# Patient Record
Sex: MALE | Race: BLACK OR AFRICAN AMERICAN | NOT HISPANIC OR LATINO | Employment: OTHER | URBAN - METROPOLITAN AREA
[De-identification: names, ages, dates, MRNs, and addresses within clinical notes are randomized per-mention and may not be internally consistent; named-entity substitution may affect disease eponyms.]

---

## 2017-03-13 ENCOUNTER — ALLSCRIPTS OFFICE VISIT (OUTPATIENT)
Dept: OTHER | Facility: OTHER | Age: 77
End: 2017-03-13

## 2017-03-13 DIAGNOSIS — N18.30 CHRONIC KIDNEY DISEASE, STAGE III (MODERATE) (HCC): ICD-10-CM

## 2017-06-21 ENCOUNTER — APPOINTMENT (OUTPATIENT)
Dept: LAB | Facility: CLINIC | Age: 77
End: 2017-06-21
Payer: MEDICARE

## 2017-06-21 ENCOUNTER — ALLSCRIPTS OFFICE VISIT (OUTPATIENT)
Dept: OTHER | Facility: OTHER | Age: 77
End: 2017-06-21

## 2017-06-21 DIAGNOSIS — G40.909 EPILEPSY WITHOUT STATUS EPILEPTICUS, NOT INTRACTABLE (HCC): ICD-10-CM

## 2017-06-21 LAB
ALBUMIN SERPL BCP-MCNC: 3.1 G/DL (ref 3.5–5)
ALP SERPL-CCNC: 75 U/L (ref 46–116)
ALT SERPL W P-5'-P-CCNC: 15 U/L (ref 12–78)
ANION GAP SERPL CALCULATED.3IONS-SCNC: 5 MMOL/L (ref 4–13)
AST SERPL W P-5'-P-CCNC: 14 U/L (ref 5–45)
BILIRUB SERPL-MCNC: 0.4 MG/DL (ref 0.2–1)
BUN SERPL-MCNC: 22 MG/DL (ref 5–25)
CALCIUM SERPL-MCNC: 8.8 MG/DL (ref 8.3–10.1)
CHLORIDE SERPL-SCNC: 102 MMOL/L (ref 100–108)
CO2 SERPL-SCNC: 31 MMOL/L (ref 21–32)
CREAT SERPL-MCNC: 1.79 MG/DL (ref 0.6–1.3)
GFR SERPL CREATININE-BSD FRML MDRD: 45.2 ML/MIN/1.73SQ M
GLUCOSE SERPL-MCNC: 161 MG/DL (ref 65–140)
POTASSIUM SERPL-SCNC: 4.4 MMOL/L (ref 3.5–5.3)
PROT SERPL-MCNC: 7.5 G/DL (ref 6.4–8.2)
SODIUM SERPL-SCNC: 138 MMOL/L (ref 136–145)
VALPROATE SERPL-MCNC: 89 UG/ML (ref 50–100)

## 2017-06-21 PROCEDURE — 80053 COMPREHEN METABOLIC PANEL: CPT

## 2017-06-21 PROCEDURE — 80164 ASSAY DIPROPYLACETIC ACD TOT: CPT

## 2017-06-21 PROCEDURE — 36415 COLL VENOUS BLD VENIPUNCTURE: CPT

## 2017-10-10 ENCOUNTER — GENERIC CONVERSION - ENCOUNTER (OUTPATIENT)
Dept: OTHER | Facility: OTHER | Age: 77
End: 2017-10-10

## 2017-10-10 ENCOUNTER — GENERIC CONVERSION - ENCOUNTER (OUTPATIENT)
Dept: NEPHROLOGY | Facility: CLINIC | Age: 77
End: 2017-10-10

## 2017-10-10 DIAGNOSIS — N18.30 CHRONIC KIDNEY DISEASE, STAGE III (MODERATE) (HCC): ICD-10-CM

## 2017-10-17 ENCOUNTER — HOSPITAL ENCOUNTER (OUTPATIENT)
Dept: NON INVASIVE DIAGNOSTICS | Facility: CLINIC | Age: 77
Discharge: HOME | End: 2017-10-17
Payer: MEDICARE

## 2017-10-17 DIAGNOSIS — I65.23 OCCLUSION AND STENOSIS OF BILATERAL CAROTID ARTERIES: ICD-10-CM

## 2017-10-17 PROCEDURE — 93880 EXTRACRANIAL BILAT STUDY: CPT

## 2017-12-18 ENCOUNTER — TRANSCRIBE ORDERS (OUTPATIENT)
Dept: ADMINISTRATIVE | Facility: HOSPITAL | Age: 77
End: 2017-12-18

## 2017-12-18 ENCOUNTER — ALLSCRIPTS OFFICE VISIT (OUTPATIENT)
Dept: OTHER | Facility: OTHER | Age: 77
End: 2017-12-18

## 2017-12-18 DIAGNOSIS — G91.2 IDIOPATHIC NORMAL PRESSURE HYDROCEPHALUS (HCC): ICD-10-CM

## 2017-12-18 DIAGNOSIS — G91.2 LOW PRESSURE HYDROCEPHALUS (HCC): Primary | ICD-10-CM

## 2017-12-19 NOTE — PROGRESS NOTES
Assessment  1  CVA (cerebral vascular accident) (434 91) (I63 9)   2  Carotid stenosis, bilateral (433 10,433 30) (I65 23)   3  Seizure disorder (345 90) (G40 909)   4  NPH (normal pressure hydrocephalus) (331 5) (G91 2)    Plan  NPH (normal pressure hydrocephalus)    · Continue with our present treatment plan ; Status:Complete;   Done: [id number]   Ordered;For:NPH (normal pressure hydrocephalus); Ordered By:Wiley Abdul;   · * MRI BRAIN WO CONTRAST; Status:Need Information - Financial Authorization; Requested for:[id number];    Perform:[facility] Radiology; Due:47Hpx7908; Last Updated By:Jamal Calero; 12/18/2017 10:55:51 AM;Ordered;For:NPH (normal pressure hydrocephalus); Ordered By:Wiley Abdul;   · Follow-up visit in 6 months Evaluation and Treatment  Follow-up  Status: Complete Done: 44IWA7068   Ordered; For: NPH (normal pressure hydrocephalus); Ordered By: Violetta Howard Performed:  Due: 90HQZ3129; Last Updated By: Lyndsey Lainez; 12/18/2017 10:56:04 AM  Seizure disorder    · Divalproex Sodium 500 MG Oral Tablet Delayed Release; take 2 tablets twice aday   Rx By: Violetta Howard; Dispense: 90 Days ; #:360 Tablet Delayed Release; Refill: 3;For: Seizure disorder; YASMEEN = N; Verified Transmission to [employer]; Last Updated By: [employer]; 12/18/2017 10:52:47 AM    Discussion/Summary  Discussion Summary:   Patient with a history of seizure disorder, lacunar CVAs has been stable on his current medications  He is advised to continue the same  Patient will also have a repeat MRI of the brain done this year due to suspected normal pressure hydrocephalus  Continue Depakote 1000 mg twice a day, patient will return back to see me in 3 months  Chief Complaint  Chief Complaint Free Text Note Form: Patient is here for follow up visit for his history of CVA and seizure disorder        History of Present Illness  HPI: Patient is here for a follow-up visit with a history of seizure disorder, CVA and suspected normal pressure hydrocephalus  Since his last visit he has not had any subsequent seizures and remains on Depakote 1000 mg twice a day  His most recent Depakote level was 89 and the patient denies any side effects from the medication  Patient had an MRI of the brain last year which showed evidence of suspected normal pressure hydrocephalus with bilateral lacunar basal ganglia CVAs  He continues to have bladder dysfunction, gait dysfunction and occasionally his wife has noticed him to have some visual hallucinations  Patient denies any central neurological symptoms such as headaches blurred vision slurred speech and ambulates with the help of a cane  Review of Systems  Neurological ROS:  Constitutional: no fever, no chills, no recent weight gain, no recent weight loss, no complaints of feeling tired, no changes in appetite  HEENT:  no sinus problems, not feeling congested, no blurred vision, no dryness of the eyes, no eye pain, no hearing loss, no tinnitus, no mouth sores, no sore throat, no hoarseness, no dysphagia, no masses, no bleeding  Cardiovascular:  no chest pain or pressure, no palpitations present, the heart rate was not rapid or irregular, no swelling in the arms or legs, no poor circulation  Respiratory:  no unusual or persistant cough, no shortness of breath with or without exertion  Gastrointestinal:  no nausea, no vomiting, no diarrhea, no abdominal pain, no changes in bowel habits, no melena, no loss of bowel control  Genitourinary:  no incontinence, no feelings of urinary urgency, no increase in frequency, no urinary hesitancy, no dysuria, no hematuria  Musculoskeletal: head/neck/back pain  Integumentary  no masses, no rash, no skin lesions, no livedo reticularis  Psychiatric:  no anxiety, no depression, no mood swings, no psychiatric hospitalizations, no sleep problems  Endocrine   no unusual weight loss or gain, no excessive urination, no excessive thirst, no hair loss or gain, no hot or cold intolerance, no menstrual period change or irregularity, no loss of sexual ability or drive, no erection difficulty, no nipple discharge  Hematologic/Lymphatic:  no unusual bleeding, no tendency for easy bruising, no clotting skin or lumps  Neurological General:  no headache, no nausea or vomiting, no lightheadedness, no convulsions, no blackouts, no syncope, no trauma, no photopsia, no increased sleepiness, no trouble falling asleep, no snoring, no awakening at night  Neurological Mental Status:  no confusion, no mood swings, no alteration or loss of consciousness, no difficulty expressing/understanding speech, no memory problems  Neurological Cranial Nerves:  no blurry or double vision, no loss of vision, no face drooping, no facial numbness or weakness, no taste or smell loss/changes, no hearing loss or ringing, no vertigo or dizziness, no dysphagia, no slurred speech  Neurological Motor findings include:  no tremor, no twitching, no cramping(pre/post exercise), no atrophy  Neurological Coordination:  no unsteadiness, no vertigo or dizziness, no clumsiness, no problems reaching for objects  Neurological Sensory:  no numbness, no pain, no tingling, does not fall when eyes closed or taking a shower  Neurological Gait:  no difficulty walking, not falling to one side, no sensation of being pushed, has not had falls  ROS Reviewed:   ROS reviewed  Active Problems  1  Abnormal MRA, brain (793 0) (R90 89)   2  Acute renal failure syndrome (584 9) (N17 9)   3  Cardiac arrhythmia (427 9) (I49 9)   4  Carotid stenosis, bilateral (433 10,433 30) (I65 23)   5  Chronic kidney disease, stage 3 (585 3) (N18 3)   6  CVA (cerebral vascular accident) (434 91) (I63 9)   7  Diabetes (250 00) (E11 9)   8  Hyperosmolality and/or hypernatremia (276 0) (E87 0)   9  Hypertension (401 9) (I10)   10  Lymphedema of both lower extremities (457 1) (I89 0)   11  Memory loss (780 93) (R41 3)   12  Seizure disorder (345 90) (G40 909)    Past Medical History  1  History of Epileptic seizures (345 90) (G40 909)   2  History of alcohol abuse (305 03) (Z87 898)   3  History of diabetes mellitus (V12 29) (Z86 39)   4  History of kidney disease (V13 09) (Z87 448)   5  History of stroke (V12 54) (Z86 73)    Surgical History  1  History of Cataract Surgery   2  History of Umbilical Hernia Repair    Family History  Mother    1  Family history of   Father    2  Family history of   Brother    3  Family history of cerebrovascular accident (CVA) (V17 1) (Z82 3)   4  Family history of diabetes mellitus (V18 0) (Z83 3)    Social History   · Active advance directive (V4 89) (Z78 9)   · Caffeine use (V4 89) (F15 90)   · Employed   · Exercises occasionally (V49 89) (Z78 9)   · Former smoker (H34 62) (B47 139)   ·    · Never used chewing tobacco (V4 89) (Z78 9)   · Retired   · Two children  Social History Reviewed: The social history was reviewed and updated today  Current Meds   1  Aspirin 325 MG Oral Tablet; Take 1 tablet daily; Therapy: (Recorded:2016) to Recorded   2  Atorvastatin Calcium 20 MG Oral Tablet; TAKE 1 TABLET DAILY; Therapy: (Recorded:50Nfh0075) to Recorded   3  Brimonidine Tartrate 0 1 % SOLN; INSTILL 1 DROP IN BOTH EYES EVERY 12 HOURS DAILY; Therapy: (Recorded:2016) to Recorded   4  Divalproex Sodium 500 MG Oral Tablet Delayed Release; take 2 tablets twice a day; Therapy: 72OHM3718 to (Last Rx:2017)  Requested for: 42ZJZ5882 Ordered   5  Dronedarone HCl 400 MG TABS; Take 1 tablet twice daily; Therapy: (Recorded:2016) to Recorded   6  Ferrous Sulfate 325 (65 Fe) MG Oral Tablet; Take 1 tablet twice daily; Therapy: (Recorded:07Dmp5224) to Recorded   7  Folic Acid 1 MG Oral Tablet; TAKE 1 TABLET DAILY; Therapy: (Narayan Rene) to Recorded   8  Furosemide 40 MG Oral Tablet; Take 1 tablet daily; Therapy: (Recorded:2016) to Recorded   9   Klor-Con M20 20 MEQ Oral Tablet Extended Release; TAKE 1 TABLET DAILY; Therapy: (Recorded:02Nov2016) to Recorded   10  Lantus SoloStar 100 UNIT/ML Subcutaneous Solution Pen-injector; INJECT 36 UNIT  Daily; Therapy: (Recorded:24Dmg3391) to Recorded   11  Metoprolol Tartrate 50 MG Oral Tablet; TAKE 1 TABLET EVERY 12 HOURS; Therapy: (Recorded:02Nov2016) to Recorded   12  Multivitamins TABS; TAKE 1 TABLET DAILY; Therapy: (Recorded:02Nov2016) to Recorded   13  Onglyza 5 MG Oral Tablet; Take 1 tablet daily; Therapy: (Recorded:02Nov2016) to Recorded   14  Travatan Z 0 004 % Ophthalmic Solution; INSTILL 1 DROP Twice daily BOTH EYES;  Therapy: (Recorded:21Jun2017) to Recorded  Medication List Reviewed: The medication list was reviewed and updated today  Allergies  1  Vasotec TABS  2  No Known Environmental Allergies   3  No Known Food Allergies    Vitals  Signs   Recorded: 15DPI1292 10:30AM   Heart Rate: 60  Systolic: 110  Diastolic: 80  Height: 5 ft 8 in  Weight: 268 lb   BMI Calculated: 40 75  BSA Calculated: 2 31    Physical Exam   Constitutional  General appearance: No acute distress, well appearing and well nourished  Musculoskeletal  Gait and station: Abnormal  -- Patient ambulates with the help of a cane, overall gait slowness noted, but his gait is normal based  Muscle strength: Normal strength throughout  Muscle tone: No atrophy, abnormal movements, flaccidity, cogwheeling or spasticity  Neurologic  Orientation to person, place, and time: Normal    Language: Names objects, able to repeat phrases and speaks spontaneously  -- Mild hypophonia noted  2nd cranial nerve: Normal    3rd, 4th, and 6th cranial nerves: Normal    7th cranial nerve: Normal    Sensation: Normal    Reflexes: Normal    Coordination: Normal        Future Appointments    Date/Time Provider Specialty Site   04/12/2018 10:30 AM HAMILTON Barreto  Nephrology [address] [facility]     Signatures   Electronically signed by :  Lula Mascorro MD; Dec 18 2017 11:06AM EST                       (Author)

## 2018-01-12 ENCOUNTER — HOSPITAL ENCOUNTER (OUTPATIENT)
Dept: MRI IMAGING | Facility: CLINIC | Age: 78
Discharge: HOME | End: 2018-01-12
Payer: MEDICARE

## 2018-01-12 DIAGNOSIS — G91.2 IDIOPATHIC NORMAL PRESSURE HYDROCEPHALUS (HCC): ICD-10-CM

## 2018-01-12 PROCEDURE — 70551 MRI BRAIN STEM W/O DYE: CPT

## 2018-01-14 VITALS
TEMPERATURE: 97.7 F | SYSTOLIC BLOOD PRESSURE: 110 MMHG | RESPIRATION RATE: 16 BRPM | HEIGHT: 68 IN | WEIGHT: 278.25 LBS | BODY MASS INDEX: 42.17 KG/M2 | DIASTOLIC BLOOD PRESSURE: 70 MMHG | HEART RATE: 78 BPM

## 2018-01-14 VITALS
HEART RATE: 62 BPM | BODY MASS INDEX: 41.22 KG/M2 | DIASTOLIC BLOOD PRESSURE: 76 MMHG | HEIGHT: 68 IN | SYSTOLIC BLOOD PRESSURE: 138 MMHG | WEIGHT: 272 LBS

## 2018-01-14 VITALS — BODY MASS INDEX: 41.07 KG/M2 | TEMPERATURE: 98.2 F | WEIGHT: 271 LBS | HEIGHT: 68 IN

## 2018-01-17 NOTE — MISCELLANEOUS
Dear Mr Salomon: We missed you for your originally scheduled neurological followup appointment with Dr Joce Bond  Please call at your earliest convenience to reschedule this appointment      Sincerely,     Jovita Lafleur [address]           Electronically signed by:Sarah Chicas   Feb 1 2016 11:47PM EST Author

## 2018-01-22 VITALS — DIASTOLIC BLOOD PRESSURE: 70 MMHG | HEART RATE: 64 BPM | RESPIRATION RATE: 16 BRPM | SYSTOLIC BLOOD PRESSURE: 120 MMHG

## 2018-01-23 VITALS
BODY MASS INDEX: 40.62 KG/M2 | HEART RATE: 60 BPM | WEIGHT: 268 LBS | HEIGHT: 68 IN | DIASTOLIC BLOOD PRESSURE: 80 MMHG | SYSTOLIC BLOOD PRESSURE: 130 MMHG

## 2018-04-30 ENCOUNTER — APPOINTMENT (OUTPATIENT)
Dept: LAB | Facility: CLINIC | Age: 78
End: 2018-04-30
Payer: MEDICARE

## 2018-04-30 DIAGNOSIS — N18.30 CHRONIC KIDNEY DISEASE, STAGE III (MODERATE) (HCC): ICD-10-CM

## 2018-04-30 LAB
ANION GAP SERPL CALCULATED.3IONS-SCNC: 4 MMOL/L (ref 4–13)
BILIRUB UR QL STRIP: NEGATIVE
BUN SERPL-MCNC: 22 MG/DL (ref 5–25)
CALCIUM SERPL-MCNC: 8.5 MG/DL (ref 8.3–10.1)
CHLORIDE SERPL-SCNC: 106 MMOL/L (ref 100–108)
CLARITY UR: CLEAR
CO2 SERPL-SCNC: 31 MMOL/L (ref 21–32)
COLOR UR: YELLOW
CREAT SERPL-MCNC: 1.72 MG/DL (ref 0.6–1.3)
CREAT UR-MCNC: 28.9 MG/DL
ERYTHROCYTE [DISTWIDTH] IN BLOOD BY AUTOMATED COUNT: 15.1 % (ref 11.6–15.1)
GFR SERPL CREATININE-BSD FRML MDRD: 44 ML/MIN/1.73SQ M
GLUCOSE P FAST SERPL-MCNC: 99 MG/DL (ref 65–99)
GLUCOSE UR STRIP-MCNC: NEGATIVE MG/DL
HCT VFR BLD AUTO: 41 % (ref 36.5–49.3)
HGB BLD-MCNC: 13.2 G/DL (ref 12–17)
HGB UR QL STRIP.AUTO: NEGATIVE
KETONES UR STRIP-MCNC: NEGATIVE MG/DL
LEUKOCYTE ESTERASE UR QL STRIP: NEGATIVE
MCH RBC QN AUTO: 30.9 PG (ref 26.8–34.3)
MCHC RBC AUTO-ENTMCNC: 32.2 G/DL (ref 31.4–37.4)
MCV RBC AUTO: 96 FL (ref 82–98)
NITRITE UR QL STRIP: NEGATIVE
PH UR STRIP.AUTO: 6 [PH] (ref 4.5–8)
PHOSPHATE SERPL-MCNC: 3.9 MG/DL (ref 2.3–4.1)
PLATELET # BLD AUTO: 137 THOUSANDS/UL (ref 149–390)
PMV BLD AUTO: 11.8 FL (ref 8.9–12.7)
POTASSIUM SERPL-SCNC: 4.2 MMOL/L (ref 3.5–5.3)
PROT UR STRIP-MCNC: NEGATIVE MG/DL
PROT UR-MCNC: <6 MG/DL
PROT/CREAT UR: <0.21 MG/G{CREAT} (ref 0–0.1)
PTH-INTACT SERPL-MCNC: 92.3 PG/ML (ref 18.4–80.1)
RBC # BLD AUTO: 4.27 MILLION/UL (ref 3.88–5.62)
SODIUM SERPL-SCNC: 141 MMOL/L (ref 136–145)
SP GR UR STRIP.AUTO: 1.01 (ref 1–1.03)
UROBILINOGEN UR QL STRIP.AUTO: 0.2 E.U./DL
WBC # BLD AUTO: 5.27 THOUSAND/UL (ref 4.31–10.16)

## 2018-04-30 PROCEDURE — 80048 BASIC METABOLIC PNL TOTAL CA: CPT

## 2018-04-30 PROCEDURE — 84156 ASSAY OF PROTEIN URINE: CPT

## 2018-04-30 PROCEDURE — 84100 ASSAY OF PHOSPHORUS: CPT

## 2018-04-30 PROCEDURE — 82570 ASSAY OF URINE CREATININE: CPT

## 2018-04-30 PROCEDURE — 83970 ASSAY OF PARATHORMONE: CPT

## 2018-04-30 PROCEDURE — 81003 URINALYSIS AUTO W/O SCOPE: CPT

## 2018-04-30 PROCEDURE — 36415 COLL VENOUS BLD VENIPUNCTURE: CPT

## 2018-04-30 PROCEDURE — 85027 COMPLETE CBC AUTOMATED: CPT

## 2018-04-30 RX ORDER — TRAVOPROST OPHTHALMIC SOLUTION 0.04 MG/ML
1 SOLUTION OPHTHALMIC
COMMUNITY

## 2018-04-30 RX ORDER — SIMVASTATIN 10 MG
1 TABLET ORAL
COMMUNITY
End: 2018-08-07 | Stop reason: ALTCHOICE

## 2018-04-30 RX ORDER — POTASSIUM CHLORIDE 20 MEQ/1
1 TABLET, EXTENDED RELEASE ORAL DAILY
COMMUNITY
End: 2020-03-20 | Stop reason: HOSPADM

## 2018-04-30 RX ORDER — FERROUS SULFATE 325(65) MG
1 TABLET ORAL 2 TIMES DAILY
COMMUNITY
End: 2019-05-13 | Stop reason: ALTCHOICE

## 2018-04-30 RX ORDER — ASPIRIN 325 MG
1 TABLET ORAL DAILY
COMMUNITY
End: 2018-08-07 | Stop reason: ALTCHOICE

## 2018-04-30 RX ORDER — DIVALPROEX SODIUM 500 MG/1
2 TABLET, DELAYED RELEASE ORAL 2 TIMES DAILY
COMMUNITY
Start: 2016-05-04 | End: 2018-08-07 | Stop reason: SDUPTHER

## 2018-04-30 RX ORDER — INSULIN GLARGINE 100 [IU]/ML
30 INJECTION, SOLUTION SUBCUTANEOUS DAILY
COMMUNITY
End: 2019-12-29 | Stop reason: HOSPADM

## 2018-04-30 RX ORDER — FOLIC ACID 1 MG/1
1 TABLET ORAL DAILY
Status: ON HOLD | COMMUNITY
End: 2020-03-20 | Stop reason: SDUPTHER

## 2018-04-30 RX ORDER — ATORVASTATIN CALCIUM 20 MG/1
1 TABLET, FILM COATED ORAL DAILY
Status: ON HOLD | COMMUNITY
End: 2020-03-20 | Stop reason: SDUPTHER

## 2018-04-30 RX ORDER — FUROSEMIDE 40 MG/1
1 TABLET ORAL DAILY
COMMUNITY
End: 2018-05-02 | Stop reason: SDUPTHER

## 2018-04-30 RX ORDER — METOPROLOL TARTRATE 50 MG/1
1 TABLET, FILM COATED ORAL EVERY 12 HOURS
COMMUNITY
End: 2019-12-29 | Stop reason: HOSPADM

## 2018-05-02 ENCOUNTER — OFFICE VISIT (OUTPATIENT)
Dept: NEPHROLOGY | Facility: CLINIC | Age: 78
End: 2018-05-02
Payer: MEDICARE

## 2018-05-02 VITALS
SYSTOLIC BLOOD PRESSURE: 120 MMHG | DIASTOLIC BLOOD PRESSURE: 80 MMHG | BODY MASS INDEX: 39.06 KG/M2 | HEIGHT: 71 IN | HEART RATE: 78 BPM | WEIGHT: 279 LBS | TEMPERATURE: 97.7 F | RESPIRATION RATE: 16 BRPM

## 2018-05-02 DIAGNOSIS — R56.9 CONVULSIONS, UNSPECIFIED CONVULSION TYPE (HCC): ICD-10-CM

## 2018-05-02 DIAGNOSIS — N18.30 CHRONIC RENAL INSUFFICIENCY, STAGE III (MODERATE) (HCC): Primary | ICD-10-CM

## 2018-05-02 DIAGNOSIS — I48.92 PAROXYSMAL ATRIAL FLUTTER (HCC): ICD-10-CM

## 2018-05-02 DIAGNOSIS — D69.3 CHRONIC ITP (IDIOPATHIC THROMBOCYTOPENIA) (HCC): ICD-10-CM

## 2018-05-02 DIAGNOSIS — N18.30 TYPE 2 DIABETES MELLITUS WITH STAGE 3 CHRONIC KIDNEY DISEASE, WITH LONG-TERM CURRENT USE OF INSULIN (HCC): ICD-10-CM

## 2018-05-02 DIAGNOSIS — I50.32 CHRONIC DIASTOLIC HEART FAILURE (HCC): ICD-10-CM

## 2018-05-02 DIAGNOSIS — Z79.4 TYPE 2 DIABETES MELLITUS WITH STAGE 3 CHRONIC KIDNEY DISEASE, WITH LONG-TERM CURRENT USE OF INSULIN (HCC): ICD-10-CM

## 2018-05-02 DIAGNOSIS — I10 BENIGN ESSENTIAL HYPERTENSION: ICD-10-CM

## 2018-05-02 DIAGNOSIS — E11.22 TYPE 2 DIABETES MELLITUS WITH STAGE 3 CHRONIC KIDNEY DISEASE, WITH LONG-TERM CURRENT USE OF INSULIN (HCC): ICD-10-CM

## 2018-05-02 PROCEDURE — 99213 OFFICE O/P EST LOW 20 MIN: CPT | Performed by: INTERNAL MEDICINE

## 2018-05-02 RX ORDER — FUROSEMIDE 40 MG/1
40 TABLET ORAL DAILY
Qty: 90 TABLET | Refills: 2 | Status: SHIPPED | OUTPATIENT
Start: 2018-05-02 | End: 2018-05-02 | Stop reason: SDUPTHER

## 2018-05-02 RX ORDER — FUROSEMIDE 40 MG/1
40 TABLET ORAL DAILY
Qty: 90 TABLET | Refills: 0 | Status: SHIPPED | OUTPATIENT
Start: 2018-05-02 | End: 2018-07-30 | Stop reason: SDUPTHER

## 2018-05-02 NOTE — LETTER
May 2, 2018     Camila Da Silva MD  [address] [place] [id number]    Patient: Yulissa Denson   YOB: 1940   Date of Visit: 5/2/2018       Dear Dr Milagro Morales: Thank you for referring Aster Longoria to me for evaluation  Below are my notes for this consultation  If you have questions, please do not hesitate to call me  I look forward to following your patient along with you  Sincerely,        Destiney Neal MD        CC: WilMD Destiney Senior MD  5/2/2018  3:04 PM  Incomplete  NEPHROLOGY OFFICE FOLLOW UP  Yulissa Denson 78 y o  male MRN: [id number]    Encounter: [id number] 5/2/2018    REASON FOR VISIT: Yulissa Denson is a 78 y o  male who is here on 5/2/2018 for Follow-up and CKD III       HPI:    HPI    REVIEW OF SYSTEMS:    Review of Systems   Constitutional: Positive for fatigue  Negative for activity change  HENT: Negative for congestion and ear discharge  Eyes: Negative for photophobia and pain  Respiratory: Negative for apnea and choking  Cardiovascular: Negative for chest pain and palpitations  Gastrointestinal: Negative for abdominal distention and blood in stool  Endocrine: Negative for heat intolerance and polyphagia  Genitourinary: Negative for flank pain and urgency  Musculoskeletal: Positive for arthralgias, back pain and joint swelling  Negative for neck pain and neck stiffness  Skin: Negative for color change and wound  Allergic/Immunologic: Negative for food allergies and immunocompromised state  Neurological: Positive for weakness  Negative for seizures and facial asymmetry  Hematological: Negative for adenopathy  Does not bruise/bleed easily  Psychiatric/Behavioral: Negative for self-injury and suicidal ideas           PAST MEDICAL HISTORY:  Past Medical History:   Diagnosis Date    Cerebrovascular disease     Chronic kidney disease     Diabetes mellitus ([facility] [address] )     Hyperlipidemia     Hypertension        PAST SURGICAL HISTORY:  Past Surgical History:   Procedure Laterality Date    HERNIA REPAIR         SOCIAL HISTORY:  History   Alcohol Use    Yes     History   Drug use: Unknown     History   Smoking Status    Former Smoker   Smokeless Tobacco    Never Used       FAMILY HISTORY:  Family History   Problem Relation Age of Onset    Hypertension Father     Hypertension Brother     Diabetes Brother        MEDICATIONS:    Current Outpatient Prescriptions:     aspirin 325 mg tablet, Take 1 tablet by mouth daily, Disp: , Rfl:     atorvastatin (LIPITOR) 20 mg tablet, Take 1 tablet by mouth daily, Disp: , Rfl:     brimonidine (ALPHAGAN P) 0 1 %, Apply 1 drop to eye every 12 (twelve) hours, Disp: , Rfl:     divalproex sodium (DEPAKOTE) 500 mg EC tablet, Take 2 tablets by mouth 2 (two) times a day, Disp: , Rfl:     dronedarone (MULTAQ) 400 mg tablet, Take 1 tablet by mouth 2 (two) times a day, Disp: , Rfl:     ferrous sulfate 325 (65 Fe) mg tablet, Take 1 tablet by mouth 2 (two) times a day, Disp: , Rfl:     folic acid (FOLVITE) 1 mg tablet, Take 1 tablet by mouth daily, Disp: , Rfl:     furosemide (LASIX) 40 mg tablet, Take 1 tablet by mouth daily, Disp: , Rfl:     insulin glargine (LANTUS SOLOSTAR) injection pen 100 units/mL, Inject 36 Units under the skin daily, Disp: , Rfl:     metoprolol tartrate (LOPRESSOR) 50 mg tablet, Take 1 tablet by mouth every 12 (twelve) hours, Disp: , Rfl:     Multiple Vitamin (MULTIVITAMINS PO), Take 1 tablet by mouth daily, Disp: , Rfl:     potassium chloride (KLOR-CON M20) 20 mEq tablet, Take 1 tablet by mouth daily, Disp: , Rfl:     saxagliptin (ONGLYZA) 5 MG tablet, Take 1 tablet by mouth daily, Disp: , Rfl:     simvastatin (ZOCOR) 10 mg tablet, Take 1 tablet by mouth, Disp: , Rfl:     travoprost (TRAVATAN Z) 0 004 % ophthalmic solution, Apply to eye Twice daily, Disp: , Rfl:     PHYSICAL EXAM:  Vitals:    05/02/18 1444   Temp: 97 7 °F (36 5 °C)   TempSrc: Tympanic   Weight: 127 kg (279 lb)   Height: 5' 11" (1 803 m)     Body mass index is 38 91 kg/m²  Physical Exam   Constitutional: He is oriented to person, place, and time  He appears well-developed  No distress  HENT:   Head: Normocephalic  Mouth/Throat: Oropharynx is clear and moist    Eyes: Conjunctivae are normal  Pupils are equal, round, and reactive to light  No scleral icterus  Neck: Neck supple  No JVD present  Cardiovascular: Normal rate, regular rhythm and normal heart sounds  Pulmonary/Chest: Effort normal and breath sounds normal  He has no wheezes  He has no rales  Abdominal: Soft  There is no tenderness  Musculoskeletal: Normal range of motion  He exhibits edema  Neurological: He is alert and oriented to person, place, and time  Skin: Skin is warm  No rash noted  Psychiatric: He has a normal mood and affect   His behavior is normal        LAB RESULTS:  Results for orders placed or performed in visit on 68/90/41   Basic metabolic panel   Result Value Ref Range    Sodium 141 136 - 145 mmol/L    Potassium 4 2 3 5 - 5 3 mmol/L    Chloride 106 100 - 108 mmol/L    CO2 31 21 - 32 mmol/L    Anion Gap 4 4 - 13 mmol/L    BUN 22 5 - 25 mg/dL    Creatinine 1 72 (H) 0 60 - 1 30 mg/dL    Glucose, Fasting 99 65 - 99 mg/dL    Calcium 8 5 8 3 - 10 1 mg/dL    eGFR 44 ml/min/1 73sq m   CBC   Result Value Ref Range    WBC 5 27 4 31 - 10 16 Thousand/uL    RBC 4 27 3 88 - 5 62 Million/uL    Hemoglobin 13 2 12 0 - 17 0 g/dL    Hematocrit 41 0 36 5 - 49 3 %    MCV 96 82 - 98 fL    MCH 30 9 26 8 - 34 3 pg    MCHC 32 2 31 4 - 37 4 g/dL    RDW 15 1 11 6 - 15 1 %    Platelets 483 (L) 935 - 390 Thousands/uL    MPV 11 8 8 9 - 12 7 fL   Phosphorus   Result Value Ref Range    Phosphorus 3 9 2 3 - 4 1 mg/dL   PTH, intact   Result Value Ref Range    PTH 92 3 (H) 18 4 - 80 1 pg/mL   Urinalysis with reflex to microscopic   Result Value Ref Range    Color, UA Yellow     Clarity, UA Clear     Specific [place], UA 1 010 1 003 - 1 030    pH, UA 6 0 4 5 - 8 0    Leukocytes, UA Negative Negative    Nitrite, UA Negative Negative    Protein, UA Negative Negative mg/dl    Glucose, UA Negative Negative mg/dl    Ketones, UA Negative Negative mg/dl    Urobilinogen, UA 0 2 0 2, 1 0 E U /dl E U /dl    Bilirubin, UA Negative Negative    Blood, UA Negative Negative   Protein / creatinine ratio, urine   Result Value Ref Range    Creatinine, Ur 28 9 mg/dL    Protein Urine Random <6 mg/dL    Prot/Creat Ratio, Ur <0 21 (H) 0 00 - 0 10       ASSESSMENT and PLAN:      No problem-specific Assessment & Plan notes found for this encounter  Portions of the record may have been created with voice recognition software  Occasional wrong word or "sound a like" substitutions may have occurred due to the inherent limitations of voice recognition software  Read the chart carefully and recognize, using context, where substitutions have occurred  If you have any questions, please contact the dictating provider

## 2018-05-02 NOTE — PROGRESS NOTES
NEPHROLOGY OFFICE FOLLOW UP  Rg De Oliveira 78 y o  male MRN: [id number]    Encounter: [id number] 5/2/2018    REASON FOR VISIT: Rg De Oliveira is a 78 y o  male who is here on 5/2/2018 for Follow-up and CKD III       HPI:    Tmai Edwards came in today for follow-up of CK D  he is gaining quite a bit of weight  According to his wife he is getting short of breath and cannot move much  He had a stroke so he has difficult to walk and walk with support  Patient denies any complaint        REVIEW OF SYSTEMS:    Review of Systems   Constitutional: Positive for fatigue  Negative for activity change  HENT: Negative for congestion and ear discharge  Eyes: Negative for photophobia and pain  Respiratory: Negative for apnea and choking  Cardiovascular: Negative for chest pain and palpitations  Gastrointestinal: Negative for abdominal distention and blood in stool  Endocrine: Negative for heat intolerance and polyphagia  Genitourinary: Negative for flank pain and urgency  Musculoskeletal: Positive for arthralgias, back pain and joint swelling  Negative for neck pain and neck stiffness  Skin: Negative for color change and wound  Allergic/Immunologic: Negative for food allergies and immunocompromised state  Neurological: Positive for weakness  Negative for seizures and facial asymmetry  Hematological: Negative for adenopathy  Does not bruise/bleed easily  Psychiatric/Behavioral: Negative for self-injury and suicidal ideas           PAST MEDICAL HISTORY:  Past Medical History:   Diagnosis Date    Cerebrovascular disease     Chronic kidney disease     Diabetes mellitus ([address] )     Hyperlipidemia     Hypertension        PAST SURGICAL HISTORY:  Past Surgical History:   Procedure Laterality Date    HERNIA REPAIR         SOCIAL HISTORY:  History   Alcohol Use    Yes     History   Drug use: Unknown     History   Smoking Status    Former Smoker   Smokeless Tobacco    Never Used       FAMILY HISTORY:  Family History Problem Relation Age of Onset    Hypertension Father     Hypertension Brother     Diabetes Brother        MEDICATIONS:    Current Outpatient Prescriptions:     aspirin 325 mg tablet, Take 1 tablet by mouth daily, Disp: , Rfl:     atorvastatin (LIPITOR) 20 mg tablet, Take 1 tablet by mouth daily, Disp: , Rfl:     brimonidine (ALPHAGAN P) 0 1 %, Apply 1 drop to eye every 12 (twelve) hours, Disp: , Rfl:     divalproex sodium (DEPAKOTE) 500 mg EC tablet, Take 2 tablets by mouth 2 (two) times a day, Disp: , Rfl:     dronedarone (MULTAQ) 400 mg tablet, Take 1 tablet by mouth 2 (two) times a day, Disp: , Rfl:     ferrous sulfate 325 (65 Fe) mg tablet, Take 1 tablet by mouth 2 (two) times a day, Disp: , Rfl:     folic acid (FOLVITE) 1 mg tablet, Take 1 tablet by mouth daily, Disp: , Rfl:     furosemide (LASIX) 40 mg tablet, Take 1 tablet by mouth daily Take one and half daily , Disp: , Rfl:     insulin glargine (LANTUS SOLOSTAR) injection pen 100 units/mL, Inject 36 Units under the skin daily, Disp: , Rfl:     metoprolol tartrate (LOPRESSOR) 50 mg tablet, Take 1 tablet by mouth every 12 (twelve) hours, Disp: , Rfl:     Multiple Vitamin (MULTIVITAMINS PO), Take 1 tablet by mouth daily, Disp: , Rfl:     potassium chloride (KLOR-CON M20) 20 mEq tablet, Take 1 tablet by mouth daily, Disp: , Rfl:     saxagliptin (ONGLYZA) 5 MG tablet, Take 1 tablet by mouth daily, Disp: , Rfl:     simvastatin (ZOCOR) 10 mg tablet, Take 1 tablet by mouth, Disp: , Rfl:     travoprost (TRAVATAN Z) 0 004 % ophthalmic solution, Apply to eye Twice daily, Disp: , Rfl:     PHYSICAL EXAM:  Vitals:    05/02/18 1444   BP: 120/80   BP Location: Right arm   Patient Position: Sitting   Pulse: 78   Resp: 16   Temp: 97 7 °F (36 5 °C)   TempSrc: Tympanic   Weight: 127 kg (279 lb)   Height: 5' 11" (1 803 m)     Body mass index is 38 91 kg/m²  Physical Exam   Constitutional: He is oriented to person, place, and time   He appears well-developed  No distress  HENT:   Head: Normocephalic  Mouth/Throat: Oropharynx is clear and moist    Eyes: Conjunctivae are normal  Pupils are equal, round, and reactive to light  No scleral icterus  Neck: Neck supple  No JVD present  Cardiovascular: Normal rate, regular rhythm and normal heart sounds  Pulmonary/Chest: Effort normal and breath sounds normal  He has no wheezes  He has no rales  Abdominal: Soft  There is no tenderness  Musculoskeletal: Normal range of motion  He exhibits edema  Neurological: He is alert and oriented to person, place, and time  Skin: Skin is warm  No rash noted  Psychiatric: He has a normal mood and affect   His behavior is normal        LAB RESULTS:  Results for orders placed or performed in visit on 59/21/63   Basic metabolic panel   Result Value Ref Range    Sodium 141 136 - 145 mmol/L    Potassium 4 2 3 5 - 5 3 mmol/L    Chloride 106 100 - 108 mmol/L    CO2 31 21 - 32 mmol/L    Anion Gap 4 4 - 13 mmol/L    BUN 22 5 - 25 mg/dL    Creatinine 1 72 (H) 0 60 - 1 30 mg/dL    Glucose, Fasting 99 65 - 99 mg/dL    Calcium 8 5 8 3 - 10 1 mg/dL    eGFR 44 ml/min/1 73sq m   CBC   Result Value Ref Range    WBC 5 27 4 31 - 10 16 Thousand/uL    RBC 4 27 3 88 - 5 62 Million/uL    Hemoglobin 13 2 12 0 - 17 0 g/dL    Hematocrit 41 0 36 5 - 49 3 %    MCV 96 82 - 98 fL    MCH 30 9 26 8 - 34 3 pg    MCHC 32 2 31 4 - 37 4 g/dL    RDW 15 1 11 6 - 15 1 %    Platelets 892 (L) 130 - 390 Thousands/uL    MPV 11 8 8 9 - 12 7 fL   Phosphorus   Result Value Ref Range    Phosphorus 3 9 2 3 - 4 1 mg/dL   PTH, intact   Result Value Ref Range    PTH 92 3 (H) 18 4 - 80 1 pg/mL   Urinalysis with reflex to microscopic   Result Value Ref Range    Color, UA Yellow     Clarity, UA Clear     Specific [place], UA 1 010 1 003 - 1 030    pH, UA 6 0 4 5 - 8 0    Leukocytes, UA Negative Negative    Nitrite, UA Negative Negative    Protein, UA Negative Negative mg/dl    Glucose, UA Negative Negative mg/dl Ketones, UA Negative Negative mg/dl    Urobilinogen, UA 0 2 0 2, 1 0 E U /dl E U /dl    Bilirubin, UA Negative Negative    Blood, UA Negative Negative   Protein / creatinine ratio, urine   Result Value Ref Range    Creatinine, Ur 28 9 mg/dL    Protein Urine Random <6 mg/dL    Prot/Creat Ratio, Ur <0 21 (H) 0 00 - 0 10       ASSESSMENT and PLAN:      Chronic renal insufficiency, stage III (moderate)  His kidney function is stable with GFR of 44  Asymptomatic and progressive nature was discussed with him and his wife with advised to avoid any nephrotoxic medicine of procedure I will repeat blood and urine test in 6 month and will see him back at that time    Benign essential hypertension  Very well control at this point with present medication    Chronic diastolic heart failure ([address] )  He is gaining weight and  short of breath and has leg swelling  I will increase his furosemide to 60 mg once a day with advised to monitor weight  He is going to seen by his cardiologist and primary doctor in couple of months        I will see him back in 6 months  Advised to go to emergency room or call cardiologist see breathing does not get better      Portions of the record may have been created with voice recognition software  Occasional wrong word or "sound a like" substitutions may have occurred due to the inherent limitations of voice recognition software  Read the chart carefully and recognize, using context, where substitutions have occurred  If you have any questions, please contact the dictating provider

## 2018-05-02 NOTE — PATIENT INSTRUCTIONS
Chronic Kidney Disease   AMBULATORY CARE:   Chronic kidney disease (CKD)  is the gradual and permanent loss of kidney function  Normally, the kidneys remove fluid, chemicals, and waste from your blood  These wastes are turned into urine by your kidneys  CKD may worsen over time and lead to kidney failure  Common symptoms include the following:   · Changes in how often you need to urinate    · Swelling in your arms, legs, or feet    · Shortness of breath    · Fatigue or weakness    · Bad or bitter taste in your mouth    · Nausea, vomiting, or loss of appetite  Seek care immediately if:   · You are confused and very drowsy  · You have a seizure  · You have shortness of breath  Contact your healthcare provider if:   · You suddenly gain or lose more weight than your healthcare provider has told you is okay  · You have itchy skin or a rash  · You urinate more or less than you normally do  · You have blood in your urine  · You have nausea and repeated vomiting  · You have fatigue or muscle weakness  · You have hiccups that will not stop  · You have questions or concerns about your condition or care  Treatment for CKD:  Medicines may be given to decrease blood pressure and get rid of extra fluid  You may also receive medicine to manage health conditions that may occur with CKD  Dialysis is a treatment to remove chemicals and waste from your blood when your kidneys can no longer do this  Surgery may be needed to create an arteriovenous fistula (AVF) in your arm or insert a catheter into your abdomen so that you can receive dialysis  A kidney transplant may be done if your CKD becomes severe  Manage CKD:   · Maintain a healthy weight  Ask your healthcare provider how much you should weigh  Ask him to help you create a weight loss plan if you are overweight  · Exercise 30 to 60 minutes a day, 4 to 7 times a week, or as directed  Ask about the best exercise plan for you   Regular exercise can help you manage CKD, high blood pressure, and diabetes  · Follow your healthcare provider's advice about what to eat and drink  He may tell you to eat food low in sodium (salt), potassium, phosphorus, or protein  You may need to see a dietitian if you need help planning meals  Ask how much liquid to drink each day and which liquids are best for you  · Limit alcohol  Ask how much alcohol is safe for you to drink  A drink of alcohol is 12 ounces of beer, 5 ounces of wine, or 1½ ounces of liquor  · Do not smoke  Nicotine and other chemicals in cigarettes and cigars can cause lung and kidney damage  Ask your healthcare provider for information if you currently smoke and need help to quit  E-cigarettes or smokeless tobacco still contain nicotine  Talk to your healthcare provider before you use these products  · Ask your healthcare provider if you need vaccines  Infections such as pneumonia, influenza, and hepatitis can be more harmful or more likely to occur in a person who has CKD  Vaccines reduce your risk of infection with these viruses  Follow up with your healthcare provider as directed:  Write down your questions so you remember to ask them during your visits  © 2017 [employer] Information is for End User's use only and may not be sold, redistributed or otherwise used for commercial purposes  All illustrations and images included in CareNotes® are the copyrighted property of [employer]  or [employer]  The above information is an  only  It is not intended as medical advice for individual conditions or treatments  Talk to your doctor, nurse or pharmacist before following any medical regimen to see if it is safe and effective for you

## 2018-05-02 NOTE — ASSESSMENT & PLAN NOTE
He is gaining weight and  short of breath and has leg swelling  I will increase his furosemide to 60 mg once a day with advised to monitor weight    He is going to seen by his cardiologist and primary doctor in couple of months

## 2018-05-03 ENCOUNTER — TELEPHONE (OUTPATIENT)
Dept: NEPHROLOGY | Facility: CLINIC | Age: 78
End: 2018-05-03

## 2018-05-03 NOTE — TELEPHONE ENCOUNTER
Ramón Leal, from [address] called and said she needs clarification on the dosage for Furosemide 40 mg  She said is it: take 1 tablet daily or take 1 1/2 tablet a day

## 2018-05-30 ENCOUNTER — TELEPHONE (OUTPATIENT)
Dept: NEUROLOGY | Facility: CLINIC | Age: 78
End: 2018-05-30

## 2018-05-30 NOTE — TELEPHONE ENCOUNTER
Patients wife stopped in, Caio Fraga was hospitalized on the 19th and 20th of May for disorientation, was told to follow up with Neurologist in a week put her in for an opening in August (1st available)  Can you please [place] (wife) [phone] to touch base

## 2018-05-30 NOTE — TELEPHONE ENCOUNTER
Called patient's wife discuss condition, patient probably had a TIA while he was in the hospital aspirin was switched to Plavix, none of his records are available, he has been doing well for the last 8 days since he is at home and she is instructed if he has any further symptoms to call me    Will keep appointment in August

## 2018-07-30 DIAGNOSIS — I50.32 CHRONIC DIASTOLIC HEART FAILURE (HCC): ICD-10-CM

## 2018-07-30 RX ORDER — FUROSEMIDE 40 MG/1
40 TABLET ORAL DAILY
Qty: 90 TABLET | Refills: 0 | Status: SHIPPED | OUTPATIENT
Start: 2018-07-30 | End: 2020-02-11 | Stop reason: ALTCHOICE

## 2018-08-02 RX ORDER — VALSARTAN AND HYDROCHLOROTHIAZIDE 320; 25 MG/1; MG/1
TABLET, FILM COATED ORAL
COMMUNITY
End: 2018-08-02

## 2018-08-02 RX ORDER — PIOGLITAZONEHYDROCHLORIDE 45 MG/1
TABLET ORAL
COMMUNITY
End: 2018-08-07 | Stop reason: ALTCHOICE

## 2018-08-02 RX ORDER — LIDOCAINE 50 MG/G
PATCH TOPICAL
COMMUNITY
End: 2018-08-07

## 2018-08-02 RX ORDER — LEVETIRACETAM 250 MG/1
TABLET ORAL
COMMUNITY
End: 2018-08-07

## 2018-08-02 RX ORDER — AMMONIUM LACTATE 12 G/100G
LOTION TOPICAL
COMMUNITY
Start: 2018-04-27 | End: 2019-02-07 | Stop reason: ALTCHOICE

## 2018-08-02 RX ORDER — HYDROCODONE BITARTRATE AND ACETAMINOPHEN 5; 300 MG/1; MG/1
TABLET ORAL
COMMUNITY
End: 2018-08-07 | Stop reason: ALTCHOICE

## 2018-08-02 RX ORDER — TADALAFIL 20 MG/1
TABLET ORAL
COMMUNITY
End: 2019-05-13 | Stop reason: ALTCHOICE

## 2018-08-02 RX ORDER — CEPHALEXIN 250 MG/1
CAPSULE ORAL
COMMUNITY
End: 2018-08-07 | Stop reason: ALTCHOICE

## 2018-08-02 RX ORDER — LACTIC ACID 10 %
LOTION (ML) TOPICAL
COMMUNITY
End: 2018-08-07

## 2018-08-07 ENCOUNTER — OFFICE VISIT (OUTPATIENT)
Dept: NEUROLOGY | Facility: CLINIC | Age: 78
End: 2018-08-07
Payer: MEDICARE

## 2018-08-07 VITALS
SYSTOLIC BLOOD PRESSURE: 132 MMHG | DIASTOLIC BLOOD PRESSURE: 70 MMHG | WEIGHT: 272 LBS | BODY MASS INDEX: 38.08 KG/M2 | HEART RATE: 56 BPM | HEIGHT: 71 IN

## 2018-08-07 DIAGNOSIS — G91.2 NPH (NORMAL PRESSURE HYDROCEPHALUS) (HCC): Primary | ICD-10-CM

## 2018-08-07 DIAGNOSIS — G40.909 SEIZURE DISORDER (HCC): ICD-10-CM

## 2018-08-07 PROCEDURE — 99213 OFFICE O/P EST LOW 20 MIN: CPT | Performed by: PSYCHIATRY & NEUROLOGY

## 2018-08-07 RX ORDER — AMIODARONE HYDROCHLORIDE 200 MG/1
200 TABLET ORAL EVERY MORNING
COMMUNITY
End: 2019-12-24

## 2018-08-07 RX ORDER — DIVALPROEX SODIUM 500 MG/1
1000 TABLET, DELAYED RELEASE ORAL 2 TIMES DAILY
Qty: 360 TABLET | Refills: 3 | Status: SHIPPED | OUTPATIENT
Start: 2018-08-07 | End: 2019-02-07 | Stop reason: SDUPTHER

## 2018-08-07 RX ORDER — CLOPIDOGREL BISULFATE 75 MG/1
75 TABLET ORAL DAILY
Status: ON HOLD | COMMUNITY
End: 2020-03-20 | Stop reason: SDUPTHER

## 2018-08-07 NOTE — PROGRESS NOTES
Progress Note - Neurology   Rose Cagle 78 y o  male MRN: [id number]  Unit/Bed#:  Encounter: [id number]      Subjective:   Patient is here for a follow-up visit accompanied with his wife and since his last visit he was hospitalized in May for a acute confusional episode, felt to be a TIA  Patient also has a history of seizure disorder, history of pontine CVA, history of gait dysfunction, diabetes and ambulates with the help of a walker  He has not had any subsequent confusional episodes and last December his Depakote was lowered to 1500 mg a day due to a Depakote level of 89 and low platelet count  Patient overall has been feeling well and denies any new neurological symptoms  ROS:   Review of Systems   Constitutional: Positive for fatigue  Negative for appetite change and fever  HENT: Negative  Negative for hearing loss, tinnitus, trouble swallowing and voice change  Eyes: Negative  Negative for photophobia and pain  Respiratory: Positive for choking and shortness of breath  Cardiovascular: Negative  Negative for palpitations  Gastrointestinal: Negative  Negative for abdominal pain, constipation, diarrhea, nausea and vomiting  Endocrine: Negative  Negative for cold intolerance and heat intolerance  Genitourinary: Positive for enuresis  Negative for dysuria, frequency and urgency  Musculoskeletal: Positive for back pain and gait problem  Negative for myalgias and neck pain  Skin: Negative  Negative for rash  Neurological: Positive for weakness  Negative for dizziness, tremors, seizures, syncope, facial asymmetry, speech difficulty, light-headedness, numbness and headaches  Hematological: Negative  Does not bruise/bleed easily  Psychiatric/Behavioral: Positive for confusion and decreased concentration  Negative for dysphoric mood, hallucinations and sleep disturbance  The patient is not nervous/anxious          Vitals:   Vitals:    08/07/18 1257   BP: 132/70   Pulse: 56   ,Body mass index is 37 94 kg/m²  MEDS:      Current Outpatient Prescriptions:     amiodarone 200 mg tablet, Take 200 mg by mouth every morning, Disp: , Rfl:     ammonium lactate (LAC-HYDRIN) 12 % lotion, , Disp: , Rfl:     atorvastatin (LIPITOR) 20 mg tablet, Take 1 tablet by mouth daily, Disp: , Rfl:     clopidogrel (PLAVIX) 75 mg tablet, Take 75 mg by mouth daily, Disp: , Rfl:     divalproex sodium (DEPAKOTE) 500 mg EC tablet, Take 2 tablets by mouth 2 (two) times a day, Disp: , Rfl:     ferrous sulfate 325 (65 Fe) mg tablet, Take 1 tablet by mouth 2 (two) times a day, Disp: , Rfl:     folic acid (FOLVITE) 1 mg tablet, Take 1 tablet by mouth daily, Disp: , Rfl:     furosemide (LASIX) 40 mg tablet, Take 1 tablet (40 mg total) by mouth daily Take one and half daily, Disp: 90 tablet, Rfl: 0    insulin glargine (LANTUS SOLOSTAR) injection pen 100 units/mL, Inject 36 Units under the skin daily, Disp: , Rfl:     metoprolol tartrate (LOPRESSOR) 50 mg tablet, Take 1 tablet by mouth every 12 (twelve) hours, Disp: , Rfl:     Multiple Vitamin (MULTIVITAMINS PO), Take 1 tablet by mouth daily, Disp: , Rfl:     potassium chloride (KLOR-CON M20) 20 mEq tablet, Take 1 tablet by mouth daily, Disp: , Rfl:     saxagliptin (ONGLYZA) 5 MG tablet, Take 1 tablet by mouth daily, Disp: , Rfl:     tadalafil (CIALIS) 20 MG tablet, Cialis 20 mg tablet, Disp: , Rfl:     travoprost (TRAVATAN Z) 0 004 % ophthalmic solution, Apply to eye Twice daily, Disp: , Rfl:   :    Physical Exam:  General appearance: alert, appears stated age and cooperative  Head: Normocephalic, without obvious abnormality, atraumatic    Neurologic:  On examination the patient is alert awake oriented, with mild dysarthria, no evidence of any focal cranial nerve deficits or weakness was noted in the upper or lower extremities, deep tendon reflexes absent, no evidence of any dysmetria, and patient ambulates with the help of a walker      Lab Results: I have personally reviewed pertinent reports  Imaging Studies: I have personally reviewed pertinent reports  Assessment:  1  Seizure disorder  2  Recent delirium most likely secondary to metabolic dysfunction, resolved  3  History of CVA and gait dysfunction  Plan:  Patient is advised to get a Depakote level at this time and may increase the Depakote back to 2000 mg in divided doses, his wife is advised to call me if he experiences any further symptoms and will otherwise return back to see me in 6 months  Patient is on a home exercise program at this time  8/7/2018,1:01 PM    Dictation voice to text software has been used in the creation of this document  Please consider this in light of any contextual or grammatical errors

## 2018-08-10 ENCOUNTER — APPOINTMENT (OUTPATIENT)
Dept: LAB | Facility: CLINIC | Age: 78
End: 2018-08-10
Payer: MEDICARE

## 2018-08-10 ENCOUNTER — TRANSCRIBE ORDERS (OUTPATIENT)
Dept: LAB | Facility: CLINIC | Age: 78
End: 2018-08-10

## 2018-08-10 DIAGNOSIS — N18.30 CHRONIC RENAL INSUFFICIENCY, STAGE III (MODERATE) (HCC): ICD-10-CM

## 2018-08-10 DIAGNOSIS — G40.909 SEIZURE DISORDER (HCC): ICD-10-CM

## 2018-08-10 LAB
ANION GAP SERPL CALCULATED.3IONS-SCNC: 6 MMOL/L (ref 4–13)
BASOPHILS # BLD AUTO: 0.02 THOUSANDS/ΜL (ref 0–0.1)
BASOPHILS NFR BLD AUTO: 0 % (ref 0–1)
BILIRUB UR QL STRIP: NEGATIVE
BUN SERPL-MCNC: 29 MG/DL (ref 5–25)
CALCIUM SERPL-MCNC: 8.2 MG/DL (ref 8.3–10.1)
CHLORIDE SERPL-SCNC: 106 MMOL/L (ref 100–108)
CLARITY UR: CLEAR
CO2 SERPL-SCNC: 30 MMOL/L (ref 21–32)
COLOR UR: YELLOW
CREAT SERPL-MCNC: 1.79 MG/DL (ref 0.6–1.3)
CREAT UR-MCNC: 34.7 MG/DL
EOSINOPHIL # BLD AUTO: 0.04 THOUSAND/ΜL (ref 0–0.61)
EOSINOPHIL NFR BLD AUTO: 1 % (ref 0–6)
ERYTHROCYTE [DISTWIDTH] IN BLOOD BY AUTOMATED COUNT: 15.5 % (ref 11.6–15.1)
GFR SERPL CREATININE-BSD FRML MDRD: 42 ML/MIN/1.73SQ M
GLUCOSE P FAST SERPL-MCNC: 188 MG/DL (ref 65–99)
GLUCOSE UR STRIP-MCNC: NEGATIVE MG/DL
HCT VFR BLD AUTO: 41 % (ref 36.5–49.3)
HGB BLD-MCNC: 13.4 G/DL (ref 12–17)
HGB UR QL STRIP.AUTO: NEGATIVE
IMM GRANULOCYTES # BLD AUTO: 0.05 THOUSAND/UL (ref 0–0.2)
IMM GRANULOCYTES NFR BLD AUTO: 1 % (ref 0–2)
KETONES UR STRIP-MCNC: NEGATIVE MG/DL
LEUKOCYTE ESTERASE UR QL STRIP: NEGATIVE
LYMPHOCYTES # BLD AUTO: 1.46 THOUSANDS/ΜL (ref 0.6–4.47)
LYMPHOCYTES NFR BLD AUTO: 30 % (ref 14–44)
MCH RBC QN AUTO: 31.8 PG (ref 26.8–34.3)
MCHC RBC AUTO-ENTMCNC: 32.7 G/DL (ref 31.4–37.4)
MCV RBC AUTO: 97 FL (ref 82–98)
MONOCYTES # BLD AUTO: 0.73 THOUSAND/ΜL (ref 0.17–1.22)
MONOCYTES NFR BLD AUTO: 15 % (ref 4–12)
NEUTROPHILS # BLD AUTO: 2.64 THOUSANDS/ΜL (ref 1.85–7.62)
NEUTS SEG NFR BLD AUTO: 53 % (ref 43–75)
NITRITE UR QL STRIP: NEGATIVE
NRBC BLD AUTO-RTO: 0 /100 WBCS
PH UR STRIP.AUTO: 6.5 [PH] (ref 4.5–8)
PHOSPHATE SERPL-MCNC: 3.5 MG/DL (ref 2.3–4.1)
PLATELET # BLD AUTO: 134 THOUSANDS/UL (ref 149–390)
PMV BLD AUTO: 11.1 FL (ref 8.9–12.7)
POTASSIUM SERPL-SCNC: 4.5 MMOL/L (ref 3.5–5.3)
PROT UR STRIP-MCNC: NEGATIVE MG/DL
PROT UR-MCNC: <6 MG/DL
PROT/CREAT UR: <0.17 MG/G{CREAT} (ref 0–0.1)
PTH-INTACT SERPL-MCNC: 76 PG/ML (ref 18.4–80.1)
RBC # BLD AUTO: 4.22 MILLION/UL (ref 3.88–5.62)
SODIUM SERPL-SCNC: 142 MMOL/L (ref 136–145)
SP GR UR STRIP.AUTO: 1.01 (ref 1–1.03)
UROBILINOGEN UR QL STRIP.AUTO: 1 E.U./DL
WBC # BLD AUTO: 4.94 THOUSAND/UL (ref 4.31–10.16)

## 2018-08-10 PROCEDURE — 85025 COMPLETE CBC W/AUTO DIFF WBC: CPT

## 2018-08-10 PROCEDURE — 80165 DIPROPYLACETIC ACID FREE: CPT

## 2018-08-10 PROCEDURE — 36415 COLL VENOUS BLD VENIPUNCTURE: CPT

## 2018-08-10 PROCEDURE — 84156 ASSAY OF PROTEIN URINE: CPT | Performed by: INTERNAL MEDICINE

## 2018-08-10 PROCEDURE — 82570 ASSAY OF URINE CREATININE: CPT | Performed by: INTERNAL MEDICINE

## 2018-08-10 PROCEDURE — 80048 BASIC METABOLIC PNL TOTAL CA: CPT

## 2018-08-10 PROCEDURE — 84100 ASSAY OF PHOSPHORUS: CPT

## 2018-08-10 PROCEDURE — 81003 URINALYSIS AUTO W/O SCOPE: CPT | Performed by: INTERNAL MEDICINE

## 2018-08-10 PROCEDURE — 83970 ASSAY OF PARATHORMONE: CPT

## 2018-08-14 LAB — VALPROATE FREE SERPL-MCNC: 17.4 UG/ML (ref 6–22)

## 2018-08-20 ENCOUNTER — TRANSCRIBE ORDERS (OUTPATIENT)
Dept: LAB | Facility: CLINIC | Age: 78
End: 2018-08-20

## 2018-08-20 ENCOUNTER — APPOINTMENT (OUTPATIENT)
Dept: LAB | Facility: CLINIC | Age: 78
End: 2018-08-20
Payer: MEDICARE

## 2018-08-20 DIAGNOSIS — Z01.812 ENCOUNTER FOR PREPROCEDURAL LABORATORY EXAMINATION: Primary | ICD-10-CM

## 2018-08-20 DIAGNOSIS — Z01.812 ENCOUNTER FOR PREPROCEDURAL LABORATORY EXAMINATION: ICD-10-CM

## 2018-08-20 LAB
ANION GAP SERPL CALCULATED.3IONS-SCNC: 3 MMOL/L (ref 4–13)
BUN SERPL-MCNC: 19 MG/DL (ref 5–25)
CALCIUM SERPL-MCNC: 7.7 MG/DL (ref 8.3–10.1)
CHLORIDE SERPL-SCNC: 108 MMOL/L (ref 100–108)
CO2 SERPL-SCNC: 29 MMOL/L (ref 21–32)
CREAT SERPL-MCNC: 1.7 MG/DL (ref 0.6–1.3)
GFR SERPL CREATININE-BSD FRML MDRD: 45 ML/MIN/1.73SQ M
GLUCOSE SERPL-MCNC: 91 MG/DL (ref 65–140)
POTASSIUM SERPL-SCNC: 4.3 MMOL/L (ref 3.5–5.3)
SODIUM SERPL-SCNC: 140 MMOL/L (ref 136–145)

## 2018-08-20 PROCEDURE — 80048 BASIC METABOLIC PNL TOTAL CA: CPT

## 2018-08-20 PROCEDURE — 36415 COLL VENOUS BLD VENIPUNCTURE: CPT

## 2018-11-05 ENCOUNTER — OFFICE VISIT (OUTPATIENT)
Dept: NEPHROLOGY | Facility: CLINIC | Age: 78
End: 2018-11-05
Payer: MEDICARE

## 2018-11-05 VITALS
DIASTOLIC BLOOD PRESSURE: 70 MMHG | WEIGHT: 268 LBS | TEMPERATURE: 100.9 F | HEART RATE: 78 BPM | HEIGHT: 71 IN | BODY MASS INDEX: 37.52 KG/M2 | RESPIRATION RATE: 16 BRPM | SYSTOLIC BLOOD PRESSURE: 110 MMHG

## 2018-11-05 DIAGNOSIS — E11.22 TYPE 2 DIABETES MELLITUS WITH STAGE 3 CHRONIC KIDNEY DISEASE, WITH LONG-TERM CURRENT USE OF INSULIN (HCC): ICD-10-CM

## 2018-11-05 DIAGNOSIS — I10 BENIGN ESSENTIAL HYPERTENSION: ICD-10-CM

## 2018-11-05 DIAGNOSIS — N18.30 CHRONIC RENAL INSUFFICIENCY, STAGE III (MODERATE) (HCC): Primary | ICD-10-CM

## 2018-11-05 DIAGNOSIS — G91.2 NPH (NORMAL PRESSURE HYDROCEPHALUS) (HCC): ICD-10-CM

## 2018-11-05 DIAGNOSIS — Z79.4 TYPE 2 DIABETES MELLITUS WITH STAGE 3 CHRONIC KIDNEY DISEASE, WITH LONG-TERM CURRENT USE OF INSULIN (HCC): ICD-10-CM

## 2018-11-05 DIAGNOSIS — I63.9 CEREBROVASCULAR ACCIDENT (CVA), UNSPECIFIED MECHANISM (HCC): ICD-10-CM

## 2018-11-05 DIAGNOSIS — N18.30 TYPE 2 DIABETES MELLITUS WITH STAGE 3 CHRONIC KIDNEY DISEASE, WITH LONG-TERM CURRENT USE OF INSULIN (HCC): ICD-10-CM

## 2018-11-05 PROCEDURE — 99213 OFFICE O/P EST LOW 20 MIN: CPT | Performed by: INTERNAL MEDICINE

## 2018-11-05 NOTE — ASSESSMENT & PLAN NOTE
No results found for: HGBA1C    No results for input(s): POCGLU in the last 72 hours  Blood Sugar Average: Last 72 hrs:    Do not have any new hemoglobin A1c but according to his wife it is well controlled    Importance of diabetic control and kidney function discussed with them

## 2018-11-05 NOTE — PROGRESS NOTES
110/70NEPHROLOGY OFFICE FOLLOW UP  Dave Quiñones 70 y o  male MRN: [id number]    Encounter: [id number] 11/5/2018    REASON FOR VISIT: Dave Quiñones is a 70 y o  male who is here on 11/5/2018 for Follow-up and CKD III       HPI:    Chanda Wang came in today for follow-up of CKD stage 3  He is doing quite well  With stroke in not communicate much  What ever communication happen is with help of his wife  His wife is concern as he is not drinking enough liquid  He also had a temperature of 100° and episode of vomiting this morning  Patient denies any complaint        REVIEW OF SYSTEMS:    Review of Systems   Constitutional: Negative for activity change and fatigue  HENT: Negative for congestion and ear discharge  Eyes: Negative for photophobia and pain  Respiratory: Negative for apnea and choking  Cardiovascular: Negative for chest pain and palpitations  Gastrointestinal: Negative for abdominal distention and blood in stool  Endocrine: Negative for heat intolerance and polyphagia  Genitourinary: Negative for flank pain and urgency  Musculoskeletal: Negative for neck pain and neck stiffness  Skin: Negative for color change and wound  Allergic/Immunologic: Negative for food allergies and immunocompromised state  Neurological: Negative for seizures and facial asymmetry  Hematological: Negative for adenopathy  Does not bruise/bleed easily  Psychiatric/Behavioral: Negative for self-injury and suicidal ideas           PAST MEDICAL HISTORY:  Past Medical History:   Diagnosis Date    [facility]     Cardiac arrhythmia     Carotid stenosis, bilateral     Cerebrovascular disease     Chronic kidney disease     [facility]     Diabetes mellitus [facility]     ETOH abuse     Hyperlipidemia     Hyperosmolality     Hypertension     Lymphedema of both lower extremities     Memory loss     NPH (normal pressure hydrocephalus)     Seizure disorder [facility] PAST SURGICAL HISTORY:  Past Surgical History:   Procedure Laterality Date    CATARACT EXTRACTION      HERNIA REPAIR         SOCIAL HISTORY:  History   Alcohol Use No     History   Drug Use No     History   Smoking Status    Former Smoker   Smokeless Tobacco    Never Used       FAMILY HISTORY:  Family History   Problem Relation Age of Onset    Hypertension Father     Hypertension Brother     Diabetes Brother     Stroke Brother     Cancer Mother        MEDICATIONS:    Current Outpatient Prescriptions:     amiodarone 200 mg tablet, Take 200 mg by mouth every morning, Disp: , Rfl:     ammonium lactate (LAC-HYDRIN) 12 % lotion, , Disp: , Rfl:     atorvastatin (LIPITOR) 20 mg tablet, Take 1 tablet by mouth daily, Disp: , Rfl:     clopidogrel (PLAVIX) 75 mg tablet, Take 75 mg by mouth daily, Disp: , Rfl:     divalproex sodium (DEPAKOTE) 500 mg EC tablet, Take 2 tablets (1,000 mg total) by mouth 2 (two) times a day, Disp: 360 tablet, Rfl: 3    ferrous sulfate 325 (65 Fe) mg tablet, Take 1 tablet by mouth 2 (two) times a day, Disp: , Rfl:     folic acid (FOLVITE) 1 mg tablet, Take 1 tablet by mouth daily, Disp: , Rfl:     furosemide (LASIX) 40 mg tablet, Take 1 tablet (40 mg total) by mouth daily Take one and half daily, Disp: 90 tablet, Rfl: 0    insulin glargine (LANTUS SOLOSTAR) injection pen 100 units/mL, Inject 36 Units under the skin daily, Disp: , Rfl:     metoprolol tartrate (LOPRESSOR) 50 mg tablet, Take 1 tablet by mouth every 12 (twelve) hours, Disp: , Rfl:     Multiple Vitamin (MULTIVITAMINS PO), Take 1 tablet by mouth daily, Disp: , Rfl:     potassium chloride (KLOR-CON M20) 20 mEq tablet, Take 1 tablet by mouth daily, Disp: , Rfl:     saxagliptin (ONGLYZA) 5 MG tablet, Take 1 tablet by mouth daily, Disp: , Rfl:     tadalafil (CIALIS) 20 MG tablet, Cialis 20 mg tablet, Disp: , Rfl:     travoprost (TRAVATAN Z) 0 004 % ophthalmic solution, Apply to eye Twice daily, Disp: , Rfl: PHYSICAL EXAM:  Vitals:    11/05/18 1056   BP: 110/70   BP Location: Right arm   Patient Position: Sitting   Pulse: 78   Resp: 16   Temp: (!) 100 9 °F (38 3 °C)   TempSrc: Tympanic   Weight: 122 kg (268 lb)   Height: 5' 11" (1 803 m)     Body mass index is 37 38 kg/m²  Physical Exam   Constitutional: He is oriented to person, place, and time  He appears well-developed  No distress  HENT:   Head: Normocephalic  Mouth/Throat: Oropharynx is clear and moist    Eyes: Conjunctivae are normal  No scleral icterus  Neck: Neck supple  No JVD present  Cardiovascular: Normal rate and normal heart sounds  Pulmonary/Chest: Effort normal and breath sounds normal  No respiratory distress  He has no wheezes  Abdominal: Soft  Bowel sounds are normal  There is no tenderness  Musculoskeletal: Normal range of motion  He exhibits no edema  Neurological: He is alert and oriented to person, place, and time  Skin: Skin is warm  No rash noted  Psychiatric: He has a normal mood and affect  His behavior is normal        LAB RESULTS:  Results for orders placed or performed in visit on 47/25/31   Basic metabolic panel   Result Value Ref Range    Sodium 140 136 - 145 mmol/L    Potassium 4 3 3 5 - 5 3 mmol/L    Chloride 108 100 - 108 mmol/L    CO2 29 21 - 32 mmol/L    ANION GAP 3 (L) 4 - 13 mmol/L    BUN 19 5 - 25 mg/dL    Creatinine 1 70 (H) 0 60 - 1 30 mg/dL    Glucose 91 65 - 140 mg/dL    Calcium 7 7 (L) 8 3 - 10 1 mg/dL    eGFR 45 ml/min/1 73sq m       ASSESSMENT and PLAN:      Chronic renal insufficiency, stage III (moderate)  Kidney function is quite stable  Advised him to continue what is doing but drink more water    NPH (normal pressure hydrocephalus)  Being monitored by neurologist    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[facility]  No results found for: HGBA1C    No results for input(s): POCGLU in the last 72 hours      Blood Sugar Average: Last 72 hrs:    Do not have any new hemoglobin A1c but according to his wife it is well controlled  Importance of diabetic control and kidney function discussed with them    Benign essential hypertension  Stable      I will see him back in 6 months again  He may have viral gastroenteritis as cause of fever  If you continue to vomit or if temperature remain high advised his wife to call primary doctor        Portions of the record may have been created with voice recognition software  Occasional wrong word or "sound a like" substitutions may have occurred due to the inherent limitations of voice recognition software  Read the chart carefully and recognize, using context, where substitutions have occurred  If you have any questions, please contact the dictating provider

## 2018-11-05 NOTE — PATIENT INSTRUCTIONS
Chronic Kidney Disease   AMBULATORY CARE:   Chronic kidney disease (CKD)  is the gradual and permanent loss of kidney function  Normally, the kidneys remove fluid, chemicals, and waste from your blood  These wastes are turned into urine by your kidneys  CKD may worsen over time and lead to kidney failure  Common symptoms include the following:   · Changes in how often you need to urinate    · Swelling in your arms, legs, or feet    · Shortness of breath    · Fatigue or weakness    · Bad or bitter taste in your mouth    · Nausea, vomiting, or loss of appetite  Seek care immediately if:   · You are confused and very drowsy  · You have a seizure  · You have shortness of breath  Contact your healthcare provider if:   · You suddenly gain or lose more weight than your healthcare provider has told you is okay  · You have itchy skin or a rash  · You urinate more or less than you normally do  · You have blood in your urine  · You have nausea and repeated vomiting  · You have fatigue or muscle weakness  · You have hiccups that will not stop  · You have questions or concerns about your condition or care  Treatment for CKD:  Medicines may be given to decrease blood pressure and get rid of extra fluid  You may also receive medicine to manage health conditions that may occur with CKD  Dialysis is a treatment to remove chemicals and waste from your blood when your kidneys can no longer do this  Surgery may be needed to create an arteriovenous fistula (AVF) in your arm or insert a catheter into your abdomen so that you can receive dialysis  A kidney transplant may be done if your CKD becomes severe  Manage CKD:   · Maintain a healthy weight  Ask your healthcare provider how much you should weigh  Ask him to help you create a weight loss plan if you are overweight  · Exercise 30 to 60 minutes a day, 4 to 7 times a week, or as directed  Ask about the best exercise plan for you   Regular exercise can help you manage CKD, high blood pressure, and diabetes  · Follow your healthcare provider's advice about what to eat and drink  He may tell you to eat food low in sodium (salt), potassium, phosphorus, or protein  You may need to see a dietitian if you need help planning meals  Ask how much liquid to drink each day and which liquids are best for you  · Limit alcohol  Ask how much alcohol is safe for you to drink  A drink of alcohol is 12 ounces of beer, 5 ounces of wine, or 1½ ounces of liquor  · Do not smoke  Nicotine and other chemicals in cigarettes and cigars can cause lung and kidney damage  Ask your healthcare provider for information if you currently smoke and need help to quit  E-cigarettes or smokeless tobacco still contain nicotine  Talk to your healthcare provider before you use these products  · Ask your healthcare provider if you need vaccines  Infections such as pneumonia, influenza, and hepatitis can be more harmful or more likely to occur in a person who has CKD  Vaccines reduce your risk of infection with these viruses  Follow up with your healthcare provider as directed:  Write down your questions so you remember to ask them during your visits  © 2017 [employer] Information is for End User's use only and may not be sold, redistributed or otherwise used for commercial purposes  All illustrations and images included in CareNotes® are the copyrighted property of [employer]  or [employer]  The above information is an  only  It is not intended as medical advice for individual conditions or treatments  Talk to your doctor, nurse or pharmacist before following any medical regimen to see if it is safe and effective for you  Chronic Kidney Disease   AMBULATORY CARE:   Chronic kidney disease (CKD)  is the gradual and permanent loss of kidney function  Normally, the kidneys remove fluid, chemicals, and waste from your blood  These wastes are turned into urine by your kidneys  CKD may worsen over time and lead to kidney failure  Common symptoms include the following:   · Changes in how often you need to urinate    · Swelling in your arms, legs, or feet    · Shortness of breath    · Fatigue or weakness    · Bad or bitter taste in your mouth    · Nausea, vomiting, or loss of appetite  Seek care immediately if:   · You are confused and very drowsy  · You have a seizure  · You have shortness of breath  Contact your healthcare provider if:   · You suddenly gain or lose more weight than your healthcare provider has told you is okay  · You have itchy skin or a rash  · You urinate more or less than you normally do  · You have blood in your urine  · You have nausea and repeated vomiting  · You have fatigue or muscle weakness  · You have hiccups that will not stop  · You have questions or concerns about your condition or care  Treatment for CKD:  Medicines may be given to decrease blood pressure and get rid of extra fluid  You may also receive medicine to manage health conditions that may occur with CKD  Dialysis is a treatment to remove chemicals and waste from your blood when your kidneys can no longer do this  Surgery may be needed to create an arteriovenous fistula (AVF) in your arm or insert a catheter into your abdomen so that you can receive dialysis  A kidney transplant may be done if your CKD becomes severe  Manage CKD:   · Maintain a healthy weight  Ask your healthcare provider how much you should weigh  Ask him to help you create a weight loss plan if you are overweight  · Exercise 30 to 60 minutes a day, 4 to 7 times a week, or as directed  Ask about the best exercise plan for you  Regular exercise can help you manage CKD, high blood pressure, and diabetes  · Follow your healthcare provider's advice about what to eat and drink    He may tell you to eat food low in sodium (salt), potassium, phosphorus, or protein  You may need to see a dietitian if you need help planning meals  Ask how much liquid to drink each day and which liquids are best for you  · Limit alcohol  Ask how much alcohol is safe for you to drink  A drink of alcohol is 12 ounces of beer, 5 ounces of wine, or 1½ ounces of liquor  · Do not smoke  Nicotine and other chemicals in cigarettes and cigars can cause lung and kidney damage  Ask your healthcare provider for information if you currently smoke and need help to quit  E-cigarettes or smokeless tobacco still contain nicotine  Talk to your healthcare provider before you use these products  · Ask your healthcare provider if you need vaccines  Infections such as pneumonia, influenza, and hepatitis can be more harmful or more likely to occur in a person who has CKD  Vaccines reduce your risk of infection with these viruses  Follow up with your healthcare provider as directed:  Write down your questions so you remember to ask them during your visits  © 2017 [employer] Information is for End User's use only and may not be sold, redistributed or otherwise used for commercial purposes  All illustrations and images included in CareNotes® are the copyrighted property of [employer]  or [employer]  The above information is an  only  It is not intended as medical advice for individual conditions or treatments  Talk to your doctor, nurse or pharmacist before following any medical regimen to see if it is safe and effective for you

## 2019-02-07 ENCOUNTER — OFFICE VISIT (OUTPATIENT)
Dept: NEUROLOGY | Facility: CLINIC | Age: 79
End: 2019-02-07
Payer: MEDICARE

## 2019-02-07 VITALS
SYSTOLIC BLOOD PRESSURE: 140 MMHG | WEIGHT: 271 LBS | DIASTOLIC BLOOD PRESSURE: 80 MMHG | HEART RATE: 56 BPM | BODY MASS INDEX: 37.94 KG/M2 | HEIGHT: 71 IN

## 2019-02-07 DIAGNOSIS — R26.9 NEUROLOGIC GAIT DYSFUNCTION: ICD-10-CM

## 2019-02-07 DIAGNOSIS — Z86.73 HISTORY OF CVA (CEREBROVASCULAR ACCIDENT): Primary | ICD-10-CM

## 2019-02-07 DIAGNOSIS — G40.909 SEIZURE DISORDER (HCC): ICD-10-CM

## 2019-02-07 PROCEDURE — 99213 OFFICE O/P EST LOW 20 MIN: CPT | Performed by: PSYCHIATRY & NEUROLOGY

## 2019-02-07 RX ORDER — DIVALPROEX SODIUM 500 MG/1
500 TABLET, DELAYED RELEASE ORAL 2 TIMES DAILY
Qty: 270 TABLET | Refills: 3 | Status: SHIPPED | OUTPATIENT
Start: 2019-02-07 | End: 2019-02-07 | Stop reason: SDUPTHER

## 2019-02-07 RX ORDER — LEVOTHYROXINE SODIUM 0.03 MG/1
25 TABLET ORAL
Status: ON HOLD | COMMUNITY
Start: 2019-02-05 | End: 2020-03-21

## 2019-02-07 RX ORDER — PEN NEEDLE, DIABETIC 31 GX5/16"
NEEDLE, DISPOSABLE MISCELLANEOUS
COMMUNITY
Start: 2019-02-06

## 2019-02-07 RX ORDER — DIVALPROEX SODIUM 500 MG/1
500 TABLET, DELAYED RELEASE ORAL 2 TIMES DAILY
Qty: 270 TABLET | Refills: 3 | Status: SHIPPED | OUTPATIENT
Start: 2019-02-07 | End: 2020-03-20 | Stop reason: HOSPADM

## 2019-02-07 NOTE — PROGRESS NOTES
Progress Note - Neurology   Bonifacio Ocampo 70 y o  male MRN: [id number]  Unit/Bed#:  Encounter: [id number]      Subjective:   Patient is here for a follow-up visit accompanied with his wife with a history of multiple lacunar CVAs, seizure disorder, gait dysfunction and since his last visit has been doing well  He has lost some weight, claims his blood pressure is also under decent control and his blood sugars have also been improving  Patient has lowered the dose of Depakote under our guidance to 500 mg in the morning and 1000 mg at bedtime  CBC LFTs in the recent past showed low platelet count which has been stable  Patient denies any new neurological symptoms and ambulates with the help of a walker  ROS:   Review of Systems   Constitutional: Positive for fatigue  Negative for appetite change and fever  HENT: Negative  Negative for hearing loss, tinnitus, trouble swallowing and voice change  Eyes: Negative  Negative for photophobia, pain and visual disturbance  Respiratory: Negative  Negative for shortness of breath  Cardiovascular: Negative  Negative for chest pain and palpitations  Gastrointestinal: Negative  Negative for abdominal pain, nausea and vomiting  Endocrine: Negative  Negative for cold intolerance and heat intolerance  Genitourinary: Negative  Negative for dysuria, frequency and urgency  Musculoskeletal: Positive for gait problem  Negative for back pain, myalgias and neck pain  Skin: Negative  Negative for rash  Neurological: Positive for weakness  Negative for dizziness, tremors, seizures, syncope, facial asymmetry, speech difficulty, light-headedness, numbness and headaches  Hematological: Negative  Does not bruise/bleed easily  Psychiatric/Behavioral: Positive for confusion and sleep disturbance  Negative for agitation and hallucinations         Vitals:   Vitals:    02/07/19 1425   BP: 140/80   BP Location: Left arm   Patient Position: Sitting   Cuff Size: Large Pulse: 56   Weight: 123 kg (271 lb)   Height: 5' 11" (1 803 m)   ,Body mass index is 37 8 kg/m²      MEDS:      Current Outpatient Prescriptions:     amiodarone 200 mg tablet, Take 200 mg by mouth every morning, Disp: , Rfl:     atorvastatin (LIPITOR) 20 mg tablet, Take 1 tablet by mouth daily, Disp: , Rfl:     B-D UF III MINI PEN NEEDLES 31G X 5 MM MISC, , Disp: , Rfl:     clopidogrel (PLAVIX) 75 mg tablet, Take 75 mg by mouth daily, Disp: , Rfl:     divalproex sodium (DEPAKOTE) 500 mg EC tablet, Take 2 tablets (1,000 mg total) by mouth 2 (two) times a day, Disp: 360 tablet, Rfl: 3    ferrous sulfate 325 (65 Fe) mg tablet, Take 1 tablet by mouth 2 (two) times a day, Disp: , Rfl:     folic acid (FOLVITE) 1 mg tablet, Take 1 tablet by mouth daily, Disp: , Rfl:     furosemide (LASIX) 40 mg tablet, Take 1 tablet (40 mg total) by mouth daily Take one and half daily (Patient taking differently: Take 40 mg by mouth daily Take one tab daily ), Disp: 90 tablet, Rfl: 0    insulin glargine (LANTUS SOLOSTAR) injection pen 100 units/mL, Inject 30 Units under the skin daily  , Disp: , Rfl:     levothyroxine 25 mcg tablet, Take 25 mcg by mouth daily in the early morning, Disp: , Rfl:     metoprolol tartrate (LOPRESSOR) 50 mg tablet, Take 1 tablet by mouth every 12 (twelve) hours, Disp: , Rfl:     Multiple Vitamin (MULTIVITAMINS PO), Take 1 tablet by mouth daily, Disp: , Rfl:     potassium chloride (KLOR-CON M20) 20 mEq tablet, Take 1 tablet by mouth daily, Disp: , Rfl:     saxagliptin (ONGLYZA) 5 MG tablet, Take 1 tablet by mouth daily, Disp: , Rfl:     tadalafil (CIALIS) 20 MG tablet, Cialis 20 mg tablet, Disp: , Rfl:     travoprost (TRAVATAN Z) 0 004 % ophthalmic solution, Apply to eye Twice daily, Disp: , Rfl:   :    Physical Exam:  General appearance: alert, appears stated age and cooperative  Head: Normocephalic, without obvious abnormality, atraumatic    His examination shows no evidence of any new focal cranial nerve, motor or sensory deficits at baseline he has mild dysarthria, bilateral upper extremity drifts, could stand with minimal assistance and ambulates with the help of a walker  No evidence of any dysmetria was noted  Lab Results: I have personally reviewed pertinent reports  Imaging Studies: I have personally reviewed pertinent reports  Assessment:  1  Seizure disorder  2  History of multiple lacunar CVA's  3  Gait dysfunction  Plan:  Patient is advised to continue present medications, home exercise program is encouraged, tight blood pressure and blood sugar control is encouraged  He is also advised to continue Plavix at this time  Return back to see me in 6 months  2/7/2019,2:34 PM    Dictation voice to text software has been used in the creation of this document  Please consider this in light of any contextual or grammatical errors

## 2019-04-08 NOTE — ASSESSMENT & PLAN NOTE
His kidney function is stable with GFR of 44    Asymptomatic and progressive nature was discussed with him and his wife with advised to avoid any nephrotoxic medicine of procedure I will repeat blood and urine test in 6 month and will see him back at that time none

## 2019-04-25 LAB
CREAT ?TM UR-SCNC: 149 UMOL/L
EXT PROTEIN URINE: 11.8
PROT/CREAT UR: 0.08 MG/G{CREAT}

## 2019-05-02 LAB
CREAT ?TM UR-SCNC: 149 UMOL/L
CREAT SERPL-MCNC: 1.67 MG/DL (ref 0.6–1.3)
EXT BLOOD, UA: NORMAL
EXT DIFF-ABS BASOPHILS: 0
EXT DIFF-ABS EOSINOPHILS: 0.1
EXT DIFF-ABS LYMPHOCYTES: 2
EXT DIFF-ABS MONOCYTES: 0.7
EXT DIFF-ABS NEUTROPHILS: 1.9
EXT GLUCOSE BLD: 64
EXT GLUCOSE, UA: NORMAL
EXT KETONES: NORMAL
EXT NITRITE, UA: NORMAL
EXT PH, UA: 6
EXT PROTEIN URINE: 11.8
EXT PROTEIN, UA: NORMAL
EXT SPECIFIC GRAVITY, UA: 1.02
EXTERNAL ANION GAP: 5
EXTERNAL BUN: 21
EXTERNAL CALCIUM: 8.3
EXTERNAL CHLORIDE: 107
EXTERNAL CO2: 31
EXTERNAL EGFR: 39
EXTERNAL HEMATOCRIT: 41 %
EXTERNAL HEMOGLOBIN: 13.5 G/DL
EXTERNAL MCV: 97
EXTERNAL PHOSPHORUS: 4.2
EXTERNAL PLATELET COUNT: 85 K/ΜL
EXTERNAL POTASSIUM: 4.4
EXTERNAL PTH: 124.8
EXTERNAL RBC: 4.18
EXTERNAL RDW: 16.3
EXTERNAL SODIUM: 143
EXTERNAL WBC: 4.9
PROT/CREAT UR: 0.08 MG/G{CREAT}
WBC # BLD EST: NORMAL 10*3/UL

## 2019-05-09 ENCOUNTER — TELEPHONE (OUTPATIENT)
Dept: NEPHROLOGY | Facility: CLINIC | Age: 79
End: 2019-05-09

## 2019-05-13 ENCOUNTER — OFFICE VISIT (OUTPATIENT)
Dept: NEPHROLOGY | Facility: CLINIC | Age: 79
End: 2019-05-13
Payer: MEDICARE

## 2019-05-13 VITALS
RESPIRATION RATE: 16 BRPM | HEIGHT: 71 IN | BODY MASS INDEX: 36.96 KG/M2 | HEART RATE: 78 BPM | DIASTOLIC BLOOD PRESSURE: 70 MMHG | TEMPERATURE: 97.7 F | SYSTOLIC BLOOD PRESSURE: 120 MMHG | WEIGHT: 264 LBS

## 2019-05-13 DIAGNOSIS — I50.32 CHRONIC DIASTOLIC HEART FAILURE (HCC): ICD-10-CM

## 2019-05-13 DIAGNOSIS — N18.30 CHRONIC RENAL INSUFFICIENCY, STAGE III (MODERATE) (HCC): Primary | ICD-10-CM

## 2019-05-13 DIAGNOSIS — E11.22 TYPE 2 DIABETES MELLITUS WITH STAGE 3 CHRONIC KIDNEY DISEASE, WITH LONG-TERM CURRENT USE OF INSULIN (HCC): ICD-10-CM

## 2019-05-13 DIAGNOSIS — Z79.4 TYPE 2 DIABETES MELLITUS WITH STAGE 3 CHRONIC KIDNEY DISEASE, WITH LONG-TERM CURRENT USE OF INSULIN (HCC): ICD-10-CM

## 2019-05-13 DIAGNOSIS — N18.30 TYPE 2 DIABETES MELLITUS WITH STAGE 3 CHRONIC KIDNEY DISEASE, WITH LONG-TERM CURRENT USE OF INSULIN (HCC): ICD-10-CM

## 2019-05-13 DIAGNOSIS — I10 BENIGN ESSENTIAL HYPERTENSION: ICD-10-CM

## 2019-05-13 PROCEDURE — 99213 OFFICE O/P EST LOW 20 MIN: CPT | Performed by: INTERNAL MEDICINE

## 2019-09-24 ENCOUNTER — TELEPHONE (OUTPATIENT)
Dept: NEUROLOGY | Facility: CLINIC | Age: 79
End: 2019-09-24

## 2019-09-24 DIAGNOSIS — I65.23 CAROTID STENOSIS, BILATERAL: Primary | ICD-10-CM

## 2019-09-24 NOTE — TELEPHONE ENCOUNTER
Please inform wife to call us after he has recovered from his cataract surgery will bring him in at that time

## 2019-09-24 NOTE — TELEPHONE ENCOUNTER
Patient's wife stated that she unfortunately had to cancel the patient's f/u appt tomorrow with Dr Sparkle Armstrong because the patient is scheduled to get cataract surgery  The scheduling call center was going to r/s patient for December, nothing sooner, however wife stated the patient cannot wait that long to be seen  No new or worsening sxs  Wife requesting sooner appt

## 2019-10-10 ENCOUNTER — TELEPHONE (OUTPATIENT)
Dept: NEUROLOGY | Facility: CLINIC | Age: 79
End: 2019-10-10

## 2019-10-10 NOTE — TELEPHONE ENCOUNTER
Took call from patient's wife asking if he had any labs that needed to be completed prior to his OV in November  There does not appear to be any  Patient's wife given appointment time/date as reminder

## 2019-10-21 ENCOUNTER — HOSPITAL ENCOUNTER (OUTPATIENT)
Dept: NON INVASIVE DIAGNOSTICS | Facility: CLINIC | Age: 79
Discharge: HOME | End: 2019-10-21
Payer: MEDICARE

## 2019-10-21 DIAGNOSIS — I65.23 CAROTID STENOSIS, BILATERAL: ICD-10-CM

## 2019-10-21 PROCEDURE — 93880 EXTRACRANIAL BILAT STUDY: CPT

## 2019-10-22 ENCOUNTER — OFFICE VISIT (OUTPATIENT)
Dept: VASCULAR SURGERY | Facility: CLINIC | Age: 79
End: 2019-10-22
Payer: MEDICARE

## 2019-10-22 VITALS
HEIGHT: 71 IN | SYSTOLIC BLOOD PRESSURE: 132 MMHG | BODY MASS INDEX: 37.1 KG/M2 | WEIGHT: 265 LBS | TEMPERATURE: 97.5 F | HEART RATE: 56 BPM | DIASTOLIC BLOOD PRESSURE: 80 MMHG

## 2019-10-22 DIAGNOSIS — I65.23 CAROTID STENOSIS, BILATERAL: ICD-10-CM

## 2019-10-22 DIAGNOSIS — N18.30 CHRONIC RENAL INSUFFICIENCY, STAGE III (MODERATE) (HCC): ICD-10-CM

## 2019-10-22 DIAGNOSIS — Z79.4 TYPE 2 DIABETES MELLITUS WITH STAGE 3 CHRONIC KIDNEY DISEASE, WITH LONG-TERM CURRENT USE OF INSULIN (HCC): Primary | ICD-10-CM

## 2019-10-22 DIAGNOSIS — I89.0 LYMPHEDEMA OF BOTH LOWER EXTREMITIES: ICD-10-CM

## 2019-10-22 DIAGNOSIS — N18.30 TYPE 2 DIABETES MELLITUS WITH STAGE 3 CHRONIC KIDNEY DISEASE, WITH LONG-TERM CURRENT USE OF INSULIN (HCC): Primary | ICD-10-CM

## 2019-10-22 DIAGNOSIS — E11.22 TYPE 2 DIABETES MELLITUS WITH STAGE 3 CHRONIC KIDNEY DISEASE, WITH LONG-TERM CURRENT USE OF INSULIN (HCC): Primary | ICD-10-CM

## 2019-10-22 DIAGNOSIS — I48.92 PAROXYSMAL ATRIAL FLUTTER (HCC): ICD-10-CM

## 2019-10-22 PROCEDURE — 99215 OFFICE O/P EST HI 40 MIN: CPT | Performed by: PHYSICIAN ASSISTANT

## 2019-10-22 PROCEDURE — 93880 EXTRACRANIAL BILAT STUDY: CPT | Performed by: SURGERY

## 2019-10-22 NOTE — PATIENT INSTRUCTIONS
Carotid artery disease  Hx strokes - bilateral lacunar infarcts  Lymphedema    70 M Hx stroke about 8 years ago who presents for follow-up evaluation of carotid artery disease  We reviewed his carotid duplex study which shows mild carotid artery stenosis bilaterally  There is no obstructive disease  Continue with Plavix and atorvastatin along with routine clinical evaluation and doppler surveillance  He also has chronic bilateral lymphedema  He is wearing his compression stockings daily which has controlled edema  No wounds  Symptoms of stroke:  - Unable to speak or understand speech  - Unable to move one side of the body (arm or leg)  - Visual changes  - Call 911 for any symptoms of stroke    Healthy Lifestyle changes  - Stay active with regular activity  - Maintain a healthy weight  - Eat heart healthy, diabetic diet - low fat, low cholesterol foods  vegetables  - Maintain good blood pressure  - Continue with optimal medical therapy Plavix and atorvastatin 20    Lymphedema  -Using zipper-style Jobst below-the-knee compression stockings which should be replaced at least once a year  -Continue with compression, elevation, activity as tolerated and skin moisturizer    -You must be vigilant in conservative measures to prevent the condition from worsening which can lead to infections  Lymphedema   AMBULATORY CARE:   The lymphatic system  contains fluid, vessels, tissue, and organs  This system removes and carries fluid throughout the body  It also helps the body fight infection  Lymphedema is the buildup of lymph fluid in fat tissue under your skin  The buildup causes the area to swell  Lymphedema can happen any time that lymphatic vessels are blocked or damaged  Damage to lymphatic vessels may be caused by surgery, infection, injury, cancer, radiation, or scar tissue      Common signs and symptoms include the following:  Signs and symptoms may happen where lymph nodes were removed, or in the arm, leg, chest, or underarm  · Swelling or itching    · Pain, burning, or aching    · Tight, hard, or red skin    · Hair loss    · Heaviness or fullness    · Numbness or tingling    · Stiffness  Contact your healthcare provider or lymphedema specialist if:   · You have a fever or chills  · You have an open area of skin that looks red or swollen, or drains pus  · Your symptoms, such as swelling or pain, get worse  · Your arms or legs feel heavy, or you cannot move them  · Your skin becomes hard, thick, or rough  · You have a skin wound that will not heal      · Your shoes, clothes, or jewelry feel tighter  · You have questions or concerns about your condition or care  Treatment for lymphedema  can relieve symptoms and prevent lymphedema from getting worse  You may need therapeutic massage, physical therapy, or compression devices to help decrease swelling and pain  Surgery may be needed if other treatments do not work  Self-care:   · Elevate  your arm or leg above the level of your heart as often as you can  This will help decrease swelling and pain  Prop your arm or leg on pillows or blankets to keep it elevated comfortably  · Wear compression socks, sleeves, or bandages  as directed  Compression devices must be fitted by a healthcare provider  Compression devices may need to be replaced every 3 to 6 months  · Exercise  can help you maintain or regain function of your arm or leg  Ask your healthcare provider what type of exercise to do and how often to do it  Start slow, take breaks, and gradually do more each day  Do not do vigorous, repeated exercises  Watch for changes in your arm or leg during exercise  Stop and rest if you have swelling, increased pain, or heaviness  Elevate your arm or leg above the level of your heart  · Change your position often  to help move lymphatic fluid through your body  Do not sit or  one position for more than 30 minutes   Do not cross your legs when you sit  These actions can cause lymphatic fluid to buildup  · Maintain a healthy weight  Ask your healthcare provider what you should weigh  Weight loss may improve your symptoms  If you need to lose weight, your healthcare provider can help you create a weight loss program   Prevent infection with proper skin care:  A skin infection can make lymphedema worse  Do the following to decrease your risk for a skin infection in your arm or leg with lymphedema:  · Wash your skin gently and dry it well  Use a mild soap to wash your skin  Gently pat your skin dry after you bathe  Apply a mild cream or lotion to moisturize your skin and prevent dryness or cracking  Keep your feet clean and dry  · Protect your skin from injury  Wear gloves when you garden and wash dishes  Cut your nails straight across to prevent injury to your fingers and toes  Use sunscreen and insect repellant to avoid burns and punctures  Wear slippers in the house  Wear shoes when you go outside  · Check your skin every day for signs of infection  Signs of infection include redness, swelling, increased heat, or pus  You may also have a fever or chills  · Care for cuts, scratches or burns  Apply antibiotic ointment to cuts and other small breaks in the skin  Apply a cold pack or cold water to a burn for 15 minutes  Then wash it with soap and water  Cover scratches, cuts, or burns with a clean, dry gauze or bandage as directed  Keep the area clean and dry  · Tell healthcare providers that you have lymphedema  Tell them not to do, IVs, blood draws, and blood pressure readings in the arm or leg with lymphedema  If there is lymphedema in both arms, ask them to take your blood pressure on your leg  Do not allow flu shots or vaccinations in your arm with lymphedema  Follow up with your healthcare provider or lymphedema specialist as directed:   You will need regular visits so healthcare providers can examine the affected areas  You may also be referred to a clinic that specializes in lymphedema treatment  Write down your questions so you remember to ask them during your visits  © 2017 [employer] Information is for End User's use only and may not be sold, redistributed or otherwise used for commercial purposes  All illustrations and images included in CareNotes® are the copyrighted property of [employer] M [employer]  or [employer]  The above information is an  only  It is not intended as medical advice for individual conditions or treatments  Talk to your doctor, nurse or pharmacist before following any medical regimen to see if it is safe and effective for you

## 2019-10-22 NOTE — LETTER
October 23, 2019     Leila Castro MD  [address] [facility] [address]    Patient: Lidya Reilly   YOB: 1940   Date of Visit: 10/22/2019     Dear Dr Karyle Lank      Thank you for referring Connor Kay to me for evaluation  Below are the relevant portions of my assessment and plan of care  If you have questions, please do not hesitate to call me  I look forward to following Fawad Reynolds along with you  Sincerely,        Amy Paul PA-C        CC: No Recipients    Progress Notes:    Carotid stenosis, bilateral  -Hx pontine CVA 8 years ago  -No new sx    Carotid artery duplex (premlim) 10/21/19  R 89/8    L 90/16 ; < 50% carotid artery stenosis bilaterally; vertebrals antegrade; no significant subclavian disease  No change paired with 2017    -we reviewed carotid du study which shows mild imtiaz  -patient education regarding stroke and healthy lifestyle was provided  -LDL at goal, < 70  -continue with Plavix 75 and atorvastatin 20  -patient education regarding TLC provided  -follow up OV in 1 year    Lymphedema of both lower extremities  -Overall stable edema with compression and diuretics  -Using zipper-style Jobst below-the-knee compression stockings which should be replaced at least once a year  The obtained the stockings from [address] [facility]   -Continue with compression, elevation, activity as tolerated and skin moisturizer    -We could consider lymph pumps in future, if uncontrolled edema or concern for wounds    -We discussed the importance of skin monitoring to prevent the condition from worsening and development of infections  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(HCC)  -A1C 8 2  -optimize diabetes and cardiovascular risk factors

## 2019-10-22 NOTE — PROGRESS NOTES
Assessment/Plan:    Carotid stenosis, bilateral  -Hx pontine CVA 8 years ago  -No new sx    Carotid artery duplex (premlim) 10/21/19  R 89/8    L 90/16 ; < 50% carotid artery stenosis bilaterally; vertebrals antegrade; no significant subclavian disease  No change paired with 2017    -we reviewed carotid du study which shows mild imtiaz  -patient education regarding stroke and healthy lifestyle was provided  -LDL at goal, < 70  -continue with Plavix 75 and atorvastatin 20  -patient education regarding TLC provided  -follow up OV in 1 year    Lymphedema of both lower extremities  -Overall stable edema with compression and diuretics  -Using zipper-style Jobst below-the-knee compression stockings which should be replaced at least once a year  The obtained the stockings from [place]   -Continue with compression, elevation, activity as tolerated and skin moisturizer    -We could consider lymph pumps in future, if uncontrolled edema or concern for wounds    -We discussed the importance of skin monitoring to prevent the condition from worsening and development of infections  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(HCC)  -A1C 8 2  -optimize diabetes and cardiovascular risk factors  Subjective:      Patient ID: Norwood Kehr is a 70 y o  male  Patient is here today for [facility] visit due to carotid artery disease  Carotid study done 10/21  Patient has h/o CVA in 2011  Currently, asymptomatic  HPI    Mr  Norwood Kehr is a 70 M DM, CKD3, PAFL, NPH, chronic ITP, d CHF, sz disorder, ambulatory dysfx, lymphedema who is quite limited after he suffered a pontine stroke about 8 years ago  He comes into the office today for follow-up evaluation of carotid artery disease  Given history of stroke, family concerned about carotid disease  Guido Weber is slow to respond but at baseline with wife answering questions  She reports that Guido Meth has not had any obviously signs of new stroke   No new unilateral facial/arm/leg weakness, numbness, or dysarthria, Family reports that Adrián Blackwell is having "frequent" episodes of hallucinations: Recently he thought there was an elephant in his room  We reviewed his medications which has been chronic > 1 year  There is no obvious cause or medication for hallucinations so I asked them to bring this up with neurology who Adrián Blackwell will be seeing later this month  Alfredo ambulates with a cane  He is otherwise doing well  The patient underwent carotid duplex study 1 day ago which shows mild carotid artery stenosis bilaterally without any obstructive disease  We discussed that we can continue with Plavix and atorvastatin along with routine clinical evaluation and doppler surveillance  He also has chronic bilateral lymphedema  He is wearing his zipper-style, compression stockings daily which has been controlled edema fairly well  The compression is a nice stocking but I suggested that they update the older stocking which whey get through [address]  He is also on a diuretic and watching weight  No wounds  We reviewed recently labs and imaging studies  Carotid artery duplex (premlim) 10/21/19  R 89/8    L 90/16 ; < 50% carotid artery stenosis bilaterally; vertebrals antegrade; no significant subclavian disease  No change paired with 2017    MRI 1/12/18  1  Extensive chronic microangiopathic disease with numerous bilateral chronic lacunar infarcts  No acute infarct identified  2   The ventricles are prominent as are cortical sulci representing diffuse cerebral white loss   Tg 85 H 38 L 64, A1C 8 2  (10/1/19)        The following portions of the patient's history were reviewed and updated as appropriate: allergies, current medications, past family history, past medical history, past social history, past surgical history and problem list     Review of Systems   Constitutional: Positive for activity change  HENT: Negative  Eyes: Negative  Respiratory: Negative  Cardiovascular: Negative  Gastrointestinal: Negative  Endocrine: Negative  Genitourinary: Positive for frequency  Musculoskeletal: Positive for gait problem  Skin: Negative  Allergic/Immunologic: Negative  Neurological: Positive for weakness  Hematological: Negative  Psychiatric/Behavioral: Positive for confusion  Objective:      /80 (BP Location: Right arm, Patient Position: Sitting)   Pulse 56   Temp 97 5 °F (36 4 °C) (Tympanic)   Ht 5' 11" (1 803 m)   Wt 120 kg (265 lb)   BMI 36 96 kg/m²        Physical Exam   Constitutional: He is oriented to person, place, and time  He appears well-developed and well-nourished  He is cooperative  HENT:   Head: Normocephalic and atraumatic  Eyes: Pupils are equal, round, and reactive to light  EOM are normal    Neck: Trachea normal  Neck supple  No JVD present  No thyromegaly present  Cardiovascular: Normal rate, regular rhythm, S1 normal, S2 normal and normal heart sounds  Exam reveals no gallop and no friction rub  No murmur heard  Pulses:       Carotid pulses are 2+ on the right side, and 2+ on the left side  Radial pulses are 2+ on the right side, and 2+ on the left side  Dorsalis pedis pulses are 0 on the right side, and 0 on the left side  Posterior tibial pulses are 0 on the right side, and 0 on the left side  1  + pitting LE edema  Feet warm  Signals present  No wounds     Pulmonary/Chest: Effort normal and breath sounds normal  No accessory muscle usage  No respiratory distress  He has no wheezes  He has no rales  Abdominal: Soft  Bowel sounds are normal  He exhibits no distension  There is no hepatosplenomegaly  There is no tenderness  Musculoskeletal: Normal range of motion  He exhibits no deformity  Neurological: He is alert and oriented to person, place, and time  The patient moves all extremities  AA+ O X 3 but seems to be disinterested  He is slow to answer questions  Wife answers most questions  Skin: Skin is warm and dry  No lesion and no rash noted  No cyanosis  Nails show no clubbing  Nursing note and vitals reviewed  I have reviewed and made appropriate changes to the review of systems input by the medical assistant      Vitals:    10/22/19 1534 10/22/19 1535   BP: 130/74 132/80   BP Location: Left arm Right arm   Patient Position: Sitting Sitting   Pulse: 56    Temp: 97 5 °F (36 4 °C)    TempSrc: Tympanic    Weight: 120 kg (265 lb)    Height: 5' 11" (1 803 m)        Patient Active Problem List   Diagnosis    Chronic renal insufficiency, stage III (moderate) (HCC)    Benign essential hypertension    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(HCC)    Paroxysmal atrial flutter (HCC)    NPH (normal pressure hydrocephalus) (HCC)    [facility]    Convulsions (HCC)    Chronic ITP (idiopathic thrombocytopenia) (HCC)    Chronic diastolic heart failure (HCC)    Seizure disorder (HCC)    Lymphedema of both lower extremities    Carotid stenosis, bilateral       Past Surgical History:   Procedure Laterality Date    CATARACT EXTRACTION      HERNIA REPAIR         Family History   Problem Relation Age of Onset    Hypertension Father     Hypertension Brother     Diabetes Brother     Stroke Brother     Cancer Mother        Social History     Socioeconomic History    Marital status: /Civil Union     Spouse name: Not on file    Number of children: Not on file    Years of education: Not on file    Highest education level: Not on file   Occupational History    Not on file   Social Needs    Financial resource strain: Not on file    Food insecurity:     Worry: Not on file     Inability: Not on file    Transportation needs:     Medical: Not on file     Non-medical: Not on file   Tobacco Use    Smoking status: Former Smoker    Smokeless tobacco: Never Used   Substance and Sexual Activity    Alcohol use: No    Drug use: No    Sexual activity: Not on file   Lifestyle    Physical activity:     Days per week: Not on file     Minutes per session: Not on file    Stress: Not on file   Relationships    Social connections:     Talks on phone: Not on file     Gets together: Not on file     Attends Sabianism service: Not on file     Active member of club or organization: Not on file     Attends meetings of clubs or organizations: Not on file     Relationship status: Not on file    Intimate partner violence:     Fear of current or ex partner: Not on file     Emotionally abused: Not on file     Physically abused: Not on file     Forced sexual activity: Not on file   Other Topics Concern    Not on file   Social History Narrative    Not on file       Allergies   Allergen Reactions    Enalapril Angioedema, Anaphylaxis and Shortness Of Breath     Other reaction(s): Unknown  Other reaction(s): Respiratory distress, Unknown Reaction         Current Outpatient Medications:     amiodarone 200 mg tablet, Take 200 mg by mouth every morning, Disp: , Rfl:     atorvastatin (LIPITOR) 20 mg tablet, Take 1 tablet by mouth daily, Disp: , Rfl:     B-D UF III MINI PEN NEEDLES 31G X 5 MM MISC, , Disp: , Rfl:     clopidogrel (PLAVIX) 75 mg tablet, Take 75 mg by mouth daily, Disp: , Rfl:     divalproex sodium (DEPAKOTE) 500 mg EC tablet, Take 1 tablet (500 mg total) by mouth 2 (two) times a day 1 additional 500 mg at bedtime, Disp: 270 tablet, Rfl: 3    folic acid (FOLVITE) 1 mg tablet, Take 1 tablet by mouth daily, Disp: , Rfl:     furosemide (LASIX) 40 mg tablet, Take 1 tablet (40 mg total) by mouth daily Take one and half daily (Patient taking differently: Take 40 mg by mouth daily Take one tab daily ), Disp: 90 tablet, Rfl: 0    insulin glargine (LANTUS SOLOSTAR) injection pen 100 units/mL, Inject 30 Units under the skin daily  , Disp: , Rfl:     levothyroxine 25 mcg tablet, Take 25 mcg by mouth daily in the early morning, Disp: , Rfl:     metoprolol tartrate (LOPRESSOR) 50 mg tablet, Take 1 tablet by mouth every 12 (twelve) hours, Disp: , Rfl:     Multiple Vitamin (MULTIVITAMINS PO), Take 1 tablet by mouth daily, Disp: , Rfl:     potassium chloride (KLOR-CON M20) 20 mEq tablet, Take 1 tablet by mouth daily, Disp: , Rfl:     saxagliptin (ONGLYZA) 5 MG tablet, Take 1 tablet by mouth daily, Disp: , Rfl:     travoprost (TRAVATAN Z) 0 004 % ophthalmic solution, Apply to eye Twice daily, Disp: , Rfl:

## 2019-10-23 NOTE — ASSESSMENT & PLAN NOTE
-Overall stable edema with compression and diuretics  -Using zipper-style Jobst below-the-knee compression stockings which should be replaced at least once a year  The obtained the stockings from [place]   -Continue with compression, elevation, activity as tolerated and skin moisturizer    -We could consider lymph pumps in future, if uncontrolled edema or concern for wounds    -We discussed the importance of skin monitoring to prevent the condition from worsening and development of infections

## 2019-10-23 NOTE — ASSESSMENT & PLAN NOTE
-Hx pontine CVA 8 years ago  -No new sx    Carotid artery duplex (premlim) 10/21/19  R 89/8    L 90/16 ; < 50% carotid artery stenosis bilaterally; vertebrals antegrade; no significant subclavian disease    No change paired with 2017    -we reviewed carotid du study which shows mild imtiaz  -patient education regarding stroke and healthy lifestyle was provided  -LDL at goal, < 70  -continue with Plavix 75 and atorvastatin 20  -patient education regarding TLC provided  -follow up OV in 1 year

## 2019-11-20 ENCOUNTER — OFFICE VISIT (OUTPATIENT)
Dept: NEPHROLOGY | Facility: CLINIC | Age: 79
End: 2019-11-20
Payer: MEDICARE

## 2019-11-20 ENCOUNTER — DOCUMENTATION (OUTPATIENT)
Dept: NEPHROLOGY | Facility: CLINIC | Age: 79
End: 2019-11-20

## 2019-11-20 VITALS
WEIGHT: 262 LBS | BODY MASS INDEX: 36.68 KG/M2 | RESPIRATION RATE: 16 BRPM | TEMPERATURE: 97.9 F | HEIGHT: 71 IN | HEART RATE: 58 BPM | DIASTOLIC BLOOD PRESSURE: 74 MMHG | SYSTOLIC BLOOD PRESSURE: 130 MMHG

## 2019-11-20 DIAGNOSIS — M89.9 CHRONIC KIDNEY DISEASE-MINERAL AND BONE DISORDER: ICD-10-CM

## 2019-11-20 DIAGNOSIS — E11.22 TYPE 2 DIABETES MELLITUS WITH STAGE 3 CHRONIC KIDNEY DISEASE, WITH LONG-TERM CURRENT USE OF INSULIN (HCC): ICD-10-CM

## 2019-11-20 DIAGNOSIS — E83.9 CHRONIC KIDNEY DISEASE-MINERAL AND BONE DISORDER: ICD-10-CM

## 2019-11-20 DIAGNOSIS — Z79.4 TYPE 2 DIABETES MELLITUS WITH STAGE 3 CHRONIC KIDNEY DISEASE, WITH LONG-TERM CURRENT USE OF INSULIN (HCC): ICD-10-CM

## 2019-11-20 DIAGNOSIS — I10 BENIGN ESSENTIAL HYPERTENSION: ICD-10-CM

## 2019-11-20 DIAGNOSIS — N18.9 CHRONIC KIDNEY DISEASE-MINERAL AND BONE DISORDER: ICD-10-CM

## 2019-11-20 DIAGNOSIS — N18.30 CHRONIC RENAL INSUFFICIENCY, STAGE III (MODERATE) (HCC): Primary | ICD-10-CM

## 2019-11-20 DIAGNOSIS — N18.30 TYPE 2 DIABETES MELLITUS WITH STAGE 3 CHRONIC KIDNEY DISEASE, WITH LONG-TERM CURRENT USE OF INSULIN (HCC): ICD-10-CM

## 2019-11-20 DIAGNOSIS — I50.32 CHRONIC DIASTOLIC HEART FAILURE (HCC): ICD-10-CM

## 2019-11-20 LAB
EXT DIFF-ABS BASOPHILS: 0
EXT DIFF-ABS EOSINOPHILS: 0.1
EXT DIFF-ABS LYMPHOCYTES: 2.5
EXT DIFF-ABS MONOCYTES: 0.8
EXT DIFF-ABS NEUTROPHILS: 3.2
EXT GLUCOSE BLD: 64
EXTERNAL ANION GAP: 9
EXTERNAL BUN: 21
EXTERNAL CALCIUM: 8.6
EXTERNAL CHLORIDE: 104
EXTERNAL CO2: 28
EXTERNAL CREATININE: 1.74
EXTERNAL EGFR: 37
EXTERNAL HEMATOCRIT: 43 %
EXTERNAL HEMOGLOBIN: 14.4 G/DL
EXTERNAL MCV: 97
EXTERNAL PHOSPHORUS: 4.2
EXTERNAL PLATELET COUNT: 127 K/ΜL
EXTERNAL POTASSIUM: 4.2
EXTERNAL PTH: 143.4
EXTERNAL RBC: 4.38
EXTERNAL RDW: 15.4
EXTERNAL SODIUM: 141
EXTERNAL VITAMIN D,25: 27
EXTERNAL WBC: 6.6

## 2019-11-20 PROCEDURE — 99214 OFFICE O/P EST MOD 30 MIN: CPT | Performed by: INTERNAL MEDICINE

## 2019-11-20 NOTE — ASSESSMENT & PLAN NOTE
PTH phosphorus and vitamin-D are within acceptable range and will continue to monitor as part of the CKD management

## 2019-11-20 NOTE — ASSESSMENT & PLAN NOTE
No results found for: HGBA1C     I do not have any new hemoglobin A1c but according to wife it seems to be within reasonable range

## 2019-11-20 NOTE — PROGRESS NOTES
NEPHROLOGY OFFICE FOLLOW UP  Lidya Reilly 70 y o  male MRN: [id number]    Encounter: [id number] 11/20/2019    REASON FOR VISIT: Lidya Reilly is a 70 y o  male who is here on 11/20/2019 for No chief complaint on file  [place] HPI:    Fawad Reynolds came in today for follow-up of CKD stage 3  Patient with hypertension and CVA over also CKD  Because of stroke he does not do much activity  Walk with a walker and also some memory problem his wife helped about a lot    According to wife overall he is doing well looks like he developing some speech problem  He is being closely monitored by neurologist    No urinary complaint  According to wife he is not drinking enough water      REVIEW OF SYSTEMS:    Review of Systems   Constitutional: Negative for activity change and fatigue  HENT: Negative for congestion and ear discharge  Eyes: Negative for photophobia and pain  Respiratory: Negative for apnea, choking and chest tightness  Cardiovascular: Negative for chest pain, palpitations and leg swelling  Gastrointestinal: Negative for abdominal distention, abdominal pain and blood in stool  Endocrine: Negative for heat intolerance and polyphagia  Genitourinary: Negative for difficulty urinating, flank pain and urgency  Musculoskeletal: Positive for arthralgias  Negative for neck pain and neck stiffness  Skin: Negative for color change and wound  Allergic/Immunologic: Negative for food allergies and immunocompromised state  Neurological: Negative for seizures and facial asymmetry  Hematological: Negative for adenopathy  Does not bruise/bleed easily  Psychiatric/Behavioral: Negative for self-injury and suicidal ideas           PAST MEDICAL HISTORY:  Past Medical History:   Diagnosis Date    [facility] renal failure syndrome ([facility] [address] )     Cardiac arrhythmia     Carotid stenosis, bilateral     Cerebrovascular disease     Chronic kidney disease     [facility]     Diabetes mellitus ([facility]     ETOH abuse     Hyperlipidemia     Hyperosmolality     Hypertension     Lymphedema of both lower extremities     Memory loss     NPH (normal pressure hydrocephalus) (HCC)     Seizure disorder (HCC)        PAST SURGICAL HISTORY:  Past Surgical History:   Procedure Laterality Date    CATARACT EXTRACTION      COLONOSCOPY  2017    HERNIA REPAIR         SOCIAL HISTORY:  Social History     Substance and Sexual Activity   Alcohol Use No     Social History     Substance and Sexual Activity   Drug Use No     Social History     Tobacco Use   Smoking Status Former Smoker    Types: Cigarettes    Last attempt to quit: 2012    Years since quittin 8   Smokeless Tobacco Never Used       FAMILY HISTORY:  Family History   Problem Relation Age of Onset    Hypertension Father     Hypertension Brother     Diabetes Brother     Stroke Brother     Cancer Mother        MEDICATIONS:    Current Outpatient Medications:     amiodarone 200 mg tablet, Take 200 mg by mouth every morning, Disp: , Rfl:     atorvastatin (LIPITOR) 20 mg tablet, Take 1 tablet by mouth daily, Disp: , Rfl:     B-D UF III MINI PEN NEEDLES 31G X 5 MM MISC, , Disp: , Rfl:     clopidogrel (PLAVIX) 75 mg tablet, Take 75 mg by mouth daily, Disp: , Rfl:     divalproex sodium (DEPAKOTE) 500 mg EC tablet, Take 1 tablet (500 mg total) by mouth 2 (two) times a day 1 additional 500 mg at bedtime (Patient taking differently: Take 1,000 mg by mouth 2 (two) times a day ), Disp: 270 tablet, Rfl: 3    folic acid (FOLVITE) 1 mg tablet, Take 1 tablet by mouth daily, Disp: , Rfl:     furosemide (LASIX) 40 mg tablet, Take 1 tablet (40 mg total) by mouth daily Take one and half daily (Patient taking differently: Take 40 mg by mouth daily Take one tab daily ), Disp: 90 tablet, Rfl: 0    insulin glargine (LANTUS SOLOSTAR) injection pen 100 units/mL, Inject 30 Units under the skin daily  , Disp: , Rfl:     levothyroxine 25 mcg tablet, Take 25 mcg by mouth daily in the early morning, Disp: , Rfl:     metoprolol tartrate (LOPRESSOR) 50 mg tablet, Take 1 tablet by mouth every 12 (twelve) hours, Disp: , Rfl:     Multiple Vitamin (MULTIVITAMINS PO), Take 1 tablet by mouth daily, Disp: , Rfl:     potassium chloride (KLOR-CON M20) 20 mEq tablet, Take 1 tablet by mouth daily, Disp: , Rfl:     saxagliptin (ONGLYZA) 5 MG tablet, Take 1 tablet by mouth daily, Disp: , Rfl:     travoprost (TRAVATAN Z) 0 004 % ophthalmic solution, Apply to eye Twice daily, Disp: , Rfl:     PHYSICAL EXAM:  Vitals:    11/20/19 1025   BP: 130/74   BP Location: Left arm   Patient Position: Sitting   Cuff Size: Large   Pulse: 58   Resp: 16   Temp: 97 9 °F (36 6 °C)   TempSrc: Tympanic   Weight: 119 kg (262 lb)   Height: 5' 11" (1 803 m)     Body mass index is 36 54 kg/m²  Physical Exam   Constitutional: He is oriented to person, place, and time  He appears well-developed  No distress  HENT:   Head: Normocephalic  Mouth/Throat: Oropharynx is clear and moist    Eyes: Conjunctivae are normal  No scleral icterus  Neck: Neck supple  No JVD present  Cardiovascular: Normal rate and normal heart sounds  Pulmonary/Chest: Effort normal  He has no wheezes  Abdominal: Soft  There is no tenderness  Musculoskeletal: Normal range of motion  He exhibits no edema  Neurological: He is alert and oriented to person, place, and time  Skin: Skin is warm  No rash noted  Psychiatric: He has a normal mood and affect   His behavior is normal        LAB RESULTS:  Results for orders placed or performed in visit on 93/69/86   Basic metabolic panel   Result Value Ref Range    SODIUM 143     POTASSIUM 4 4     CHLORIDE 107     CO2 31     BUN 21     Creatinine 1 67 (A) 0 60 - 1 30 mg/dL    Glucose 64     EXTERNAL CALCIUM 8 3     ANION GAP 5     EXTERNAL EGFR 39    Protein / creatinine ratio, urine   Result Value Ref Range    PROTEIN UA 11 8     EXT Creatinine Urine 149 0     EXTERNAL Ur Prot/Creat Ratio 0 08 Phosphorus   Result Value Ref Range    EXTERNAL PHOSPHORUS 4 2    Urinalysis with microscopic   Result Value Ref Range    Spec Grav, UA (Ref:1 003-1 030) 1 019     Glucose, UA (Ref: Negative) neg     Ketones, UA (Ref: Negative) neg     Blood, UA neg     Nitrite, UA (Ref: Negative) neg      Leukocytes, UA (Ref: Negative) neg     pH, UA (Ref: 4 5-8 0) 6 0     Protein, UA (Ref: Negative) neg    CBC and differential   Result Value Ref Range    WBC 4 9     External RBC 4 18     External Hemoglobin 13 5 g/dL    Hematocrit 41 %    MCV 97     External RDW 16 3     External Platelets 85 K/µL    Abs Neutrophils 1 9     Abs Lymphocytes 2 0     External Abs Monocytes  0 7     External Abs Eosinophils 0 1     External Abs Basophils  0 0    PTH, intact   Result Value Ref Range     8        ASSESSMENT and PLAN:      Chronic renal insufficiency, stage III (moderate)  Patient GFR is stable at range of 37  Asymptomatic and progressive nature of kidney disease discussed with them  Importance of hydration also discussed with the patient    Chronic kidney disease-mineral and bone disorder  PTH phosphorus and vitamin-D are within acceptable range and will continue to monitor as part of the CKD management    Benign essential hypertension  Blood pressure is very well control with present medication which I will continue    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([facility] [address] )    No results found for: HGBA1C     I do not have any new hemoglobin A1c but according to wife it seems to be within reasonable range    Chronic diastolic heart failure ([facility] [address] )  Wt Readings from Last 3 Encounters:   11/20/19 119 kg (262 lb)   10/22/19 120 kg (265 lb)   05/13/19 120 kg (264 lb)     He is on diuretic and seems to be stable  His seems to be stable overall  I discuss lab work and medication with his wife at length  I will see him back in 6 months and will repeat blood and urine test before that visit    Importance of hydration discussed with the patient      Portions of the record may have been created with voice recognition software  Occasional wrong word or "sound a like" substitutions may have occurred due to the inherent limitations of voice recognition software  Read the chart carefully and recognize, using context, where substitutions have occurred  If you have any questions, please contact the dictating provider

## 2019-11-20 NOTE — ASSESSMENT & PLAN NOTE
Patient GFR is stable at range of 37  Asymptomatic and progressive nature of kidney disease discussed with them    Importance of hydration also discussed with the patient

## 2019-11-20 NOTE — ASSESSMENT & PLAN NOTE
Wt Readings from Last 3 Encounters:   11/20/19 119 kg (262 lb)   10/22/19 120 kg (265 lb)   05/13/19 120 kg (264 lb)     He is on diuretic and seems to be stable

## 2019-11-20 NOTE — PATIENT INSTRUCTIONS
Chronic Kidney Disease   AMBULATORY CARE:   Chronic kidney disease (CKD)  is the gradual and permanent loss of kidney function  Normally, the kidneys remove fluid, chemicals, and waste from your blood  These wastes are turned into urine by your kidneys  CKD may worsen over time and lead to kidney failure  Common symptoms include the following:   · Changes in how often you need to urinate    · Swelling in your arms, legs, or feet    · Shortness of breath    · Fatigue or weakness    · Bad or bitter taste in your mouth    · Nausea, vomiting, or loss of appetite  Seek care immediately if:   · You are confused and very drowsy  · You have a seizure  · You have shortness of breath  Contact your healthcare provider if:   · You suddenly gain or lose more weight than your healthcare provider has told you is okay  · You have itchy skin or a rash  · You urinate more or less than you normally do  · You have blood in your urine  · You have nausea and repeated vomiting  · You have fatigue or muscle weakness  · You have hiccups that will not stop  · You have questions or concerns about your condition or care  Treatment for CKD:  Medicines may be given to decrease blood pressure and get rid of extra fluid  You may also receive medicine to manage health conditions that may occur with CKD  Dialysis is a treatment to remove chemicals and waste from your blood when your kidneys can no longer do this  Surgery may be needed to create an arteriovenous fistula (AVF) in your arm or insert a catheter into your abdomen so that you can receive dialysis  A kidney transplant may be done if your CKD becomes severe  Manage CKD:   · Maintain a healthy weight  Ask your healthcare provider how much you should weigh  Ask him to help you create a weight loss plan if you are overweight  · Exercise 30 to 60 minutes a day, 4 to 7 times a week, or as directed  Ask about the best exercise plan for you   Regular exercise can help you manage CKD, high blood pressure, and diabetes  · Follow your healthcare provider's advice about what to eat and drink  He may tell you to eat food low in sodium (salt), potassium, phosphorus, or protein  You may need to see a dietitian if you need help planning meals  Ask how much liquid to drink each day and which liquids are best for you  · Limit alcohol  Ask how much alcohol is safe for you to drink  A drink of alcohol is 12 ounces of beer, 5 ounces of wine, or 1½ ounces of liquor  · Do not smoke  Nicotine and other chemicals in cigarettes and cigars can cause lung and kidney damage  Ask your healthcare provider for information if you currently smoke and need help to quit  E-cigarettes or smokeless tobacco still contain nicotine  Talk to your healthcare provider before you use these products  · Ask your healthcare provider if you need vaccines  Infections such as pneumonia, influenza, and hepatitis can be more harmful or more likely to occur in a person who has CKD  Vaccines reduce your risk of infection with these viruses  Follow up with your healthcare provider as directed:  Write down your questions so you remember to ask them during your visits  © 2017 [employer] Information is for End User's use only and may not be sold, redistributed or otherwise used for commercial purposes  All illustrations and images included in CareNotes® are the copyrighted property of [employer]  or [employer]  The above information is an  only  It is not intended as medical advice for individual conditions or treatments  Talk to your doctor, nurse or pharmacist before following any medical regimen to see if it is safe and effective for you

## 2019-11-26 ENCOUNTER — OFFICE VISIT (OUTPATIENT)
Dept: NEUROLOGY | Facility: CLINIC | Age: 79
End: 2019-11-26
Payer: MEDICARE

## 2019-11-26 VITALS
DIASTOLIC BLOOD PRESSURE: 70 MMHG | HEART RATE: 58 BPM | HEIGHT: 71 IN | WEIGHT: 259.2 LBS | SYSTOLIC BLOOD PRESSURE: 164 MMHG | BODY MASS INDEX: 36.29 KG/M2

## 2019-11-26 DIAGNOSIS — M47.816 SPONDYLOSIS OF LUMBAR REGION WITHOUT MYELOPATHY OR RADICULOPATHY: ICD-10-CM

## 2019-11-26 DIAGNOSIS — I63.9 CEREBROVASCULAR ACCIDENT (CVA), UNSPECIFIED MECHANISM (HCC): Primary | ICD-10-CM

## 2019-11-26 DIAGNOSIS — G40.909 SEIZURE DISORDER (HCC): ICD-10-CM

## 2019-11-26 PROCEDURE — 99213 OFFICE O/P EST LOW 20 MIN: CPT | Performed by: PSYCHIATRY & NEUROLOGY

## 2019-11-26 NOTE — PROGRESS NOTES
Progress Note - Neurology   Mine Man 70 y o  male MRN: [id number]  Unit/Bed#:  Encounter: [id number]      Subjective:   Patient is here for a follow-up visit with seizure disorder, CVAs, lacunar type, bilateral, CKD, diabetes mellitus, gait dysfunction and complains of low back pain  Patient complains of pain radiating across the back but denies any motor or sensory symptoms of pain radiating to the lower extremities  He denies any bladder bowel symptoms at baseline he has seizure disorder for which he remains on Depakote 500 in the morning and a 1000 mg at bedtime  His platelet count is now within the normal range, and patient denies any side effects from the medication  Patient denies any seizures since his last visit  ROS:   Review of Systems   Constitutional: Negative  Negative for appetite change and fever  HENT: Negative  Negative for hearing loss, tinnitus, trouble swallowing and voice change  Eyes: Negative  Negative for photophobia and pain  Respiratory: Negative  Negative for shortness of breath  Cardiovascular: Negative  Negative for palpitations  Gastrointestinal: Negative  Negative for nausea and vomiting  Endocrine: Negative  Negative for cold intolerance and heat intolerance  Genitourinary: Negative  Negative for dysuria, frequency and urgency  Musculoskeletal: Positive for gait problem  Negative for myalgias and neck pain  Skin: Negative  Negative for rash  Neurological: Negative for dizziness, tremors, seizures, syncope, facial asymmetry, speech difficulty, weakness, light-headedness, numbness and headaches  Hematological: Negative  Does not bruise/bleed easily  Psychiatric/Behavioral: Positive for confusion (patient is not sure of where he is in the moment/zoning out) and hallucinations (possible)  Negative for sleep disturbance         Vitals:   Vitals:    11/26/19 1226   BP: 164/70   BP Location: Left arm   Patient Position: Sitting   Cuff Size: Large Pulse: 58   Weight: 118 kg (259 lb 3 2 oz)   Height: 5' 11" (1 803 m)   ,Body mass index is 36 15 kg/m²      MEDS:      Current Outpatient Medications:     amiodarone 200 mg tablet, Take 200 mg by mouth every morning, Disp: , Rfl:     atorvastatin (LIPITOR) 20 mg tablet, Take 1 tablet by mouth daily, Disp: , Rfl:     B-D UF III MINI PEN NEEDLES 31G X 5 MM MISC, , Disp: , Rfl:     clopidogrel (PLAVIX) 75 mg tablet, Take 75 mg by mouth daily, Disp: , Rfl:     divalproex sodium (DEPAKOTE) 500 mg EC tablet, Take 1 tablet (500 mg total) by mouth 2 (two) times a day 1 additional 500 mg at bedtime (Patient taking differently: Take 1,000 mg by mouth 2 (two) times a day ), Disp: 270 tablet, Rfl: 3    folic acid (FOLVITE) 1 mg tablet, Take 1 tablet by mouth daily, Disp: , Rfl:     furosemide (LASIX) 40 mg tablet, Take 1 tablet (40 mg total) by mouth daily Take one and half daily (Patient taking differently: Take 40 mg by mouth daily Take one tab daily ), Disp: 90 tablet, Rfl: 0    insulin glargine (LANTUS SOLOSTAR) injection pen 100 units/mL, Inject 30 Units under the skin daily  , Disp: , Rfl:     levothyroxine 25 mcg tablet, Take 25 mcg by mouth daily in the early morning, Disp: , Rfl:     metoprolol tartrate (LOPRESSOR) 50 mg tablet, Take 1 tablet by mouth every 12 (twelve) hours, Disp: , Rfl:     Multiple Vitamin (MULTIVITAMINS PO), Take 1 tablet by mouth daily, Disp: , Rfl:     potassium chloride (KLOR-CON M20) 20 mEq tablet, Take 1 tablet by mouth daily, Disp: , Rfl:     saxagliptin (ONGLYZA) 5 MG tablet, Take 1 tablet by mouth daily, Disp: , Rfl:     travoprost (TRAVATAN Z) 0 004 % ophthalmic solution, Apply to eye Twice daily, Disp: , Rfl:   :    Physical Exam:  General appearance: alert, appears stated age and cooperative  Head: Normocephalic, without obvious abnormality, atraumatic    On examination patient is alert awake oriented, speech is fluent, with no new deficits noted on cranial nerve, motor and sensory exam   Deep tendon reflexes absent bilaterally, no evidence of any dysmetria was noted patient's gait is slightly wide-based and ambulates with the help of a cane  No bruits were appreciable in the neck  He has evidence of lumbosacral paraspinal tenderness bilaterally  Lab Results: I have personally reviewed pertinent reports  Imaging Studies: I have personally reviewed pertinent reports  Assessment:  1  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  2  Multiple lacunar CVAs with gait dysfunction  3  Seizure disorder  Plan:  Patient uses Tylenol Arthritis on a p r n  Basis for his back pain and is now advised to go for a short course of physical therapy for the low back pain  He will continue with Depakote at the present dosage, continue with Plavix and also on lipid-lowering agents, tight blood sugar control is encouraged, weight loss is encouraged and patient will return back to see me in 6 months  11/26/2019,12:30 PM    Dictation voice to text software has been used in the creation of this document  Please consider this in light of any contextual or grammatical errors

## 2019-12-24 ENCOUNTER — APPOINTMENT (EMERGENCY)
Dept: CT IMAGING | Facility: HOSPITAL | Age: 79
DRG: 637 | End: 2019-12-24
Payer: MEDICARE

## 2019-12-24 ENCOUNTER — HOSPITAL ENCOUNTER (INPATIENT)
Facility: HOSPITAL | Age: 79
LOS: 4 days | Discharge: NURSING, REHAB OR LONG-TERM CARE | DRG: 637 | End: 2019-12-29
Attending: EMERGENCY MEDICINE | Admitting: INTERNAL MEDICINE
Payer: MEDICARE

## 2019-12-24 ENCOUNTER — APPOINTMENT (EMERGENCY)
Dept: RADIOLOGY | Facility: HOSPITAL | Age: 79
DRG: 637 | End: 2019-12-24
Payer: MEDICARE

## 2019-12-24 DIAGNOSIS — E11.22 TYPE 2 DIABETES MELLITUS WITH STAGE 3 CHRONIC KIDNEY DISEASE, WITH LONG-TERM CURRENT USE OF INSULIN (HCC): ICD-10-CM

## 2019-12-24 DIAGNOSIS — I10 BENIGN ESSENTIAL HYPERTENSION: ICD-10-CM

## 2019-12-24 DIAGNOSIS — I50.32 CHRONIC DIASTOLIC HEART FAILURE (HCC): ICD-10-CM

## 2019-12-24 DIAGNOSIS — N18.30 TYPE 2 DIABETES MELLITUS WITH STAGE 3 CHRONIC KIDNEY DISEASE, WITH LONG-TERM CURRENT USE OF INSULIN (HCC): ICD-10-CM

## 2019-12-24 DIAGNOSIS — R41.82 ALTERED MENTAL STATUS: Primary | ICD-10-CM

## 2019-12-24 DIAGNOSIS — E16.2 HYPOGLYCEMIA: ICD-10-CM

## 2019-12-24 DIAGNOSIS — Z79.4 TYPE 2 DIABETES MELLITUS WITH STAGE 3 CHRONIC KIDNEY DISEASE, WITH LONG-TERM CURRENT USE OF INSULIN (HCC): ICD-10-CM

## 2019-12-24 LAB
ALBUMIN SERPL BCP-MCNC: 2.8 G/DL (ref 3.5–5)
ALP SERPL-CCNC: 78 U/L (ref 46–116)
ALT SERPL W P-5'-P-CCNC: 19 U/L (ref 12–78)
ANION GAP SERPL CALCULATED.3IONS-SCNC: 6 MMOL/L (ref 4–13)
AST SERPL W P-5'-P-CCNC: 20 U/L (ref 5–45)
ATRIAL RATE: 47 BPM
BASOPHILS # BLD AUTO: 0.02 THOUSANDS/ΜL (ref 0–0.1)
BASOPHILS NFR BLD AUTO: 0 % (ref 0–1)
BILIRUB DIRECT SERPL-MCNC: 0.16 MG/DL (ref 0–0.2)
BILIRUB SERPL-MCNC: 0.6 MG/DL (ref 0.2–1)
BILIRUB UR QL STRIP: NEGATIVE
BILIRUB UR QL STRIP: NEGATIVE
BUN SERPL-MCNC: 15 MG/DL (ref 5–25)
CALCIUM SERPL-MCNC: 8.1 MG/DL (ref 8.3–10.1)
CHLORIDE SERPL-SCNC: 103 MMOL/L (ref 100–108)
CLARITY UR: CLEAR
CLARITY UR: CLEAR
CO2 SERPL-SCNC: 31 MMOL/L (ref 21–32)
COLOR UR: YELLOW
COLOR UR: YELLOW
CREAT SERPL-MCNC: 1.59 MG/DL (ref 0.6–1.3)
EOSINOPHIL # BLD AUTO: 0.07 THOUSAND/ΜL (ref 0–0.61)
EOSINOPHIL NFR BLD AUTO: 1 % (ref 0–6)
ERYTHROCYTE [DISTWIDTH] IN BLOOD BY AUTOMATED COUNT: 14.5 % (ref 11.6–15.1)
GFR SERPL CREATININE-BSD FRML MDRD: 48 ML/MIN/1.73SQ M
GLUCOSE SERPL-MCNC: 134 MG/DL (ref 65–140)
GLUCOSE SERPL-MCNC: 203 MG/DL (ref 65–140)
GLUCOSE SERPL-MCNC: 58 MG/DL (ref 65–140)
GLUCOSE SERPL-MCNC: 64 MG/DL (ref 65–140)
GLUCOSE SERPL-MCNC: 72 MG/DL (ref 65–140)
GLUCOSE SERPL-MCNC: 73 MG/DL (ref 65–140)
GLUCOSE UR STRIP-MCNC: NEGATIVE MG/DL
GLUCOSE UR STRIP-MCNC: NEGATIVE MG/DL
HCT VFR BLD AUTO: 40.8 % (ref 36.5–49.3)
HGB BLD-MCNC: 13.3 G/DL (ref 12–17)
HGB UR QL STRIP.AUTO: NEGATIVE
HGB UR QL STRIP.AUTO: NEGATIVE
IMM GRANULOCYTES # BLD AUTO: 0.07 THOUSAND/UL (ref 0–0.2)
IMM GRANULOCYTES NFR BLD AUTO: 1 % (ref 0–2)
INR PPP: 1.2 (ref 0.84–1.19)
KETONES UR STRIP-MCNC: NEGATIVE MG/DL
KETONES UR STRIP-MCNC: NEGATIVE MG/DL
LEUKOCYTE ESTERASE UR QL STRIP: NEGATIVE
LEUKOCYTE ESTERASE UR QL STRIP: NEGATIVE
LIPASE SERPL-CCNC: 34 U/L (ref 73–393)
LYMPHOCYTES # BLD AUTO: 1.88 THOUSANDS/ΜL (ref 0.6–4.47)
LYMPHOCYTES NFR BLD AUTO: 37 % (ref 14–44)
MCH RBC QN AUTO: 32 PG (ref 26.8–34.3)
MCHC RBC AUTO-ENTMCNC: 32.6 G/DL (ref 31.4–37.4)
MCV RBC AUTO: 98 FL (ref 82–98)
MONOCYTES # BLD AUTO: 0.75 THOUSAND/ΜL (ref 0.17–1.22)
MONOCYTES NFR BLD AUTO: 15 % (ref 4–12)
NEUTROPHILS # BLD AUTO: 2.29 THOUSANDS/ΜL (ref 1.85–7.62)
NEUTS SEG NFR BLD AUTO: 46 % (ref 43–75)
NITRITE UR QL STRIP: NEGATIVE
NITRITE UR QL STRIP: NEGATIVE
NRBC BLD AUTO-RTO: 0 /100 WBCS
NT-PROBNP SERPL-MCNC: 681 PG/ML
P AXIS: 62 DEGREES
PH UR STRIP.AUTO: 7.5 [PH]
PH UR STRIP.AUTO: 7.5 [PH]
PLATELET # BLD AUTO: 100 THOUSANDS/UL (ref 149–390)
PLATELET # BLD AUTO: 114 THOUSANDS/UL (ref 149–390)
PMV BLD AUTO: 11.3 FL (ref 8.9–12.7)
PMV BLD AUTO: 11.9 FL (ref 8.9–12.7)
POTASSIUM SERPL-SCNC: 4.2 MMOL/L (ref 3.5–5.3)
PR INTERVAL: 218 MS
PROT SERPL-MCNC: 7.2 G/DL (ref 6.4–8.2)
PROT UR STRIP-MCNC: NEGATIVE MG/DL
PROT UR STRIP-MCNC: NEGATIVE MG/DL
PROTHROMBIN TIME: 15.3 SECONDS (ref 11.6–14.5)
QRS AXIS: -23 DEGREES
QRSD INTERVAL: 104 MS
QT INTERVAL: 508 MS
QTC INTERVAL: 449 MS
RBC # BLD AUTO: 4.15 MILLION/UL (ref 3.88–5.62)
SODIUM SERPL-SCNC: 140 MMOL/L (ref 136–145)
SP GR UR STRIP.AUTO: 1.01 (ref 1–1.03)
SP GR UR STRIP.AUTO: 1.01 (ref 1–1.03)
T WAVE AXIS: -7 DEGREES
TROPONIN I SERPL-MCNC: 0.02 NG/ML
TROPONIN I SERPL-MCNC: 0.02 NG/ML
UROBILINOGEN UR QL STRIP.AUTO: 4 E.U./DL
UROBILINOGEN UR QL STRIP.AUTO: >=8 E.U./DL
VENTRICULAR RATE: 47 BPM
WBC # BLD AUTO: 5.08 THOUSAND/UL (ref 4.31–10.16)

## 2019-12-24 PROCEDURE — 93005 ELECTROCARDIOGRAM TRACING: CPT

## 2019-12-24 PROCEDURE — 84484 ASSAY OF TROPONIN QUANT: CPT | Performed by: EMERGENCY MEDICINE

## 2019-12-24 PROCEDURE — 36415 COLL VENOUS BLD VENIPUNCTURE: CPT | Performed by: EMERGENCY MEDICINE

## 2019-12-24 PROCEDURE — 85610 PROTHROMBIN TIME: CPT | Performed by: EMERGENCY MEDICINE

## 2019-12-24 PROCEDURE — 93010 ELECTROCARDIOGRAM REPORT: CPT | Performed by: INTERNAL MEDICINE

## 2019-12-24 PROCEDURE — 81003 URINALYSIS AUTO W/O SCOPE: CPT | Performed by: EMERGENCY MEDICINE

## 2019-12-24 PROCEDURE — 80076 HEPATIC FUNCTION PANEL: CPT | Performed by: EMERGENCY MEDICINE

## 2019-12-24 PROCEDURE — 99285 EMERGENCY DEPT VISIT HI MDM: CPT | Performed by: EMERGENCY MEDICINE

## 2019-12-24 PROCEDURE — 99220 PR INITIAL OBSERVATION CARE/DAY 70 MINUTES: CPT | Performed by: INTERNAL MEDICINE

## 2019-12-24 PROCEDURE — 85025 COMPLETE CBC W/AUTO DIFF WBC: CPT | Performed by: EMERGENCY MEDICINE

## 2019-12-24 PROCEDURE — 99285 EMERGENCY DEPT VISIT HI MDM: CPT

## 2019-12-24 PROCEDURE — 85049 AUTOMATED PLATELET COUNT: CPT | Performed by: INTERNAL MEDICINE

## 2019-12-24 PROCEDURE — 71046 X-RAY EXAM CHEST 2 VIEWS: CPT

## 2019-12-24 PROCEDURE — 82948 REAGENT STRIP/BLOOD GLUCOSE: CPT

## 2019-12-24 PROCEDURE — 96361 HYDRATE IV INFUSION ADD-ON: CPT

## 2019-12-24 PROCEDURE — 80048 BASIC METABOLIC PNL TOTAL CA: CPT | Performed by: EMERGENCY MEDICINE

## 2019-12-24 PROCEDURE — 83880 ASSAY OF NATRIURETIC PEPTIDE: CPT | Performed by: EMERGENCY MEDICINE

## 2019-12-24 PROCEDURE — 96360 HYDRATION IV INFUSION INIT: CPT

## 2019-12-24 PROCEDURE — 81003 URINALYSIS AUTO W/O SCOPE: CPT | Performed by: INTERNAL MEDICINE

## 2019-12-24 PROCEDURE — 1124F ACP DISCUSS-NO DSCNMKR DOCD: CPT | Performed by: INTERNAL MEDICINE

## 2019-12-24 PROCEDURE — 70450 CT HEAD/BRAIN W/O DYE: CPT

## 2019-12-24 PROCEDURE — 83690 ASSAY OF LIPASE: CPT | Performed by: EMERGENCY MEDICINE

## 2019-12-24 RX ORDER — POTASSIUM CHLORIDE 20 MEQ/1
20 TABLET, EXTENDED RELEASE ORAL DAILY
Status: DISCONTINUED | OUTPATIENT
Start: 2019-12-25 | End: 2019-12-29 | Stop reason: HOSPADM

## 2019-12-24 RX ORDER — TRAVOPROST OPHTHALMIC SOLUTION 0.04 MG/ML
1 SOLUTION OPHTHALMIC
Status: DISCONTINUED | OUTPATIENT
Start: 2019-12-24 | End: 2019-12-29 | Stop reason: HOSPADM

## 2019-12-24 RX ORDER — DEXTROSE AND SODIUM CHLORIDE 5; .9 G/100ML; G/100ML
50 INJECTION, SOLUTION INTRAVENOUS CONTINUOUS
Status: DISCONTINUED | OUTPATIENT
Start: 2019-12-24 | End: 2019-12-24

## 2019-12-24 RX ORDER — FUROSEMIDE 40 MG/1
40 TABLET ORAL DAILY
Status: DISCONTINUED | OUTPATIENT
Start: 2019-12-25 | End: 2019-12-29 | Stop reason: HOSPADM

## 2019-12-24 RX ORDER — DIVALPROEX SODIUM 500 MG/1
500 TABLET, DELAYED RELEASE ORAL 2 TIMES DAILY
Status: DISCONTINUED | OUTPATIENT
Start: 2019-12-24 | End: 2019-12-29 | Stop reason: HOSPADM

## 2019-12-24 RX ORDER — FOLIC ACID 1 MG/1
1000 TABLET ORAL DAILY
Status: DISCONTINUED | OUTPATIENT
Start: 2019-12-25 | End: 2019-12-29 | Stop reason: HOSPADM

## 2019-12-24 RX ORDER — HYDRALAZINE HYDROCHLORIDE 20 MG/ML
5 INJECTION INTRAMUSCULAR; INTRAVENOUS EVERY 6 HOURS PRN
Status: DISCONTINUED | OUTPATIENT
Start: 2019-12-24 | End: 2019-12-29 | Stop reason: HOSPADM

## 2019-12-24 RX ORDER — LEVOTHYROXINE SODIUM 0.03 MG/1
25 TABLET ORAL
Status: DISCONTINUED | OUTPATIENT
Start: 2019-12-25 | End: 2019-12-29 | Stop reason: HOSPADM

## 2019-12-24 RX ORDER — DEXTROSE AND SODIUM CHLORIDE 5; .9 G/100ML; G/100ML
50 INJECTION, SOLUTION INTRAVENOUS CONTINUOUS
Status: DISCONTINUED | OUTPATIENT
Start: 2019-12-24 | End: 2019-12-25

## 2019-12-24 RX ORDER — ATORVASTATIN CALCIUM 20 MG/1
20 TABLET, FILM COATED ORAL DAILY
Status: DISCONTINUED | OUTPATIENT
Start: 2019-12-25 | End: 2019-12-29 | Stop reason: HOSPADM

## 2019-12-24 RX ORDER — ACETAMINOPHEN 325 MG/1
650 TABLET ORAL EVERY 6 HOURS PRN
Status: DISCONTINUED | OUTPATIENT
Start: 2019-12-24 | End: 2019-12-29 | Stop reason: HOSPADM

## 2019-12-24 RX ORDER — CLOPIDOGREL BISULFATE 75 MG/1
75 TABLET ORAL DAILY
Status: DISCONTINUED | OUTPATIENT
Start: 2019-12-25 | End: 2019-12-29 | Stop reason: HOSPADM

## 2019-12-24 RX ORDER — DEXTROSE AND SODIUM CHLORIDE 5; .9 G/100ML; G/100ML
125 INJECTION, SOLUTION INTRAVENOUS CONTINUOUS
Status: DISPENSED | OUTPATIENT
Start: 2019-12-24 | End: 2019-12-24

## 2019-12-24 RX ADMIN — DEXTROSE AND SODIUM CHLORIDE 125 ML/HR: 5; .9 INJECTION, SOLUTION INTRAVENOUS at 10:42

## 2019-12-24 RX ADMIN — DEXTROSE AND SODIUM CHLORIDE 50 ML/HR: 5; .9 INJECTION, SOLUTION INTRAVENOUS at 18:02

## 2019-12-24 RX ADMIN — DIVALPROEX SODIUM 500 MG: 500 TABLET, DELAYED RELEASE ORAL at 18:32

## 2019-12-24 NOTE — ED PROVIDER NOTES
History  Chief Complaint   Patient presents with    Altered Mental Status     Patients wife states that patient has had AMS for a few days on and off and falls out of the bed and his chair and is unable to get up  EMS saw hiim this AM and he refused to come with them, wife brought him in after that  She also states that he is not eating or drinking as much today     70year old male patient presents emergency department for altered mental status  The patient is bradycardic currently, this is different than his previous EKG which was normal sinus rhythm  The patient has not taken any of his medications today  Currently the patient is very sluggish in his responses, is in a diabetic, we will check a blood sugar to see this is an easy fix but the patient is also having some respiratory difficulties at home, he is bilateral extensive edema in his legs, will be evaluated with a differential diagnosis to include but not be limited to hypoglycemia, DKA, underlying infection causing alteration in mental status  History provided by:  Patient   used: No    Altered Mental Status   Presenting symptoms: behavior changes, disorientation and partial responsiveness    Severity:  Mild  Most recent episode:  Yesterday  Episode history:  Continuous  Timing:  Constant  Progression:  Worsening  Chronicity:  New  Context: not dementia, not head injury, taking medications as prescribed and not recent change in medication    Associated symptoms: no abdominal pain, no depression, no fever and no seizures        Prior to Admission Medications   Prescriptions Last Dose Informant Patient Reported? Taking?    B-D UF III MINI PEN NEEDLES 31G X 5 MM MISC  Spouse/Significant Other Yes No   Multiple Vitamin (MULTIVITAMINS PO)  Spouse/Significant Other Yes No   Sig: Take 1 tablet by mouth daily   atorvastatin (LIPITOR) 20 mg tablet  Spouse/Significant Other Yes No   Sig: Take 1 tablet by mouth daily   clopidogrel (PLAVIX) 75 mg tablet  Spouse/Significant Other Yes No   Sig: Take 75 mg by mouth daily   divalproex sodium (DEPAKOTE) 500 mg EC tablet  Spouse/Significant Other No No   Sig: Take 1 tablet (500 mg total) by mouth 2 (two) times a day 1 additional 500 mg at bedtime   Patient taking differently: Take 1,000 mg by mouth 2 (two) times a day    folic acid (FOLVITE) 1 mg tablet  Spouse/Significant Other Yes No   Sig: Take 1 tablet by mouth daily   furosemide (LASIX) 40 mg tablet  Spouse/Significant Other No No   Sig: Take 1 tablet (40 mg total) by mouth daily Take one and half daily   Patient taking differently: Take 40 mg by mouth daily Take one tab daily    insulin glargine (LANTUS SOLOSTAR) 100 units/mL injection pen  Spouse/Significant Other Yes No   Sig: Inject 30 Units under the skin daily    levothyroxine 25 mcg tablet  Spouse/Significant Other Yes No   Sig: Take 25 mcg by mouth daily in the early morning   metoprolol tartrate (LOPRESSOR) 50 mg tablet  Spouse/Significant Other Yes No   Sig: Take 1 tablet by mouth every 12 (twelve) hours   potassium chloride (KLOR-CON M20) 20 mEq tablet  Spouse/Significant Other Yes No   Sig: Take 1 tablet by mouth daily   saxagliptin (ONGLYZA) 5 MG tablet  Spouse/Significant Other Yes No   Sig: Take 1 tablet by mouth daily   travoprost (TRAVATAN Z) 0 004 % ophthalmic solution  Spouse/Significant Other Yes No   Sig: Apply to eye Twice daily      Facility-Administered Medications: None       Past Medical History:   Diagnosis Date    Acute renal failure syndrome (HCC)     Cardiac arrhythmia     Carotid stenosis, bilateral     Cerebrovascular disease     Chronic kidney disease     [facility]     Diabetes mellitus [facility] )     ETOH abuse     Hyperlipidemia     Hyperosmolality     Hypertension     Lymphedema of both lower extremities     Memory loss     [facility]     [facility]        Past Surgical History: Procedure Laterality Date    CATARACT EXTRACTION      COLONOSCOPY  2017    HERNIA REPAIR      MULTIPLE TOOTH EXTRACTIONS         Family History   Problem Relation Age of Onset    Hypertension Father     Hypertension Brother     Diabetes Brother     Stroke Brother     Cancer Mother      I have reviewed and agree with the history as documented  Social History     Tobacco Use    Smoking status: Former Smoker     Types: Cigarettes     Last attempt to quit: 2012     Years since quittin 9    Smokeless tobacco: Never Used   Substance Use Topics    Alcohol use: No    Drug use: No        Review of Systems   Constitutional: Negative for fever  Gastrointestinal: Negative for abdominal pain  Neurological: Negative for seizures  All other systems reviewed and are negative  Physical Exam  Physical Exam   Constitutional:  Non-toxic appearance  Eyes: Right eye exhibits normal extraocular motion and no nystagmus  Left eye exhibits normal extraocular motion and no nystagmus  Neck: No JVD present  No tracheal deviation present  Cardiovascular: Exam reveals no friction rub  No murmur heard  Pulmonary/Chest: He has wheezes  Musculoskeletal: He exhibits edema  Neurological: He is disoriented  He displays no atrophy and no tremor  A cranial nerve deficit is present  GCS eye subscore is 4  GCS verbal subscore is 4  GCS motor subscore is 5  Skin: No rash noted  He is not diaphoretic  No cyanosis or erythema         Vital Signs  ED Triage Vitals   Temperature Pulse Respirations Blood Pressure SpO2   19 1648 19 0853 19 0853 19 0853 19 0853   97 5 °F (36 4 °C) (!) 51 18 (!) 183/77 96 %      Temp Source Heart Rate Source Patient Position - Orthostatic VS BP Location FiO2 (%)   19 1648 19 0853 19 0853 19 0853 --   Oral Monitor Lying Right arm       Pain Score       19 1648       No Pain           Vitals:    19 0703 19 0493 12/25/19 1500 12/25/19 1900   BP: (!) 191/85 158/74 164/82 170/80   Pulse: (!) 52  64 71   Patient Position - Orthostatic VS: Lying  Lying Lying         Visual Acuity  Visual Acuity      Most Recent Value   L Pupil Size (mm)  3   R Pupil Size (mm)  3          ED Medications  Medications   dextrose 5 % and sodium chloride 0 9 % infusion (0 mL/hr Intravenous Stopped 12/24/19 1430)   atorvastatin (LIPITOR) tablet 20 mg (20 mg Oral Given 12/25/19 0956)   clopidogrel (PLAVIX) tablet 75 mg (75 mg Oral Given 12/25/19 0956)   divalproex sodium (DEPAKOTE) EC tablet 500 mg (500 mg Oral Given 14/69/13 2671)   folic acid (FOLVITE) tablet 1,000 mcg (1,000 mcg Oral Given 12/25/19 0956)   furosemide (LASIX) tablet 40 mg (40 mg Oral Given 12/25/19 0956)   levothyroxine tablet 25 mcg (25 mcg Oral Given 12/25/19 0610)   potassium chloride (K-DUR,KLOR-CON) CR tablet 20 mEq (20 mEq Oral Given 12/25/19 0956)   travoprost (TRAVATAN-Z) 0 004 % ophthalmic solution 1 drop (1 drop Both Eyes Given 12/25/19 0008)   enoxaparin (LOVENOX) subcutaneous injection 40 mg (40 mg Subcutaneous Given 12/25/19 0956)   acetaminophen (TYLENOL) tablet 650 mg (has no administration in time range)   insulin lispro (HumaLOG) 100 units/mL subcutaneous injection 1-5 Units (1 Units Subcutaneous Not Given 12/25/19 1656)   insulin lispro (HumaLOG) 100 units/mL subcutaneous injection 1-6 Units (2 Units Subcutaneous Given 12/25/19 0008)   hydrALAZINE (APRESOLINE) injection 5 mg (5 mg Intravenous Given 12/25/19 0739)   amLODIPine (NORVASC) tablet 5 mg (5 mg Oral Given 12/25/19 0956)       Diagnostic Studies  Results Reviewed     Procedure Component Value Units Date/Time    Fingerstick Glucose (POCT) [[id number]]  (Normal) Collected:  12/24/19 1604    Lab Status:  Final result Updated:  12/24/19 1605     POC Glucose 134 mg/dl     Fingerstick Glucose (POCT) [[id number]]  (Abnormal) Collected:  12/24/19 1437    Lab Status:  Final result Updated:  12/24/19 1438     POC Glucose 64 mg/dl     Fingerstick Glucose (POCT) [[id number]]  (Abnormal) Collected:  12/24/19 1358    Lab Status:  Final result Updated:  12/24/19 1425     POC Glucose 58 mg/dl     Troponin I [[id number]]  (Normal) Collected:  12/24/19 1220    Lab Status:  Final result Specimen:  Blood from Arm, Left Updated:  12/24/19 1244     Troponin I 0 02 ng/mL     UA w Reflex to Microscopic w Reflex to Culture [[id number]]  (Abnormal) Collected:  12/24/19 1210    Lab Status:  Final result Specimen:  Urine, Clean Catch Updated:  12/24/19 1225     Color, UA Yellow     Clarity, UA Clear     Specific Gravity, UA 1 015     pH, UA 7 5     Leukocytes, UA Negative     Nitrite, UA Negative     Protein, UA Negative mg/dl      Glucose, UA Negative mg/dl      Ketones, UA Negative mg/dl      Urobilinogen, UA 4 0 E U /dl      Bilirubin, UA Negative     Blood, UA Negative    NT-BNP PRO [[id number]]  (Abnormal) Collected:  12/24/19 1038    Lab Status:  Final result Specimen:  Blood from Arm, Right Updated:  12/24/19 1109     NT-proBNP 681 pg/mL     Lipase [[id number]]  (Abnormal) Collected:  12/24/19 1038    Lab Status:  Final result Specimen:  Blood from Arm, Right Updated:  12/24/19 1109     Lipase 34 u/L     Hepatic function panel [[id number]]  (Abnormal) Collected:  12/24/19 1038    Lab Status:  Final result Specimen:  Blood from Arm, Right Updated:  12/24/19 1109     Total Bilirubin 0 60 mg/dL      Bilirubin, Direct 0 16 mg/dL      Alkaline Phosphatase 78 U/L      AST 20 U/L      ALT 19 U/L      Total Protein 7 2 g/dL      Albumin 2 8 g/dL     Troponin I [[id number]]  (Normal) Collected:  12/24/19 1038    Lab Status:  Final result Specimen:  Blood from Arm, Right Updated:  12/24/19 1104     Troponin I 0 02 ng/mL     Basic metabolic panel [[id number]]  (Abnormal) Collected:  12/24/19 1038    Lab Status:  Final result Specimen:  Blood from Arm, Right Updated:  12/24/19 1103     Sodium 140 mmol/L      Potassium 4 2 mmol/L      Chloride 103 mmol/L CO2 31 mmol/L      ANION GAP 6 mmol/L      BUN 15 mg/dL      Creatinine 1 59 mg/dL      Glucose 73 mg/dL      Calcium 8 1 mg/dL      eGFR 48 ml/min/1 73sq m     Narrative:       [place] guidelines for Chronic Kidney Disease (CKD):     Stage 1 with normal or high GFR (GFR > 90 mL/min/1 73 square meters)    Stage 2 Mild CKD (GFR = 60-89 mL/min/1 73 square meters)    Stage 3A Moderate CKD (GFR = 45-59 mL/min/1 73 square meters)    Stage 3B Moderate CKD (GFR = 30-44 mL/min/1 73 square meters)    Stage 4 Severe CKD (GFR = 15-29 mL/min/1 73 square meters)    Stage 5 End Stage CKD (GFR <15 mL/min/1 73 square meters)  Note: GFR calculation is accurate only with a steady state creatinine    Protime-INR [[id number]]  (Abnormal) Collected:  12/24/19 1038    Lab Status:  Final result Specimen:  Blood from Arm, Right Updated:  12/24/19 1057     Protime 15 3 seconds      INR 1 20    CBC and differential [[id number]]  (Abnormal) Collected:  12/24/19 1038    Lab Status:  Final result Specimen:  Blood from Arm, Right Updated:  12/24/19 1047     WBC 5 08 Thousand/uL      RBC 4 15 Million/uL      Hemoglobin 13 3 g/dL      Hematocrit 40 8 %      MCV 98 fL      MCH 32 0 pg      MCHC 32 6 g/dL      RDW 14 5 %      MPV 11 9 fL      Platelets 449 Thousands/uL      nRBC 0 /100 WBCs      Neutrophils Relative 46 %      Immat GRANS % 1 %      Lymphocytes Relative 37 %      Monocytes Relative 15 %      Eosinophils Relative 1 %      Basophils Relative 0 %      Neutrophils Absolute 2 29 Thousands/µL      Immature Grans Absolute 0 07 Thousand/uL      Lymphocytes Absolute 1 88 Thousands/µL      Monocytes Absolute 0 75 Thousand/µL      Eosinophils Absolute 0 07 Thousand/µL      Basophils Absolute 0 02 Thousands/µL     Fingerstick Glucose (POCT) [[id number]]  (Normal) Collected:  12/24/19 0912    Lab Status:  Final result Updated:  12/24/19 0913     POC Glucose 72 mg/dl                  CT head without contrast Final Result by Amber Hallman MD (12/24 1320)      No acute intracranial abnormality  Microangiopathic changes  Workstation performed: [id number]         XR chest 2 views   Final Result by Narcisa Sierra MD (12/24 1233)      Development of mild cardiomegaly and mild vascular congestion            Workstation performed: [id number]                    Procedures  ECG 12 Lead Documentation Only  Date/Time: 12/24/2019 9:09 AM  Performed by: Kai Triplett DO  Authorized by: Kai Triplett DO     Indications / Diagnosis:  48  ECG reviewed by me, the ED Provider: yes    Patient location:  ED  Previous ECG:     Previous ECG:  Compared to current    Similarity:  Changes noted  Interpretation:     Interpretation: abnormal    Rate:     ECG rate assessment: bradycardic    Rhythm:     Rhythm: sinus bradycardia    Ectopy:     Ectopy: none               ED Course                               MDM  Number of Diagnoses or Management Options  Altered mental status: new and requires workup  Hypoglycemia: new and requires workup     Amount and/or Complexity of Data Reviewed  Clinical lab tests: ordered and reviewed  Tests in the radiology section of CPT®: ordered and reviewed  Decide to obtain previous medical records or to obtain history from someone other than the patient: yes  Review and summarize past medical records: yes    Patient Progress  Patient progress: stable        Disposition  Final diagnoses:    Altered mental status   Hypoglycemia     Time reflects when diagnosis was documented in both MDM as applicable and the Disposition within this note     Time User Action Codes Description Comment    12/24/2019  2:25 PM Leigh Ann Cavazos Add [R41 82] Altered mental status     12/24/2019  2:25 PM Leigh Ann Padillar Add [E16 2] Hypoglycemia       ED Disposition     ED Disposition Condition Date/Time Comment    Admit Stable Tue Dec 24, 2019  2:25 PM Case was discussed with Dr Eileen Bear and the patient's admission status was agreed to be Admission Status: observation status to the service of Dr oK Old   Follow-up Information    None         Current Discharge Medication List      CONTINUE these medications which have NOT CHANGED    Details   atorvastatin (LIPITOR) 20 mg tablet Take 1 tablet by mouth daily      B-D UF III MINI PEN NEEDLES 31G X 5 MM MISC       clopidogrel (PLAVIX) 75 mg tablet Take 75 mg by mouth daily      divalproex sodium (DEPAKOTE) 500 mg EC tablet Take 1 tablet (500 mg total) by mouth 2 (two) times a day 1 additional 500 mg at bedtime  Qty: 270 tablet, Refills: 3    Associated Diagnoses: Seizure disorder (HCC)      folic acid (FOLVITE) 1 mg tablet Take 1 tablet by mouth daily      furosemide (LASIX) 40 mg tablet Take 1 tablet (40 mg total) by mouth daily Take one and half daily  Qty: 90 tablet, Refills: 0    Associated Diagnoses: Chronic diastolic heart failure (HCC)      insulin glargine (LANTUS SOLOSTAR) 100 units/mL injection pen Inject 30 Units under the skin daily       levothyroxine 25 mcg tablet Take 25 mcg by mouth daily in the early morning      metoprolol tartrate (LOPRESSOR) 50 mg tablet Take 1 tablet by mouth every 12 (twelve) hours      Multiple Vitamin (MULTIVITAMINS PO) Take 1 tablet by mouth daily      potassium chloride (KLOR-CON M20) 20 mEq tablet Take 1 tablet by mouth daily      saxagliptin (ONGLYZA) 5 MG tablet Take 1 tablet by mouth daily      travoprost (TRAVATAN Z) 0 004 % ophthalmic solution Apply to eye Twice daily           No discharge procedures on file      ED Provider  Electronically Signed by           Kita Remy,   12/25/19 8279

## 2019-12-24 NOTE — ED NOTES
1  CC- Patient came with SOB while walking and going up stairs    2  Orientation status-A and O X3 did not know the year    3  Abnormal lab/abnormal focused assessment/abnormal vitals-slightly elevated Pro-BNP    4  Medications/ drips- 5% Dextrose in NS  Edd Cobb Patient Blood sugar was just at 58 and I gave him juice, ice cream, and 1/2 sandwich, he is now at 134    5  Last time nacrotics/pain meds  Given-None    6  IV lines/drains/etc 20 R AC    7  Isolation status-None    8  Skin-Good    9  Ambulation status-Self    10  ED phone number-[phone]    11  Trauma ?  No       Chas Edwards RN  12/24/19 8924       Chas Edwards RN  12/24/19 8071

## 2019-12-24 NOTE — ED NOTES
Pt felt the need to urinate  Staff stood Pt up next to the bed, but Pt was unable to urinate        Marshall Silverio  12/24/19 3868

## 2019-12-25 PROBLEM — R29.6 FREQUENT FALLS: Status: ACTIVE | Noted: 2019-12-25

## 2019-12-25 LAB
ANION GAP SERPL CALCULATED.3IONS-SCNC: 6 MMOL/L (ref 4–13)
BASOPHILS # BLD AUTO: 0.03 THOUSANDS/ΜL (ref 0–0.1)
BASOPHILS NFR BLD AUTO: 1 % (ref 0–1)
BUN SERPL-MCNC: 12 MG/DL (ref 5–25)
CALCIUM SERPL-MCNC: 8.1 MG/DL (ref 8.3–10.1)
CHLORIDE SERPL-SCNC: 105 MMOL/L (ref 100–108)
CO2 SERPL-SCNC: 29 MMOL/L (ref 21–32)
CREAT SERPL-MCNC: 1.38 MG/DL (ref 0.6–1.3)
EOSINOPHIL # BLD AUTO: 0.06 THOUSAND/ΜL (ref 0–0.61)
EOSINOPHIL NFR BLD AUTO: 1 % (ref 0–6)
ERYTHROCYTE [DISTWIDTH] IN BLOOD BY AUTOMATED COUNT: 14.4 % (ref 11.6–15.1)
EST. AVERAGE GLUCOSE BLD GHB EST-MCNC: 148 MG/DL
GFR SERPL CREATININE-BSD FRML MDRD: 57 ML/MIN/1.73SQ M
GLUCOSE SERPL-MCNC: 105 MG/DL (ref 65–140)
GLUCOSE SERPL-MCNC: 115 MG/DL (ref 65–140)
GLUCOSE SERPL-MCNC: 116 MG/DL (ref 65–140)
GLUCOSE SERPL-MCNC: 131 MG/DL (ref 65–140)
GLUCOSE SERPL-MCNC: 136 MG/DL (ref 65–140)
GLUCOSE SERPL-MCNC: 166 MG/DL (ref 65–140)
GLUCOSE SERPL-MCNC: 196 MG/DL (ref 65–140)
GLUCOSE SERPL-MCNC: 89 MG/DL (ref 65–140)
HBA1C MFR BLD: 6.8 % (ref 4.2–6.3)
HCT VFR BLD AUTO: 38 % (ref 36.5–49.3)
HGB BLD-MCNC: 12.5 G/DL (ref 12–17)
IMM GRANULOCYTES # BLD AUTO: 0.03 THOUSAND/UL (ref 0–0.2)
IMM GRANULOCYTES NFR BLD AUTO: 1 % (ref 0–2)
LYMPHOCYTES # BLD AUTO: 1.8 THOUSANDS/ΜL (ref 0.6–4.47)
LYMPHOCYTES NFR BLD AUTO: 34 % (ref 14–44)
MCH RBC QN AUTO: 32.1 PG (ref 26.8–34.3)
MCHC RBC AUTO-ENTMCNC: 32.9 G/DL (ref 31.4–37.4)
MCV RBC AUTO: 98 FL (ref 82–98)
MONOCYTES # BLD AUTO: 0.81 THOUSAND/ΜL (ref 0.17–1.22)
MONOCYTES NFR BLD AUTO: 16 % (ref 4–12)
NEUTROPHILS # BLD AUTO: 2.51 THOUSANDS/ΜL (ref 1.85–7.62)
NEUTS SEG NFR BLD AUTO: 47 % (ref 43–75)
NRBC BLD AUTO-RTO: 0 /100 WBCS
PLATELET # BLD AUTO: 99 THOUSANDS/UL (ref 149–390)
PMV BLD AUTO: 11.9 FL (ref 8.9–12.7)
POTASSIUM SERPL-SCNC: 4.4 MMOL/L (ref 3.5–5.3)
RBC # BLD AUTO: 3.89 MILLION/UL (ref 3.88–5.62)
SODIUM SERPL-SCNC: 140 MMOL/L (ref 136–145)
WBC # BLD AUTO: 5.24 THOUSAND/UL (ref 4.31–10.16)

## 2019-12-25 PROCEDURE — 99232 SBSQ HOSP IP/OBS MODERATE 35: CPT | Performed by: INTERNAL MEDICINE

## 2019-12-25 PROCEDURE — 82948 REAGENT STRIP/BLOOD GLUCOSE: CPT

## 2019-12-25 PROCEDURE — 85025 COMPLETE CBC W/AUTO DIFF WBC: CPT | Performed by: INTERNAL MEDICINE

## 2019-12-25 PROCEDURE — 83036 HEMOGLOBIN GLYCOSYLATED A1C: CPT | Performed by: INTERNAL MEDICINE

## 2019-12-25 PROCEDURE — G8978 MOBILITY CURRENT STATUS: HCPCS

## 2019-12-25 PROCEDURE — 80186 ASSAY OF PHENYTOIN FREE: CPT | Performed by: INTERNAL MEDICINE

## 2019-12-25 PROCEDURE — G8979 MOBILITY GOAL STATUS: HCPCS

## 2019-12-25 PROCEDURE — 97163 PT EVAL HIGH COMPLEX 45 MIN: CPT

## 2019-12-25 PROCEDURE — 80048 BASIC METABOLIC PNL TOTAL CA: CPT | Performed by: INTERNAL MEDICINE

## 2019-12-25 RX ORDER — AMLODIPINE BESYLATE 5 MG/1
5 TABLET ORAL DAILY
Status: DISCONTINUED | OUTPATIENT
Start: 2019-12-25 | End: 2019-12-26

## 2019-12-25 RX ADMIN — LEVOTHYROXINE SODIUM 25 MCG: 25 TABLET ORAL at 06:10

## 2019-12-25 RX ADMIN — AMLODIPINE BESYLATE 5 MG: 5 TABLET ORAL at 09:56

## 2019-12-25 RX ADMIN — FOLIC ACID 1000 MCG: 1 TABLET ORAL at 09:56

## 2019-12-25 RX ADMIN — DIVALPROEX SODIUM 500 MG: 500 TABLET, DELAYED RELEASE ORAL at 20:23

## 2019-12-25 RX ADMIN — CLOPIDOGREL BISULFATE 75 MG: 75 TABLET ORAL at 09:56

## 2019-12-25 RX ADMIN — FUROSEMIDE 40 MG: 40 TABLET ORAL at 09:56

## 2019-12-25 RX ADMIN — POTASSIUM CHLORIDE 20 MEQ: 1500 TABLET, EXTENDED RELEASE ORAL at 09:56

## 2019-12-25 RX ADMIN — TRAVOPROST 1 DROP: 0.04 SOLUTION/ DROPS OPHTHALMIC at 21:44

## 2019-12-25 RX ADMIN — INSULIN LISPRO 1 UNITS: 100 INJECTION, SOLUTION INTRAVENOUS; SUBCUTANEOUS at 21:44

## 2019-12-25 RX ADMIN — TRAVOPROST 1 DROP: 0.04 SOLUTION/ DROPS OPHTHALMIC at 00:08

## 2019-12-25 RX ADMIN — ATORVASTATIN CALCIUM 20 MG: 20 TABLET, FILM COATED ORAL at 09:56

## 2019-12-25 RX ADMIN — ENOXAPARIN SODIUM 40 MG: 40 INJECTION SUBCUTANEOUS at 09:56

## 2019-12-25 RX ADMIN — INSULIN LISPRO 2 UNITS: 100 INJECTION, SOLUTION INTRAVENOUS; SUBCUTANEOUS at 00:08

## 2019-12-25 RX ADMIN — DIVALPROEX SODIUM 500 MG: 500 TABLET, DELAYED RELEASE ORAL at 09:56

## 2019-12-25 RX ADMIN — HYDRALAZINE HYDROCHLORIDE 5 MG: 20 INJECTION INTRAMUSCULAR; INTRAVENOUS at 07:39

## 2019-12-25 NOTE — UTILIZATION REVIEW
Initial Clinical Review - admitted as Observation on 12/24/19 @ 1426  Changed to Inpatient on 12/25/19 @ 1128  Start   Ordered   12/25/19 1128  Inpatient Admission Once     Transfer Service: General Medicine       Question Answer Comment   Admitting Physician Elaine Cummins    Level of Care Med Surg    Estimated length of stay More than 2 Midnights    Certification I certify that inpatient services are medically necessary for this patient for a duration of greater than two midnights  See H&P and MD Progress Notes for additional information about the patient's course of treatment  12/25/19 1128       Admission: Date/Time/Statement: OBSERVATION 12/24/19 @ 1426  Orders Placed This Encounter   Procedures    Place in Observation     Standing Status:   Standing     Number of Occurrences:   1     Order Specific Question:   Admitting Physician     Answer:   Elaine Cummins [[id number]]     Order Specific Question:   Level of Care     Answer:   Med Surg [16]     ED Arrival Information     Expected Arrival Acuity Means of Arrival Escorted By Service Admission Type    - 12/24/2019 08:45 Emergent Wheelchair Family Member General Medicine Emergency    Arrival Complaint    Multiple Falls        Chief Complaint   Patient presents with    Altered Mental Status     Patients wife states that patient has had AMS for a few days on and off and falls out of the bed and his chair and is unable to get up  EMS saw hiim this AM and he refused to come with them, wife brought him in after that  She also states that he is not eating or drinking as much today     HPI: Joseph Moore is a 70 y o  male who presents with frequent falls and hypoglycemia  History obtained from patient and wife bedside  Patient having frequent falls for last 1 week  No head injury  He also found to have hypoglycemia  He received lantus last night, but didn't eat anything after that  Blood sugar was around 50  Given d5w in ER   Still hypoglycemic and now admitted for further evaluation  No chest pain, palpitation, SOB  Assessment/Plan:        Frequent falls  Assessment & Plan  CT head- no new stroke  Pt/ ot  Neurology  Check UA     Seizure disorder [facility])  Assessment & Plan  Continue dilantin  Check level     Chronic diastolic heart failure (HCC)  Assessment & Plan      Wt Readings from Last 3 Encounters:   12/24/19 117 kg (258 lb 6 1 oz)   11/26/19 118 kg (259 lb 3 2 oz)   11/20/19 119 kg (262 lb)      Continue lasix            CVA (cerebral vascular accident) ([address] )  Assessment & Plan  Frequent fall  Ct head no new stroke       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[facility])  Assessment & Plan  No results found for: HGBA1C            Recent Labs     12/24/19  1358 12/24/19  1437 12/24/19  1604 12/24/19  2114   POCGLU 58* 64* 134 203*         Blood Sugar Average: Last 72 hrs:  (P) 106  2      Hold lantus for hypoglycemia  Sliding scale     Benign essential hypertension  Assessment & Plan  Monitor  Metoprolol on hold for bradycardia     Chronic renal insufficiency, stage III (moderate) (HCC)  Assessment & Plan  Cr 1 59  Baseline around 1 7  Monitor  Avoid nephrotoxins     * Hypoglycemia  Assessment & Plan  This is 70years old male came to the emergency room because of altered mental status  He was found to have hypoglycemia  As per family he was not eating and drinking normally but took his insulin yesterday  Continue IV fluid D5 NS for now  Check blood sugar frequently  CT head was negative  Hold insulin for now            VTE Prophylaxis: Heparin  / sequential compression device   Code Status: FULL CODE  POLST: POLST is not applicable to this patient  Discussion with family: WIFE     Anticipated Length of Stay:  Patient will be admitted on an Observation basis with an anticipated length of stay of  Less than 2 midnights  Justification for Hospital Stay: Hypoglycemia     12/25 Physical Therapy recommending short term skilled PT at D/C       ED Triage Vitals   Temperature Pulse Respirations Blood Pressure SpO2   12/24/19 1648 12/24/19 0853 12/24/19 0853 12/24/19 0853 12/24/19 0853   97 5 °F (36 4 °C) (!) 51 18 (!) 183/77 96 %      Temp Source Heart Rate Source Patient Position - Orthostatic VS BP Location FiO2 (%)   12/24/19 1648 12/24/19 0853 12/24/19 0853 12/24/19 0853 --   Oral Monitor Lying Right arm       Pain Score       12/24/19 1648       No Pain        Wt Readings from Last 1 Encounters:   12/24/19 117 kg (258 lb 6 1 oz)     Additional Vital Signs:     Date/Time  Temp  Pulse  Resp  BP  MAP (mmHg)  SpO2  O2 Device    12/25/19 0833        158/74          12/25/19 0703  97 7 °F (36 5 °C)  52Abnormal   20  191/85Abnormal     96 %  None (Room air)    12/25/19 0253        156/64          12/24/19 2317  98 2 °F (36 8 °C)  48Abnormal   20  170/78    96 %  None (Room air)    12/24/19 1648  97 5 °F (36 4 °C)  56  19  164/75  108  98 %  None (Room air)    12/24/19 1430    89  22  180/80Abnormal     99 %  None (Room air)        Pertinent Labs/Diagnostic Test Results:   Results from last 7 days   Lab Units 12/25/19  0443 12/24/19  1926 12/24/19  1038   WBC Thousand/uL 5 24  --  5 08   HEMOGLOBIN g/dL 12 5  --  13 3   HEMATOCRIT % 38 0  --  40 8   PLATELETS Thousands/uL 99* 100* 114*   NEUTROS ABS Thousands/µL 2 51  --  2 29         Results from last 7 days   Lab Units 12/25/19  0443 12/24/19  1038   SODIUM mmol/L 140 140   POTASSIUM mmol/L 4 4 4 2   CHLORIDE mmol/L 105 103   CO2 mmol/L 29 31   ANION GAP mmol/L 6 6   BUN mg/dL 12 15   CREATININE mg/dL 1 38* 1 59*   EGFR ml/min/1 73sq m 57 48   CALCIUM mg/dL 8 1* 8 1*     Results from last 7 days   Lab Units 12/24/19  1038   AST U/L 20   ALT U/L 19   ALK PHOS U/L 78   TOTAL PROTEIN g/dL 7 2   ALBUMIN g/dL 2 8*   TOTAL BILIRUBIN mg/dL 0 60   BILIRUBIN DIRECT mg/dL 0 16     Results from last 7 days   Lab Units 12/25/19  0941 12/25/19  0610 12/25/19  0302 12/24/19  2114 12/24/19  1604 12/24/19  1437 12/24/19  1358 12/24/19  0912   POC GLUCOSE mg/dl 116 89 115 203* 134 64* 58* 72     Results from last 7 days   Lab Units 12/25/19  0443 12/24/19  1038   GLUCOSE RANDOM mg/dL 105 73       Results from last 7 days   Lab Units 12/24/19  1220 12/24/19  1038   TROPONIN I ng/mL 0 02 0 02         Results from last 7 days   Lab Units 12/24/19  1038   PROTIME seconds 15 3*   INR  1 20*     Results from last 7 days   Lab Units 12/24/19  1038   NT-PRO BNP pg/mL 681*             Results from last 7 days   Lab Units 12/24/19  1038   LIPASE u/L 34*             Results from last 7 days   Lab Units 12/24/19  1927 12/24/19  1210   CLARITY UA  Clear Clear   COLOR UA  Yellow Yellow   SPEC GRAV UA  1 010 1 015   PH UA  7 5 7 5   GLUCOSE UA mg/dl Negative Negative   KETONES UA mg/dl Negative Negative   BLOOD UA  Negative Negative   PROTEIN UA mg/dl Negative Negative   NITRITE UA  Negative Negative   BILIRUBIN UA  Negative Negative   UROBILINOGEN UA E U /dl >=8 0* 4 0*   LEUKOCYTES UA  Negative Negative       12/24 Chest x-ray: Development of mild cardiomegaly and mild vascular congestion   12/24 CT head: No acute intracranial abnormality  Microangiopathic changes  12/24 EKG:     Ref Range & Units 12/24/19 0902   Ventricular Rate BPM 47    Atrial Rate BPM 47    NC Interval ms 218    QRSD Interval ms 104    QT Interval ms 508    QTC Interval ms 449    P [place] degrees 62    QRS Axis degrees -23    T Wave Axis degrees -7         Sinus bradycardia with 1st degree A-V block  Anteroseptal infarct (cited on or before 26-SEP-2007)  Nonspecific ST abnormality  When compared with ECG of 05-OCT-2007 14:25,  NC interval has increased  Vent   rate has decreased BY  34 BPM  ST now depressed in Inferior leads  QT has shortened             ED Treatment:   Medication Administration from 12/24/2019 0845 to 12/24/2019 1637       Date/Time Order Dose Route Action Action by Comments     12/24/2019 1430 dextrose 5 % and sodium chloride 0 9 % infusion 0 mL/hr Intravenous Stopped Kasey Woods RN      12/24/2019 1042 dextrose 5 % and sodium chloride 0 9 % infusion 125 mL/hr Intravenous New Bag Kasey Woods RN         Past Medical History:   Diagnosis Date    Acute renal failure syndrome (HCC)     Cardiac arrhythmia     Carotid stenosis, bilateral     Cerebrovascular disease     Chronic kidney disease     [facility]     Diabetes mellitus [facility] )     ETOH abuse     Hyperlipidemia     Hyperosmolality     Hypertension     Lymphedema of both lower extremities     Memory loss     NPH [facility]normal [facility]cephalus) [facility])     Seizure disorder ([facility])      Present on Admission:   Chronic renal insufficiency, stage III (moderate) ([facility])   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([facility])   Benign essential hypertension   CVA (cerebral vascular accident) ([facility] )   Chronic diastolic heart failure ([facility])   Seizure disorder ([facility])   Hypoglycemia      Admitting Diagnosis: Altered mental status [R41 82]  Hypoglycemia [E16 2]  Age/Sex: 70 y o  male     Admission Orders: Telemetry monitoring, Neurology consult, PT/OT , fingerstick glucose Q3H  Scheduled Medications:    Medications:  amLODIPine 5 mg Oral Daily   atorvastatin 20 mg Oral Daily   clopidogrel 75 mg Oral Daily   divalproex sodium 500 mg Oral BID   enoxaparin 40 mg Subcutaneous Daily   folic acid 3,262 mcg Oral Daily   furosemide 40 mg Oral Daily   insulin lispro 1-5 Units Subcutaneous TID AC   insulin lispro 1-6 Units Subcutaneous HS   levothyroxine 25 mcg Oral Early Morning   potassium chloride 20 mEq Oral Daily   travoprost 1 drop Both Eyes HS     Continuous IV Infusions:    dextrose 5 % and sodium chloride 0 9 % 50 mL/hr Intravenous Continuous     PRN Meds:    acetaminophen 650 mg Oral Q6H PRN   hydrALAZINE 5 mg Intravenous Q6H PRN           Network Utilization Review Department  [email or web address]  ATTENTION: Please call with any questions or concerns to [phone] and carefully listen to the prompts so that you are directed to the right person  All voicemails are confidential   [place] all requests for admission clinical reviews, approved or denied determinations and any other requests to dedicated fax number below belonging to the campus where the patient is receiving treatment   List of dedicated fax numbers for the Facilities:  [address] DENIALS (Administrative/Medical Necessity) [phone]   [address] (Maternity/NICU/Pediatrics) [phone]   Terrell Walker [phone]   [facility] [phone]   Jeremiah Specter [phone]   [facility] [phone]   [address] [place] [address] [phone]   [facility]  [phone]   [address] [facility]  [address] [facility] [address] [facility] [phone]

## 2019-12-25 NOTE — ASSESSMENT & PLAN NOTE
No results found for: HGBA1C    Recent Labs     12/24/19  1358 12/24/19  1437 12/24/19  1604 12/24/19  2114   POCGLU 58* 64* 134 203*       Blood Sugar Average: Last 72 hrs:  (P) 106 2     Hold lantus for hypoglycemia   Sliding scale

## 2019-12-25 NOTE — ASSESSMENT & PLAN NOTE
This is 70years old male came to the emergency room because of altered mental status  He was found to have hypoglycemia  As per family he was not eating and drinking normally but took his insulin yesterday  Continue IV fluid D5 NS for now  Check blood sugar frequently  CT head was negative  Hold insulin for now

## 2019-12-25 NOTE — ASSESSMENT & PLAN NOTE
Wt Readings from Last 3 Encounters:   12/24/19 117 kg (258 lb 6 1 oz)   11/26/19 118 kg (259 lb 3 2 oz)   11/20/19 119 kg (262 lb)     Continue lasix

## 2019-12-25 NOTE — ASSESSMENT & PLAN NOTE
Blood pressure remains high  Metoprolol on hold for bradycardia  Heart rate ranging around 55  Telemetry shows sinus bradycardia  Will start on Norvasc 5 mg daily

## 2019-12-25 NOTE — PROGRESS NOTES
Progress Note - Donaldo Mondragon 1940, 70 y o  male MRN: [id number]    Unit/Bed#: -01 Encounter: [id number]    Primary Care Provider: Namita Brewster MD   Date and time admitted to hospital: 12/24/2019  8:48 AM        * Hypoglycemia  Assessment & Plan  This is 70years old male came to the emergency room because of altered mental status  He was found to have hypoglycemia  As per family he was not eating and drinking normally but took his insulin yesterday  Patient was placed on IV D5 NS  No hypoglycemia overnight  Will discontinue IV fluid  Hold Lantus for now    Frequent falls  Assessment & Plan  CT head- no new stroke  Pt/ ot  Neurology  UA negative for UTI  Physical therapy recommended rehab  Seizure disorder [facility])  Assessment & Plan  Continue dilantin  Check level  Still pending    Chronic diastolic heart failure (HCC)  Assessment & Plan  Wt Readings from Last 3 Encounters:   12/24/19 117 kg (258 lb 6 1 oz)   11/26/19 118 kg (259 lb 3 2 oz)   11/20/19 119 kg (262 lb)     Continue lasix at home dose        CVA (cerebral vascular accident) [facility])  Assessment & Plan  Frequent fall  Ct head no new stroke  PT recommended short-term rehab  Neurology consulted  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[facility])  Assessment & Plan  Lab Results   Component Value Date    HGBA1C 6 8 (H) 12/25/2019       Recent Labs     12/25/19  0302 12/25/19  0610 12/25/19  0941 12/25/19  1156   POCGLU 115 89 116 196*       Blood Sugar Average: Last 72 hrs:  (P) 078 [id number]     Hold lantus for hypoglycemia  Sliding scale    Benign essential hypertension  Assessment & Plan  Blood pressure remains high  Metoprolol on hold for bradycardia  Heart rate ranging around 55  Telemetry shows sinus bradycardia  Will start on Norvasc 5 mg daily  Chronic renal insufficiency, stage III (moderate) (HCC)  Assessment & Plan  Baseline creatinine around 1 7  Serum creatinine 1 38 today    About nephrotoxins  Continue to monitor  Continue home dose of Lasix  VTE Pharmacologic Prophylaxis:   Pharmacologic: Heparin  Mechanical VTE Prophylaxis in Place: Yes    Patient Centered Rounds: I have performed bedside rounds with nursing staff today  Discussions with Specialists or Other Care Team Provider:     Education and Discussions with Family / Patient:     Time Spent for Care: More than 50% of total time spent on counseling and coordination of care as described above  Current Length of Stay: 0 day(s)    Current Patient Status: Inpatient   Certification Statement: The patient will continue to require additional inpatient hospital stay due to Uncontrolled hypertension, physical therapy    Discharge Plan:  In next 24 hours    Code Status: Level 1 - Full Code      Subjective:   I have seen and examined the patient bedside this morning  Patient still very weak  He is easily arousable  Blood sugar stable now  Ate his breakfast   No chest pain or shortness of breath  Objective:     Vitals:   Temp (24hrs), Av 7 °F (36 5 °C), Min:97 5 °F (36 4 °C), Max:98 2 °F (36 8 °C)    Temp:  [97 5 °F (36 4 °C)-98 2 °F (36 8 °C)] 97 5 °F (36 4 °C)  HR:  [48-64] 64  Resp:  [18-20] 18  BP: (156-191)/(64-85) 164/82  SpO2:  [93 %-98 %] 93 %  Body mass index is 36 04 kg/m²  Input and Output Summary (last 24 hours):        Intake/Output Summary (Last 24 hours) at 2019 1532  Last data filed at 2019 1300  Gross per 24 hour   Intake 270 ml   Output 200 ml   Net 70 ml       Physical Exam:     Physical Exam  General- Awake, alert, looks comfortable  HEENT- Normocephalic, atraumatic, oral mucosa- moist  CVS- Normal S1/ S2, Regular rate and rhythm, edema+  Respiratory system- B/L clear breath sounds, no wheezing  Abdomen- Soft, Non distended, no tenderness, Bowel sound- present  Musculoskeletal- No gross deformity  CNS- No acute focal neurologic deficit noted    Additional Data:     Labs:    Results from last 7 days   Lab Units 12/25/19  0443   WBC Thousand/uL 5 24   HEMOGLOBIN g/dL 12 5   HEMATOCRIT % 38 0   PLATELETS Thousands/uL 99*   NEUTROS PCT % 47   LYMPHS PCT % 34   MONOS PCT % 16*   EOS PCT % 1     Results from last 7 days   Lab Units 12/25/19  0443 12/24/19  1038   SODIUM mmol/L 140 140   POTASSIUM mmol/L 4 4 4 2   CHLORIDE mmol/L 105 103   CO2 mmol/L 29 31   BUN mg/dL 12 15   CREATININE mg/dL 1 38* 1 59*   ANION GAP mmol/L 6 6   CALCIUM mg/dL 8 1* 8 1*   ALBUMIN g/dL  --  2 8*   TOTAL BILIRUBIN mg/dL  --  0 60   ALK PHOS U/L  --  78   ALT U/L  --  19   AST U/L  --  20   GLUCOSE RANDOM mg/dL 105 73     Results from last 7 days   Lab Units 12/24/19  1038   INR  1 20*     Results from last 7 days   Lab Units 12/25/19  1156 12/25/19  0941 12/25/19  0610 12/25/19  0302 12/24/19  2114 12/24/19  1604 12/24/19  1437 12/24/19  1358 12/24/19  0912   POC GLUCOSE mg/dl 196* 116 89 115 203* 134 64* 58* 72     Results from last 7 days   Lab Units 12/25/19  0443   HEMOGLOBIN A1C % 6 8*               * I Have Reviewed All Lab Data Listed Above  * Additional Pertinent Lab Tests Reviewed:  All Labs Within Last 24 Hours Reviewed    Imaging:    Imaging Reports Reviewed Today Include:   Imaging Personally Reviewed by Myself Includes:      Recent Cultures (last 7 days):           Last 24 Hours Medication List:     Current Facility-Administered Medications:  acetaminophen 650 mg Oral Q6H PRN Chi Belle MD   amLODIPine 5 mg Oral Daily Chi Belle MD   atorvastatin 20 mg Oral Daily Chi Belle MD   clopidogrel 75 mg Oral Daily Chi Belle MD   divalproex sodium 500 mg Oral BID Chi Belle MD   enoxaparin 40 mg Subcutaneous Daily Chi Belle MD   folic acid 9,964 mcg Oral Daily Chi Belle MD   furosemide 40 mg Oral Daily Chi Belle MD   hydrALAZINE 5 mg Intravenous Q6H PRN ONDINA Mills   insulin lispro 1-5 Units Subcutaneous TID AC Chi Belle MD   insulin lispro 1-6 Units Subcutaneous HS Joselo Mccarthy MD   levothyroxine 25 mcg Oral Early Morning Joselo Mccarthy MD   potassium chloride 20 mEq Oral Daily Joselo Mccarthy MD   travoprost 1 drop Both Eyes HS Joselo Mccarthy MD        Today, Patient Was Seen By: Lashaun Coon MD    ** Please Note: Dictation voice to text software may have been used in the creation of this document  **      Metabolic encephalopathy, POA, due to diabetic hypoglycemia evidenced by altered MS with glucose of 58, 64  Improved with IV fluid D5NS,  Lantus on hold

## 2019-12-25 NOTE — PLAN OF CARE
Problem: PHYSICAL THERAPY ADULT  Goal: Performs mobility at highest level of function for planned discharge setting  See evaluation for individualized goals  Description  Treatment/Interventions: Functional transfer training, LE strengthening/ROM, Elevations, Therapeutic exercise, Patient/family training, Endurance training, Equipment eval/education, Bed mobility, Gait training, Spoke to nursing          See flowsheet documentation for full assessment, interventions and recommendations  Note:   Prognosis: Good  Problem List: Decreased strength, Decreased endurance, Impaired balance, Decreased mobility, Decreased safety awareness, Decreased skin integrity  Assessment: pt is a 73y/o m who [facility] to [facility] c hypoglycemia + s/p multiple falls  PMH significant for DM 2, CVA, falls, seizure disorder, LE lymphedema, NPH, + ETOH abuse  at baseline, pt mod (I) c functional mobility c RW  resides c spouse in ranch home c 5 JUANITA in front + 2 JUANITA in back of home  currently requires min (A)x1 to complete mobility tasks 2* deficits in strength, balance, gait quality, + activity tolerance noted in PT exam above  Barthel Index 45/100  min verbal cues required for safe transfer technique  ambulated 30' c RW limited by fatigue  able to sit OOB in chair at end of session c chair alarm activated  would benefit from skilled PT to maximize functional mobility + improve quality of life  upon d/c, recommend STR  PT eval of high complexity 2* unstable med status c pt requiring ongoing medical management 2* hypoglycemia + multiple falls  pt c multiple co-morbidities + mobility deficits above requiring (A)x1 to complete mobility tasks  resides c spouse in ranch home c 2-5 JUANITA  pt c h/o multiple falls OOB  Barriers to Discharge: Inaccessible home environment     Recommendation: Short-term skilled PT     PT - OK to Discharge: Yes(to STR when stable )    See flowsheet documentation for full assessment

## 2019-12-25 NOTE — ASSESSMENT & PLAN NOTE
This is 70years old male came to the emergency room because of altered mental status  He was found to have hypoglycemia  As per family he was not eating and drinking normally but took his insulin yesterday  Patient was placed on IV D5 NS  No hypoglycemia overnight  Will discontinue IV fluid    Hold Lantus for now

## 2019-12-25 NOTE — ASSESSMENT & PLAN NOTE
Wt Readings from Last 3 Encounters:   12/24/19 117 kg (258 lb 6 1 oz)   11/26/19 118 kg (259 lb 3 2 oz)   11/20/19 119 kg (262 lb)     Continue lasix at home dose

## 2019-12-25 NOTE — PHYSICAL THERAPY NOTE
PT Evaluation (19min)  (8:20-8:39)    Past Medical History:   Diagnosis Date    Acute renal failure syndrome (HCC)     Cardiac arrhythmia     Carotid stenosis, bilateral     Cerebrovascular disease     Chronic kidney disease     [facility]     Diabetes mellitus ([facility] )     ETOH abuse     Hyperlipidemia     Hyperosmolality     Hypertension     Lymphedema of both lower extremities     Memory loss     NPH [facility]normal pressure hydrocephalus) [facility]     Seizure disorder ([facility] )         12/25/19 0820   Note Type   Note type Eval only   Pain Assessment   Pain Assessment No/denies pain   Home Living   Type of [address] [place] [address] One level;Stairs to enter with rails  (5 JUANITA in front; 2 JUANITA in back)   Bathroom Shower/Tub Walk-in shower   Bathroom Toilet Raised   Bathroom Equipment Shower chair   Home Equipment Walker;Cane;Wheelchair-manual   Prior Function   Level of [place] Independent with ADLs and functional mobility  (ambulates c RW)   Lives With Jarrett-Shukla Help From Family;Home health  (HHA daily x2hrs to (A) c showers)   ADL Assistance Needs assistance   IADLs Needs assistance   Falls in the last 6 months 1 to 4  (reports 3-4 falls OOB)   Vocational Retired   Comments pt's spouse drives   Restrictions/Precautions   [place] Bearing Precautions Per Order No   Other Precautions Chair Alarm; Bed Alarm;Multiple lines; Fall Risk   General   Additional Pertinent History pt presents to [facility] c hypoglycemia + s/p multiple falls  PT consulted for mobility + d/c planning  up + OOB as tolerated     Family/Caregiver Present No   Cognition   Orientation Level Oriented X4   RUE Assessment   RUE Assessment WFL  (4-/5)   LUE Assessment   LUE Assessment WFL  (4-/5)   RLE Assessment   RLE Assessment WFL  (3+/5)   LLE Assessment   LLE Assessment WFL  (3+/5)   Coordination   Sensation WFL   Bed Mobility   Supine to Sit 4  Minimal assistance   Additional items Assist x 1;HOB elevated; Bedrails; Increased time required;Verbal cues   Transfers   Sit to Stand 4  Minimal assistance   Additional items Assist x 1;Verbal cues; Increased time required   Stand to Sit 4  Minimal assistance   Additional items Assist x 1; Armrests; Verbal cues; Increased time required   Ambulation/Elevation   Gait pattern Narrow DAVIDA; Forward Flexion;Decreased foot clearance; Short stride   Gait Assistance 4  Minimal assist   Additional items Assist x 1;Verbal cues   Assistive Device Rolling walker   Distance 30'   Stair Management Assistance Not tested   Balance   Static Sitting Fair +   Dynamic Sitting Fair +   Static Standing Fair -   Dynamic Standing Fair -   Ambulatory Poor +   Activity Tolerance   Activity Tolerance Patient limited by fatigue   Nurse Made Aware PIOTR Merrill aware pt ambulated (A)x1 c RW  OOB in chair c chair alarm activated  Assessment   Prognosis Good   Problem List Decreased strength;Decreased endurance; Impaired balance;Decreased mobility; Decreased safety awareness;Decreased skin integrity   Assessment pt is a 75y/o m who presents to [facility] c hypoglycemia + s/p multiple falls  PMH significant for DM 2, CVA, falls, seizure disorder, LE lymphedema, NPH, + ETOH abuse  at baseline, pt mod (I) c functional mobility c RW  resides c spouse in ranch home c 5 JUANITA in front + 2 JUANITA in back of home  currently requires min (A)x1 to complete mobility tasks 2* deficits in strength, balance, gait quality, + activity tolerance noted in PT exam above  Barthel Index 45/100  min verbal cues required for safe transfer technique  ambulated 30' c RW limited by fatigue  able to sit OOB in chair at end of session c chair alarm activated  would benefit from skilled PT to maximize functional mobility + improve quality of life  upon d/c, recommend STR  PT eval of high complexity 2* unstable med status c pt requiring ongoing medical management 2* hypoglycemia + multiple falls   pt c multiple co-morbidities + mobility deficits above requiring (A)x1 to complete mobility tasks  resides c spouse in ranch home c 2-5 JUANITA  pt c h/o multiple falls OOB  Barriers to Discharge Inaccessible home environment   Goals   Patient Goals "to get up and walk around"  [facility] Expiration Date 01/04/20   Short Term Goal #1 1  increase strength 1/2 grade to improve overall functional mobility, 2  perform bed mobility mod (I) to decrease caregiver burden, 3  perform transfers mod (I) to safely perform ADLs, 4  ambulate 150' mod (I) c RW to safely navigate home environment, 5  negotiate 5 stairs c (S) to safely enter home   Plan   Treatment/Interventions Functional transfer training;LE strengthening/ROM; Elevations; Therapeutic exercise;Patient/family training; Endurance training;Equipment eval/education; Bed mobility;Gait training;Spoke to nursing   PT Frequency Other (Comment)  (3-5x/wk)   Recommendation   Recommendation Short-term skilled PT   PT - OK to Discharge Yes  (to STR when stable )   Modified [place] Scale   Modified [place] Scale 4   Barthel Index   Feeding 5   Bathing 0   Grooming Score 5   Dressing Score 5   Bladder Score 5   Bowels Score 10   Toilet Use Score 5   Transfers (Bed/Chair) Score 10   Mobility (Level Surface) Score 0   Stairs Score 0   Barthel Index Score 45     Herminia Garnett, PT, DPT

## 2019-12-25 NOTE — H&P
H&P- Eveline Shah 1940, 70 y o  male MRN: [id number]    Unit/Bed#: -01 Encounter: [id number]    Primary Care Provider: Giulia Camp MD   Date and time admitted to hospital: 12/24/2019  8:48 AM        Frequent falls  Assessment & Plan  CT head- no new stroke  Pt/ ot  Neurology  Check UA    Seizure disorder [facility])  Assessment & Plan  Continue dilantin  Check level    Chronic diastolic heart failure [facility])  Assessment & Plan  Wt Readings from Last 3 Encounters:   12/24/19 117 kg (258 lb 6 1 oz)   11/26/19 118 kg (259 lb 3 2 oz)   11/20/19 119 kg (262 lb)     Continue lasix         CVA (cerebral vascular accident) ([facility] )  Assessment & Plan  Frequent fall  Ct head no new stroke  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[facility])  Assessment & Plan  No results found for: HGBA1C    Recent Labs     12/24/19  1358 12/24/19  1437 12/24/19  1604 12/24/19  2114   POCGLU 58* 64* 134 203*       Blood Sugar Average: Last 72 hrs:  (P) 106 2     Hold lantus for hypoglycemia  Sliding scale    Benign essential hypertension  Assessment & Plan  Monitor  Metoprolol on hold for bradycardia    Chronic renal insufficiency, stage III (moderate) (HCC)  Assessment & Plan  Cr 1 59  Baseline around 1 7  Monitor  Avoid nephrotoxins    * Hypoglycemia  Assessment & Plan  This is 70years old male came to the emergency room because of altered mental status  He was found to have hypoglycemia  As per family he was not eating and drinking normally but took his insulin yesterday  Continue IV fluid D5 NS for now  Check blood sugar frequently  CT head was negative  Hold insulin for now  VTE Prophylaxis: Heparin  / sequential compression device   Code Status: FULL CODE  POLST: POLST is not applicable to this patient  Discussion with family: WIFE    Anticipated Length of Stay:  Patient will be admitted on an Observation basis with an anticipated length of stay of  Less than 2 midnights     Justification for Hospital Stay: Hypoglycemia    Total Time for Visit, including Counseling / Coordination of Care:   Greater than 50% of this total time spent on direct patient counseling and coordination of care  Chief Complaint:   Hypoglycemia, fall    History of Present Illness:    Brian Medel is a 70 y o  male who presents with frequent falls and hypoglycemia  History obtained from patient and wife bedside  Patient having frequent falls for last 1 week  No head injury  He also found to have hypoglycemia  He received lantus last night, but didn't eat anything after that  Blood sugar was around 50  Given d5w in ER  Still hypoglycemic and now admitted for further evaluation  No chest pain, palpitation, SOB  Review of Systems:    Review of Systems   Constitutional: Negative for chills and fever  HENT: Negative for ear pain, nosebleeds, sneezing, sore throat, tinnitus and voice change  Eyes: Negative for pain and visual disturbance  Respiratory: Negative for cough, chest tightness, shortness of breath and wheezing  Cardiovascular: Negative for chest pain, palpitations and leg swelling  Gastrointestinal: Negative for abdominal distention, abdominal pain, blood in stool, nausea and vomiting  Endocrine: Negative for cold intolerance  Genitourinary: Negative for dysuria, flank pain, frequency, hematuria and urgency  Musculoskeletal: Negative for back pain and joint swelling  Skin: Negative for pallor and rash  Neurological: Negative for dizziness, speech difficulty, light-headedness, numbness and headaches  Psychiatric/Behavioral: Negative for agitation and confusion  All other systems reviewed and are negative        Past Medical and Surgical History:     Past Medical History:   Diagnosis Date    [facility]     Cardiac arrhythmia     Carotid stenosis, bilateral     Cerebrovascular disease     Chronic kidney disease     [facility]     [facility]     ETOH abuse     Hyperlipidemia     Hyperosmolality     Hypertension     Lymphedema of both lower extremities     Memory loss     NPH (normal pressure hydrocephalus) (HCC)     Seizure disorder ([facility] )        Past Surgical History:   Procedure Laterality Date    CATARACT EXTRACTION      COLONOSCOPY  2017    HERNIA REPAIR      MULTIPLE TOOTH EXTRACTIONS         Meds/Allergies:    Prior to Admission medications    Medication Sig Start Date End Date Taking?  Authorizing Provider   atorvastatin (LIPITOR) 20 mg tablet Take 1 tablet by mouth daily    Historical Provider, MD ALONSO UF III MINI PEN NEEDLES 31G X 5 MM MISC  2/6/19   Historical Provider, MD   clopidogrel (PLAVIX) 75 mg tablet Take 75 mg by mouth daily    Historical Provider, MD   divalproex sodium (DEPAKOTE) 500 mg EC tablet Take 1 tablet (500 mg total) by mouth 2 (two) times a day 1 additional 500 mg at bedtime  Patient taking differently: Take 1,000 mg by mouth 2 (two) times a day  2/7/19   Dinorah Porter MD   folic acid (FOLVITE) 1 mg tablet Take 1 tablet by mouth daily    Historical Provider, MD   furosemide (LASIX) 40 mg tablet Take 1 tablet (40 mg total) by mouth daily Take one and half daily  Patient taking differently: Take 40 mg by mouth daily Take one tab daily  7/30/18   Theresa Corona MD   insulin glargine (LANTUS SOLOSTAR) 100 units/mL injection pen Inject 30 Units under the skin daily     Historical Provider, MD   levothyroxine 25 mcg tablet Take 25 mcg by mouth daily in the early morning 2/5/19 2/5/20  Historical Provider, MD   metoprolol tartrate (LOPRESSOR) 50 mg tablet Take 1 tablet by mouth every 12 (twelve) hours    Historical Provider, MD   Multiple Vitamin (MULTIVITAMINS PO) Take 1 tablet by mouth daily    Historical Provider, MD   potassium chloride (KLOR-CON M20) 20 mEq tablet Take 1 tablet by mouth daily    Historical Provider, MD   saxagliptin (ONGLYZA) 5 MG tablet Take 1 tablet by mouth daily    Historical Provider, MD   travoprost (TRAVATAN Z) 0 004 % ophthalmic solution Apply to eye Twice daily    Historical Provider, MD   amiodarone 200 mg tablet Take 200 mg by mouth every morning  19  Historical Provider, MD     I have reviewed home medications with patient personally  Allergies:    Allergies   Allergen Reactions    Enalapril Angioedema, Anaphylaxis and Shortness Of Breath     Other reaction(s): Unknown  Other reaction(s): Respiratory distress, Unknown Reaction       Social History:     Marital Status: /Civil Union   Occupation: none  Patient Pre-hospital Living Situation: home  Patient Pre-hospital Level of Mobility: with support  Patient Pre-hospital Diet Restrictions: diabetic  Substance Use History:   Social History     Substance and Sexual Activity   Alcohol Use No     Social History     Tobacco Use   Smoking Status Former Smoker    Types: Cigarettes    Last attempt to quit:     Years since quittin 9   Smokeless Tobacco Never Used     Social History     Substance and Sexual Activity   Drug Use No       Family History:    Family History   Problem Relation Age of Onset    Hypertension Father     Hypertension Brother     Diabetes Brother     Stroke Brother     Cancer Mother        Physical Exam:     Vitals:   Blood Pressure: 170/78 (19)  Pulse: (!) 48 (19)  Temperature: 98 2 °F (36 8 °C) (19)  Temp Source: Oral (19)  Respirations: 20 (19)  Height: 5' 11" (180 3 cm) (19 164)  Weight - Scale: 117 kg (258 lb 6 1 oz) (19)  SpO2: 96 % (19)    Physical Exam    General- Awake, alert, looks comfortable  HEENT- Normocephalic, atraumatic, oral mucosa- moist  Neck- Supple, no JVD  CVS- Normal S1/ S2, Regular rate and rhythm, edema +  Respiratory system- B/L clear breath sounds, no wheezing  Abdomen- Soft, Non distended, no tenderness, Bowel sound- present  Skin- No new bruise or rash  Musculoskeletal- No gross deformity  Psych- No acute psychosis  CNS-  No acute focal neurologic deficit noted    Additional Data:     Lab Results: I have personally reviewed pertinent reports  Results from last 7 days   Lab Units 12/24/19  1926 12/24/19  1038   WBC Thousand/uL  --  5 08   HEMOGLOBIN g/dL  --  13 3   HEMATOCRIT %  --  40 8   PLATELETS Thousands/uL 100* 114*   NEUTROS PCT %  --  46   LYMPHS PCT %  --  37   MONOS PCT %  --  15*   EOS PCT %  --  1     Results from last 7 days   Lab Units 12/24/19  1038   SODIUM mmol/L 140   POTASSIUM mmol/L 4 2   CHLORIDE mmol/L 103   CO2 mmol/L 31   BUN mg/dL 15   CREATININE mg/dL 1 59*   ANION GAP mmol/L 6   CALCIUM mg/dL 8 1*   ALBUMIN g/dL 2 8*   TOTAL BILIRUBIN mg/dL 0 60   ALK PHOS U/L 78   ALT U/L 19   AST U/L 20   GLUCOSE RANDOM mg/dL 73     Results from last 7 days   Lab Units 12/24/19  1038   INR  1 20*     Results from last 7 days   Lab Units 12/24/19  2114 12/24/19  1604 12/24/19  1437 12/24/19  1358 12/24/19  0912   POC GLUCOSE mg/dl 203* 134 64* 58* 72               Imaging: I have personally reviewed pertinent reports  CT head without contrast   Final Result by Diana Morales MD (12/24 1320)      No acute intracranial abnormality  Microangiopathic changes  Workstation performed: [id number]         XR chest 2 views   Final Result by Yoandy Isabel MD (12/24 1233)      Development of mild cardiomegaly and mild vascular congestion            Workstation performed: [id number]             EKG, Pathology, and Other Studies Reviewed on Admission:   · EKG: SINUS RHYTHM     Allscripts / Epic Records Reviewed: Yes     ** Please Note: This note has been constructed using a voice recognition system   **

## 2019-12-25 NOTE — ASSESSMENT & PLAN NOTE
Lab Results   Component Value Date    HGBA1C 6 8 (H) 12/25/2019       Recent Labs     12/25/19  0302 12/25/19  0610 12/25/19  0941 12/25/19  1156   POCGLU 115 89 116 196*       Blood Sugar Average: Last 72 hrs:  (P) 393 [id number]     Hold lantus for hypoglycemia   Sliding scale

## 2019-12-25 NOTE — ASSESSMENT & PLAN NOTE
Baseline creatinine around 1 7  Serum creatinine 1 38 today  About nephrotoxins  Continue to monitor  Continue home dose of Lasix

## 2019-12-25 NOTE — PLAN OF CARE
Problem: Potential for Falls  Goal: Patient will remain free of falls  Description  INTERVENTIONS:  - Assess patient frequently for physical needs  -  Identify cognitive and physical deficits and behaviors that affect risk of falls    -  [place] fall precautions as indicated by assessment   - Educate patient/family on patient safety including physical limitations  - Instruct patient to call for assistance with activity based on assessment  - Modify environment to reduce risk of injury  - Consider OT/PT consult to assist with strengthening/mobility  Outcome: Progressing     Problem: Prexisting or High Potential for Compromised Skin Integrity  Goal: Skin integrity is maintained or improved  Description  INTERVENTIONS:  - Identify patients at risk for skin breakdown  - Assess and monitor skin integrity  - Assess and monitor nutrition and hydration status  - Monitor labs   - Assess for incontinence   - Turn and reposition patient  - Assist with mobility/ambulation  - Relieve pressure over bony prominences  - Avoid friction and shearing  - Provide appropriate hygiene as needed including keeping skin clean and dry  - Evaluate need for skin moisturizer/barrier cream  - Collaborate with interdisciplinary team   - Patient/family teaching  - Consider wound care consult   Outcome: Progressing

## 2019-12-26 LAB
ANION GAP SERPL CALCULATED.3IONS-SCNC: 8 MMOL/L (ref 4–13)
BUN SERPL-MCNC: 12 MG/DL (ref 5–25)
CALCIUM SERPL-MCNC: 8.6 MG/DL (ref 8.3–10.1)
CHLORIDE SERPL-SCNC: 102 MMOL/L (ref 100–108)
CO2 SERPL-SCNC: 26 MMOL/L (ref 21–32)
CREAT SERPL-MCNC: 1.35 MG/DL (ref 0.6–1.3)
GFR SERPL CREATININE-BSD FRML MDRD: 58 ML/MIN/1.73SQ M
GLUCOSE SERPL-MCNC: 130 MG/DL (ref 65–140)
GLUCOSE SERPL-MCNC: 195 MG/DL (ref 65–140)
GLUCOSE SERPL-MCNC: 214 MG/DL (ref 65–140)
GLUCOSE SERPL-MCNC: 216 MG/DL (ref 65–140)
GLUCOSE SERPL-MCNC: 229 MG/DL (ref 65–140)
POTASSIUM SERPL-SCNC: 4.2 MMOL/L (ref 3.5–5.3)
SODIUM SERPL-SCNC: 136 MMOL/L (ref 136–145)

## 2019-12-26 PROCEDURE — 82948 REAGENT STRIP/BLOOD GLUCOSE: CPT

## 2019-12-26 PROCEDURE — 80048 BASIC METABOLIC PNL TOTAL CA: CPT | Performed by: INTERNAL MEDICINE

## 2019-12-26 PROCEDURE — 99232 SBSQ HOSP IP/OBS MODERATE 35: CPT | Performed by: INTERNAL MEDICINE

## 2019-12-26 PROCEDURE — 99223 1ST HOSP IP/OBS HIGH 75: CPT | Performed by: PSYCHIATRY & NEUROLOGY

## 2019-12-26 RX ORDER — AMLODIPINE BESYLATE 10 MG/1
10 TABLET ORAL DAILY
Status: DISCONTINUED | OUTPATIENT
Start: 2019-12-27 | End: 2019-12-28

## 2019-12-26 RX ORDER — AMLODIPINE BESYLATE 5 MG/1
5 TABLET ORAL DAILY
Status: DISCONTINUED | OUTPATIENT
Start: 2019-12-27 | End: 2019-12-26

## 2019-12-26 RX ORDER — INSULIN GLARGINE 100 [IU]/ML
7 INJECTION, SOLUTION SUBCUTANEOUS
Status: DISCONTINUED | OUTPATIENT
Start: 2019-12-26 | End: 2019-12-27

## 2019-12-26 RX ORDER — AMLODIPINE BESYLATE 10 MG/1
10 TABLET ORAL DAILY
Status: DISCONTINUED | OUTPATIENT
Start: 2019-12-26 | End: 2019-12-26

## 2019-12-26 RX ADMIN — INSULIN GLARGINE 7 UNITS: 100 INJECTION, SOLUTION SUBCUTANEOUS at 21:53

## 2019-12-26 RX ADMIN — DIVALPROEX SODIUM 500 MG: 500 TABLET, DELAYED RELEASE ORAL at 09:02

## 2019-12-26 RX ADMIN — ATORVASTATIN CALCIUM 20 MG: 20 TABLET, FILM COATED ORAL at 09:02

## 2019-12-26 RX ADMIN — POTASSIUM CHLORIDE 20 MEQ: 1500 TABLET, EXTENDED RELEASE ORAL at 09:02

## 2019-12-26 RX ADMIN — CLOPIDOGREL BISULFATE 75 MG: 75 TABLET ORAL at 09:02

## 2019-12-26 RX ADMIN — FOLIC ACID 1000 MCG: 1 TABLET ORAL at 09:02

## 2019-12-26 RX ADMIN — LEVOTHYROXINE SODIUM 25 MCG: 25 TABLET ORAL at 07:44

## 2019-12-26 RX ADMIN — HYDRALAZINE HYDROCHLORIDE 5 MG: 20 INJECTION INTRAMUSCULAR; INTRAVENOUS at 03:46

## 2019-12-26 RX ADMIN — INSULIN LISPRO 2 UNITS: 100 INJECTION, SOLUTION INTRAVENOUS; SUBCUTANEOUS at 17:05

## 2019-12-26 RX ADMIN — TRAVOPROST 1 DROP: 0.04 SOLUTION/ DROPS OPHTHALMIC at 21:53

## 2019-12-26 RX ADMIN — METOPROLOL TARTRATE 12.5 MG: 25 TABLET ORAL at 21:53

## 2019-12-26 RX ADMIN — ACETAMINOPHEN 650 MG: 325 TABLET, FILM COATED ORAL at 11:38

## 2019-12-26 RX ADMIN — INSULIN LISPRO 2 UNITS: 100 INJECTION, SOLUTION INTRAVENOUS; SUBCUTANEOUS at 21:54

## 2019-12-26 RX ADMIN — FUROSEMIDE 40 MG: 40 TABLET ORAL at 09:02

## 2019-12-26 RX ADMIN — AMLODIPINE BESYLATE 10 MG: 10 TABLET ORAL at 09:02

## 2019-12-26 RX ADMIN — INSULIN LISPRO 2 UNITS: 100 INJECTION, SOLUTION INTRAVENOUS; SUBCUTANEOUS at 11:38

## 2019-12-26 RX ADMIN — DIVALPROEX SODIUM 500 MG: 500 TABLET, DELAYED RELEASE ORAL at 17:04

## 2019-12-26 RX ADMIN — ENOXAPARIN SODIUM 40 MG: 40 INJECTION SUBCUTANEOUS at 09:02

## 2019-12-26 NOTE — ASSESSMENT & PLAN NOTE
Wt Readings from Last 3 Encounters:   12/24/19 117 kg (258 lb 6 1 oz)   11/26/19 118 kg (259 lb 3 2 oz)   11/20/19 119 kg (262 lb)

## 2019-12-26 NOTE — SOCIAL WORK
Cm met with patient wife at patient bedside, cm informed couple reside in a private two level home patient is on the first floor  Cm informed patient has using a rw to ambulate reports patient has several falls within the past several months  Cm informed patient went to acute rehab 3 years ago wife is agreeable to str with PVM and WS whe stable  Cm reviewed potential for private paying for str depending on his medical clearance  [facility] 2 hours each day and [place] [facility] [place]  Patient has a follow up appointment with his PCP in January  Wife transports patient to his MD appointments  Cm informed there is no formal POA wife is making arrangements with an  to finalize his advanced directives  No MH/SA str referrals sent  CM reviewed discharge planning process including the following: identifying help at home, patient preference for discharge planning needs, pharmacy preference, and availability of treatment team to discuss questions or concerns patient and/or family may have regarding understanding medications and recognizing signs and symptoms once discharged  CM also encouraged patient to follow up with all recommended appointments after discharge  Patient advised of importance for patient and family to participate in managing patients medical well being

## 2019-12-26 NOTE — ASSESSMENT & PLAN NOTE
His blood pressures appear to be fairly high here in the hospital   His wife is also unsure with they would be at home she has no home blood pressure monitoring program in place  He would likely benefit from watching his pressures at home  A goal blood pressure would be below 843 systolic

## 2019-12-26 NOTE — PROGRESS NOTES
Progress Note - Loraine Camacho 1940, 70 y o  male MRN: [id number]    Unit/Bed#: -01 Encounter: [id number]    Primary Care Provider: Paramjit Stanford MD   Date and time admitted to hospital: 12/24/2019  8:48 AM        * Hypoglycemia  Assessment & Plan  This is 70years old male came to the emergency room because of altered mental status  He was found to have hypoglycemia  As per family he was not eating and drinking normally but took his insulin yesterday  Patient was placed on IV D5 NS  No hypoglycemia overnight  IV fluid discontinued  Blood sugar more than 200 now  Will restart on Lantus 7 units q h s   Monitor blood sugar closely  Frequent falls  Assessment & Plan  CT head- no new stroke  Pt/ ot  Neurology  UA negative for UTI  Physical therapy recommended short-term rehab  family agreeable  Benign essential hypertension  Assessment & Plan  Blood pressure remains high  Heart rate stable now  Will discontinue telemetry  Restart metoprolol XII 0 5 mg b i d  Critical access hospital Last night he received IV hydralazine 2 times  Will increase Norvasc to 10 mg daily  Monitor closely  Seizure disorder [facility])  Assessment & Plan  Continue dilantin  Check level  Still pending    Chronic diastolic heart failure [facility])  Assessment & Plan  Wt Readings from Last 3 Encounters:   12/24/19 117 kg (258 lb 6 1 oz)   11/26/19 118 kg (259 lb 3 2 oz)   11/20/19 119 kg (262 lb)     Continue lasix at home dose  Restart metoprolol 12 5 mg b i d  He was 50 mg b i d  at home before  Dose decreased due to bradycardia  History of bilateral subcortical CVA   Assessment & Plan  Frequent fall  Ct head no new stroke  PT recommended short-term rehab  Neurology evaluation appreciated      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[facility])  Assessment & Plan  Lab Results   Component Value Date    HGBA1C 6 8 (H) 12/25/2019       Recent Labs     12/25/19  1750 12/25/19  1922 12/26/19  0645 12/26/19  1059 POCGLU 136 166* 130 216*       Blood Sugar Average: Last 72 hrs:  (P) 028 [id number]     Continue sliding scale will restart Lantus lower dose, 7 units q h s       Chronic renal insufficiency, stage III (moderate) (HCC)  Assessment & Plan  Baseline creatinine around 1 7  Serum creatinine 1 35 today  About nephrotoxins  Continue to monitor  Continue home dose of Lasix  VTE Pharmacologic Prophylaxis:   Pharmacologic: Enoxaparin (Lovenox)  Mechanical VTE Prophylaxis in Place: Yes    Patient Centered Rounds: I have performed bedside rounds with nursing staff today  Discussions with Specialists or Other Care Team Provider:  Case management    Education and Discussions with Family / Patient:  Patient and wife, daughter bedside    Time Spent for Care: More than 50% of total time spent on counseling and coordination of care as described above  Current Length of Stay: 1 day(s)    Current Patient Status: Inpatient   Certification Statement: The patient will continue to require additional inpatient hospital stay due to Uncontrolled hypertension    Discharge Plan:  In next 24-48 hours    Code Status: Level 1 - Full Code      Subjective:   I have seen and examined the patient bedside this morning  Patient feels much better today  Hypoglycemia resolved  His blood pressure was very high last night  Given hydralazine IV  Currently better  Norvasc dose increased to 10 mg daily  Denies any chest pain, palpitation or shortness of breath  Objective:     Vitals:   Temp (24hrs), Av 2 °F (36 8 °C), Min:98 1 °F (36 7 °C), Max:98 4 °F (36 9 °C)    Temp:  [98 1 °F (36 7 °C)-98 4 °F (36 9 °C)] 98 1 °F (36 7 °C)  HR:  [61-86] 67  Resp:  [20-21] 21  BP: (131-190)/(63-88) 131/63  SpO2:  [92 %-100 %] 95 %  Body mass index is 36 04 kg/m²  Input and Output Summary (last 24 hours):        Intake/Output Summary (Last 24 hours) at 2019 0576  Last data filed at 2019 1305  Gross per 24 hour   Intake 480 ml   Output 353 ml   Net 127 ml       Physical Exam:     Physical Exam  General- Awake, alert, looks comfortable  CVS- Normal S1/ S2, Regular rate and rhythm, mild edema  Respiratory system- B/L clear breath sounds, no wheezing  Abdomen- Soft, Non distended, no tenderness, Bowel sound- present  Skin- No new bruise or rash  CNS- No acute focal neurologic deficit noted    Additional Data:     Labs:    Results from last 7 days   Lab Units 12/25/19  0443   WBC Thousand/uL 5 24   HEMOGLOBIN g/dL 12 5   HEMATOCRIT % 38 0   PLATELETS Thousands/uL 99*   NEUTROS PCT % 47   LYMPHS PCT % 34   MONOS PCT % 16*   EOS PCT % 1     Results from last 7 days   Lab Units 12/26/19  0931  12/24/19  1038   SODIUM mmol/L 136   < > 140   POTASSIUM mmol/L 4 2   < > 4 2   CHLORIDE mmol/L 102   < > 103   CO2 mmol/L 26   < > 31   BUN mg/dL 12   < > 15   CREATININE mg/dL 1 35*   < > 1 59*   ANION GAP mmol/L 8   < > 6   CALCIUM mg/dL 8 6   < > 8 1*   ALBUMIN g/dL  --   --  2 8*   TOTAL BILIRUBIN mg/dL  --   --  0 60   ALK PHOS U/L  --   --  78   ALT U/L  --   --  19   AST U/L  --   --  20   GLUCOSE RANDOM mg/dL 229*   < > 73    < > = values in this interval not displayed  Results from last 7 days   Lab Units 12/24/19  1038   INR  1 20*     Results from last 7 days   Lab Units 12/26/19  1059 12/26/19  0645 12/25/19  1922 12/25/19  1750 12/25/19  1534 12/25/19  1156 12/25/19  0941 12/25/19  0610 12/25/19  0302 12/24/19  2114 12/24/19  1604 12/24/19  1437   POC GLUCOSE mg/dl 216* 130 166* 136 131 196* 116 89 115 203* 134 64*     Results from last 7 days   Lab Units 12/25/19  0443   HEMOGLOBIN A1C % 6 8*               * I Have Reviewed All Lab Data Listed Above  * Additional Pertinent Lab Tests Reviewed:  All Labs Within Last 24 Hours Reviewed    Imaging:    Imaging Reports Reviewed Today Include:  Imaging Personally Reviewed by Myself Includes:      Recent Cultures (last 7 days):           Last 24 Hours Medication List:     Current Facility-Administered Medications:  acetaminophen 650 mg Oral Q6H PRN Hernandez Dacosta MD   [START ON 12/27/2019] amLODIPine 10 mg Oral Daily Hernandez Dacosta MD   atorvastatin 20 mg Oral Daily Hernandez Dacosta MD   clopidogrel 75 mg Oral Daily Hernandez Dacosta MD   divalproex sodium 500 mg Oral BID Hernandez Dacosta MD   enoxaparin 40 mg Subcutaneous Daily Hernandez Dacosta MD   folic acid 1,915 mcg Oral Daily Hernandez Dacosta MD   furosemide 40 mg Oral Daily Hernandez Dacosta MD   hydrALAZINE 5 mg Intravenous Q6H PRN ONDINA Mills   insulin glargine 7 Units Subcutaneous HS Hernandez Dacosta MD   insulin lispro 1-5 Units Subcutaneous TID AC Hernandez Dacosta MD   insulin lispro 1-6 Units Subcutaneous HS Hernandez Dacosta MD   levothyroxine 25 mcg Oral Early Morning Hernandez Dacosta MD   metoprolol tartrate 12 5 mg Oral Q12H [address] Hernandez Dacosta MD   potassium chloride 20 mEq Oral Daily Hernandez Dacosta MD   travoprost 1 drop Both Eyes HS Hernandez Dacosta MD        Today, Patient Was Seen By: Chava Garcia MD    ** Please Note: Dictation voice to text software may have been used in the creation of this document   **

## 2019-12-26 NOTE — ASSESSMENT & PLAN NOTE
Wt Readings from Last 3 Encounters:   12/24/19 117 kg (258 lb 6 1 oz)   11/26/19 118 kg (259 lb 3 2 oz)   11/20/19 119 kg (262 lb)     Continue lasix at home dose  Restart metoprolol 12 5 mg b i d  He was 50 mg b i d  at home before  Dose decreased due to bradycardia

## 2019-12-26 NOTE — ASSESSMENT & PLAN NOTE
Frequent fall  Ct head no new stroke  PT recommended short-term rehab  Neurology evaluation appreciated

## 2019-12-26 NOTE — CONSULTS
Neurology Consult- Casimiro Daphnie 1940, 70 y o  male   MRN: [id number] Unit/Bed#: -01 Encounter: [id number]      Inpatient consult to Neurology  Consult performed by: ONDINA Marshall  Consult ordered by: Laurie James MD      Reason for Consult / Principal Problem:  Frequent falls and confusion at home  Hx and PE limited by:  None  Review of previous medical records was completed  Family, specifically the patient's wife, was present at the bedside for history and examination  She is deemed to be the primary historian  History of bilateral subcortical CVA   Assessment & Plan  This patient's cerebral vascular history includes that of somewhat progressive chronic small vessel disease infarcts  He has had infarcts in the caroline as well as the basal ganglia regions  His exam at this time is largely nonfocal there is no indication a new stroke  He has been maintained on his Plavix as well as Lipitor  Frequent falls  Assessment & Plan  This patient's recent falls, has fallen approximately 4 times or so within the week prior to admission, most likely have several components as triggers  He was dehydrated when he came in, he was recently hospitalized with a discharged to home, and 1 fall often leads then to subsequent falls  His exam and testing here at the bedside indicates that he is proximally weak and I suspect significantly deconditioned approximately  We will however check a Depakote level as noted below  Seizure disorder [facility])  Assessment & Plan  This patient's seizure disorder by his wife's report predates his infarcts  Our office notes indicate that he has been on a stable dose of 500 in the morning and 1000 at HS of Depakote without any concern for seizures or adverse effects of the Depakote  She reports only 1 fall out of possibly for that has resulted in him falling out of the other side of the bed with an unknown mechanism    While it is possible that this was an isolated seizure, his wife does not seem to report that he has had periods of what could be considered post ictal decreased responsiveness after these falls  We will ask that we check his Depakote level tomorrow morning  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([facility] [address] )  Assessment & Plan  His hemoglobin A1c is 6 8  That suggests fairly tight control in a patient of this age was been diabetic this long  Some another factor possibly contributing to his falls might be relative hypoglycemic episodes, although it is less likely  Benign essential hypertension  Assessment & Plan  His blood pressures appear to be fairly high here in the hospital   His wife is also unsure with they would be at home she has no home blood pressure monitoring program in place  He would likely benefit from watching his pressures at home  A goal blood pressure would be below 350 systolic  HPI: Lynn Murray is a right handed  70 y o  male who  is known to our neurology practice here in the [facility] area  This patient apparently started with a seizure disorder greater than a decade ago or so  He was seen by the neurology practice and started apparently maintained on Depakote  Since the start of his seizure disorder then he has also had several lacunar infarcts noted on MRI and previous CT scanning to be involving both his pontine as well as his subcortical regions  He was last seen in our office approximately 1 month ago with no changes in his medication regimen as Plavix and Lipitor for his cerebral vascular disease and maintenance doses of 500/1000, a m  And p m , of his Depakote for his seizures  He had a blood level drawn in 2018 which was subtherapeutic but according to the wife's report he seems to have been very well controlled with his seizures with the Depakote      It is in the context in background of this that she reports today that over the last week prior to admission to hospital on the 24th she has noticed that he has certainly been slipping to the floor from his chair and being unable to get additionally she found him on the floor on the other side of the kink size bed without what appeared to be a reasonable explanation for how he got there  She reports some on some of these falls earlier in the week he was at least able to sort of help get himself up but certainly needed the assistance of 2 other strong people  She reports that on 2 of these other falls he just has not been able to assist with getting himself up and his needed assistance  On the event that triggered admission she reports again he needs assistance to be gotten up off the floor EMS was summoned and they were able to assist with that however he did not want to be transported via ambulance  He was assisted into the car and she drove him to the hospital   She reports there was no period of decreased responsiveness there was no slurred speech or apparent confusion associated with each of these episodes suggesting to her that he had a seizure  He maintained his wakeful state throughout the ride to the hospital in the car  When he got to the hospital he was able to again partially move his legs out of the car but he needed help to stand up transition to the transfer chair  Since admission here to the hospital she reports that overnight tonight he seems brighter and much closer to himself than when he was weak at home  She also notes that she thinks he was very dehydrated at home and she suggest that also was part of his inability to help himself better  Was also noted to be somewhat bradycardic with a rate of 51 upon presentation  He has had some periods of bradycardia yesterday morning and last evening there is a notation from the nurse that he may have had 2 periods of AFib although there were no vital signs associated with that        ROS: 12 system cued query:  He reports that he feels fine right now although he also notes he has had a headache all morning he had an thing to tell anybody  He denies any visual changes or disturbances  He denies any chest pain or shortness of breath, however both I and his wife note that he appears to be somewhat dypsnic as he continues to converse and participate in the full exam at the bedside  No bladder or bowel complaints  He reports no orthopedic injuries from his falls  He is unable to contribute as to why he is falling other than that he is weak and he can't get up  His wife again reports that she feels it is part of his poor posture and his generalized weakness that he slides out of the chair  The remainder of his query was negative  Historical Information     Past Medical History:   Diagnosis Date    Acute renal failure syndrome ([facility] [address] )     Cardiac arrhythmia     Carotid stenosis, bilateral     Cerebrovascular disease     Chronic kidney disease     [facility]     [facility]     ETOH abuse     Hyperlipidemia     Hyperosmolality     Hypertension     Lymphedema of both lower extremities     Memory loss     NPH (normal pressure hydrocephalus) (HCC)     Seizure disorder (HCC)      Past Surgical History:   Procedure Laterality Date    CATARACT EXTRACTION      COLONOSCOPY  2017    HERNIA REPAIR      MULTIPLE TOOTH EXTRACTIONS         Social History :  He is  lives with his wife of many years in their family home  He was a previous aircraft   He quit smoking several years ago no alcohol no recreational is          Family History:   Family History   Problem Relation Age of Onset    Hypertension Father     Hypertension Brother     Diabetes Brother     Stroke Brother     Cancer Mother          Allergies   Allergen Reactions    Enalapril Angioedema, Anaphylaxis and Shortness Of Breath     Other reaction(s): Unknown  Other reaction(s): Respiratory distress, Unknown Reaction     Meds:all current active meds have been reviewed and He has been compliant and maintain on his Plavix and Lipitor regimen for his cerebral vascular disease and compliant with his Depakote for his seizures which have not occurred in 7 years  Scheduled Meds:    Current Facility-Administered Medications:  acetaminophen 650 mg Oral Q6H PRN   amLODIPine 10 mg Oral Daily   atorvastatin 20 mg Oral Daily   clopidogrel 75 mg Oral Daily   divalproex sodium 500 mg Oral BID   enoxaparin 40 mg Subcutaneous Daily   folic acid 7,835 mcg Oral Daily   furosemide 40 mg Oral Daily   hydrALAZINE 5 mg Intravenous Q6H PRN   insulin lispro 1-5 Units Subcutaneous TID AC   insulin lispro 1-6 Units Subcutaneous HS   levothyroxine 25 mcg Oral Early Morning   potassium chloride 20 mEq Oral Daily   travoprost 1 drop Both Eyes HS     PRN Meds:   acetaminophen    hydrALAZINE      Physical Exam:   Objective   Vitals:Blood pressure (!) 184/84, pulse 65, temperature 98 2 °F (36 8 °C), temperature source Oral, resp  rate 20, height 5' 11" (1 803 m), weight 117 kg (258 lb 6 1 oz), SpO2 98 %  ,Body mass index is 36 04 kg/m²  Patient was examined in bedside chair he is awake and self eating his lunch tray  General: alert, appears stated age and cooperative  Head: Normocephalic, without obvious abnormality, atraumatic  Oral exam: lips, mucosa, and tongue moist; no tongue bites  Neck: no carotid bruit,   Lungs: clear to auscultation ant  bilaterally  Heart: regular rate and rhythm, S1, S2 normal, no murmur appreciated,   Abdomen: soft, +BS    Extremities: atraumatic, no cyanosis, +1 edema    Neurologic:   Mental status: Alert, oriented, he did well with cognitive screening questions such as reverse ordering the months of the year  He calculated the sum of quarters in 3 dollars and 25 cents however it took him a slight the period of time  He was able to report 7 or so very diverse animals for an animal listing  He was able to sequence 2 part command    His thought content appropriate in regard to his being unable to help himself once he is fallen and that his wife is unable to get him up  He agrees that rehabilitation would probably be a good thing at this point  CN Exam: SHERLYN, EOM's I, VF full, Gaze conjugate No acute volitional sensory or motor lateralizations (No PP on face), Hearing I B, CNIX-XII I B  Motor: full power, age appropriate x 4 limbs distally, his deltoid were weaker as were his hips to a greater degree  Sensory:  Grossly intact  X 4 limbs, 4 mod inc lt, temp, vib was diminished distally in his feet and proprioception was poor, (not PP tested)  Cerebellar: no past pointing or drift from modified Romberg position, no ataxia w maneuvers,   DTR's:  Diminished throughout; Plantars:  Mute bilaterally  Gait:  He needed assistance to stand from the chair  He was able to hold a standing position for approximately a minute and then he marched at the chair side for approximately an additional minute before he became somewhat short of breath  Lab Results:   I have personally reviewed pertinent reports    , CBC:   Results from last 7 days   Lab Units 12/25/19 0443 12/24/19  1926 12/24/19  1038   WBC Thousand/uL 5 24  --  5 08   RBC Million/uL 3 89  --  4 15   HEMOGLOBIN g/dL 12 5  --  13 3   HEMATOCRIT % 38 0  --  40 8   MCV fL 98  --  98   PLATELETS Thousands/uL 99* 100* 114*   , BMP/CMP:   Results from last 7 days   Lab Units 12/26/19  0931 12/25/19  0443 12/24/19  1038   SODIUM mmol/L 136 140 140   POTASSIUM mmol/L 4 2 4 4 4 2   CHLORIDE mmol/L 102 105 103   CO2 mmol/L 26 29 31   BUN mg/dL 12 12 15   CREATININE mg/dL 1 35* 1 38* 1 59*   CALCIUM mg/dL 8 6 8 1* 8 1*   AST U/L  --   --  20   ALT U/L  --   --  19   ALK PHOS U/L  --   --  78   EGFR ml/min/1 73sq m 58 57 48   , Vitamin B12:   , HgBA1C:   Results from last 7 days   Lab Units 12/25/19  0443   HEMOGLOBIN A1C % 6 8*   , TSH:   , Coagulation:   Results from last 7 days   Lab Units 12/24/19  1038   INR  1 20*   , Lipid Profile: Imaging Studies: I have personally reviewed pertinent films in PACS and No new acute pathology  Counseling / Coordination of Care  Total time spent today Greater than 55 minutes  Greater than 50% of total time was spent with the patient and / or family counseling and / or coordination of care  A description of the counseling / coordination of care: All of the above was discussed with the patient's wife who was at the bedside  She had several questions and concerns I believe they were all answered at this time  Was also discussed with Internal Medicine  Dictation voice to text software has been used in the creation of this document  Please consider this in light of any contextual or grammatical errors

## 2019-12-26 NOTE — ASSESSMENT & PLAN NOTE
This patient's recent falls, has fallen approximately 4 times or so within the week prior to admission, most likely have several components as triggers  He was dehydrated when he came in, he was recently hospitalized with a discharged to home, and 1 fall often leads then to subsequent falls  His exam and testing here at the bedside indicates that he is proximally weak and I suspect significantly deconditioned approximately  We will however check a Depakote level as noted below

## 2019-12-26 NOTE — PLAN OF CARE
Problem: Potential for Falls  Goal: Patient will remain free of falls  Description  INTERVENTIONS:  - Assess patient frequently for physical needs  -  Identify cognitive and physical deficits and behaviors that affect risk of falls    -  [place] fall precautions as indicated by assessment   - Educate patient/family on patient safety including physical limitations  - Instruct patient to call for assistance with activity based on assessment  - Modify environment to reduce risk of injury  - Consider OT/PT consult to assist with strengthening/mobility  Outcome: Progressing     Problem: Prexisting or High Potential for Compromised Skin Integrity  Goal: Skin integrity is maintained or improved  Description  INTERVENTIONS:  - Identify patients at risk for skin breakdown  - Assess and monitor skin integrity  - Assess and monitor nutrition and hydration status  - Monitor labs   - Assess for incontinence   - Turn and reposition patient  - Assist with mobility/ambulation  - Relieve pressure over bony prominences  - Avoid friction and shearing  - Provide appropriate hygiene as needed including keeping skin clean and dry  - Evaluate need for skin moisturizer/barrier cream  - Collaborate with interdisciplinary team   - Patient/family teaching  - Consider wound care consult   Outcome: Progressing

## 2019-12-26 NOTE — ASSESSMENT & PLAN NOTE
CT head- no new stroke  Pt/ ot  Neurology  UA negative for UTI  Physical therapy recommended short-term rehab  family agreeable

## 2019-12-26 NOTE — ASSESSMENT & PLAN NOTE
Blood pressure remains high  Heart rate stable now  Will discontinue telemetry  Restart metoprolol XII 0 5 mg b i d  George Devine Last night he received IV hydralazine 2 times  Will increase Norvasc to 10 mg daily  Monitor closely

## 2019-12-26 NOTE — ASSESSMENT & PLAN NOTE
This is 70years old male came to the emergency room because of altered mental status  He was found to have hypoglycemia  As per family he was not eating and drinking normally but took his insulin yesterday  Patient was placed on IV D5 NS  No hypoglycemia overnight  IV fluid discontinued  Blood sugar more than 200 now  Will restart on Lantus 7 units q h s   Monitor blood sugar closely

## 2019-12-26 NOTE — ASSESSMENT & PLAN NOTE
Baseline creatinine around 1 7  Serum creatinine 1 35 today  About nephrotoxins  Continue to monitor  Continue home dose of Lasix

## 2019-12-26 NOTE — ASSESSMENT & PLAN NOTE
Lab Results   Component Value Date    HGBA1C 6 8 (H) 12/25/2019       Recent Labs     12/25/19  1750 12/25/19  1922 12/26/19  0645 12/26/19  1059   POCGLU 136 166* 130 216*       Blood Sugar Average: Last 72 hrs:  (P) 734 [id number]     Continue sliding scale will restart Lantus lower dose, 7 units q h abdoulaye Steve

## 2019-12-26 NOTE — PLAN OF CARE
Problem: Potential for Falls  Goal: Patient will remain free of falls  Description  INTERVENTIONS:  - Assess patient frequently for physical needs  -  Identify cognitive and physical deficits and behaviors that affect risk of falls    -  [place] fall precautions as indicated by assessment   - Educate patient/family on patient safety including physical limitations  - Instruct patient to call for assistance with activity based on assessment  - Modify environment to reduce risk of injury  - Consider OT/PT consult to assist with strengthening/mobility  Outcome: Progressing     Problem: Prexisting or High Potential for Compromised Skin Integrity  Goal: Skin integrity is maintained or improved  Description  INTERVENTIONS:  - Identify patients at risk for skin breakdown  - Assess and monitor skin integrity  - Assess and monitor nutrition and hydration status  - Monitor labs   - Assess for incontinence   - Turn and reposition patient  - Assist with mobility/ambulation  - Relieve pressure over bony prominences  - Avoid friction and shearing  - Provide appropriate hygiene as needed including keeping skin clean and dry  - Evaluate need for skin moisturizer/barrier cream  - Collaborate with interdisciplinary team   - Patient/family teaching  - Consider wound care consult   Outcome: Progressing

## 2019-12-26 NOTE — ASSESSMENT & PLAN NOTE
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

## 2019-12-26 NOTE — ASSESSMENT & PLAN NOTE
This patient's seizure disorder by his wife's report predates his infarcts  Our office notes indicate that he has been on a stable dose of 500 in the morning and 1000 at HS of Depakote without any concern for seizures or adverse effects of the Depakote  She reports only 1 fall out of possibly for that has resulted in him falling out of the other side of the bed with an unknown mechanism  While it is possible that this was an isolated seizure, his wife does not seem to report that he has had periods of what could be considered post ictal decreased responsiveness after these falls  We will ask that we check his Depakote level tomorrow morning

## 2019-12-26 NOTE — ASSESSMENT & PLAN NOTE
His hemoglobin A1c is 6 8  That suggests fairly tight control in a patient of this age was been diabetic this long  Some another factor possibly contributing to his falls might be relative hypoglycemic episodes, although it is less likely

## 2019-12-27 LAB
ANION GAP SERPL CALCULATED.3IONS-SCNC: 8 MMOL/L (ref 4–13)
BUN SERPL-MCNC: 17 MG/DL (ref 5–25)
CALCIUM SERPL-MCNC: 8 MG/DL (ref 8.3–10.1)
CHLORIDE SERPL-SCNC: 105 MMOL/L (ref 100–108)
CO2 SERPL-SCNC: 27 MMOL/L (ref 21–32)
CREAT SERPL-MCNC: 1.52 MG/DL (ref 0.6–1.3)
GFR SERPL CREATININE-BSD FRML MDRD: 50 ML/MIN/1.73SQ M
GLUCOSE SERPL-MCNC: 113 MG/DL (ref 65–140)
GLUCOSE SERPL-MCNC: 134 MG/DL (ref 65–140)
GLUCOSE SERPL-MCNC: 146 MG/DL (ref 65–140)
GLUCOSE SERPL-MCNC: 184 MG/DL (ref 65–140)
GLUCOSE SERPL-MCNC: 237 MG/DL (ref 65–140)
POTASSIUM SERPL-SCNC: 4.3 MMOL/L (ref 3.5–5.3)
SODIUM SERPL-SCNC: 140 MMOL/L (ref 136–145)
TSH SERPL DL<=0.05 MIU/L-ACNC: 4.64 UIU/ML (ref 0.36–3.74)
VALPROATE SERPL-MCNC: 60 UG/ML (ref 50–100)

## 2019-12-27 PROCEDURE — 80164 ASSAY DIPROPYLACETIC ACD TOT: CPT | Performed by: NURSE PRACTITIONER

## 2019-12-27 PROCEDURE — 99232 SBSQ HOSP IP/OBS MODERATE 35: CPT | Performed by: INTERNAL MEDICINE

## 2019-12-27 PROCEDURE — 82948 REAGENT STRIP/BLOOD GLUCOSE: CPT

## 2019-12-27 PROCEDURE — 80048 BASIC METABOLIC PNL TOTAL CA: CPT | Performed by: INTERNAL MEDICINE

## 2019-12-27 PROCEDURE — 84443 ASSAY THYROID STIM HORMONE: CPT | Performed by: INTERNAL MEDICINE

## 2019-12-27 RX ORDER — INSULIN GLARGINE 100 [IU]/ML
10 INJECTION, SOLUTION SUBCUTANEOUS
Status: DISCONTINUED | OUTPATIENT
Start: 2019-12-27 | End: 2019-12-29 | Stop reason: HOSPADM

## 2019-12-27 RX ORDER — HEPARIN SODIUM 5000 [USP'U]/ML
5000 INJECTION, SOLUTION INTRAVENOUS; SUBCUTANEOUS EVERY 8 HOURS SCHEDULED
Status: DISCONTINUED | OUTPATIENT
Start: 2019-12-27 | End: 2019-12-29 | Stop reason: HOSPADM

## 2019-12-27 RX ADMIN — CLOPIDOGREL BISULFATE 75 MG: 75 TABLET ORAL at 08:21

## 2019-12-27 RX ADMIN — ENOXAPARIN SODIUM 40 MG: 40 INJECTION SUBCUTANEOUS at 08:22

## 2019-12-27 RX ADMIN — ATORVASTATIN CALCIUM 20 MG: 20 TABLET, FILM COATED ORAL at 08:23

## 2019-12-27 RX ADMIN — LEVOTHYROXINE SODIUM 25 MCG: 25 TABLET ORAL at 07:36

## 2019-12-27 RX ADMIN — HEPARIN SODIUM 5000 UNITS: 5000 INJECTION INTRAVENOUS; SUBCUTANEOUS at 17:35

## 2019-12-27 RX ADMIN — HEPARIN SODIUM 5000 UNITS: 5000 INJECTION INTRAVENOUS; SUBCUTANEOUS at 23:26

## 2019-12-27 RX ADMIN — POTASSIUM CHLORIDE 20 MEQ: 1500 TABLET, EXTENDED RELEASE ORAL at 08:21

## 2019-12-27 RX ADMIN — AMLODIPINE BESYLATE 10 MG: 10 TABLET ORAL at 08:21

## 2019-12-27 RX ADMIN — METOPROLOL TARTRATE 12.5 MG: 25 TABLET ORAL at 08:21

## 2019-12-27 RX ADMIN — INSULIN LISPRO 1 UNITS: 100 INJECTION, SOLUTION INTRAVENOUS; SUBCUTANEOUS at 23:27

## 2019-12-27 RX ADMIN — DIVALPROEX SODIUM 500 MG: 500 TABLET, DELAYED RELEASE ORAL at 08:21

## 2019-12-27 RX ADMIN — INSULIN LISPRO 2 UNITS: 100 INJECTION, SOLUTION INTRAVENOUS; SUBCUTANEOUS at 17:36

## 2019-12-27 RX ADMIN — FUROSEMIDE 40 MG: 40 TABLET ORAL at 08:21

## 2019-12-27 RX ADMIN — TRAVOPROST 1 DROP: 0.04 SOLUTION/ DROPS OPHTHALMIC at 23:57

## 2019-12-27 RX ADMIN — INSULIN GLARGINE 10 UNITS: 100 INJECTION, SOLUTION SUBCUTANEOUS at 23:26

## 2019-12-27 RX ADMIN — METOPROLOL TARTRATE 12.5 MG: 25 TABLET ORAL at 23:26

## 2019-12-27 RX ADMIN — DIVALPROEX SODIUM 500 MG: 500 TABLET, DELAYED RELEASE ORAL at 17:36

## 2019-12-27 RX ADMIN — FOLIC ACID 1000 MCG: 1 TABLET ORAL at 08:21

## 2019-12-27 NOTE — ASSESSMENT & PLAN NOTE
This is 70years old male came to the emergency room because of altered mental status  He was found to have hypoglycemia  As per family he was not eating and drinking normally but took his insulin yesterday  Patient was placed on IV D5 NS  No hypoglycemia  IV fluid discontinued and started Lantus 7 units q h s     Will increase Lantus to 10 units q h s   Because blood sugar around 200

## 2019-12-27 NOTE — PLAN OF CARE
Problem: Potential for Falls  Goal: Patient will remain free of falls  Description  INTERVENTIONS:  - Assess patient frequently for physical needs  -  Identify cognitive and physical deficits and behaviors that affect risk of falls    -  [place] fall precautions as indicated by assessment   - Educate patient/family on patient safety including physical limitations  - Instruct patient to call for assistance with activity based on assessment  - Modify environment to reduce risk of injury  - Consider OT/PT consult to assist with strengthening/mobility  Outcome: Progressing     Problem: Prexisting or High Potential for Compromised Skin Integrity  Goal: Skin integrity is maintained or improved  Description  INTERVENTIONS:  - Identify patients at risk for skin breakdown  - Assess and monitor skin integrity  - Assess and monitor nutrition and hydration status  - Monitor labs   - Assess for incontinence   - Turn and reposition patient  - Assist with mobility/ambulation  - Relieve pressure over bony prominences  - Avoid friction and shearing  - Provide appropriate hygiene as needed including keeping skin clean and dry  - Evaluate need for skin moisturizer/barrier cream  - Collaborate with interdisciplinary team   - Patient/family teaching  - Consider wound care consult   Outcome: Progressing

## 2019-12-27 NOTE — SOCIAL WORK
CM reviewed [facility] responses; patient has been accepted at [facility]  [facility] does not have a bed available  CM met with patient and spouse at bedside, they happily accept bed offer at [facility]  Patient's spouse stated she's unsure if family will transport or if they need [facility]CV; she will discuss with her son tonight and let CM Dept know tomorrow  [facility] anticipates discharge tomorrow  CM Dept updated [facility] via [address] and will continue to follow through discharge

## 2019-12-27 NOTE — ASSESSMENT & PLAN NOTE
CT head- no new stroke  Pt/ ot  Neurology follow-up appreciated  UA negative for UTI  Physical therapy recommended short-term rehab  family agreeable

## 2019-12-27 NOTE — PLAN OF CARE
Problem: Potential for Falls  Goal: Patient will remain free of falls  Description  INTERVENTIONS:  - Assess patient frequently for physical needs  -  Identify cognitive and physical deficits and behaviors that affect risk of falls    -  [place] fall precautions as indicated by assessment   - Educate patient/family on patient safety including physical limitations  - Instruct patient to call for assistance with activity based on assessment  - Modify environment to reduce risk of injury  - Consider OT/PT consult to assist with strengthening/mobility  Outcome: Progressing     Problem: Prexisting or High Potential for Compromised Skin Integrity  Goal: Skin integrity is maintained or improved  Description  INTERVENTIONS:  - Identify patients at risk for skin breakdown  - Assess and monitor skin integrity  - Assess and monitor nutrition and hydration status  - Monitor labs   - Assess for incontinence   - Turn and reposition patient  - Assist with mobility/ambulation  - Relieve pressure over bony prominences  - Avoid friction and shearing  - Provide appropriate hygiene as needed including keeping skin clean and dry  - Evaluate need for skin moisturizer/barrier cream  - Collaborate with interdisciplinary team   - Patient/family teaching  - Consider wound care consult   Outcome: Progressing

## 2019-12-27 NOTE — ASSESSMENT & PLAN NOTE
Blood pressure remains high  Heart rate stable now  Will discontinue telemetry  Restart metoprolol 12 5 mg b i d   Continue Norvasc 10 mg daily

## 2019-12-27 NOTE — ASSESSMENT & PLAN NOTE
Baseline creatinine around 1 7  Serum creatinine 1 52 today  About nephrotoxins  Continue to monitor  Continue home dose of Lasix

## 2019-12-27 NOTE — PROGRESS NOTES
Progress Note - Efra Jensen 1940, 70 y o  male MRN: [id number]    Unit/Bed#: -01 Encounter: [id number]    Primary Care Provider: Maurilio Seymour MD   Date and time admitted to hospital: 12/24/2019  8:48 AM        * Hypoglycemia  Assessment & Plan  This is 70years old male came to the emergency room because of altered mental status  He was found to have hypoglycemia  As per family he was not eating and drinking normally but took his insulin yesterday  Patient was placed on IV D5 NS  No hypoglycemia  IV fluid discontinued and started Lantus 7 units q h s     Will increase Lantus to 10 units q h s  Because blood sugar around 200    Frequent falls  Assessment & Plan  CT head- no new stroke  Pt/ ot  Neurology follow-up appreciated  UA negative for UTI  Physical therapy recommended short-term rehab  family agreeable  Benign essential hypertension  Assessment & Plan  Blood pressure remains high  Heart rate stable now  Will discontinue telemetry  Restart metoprolol 12 5 mg b i d   Continue Norvasc 10 mg daily  Seizure disorder [facility])  Assessment & Plan  Continue dilantin  Dilantin level 60  Continue same dose     Chronic diastolic heart failure (HCC)  Assessment & Plan  Wt Readings from Last 3 Encounters:   12/24/19 117 kg (258 lb 6 1 oz)   11/26/19 118 kg (259 lb 3 2 oz)   11/20/19 119 kg (262 lb)     Continue lasix at home dose  Restart metoprolol 12 5 mg b i d  He was 50 mg b i d  at home before  Dose decreased due to bradycardia  History of bilateral subcortical CVA   Assessment & Plan  Frequent fall  Ct head no new stroke  PT recommended short-term rehab  Neurology evaluation appreciated      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[facility])  Assessment & Plan  Lab Results   Component Value Date    HGBA1C 6 8 (H) 12/25/2019       Recent Labs     12/26/19  2039 12/27/19  0602 12/27/19  1104 12/27/19  1653   POCGLU 195* 134 146* 237*       Blood Sugar Average: Last 72 hrs:  (P) 334 [id number]     Continue sliding scale will restart Lantus lower dose, 10 units q h s       Chronic renal insufficiency, stage III (moderate) (HCC)  Assessment & Plan  Baseline creatinine around 1 7  Serum creatinine 1 52 today  About nephrotoxins  Continue to monitor  Continue home dose of Lasix  VTE Pharmacologic Prophylaxis:   Pharmacologic: Enoxaparin (Lovenox) changed to heparin  Mechanical VTE Prophylaxis in Place: Yes    Patient Centered Rounds: I have performed bedside rounds with nursing staff today  Discussions with Specialists or Other Care Team Provider:  Case management    Education and Discussions with Family / Patient:  Wife    Time Spent for Care: More than 50% of total time spent on counseling and coordination of care as described above  Current Length of Stay: 2 day(s)    Current Patient Status: Inpatient   Certification Statement: The patient will continue to require additional inpatient hospital stay due to Uncontrolled hypertension    Discharge Plan:  In next 24 hours    Code Status: Level 1 - Full Code      Subjective:   I have seen and examined the patient bedside this morning patient denies any new complaints  His blood sugar running around 200 now  No hypoglycemia  Blood pressure also better controlled now  However at night his blood pressure was on higher side  Objective:     Vitals:   Temp (24hrs), Av 8 °F (36 6 °C), Min:97 6 °F (36 4 °C), Max:98 °F (36 7 °C)    Temp:  [97 6 °F (36 4 °C)-98 °F (36 7 °C)] 98 °F (36 7 °C)  HR:  [53-63] 57  Resp:  [18-20] 18  BP: (126-176)/(72-84) 126/84  SpO2:  [96 %-97 %] 97 %  Body mass index is 36 04 kg/m²  Input and Output Summary (last 24 hours):        Intake/Output Summary (Last 24 hours) at 2019 1712  Last data filed at 2019 1152  Gross per 24 hour   Intake 420 ml   Output 400 ml   Net 20 ml       Physical Exam:     Physical Exam  General- Awake, alert and oriented x 3, looks comfortable  Neck- Supple,  no JVD  CVS- Normal S1/ S2, Regular rate and rhythm, edema resolved  Respiratory system- B/L clear breath sounds, no wheezing  Abdomen- Soft, Non distended, no tenderness, Bowel sound- present  Genitourinary- No suprapubic tenderness  CNS-  No acute focal neurologic deficit noted    Additional Data:     Labs:    Results from last 7 days   Lab Units 12/25/19  0443   WBC Thousand/uL 5 24   HEMOGLOBIN g/dL 12 5   HEMATOCRIT % 38 0   PLATELETS Thousands/uL 99*   NEUTROS PCT % 47   LYMPHS PCT % 34   MONOS PCT % 16*   EOS PCT % 1     Results from last 7 days   Lab Units 12/27/19  0440  12/24/19  1038   SODIUM mmol/L 140   < > 140   POTASSIUM mmol/L 4 3   < > 4 2   CHLORIDE mmol/L 105   < > 103   CO2 mmol/L 27   < > 31   BUN mg/dL 17   < > 15   CREATININE mg/dL 1 52*   < > 1 59*   ANION GAP mmol/L 8   < > 6   CALCIUM mg/dL 8 0*   < > 8 1*   ALBUMIN g/dL  --   --  2 8*   TOTAL BILIRUBIN mg/dL  --   --  0 60   ALK PHOS U/L  --   --  78   ALT U/L  --   --  19   AST U/L  --   --  20   GLUCOSE RANDOM mg/dL 113   < > 73    < > = values in this interval not displayed  Results from last 7 days   Lab Units 12/24/19  1038   INR  1 20*     Results from last 7 days   Lab Units 12/27/19  1653 12/27/19  1104 12/27/19  0602 12/26/19  2039 12/26/19  1614 12/26/19  1059 12/26/19  0645 12/25/19  1922 12/25/19  1750 12/25/19  1534 12/25/19  1156 12/25/19  0941   POC GLUCOSE mg/dl 237* 146* 134 195* 214* 216* 130 166* 136 131 196* 116     Results from last 7 days   Lab Units 12/25/19  0443   HEMOGLOBIN A1C % 6 8*               * I Have Reviewed All Lab Data Listed Above  * Additional Pertinent Lab Tests Reviewed:  All Labs Within Last 24 Hours Reviewed    Imaging:    Imaging Reports Reviewed Today Include:  Imaging Personally Reviewed by Myself Includes:      Recent Cultures (last 7 days):           Last 24 Hours Medication List:     Current Facility-Administered Medications:  acetaminophen 650 mg Oral Q6H PRN Thais Lacey MD   amLODIPine 10 mg Oral Daily Thais Lacey MD   atorvastatin 20 mg Oral Daily Thais Lacey MD   clopidogrel 75 mg Oral Daily Thais Lacey MD   divalproex sodium 500 mg Oral BID Thais Lacey MD   enoxaparin 40 mg Subcutaneous Daily Thais Lacey MD   folic acid 0,470 mcg Oral Daily Thais Lacey MD   furosemide 40 mg Oral Daily Thais Lacey MD   hydrALAZINE 5 mg Intravenous Q6H PRN ONDINA [place]   insulin glargine 10 Units Subcutaneous HS Thais Lacey MD   insulin lispro 1-5 Units Subcutaneous TID AC Thais Lacey MD   insulin lispro 1-6 Units Subcutaneous HS Thais Lacey MD   levothyroxine 25 mcg Oral Early Morning Thais Lacey MD   metoprolol tartrate 12 5 mg Oral Q12H [address] Thais Lacey MD   potassium chloride 20 mEq Oral Daily Thais Lacey MD   travoprost 1 drop Both Eyes HS Thais Lacey MD        Today, Patient Was Seen By: Kennedy Rae MD    ** Please Note: Dictation voice to text software may have been used in the creation of this document   **

## 2019-12-27 NOTE — ASSESSMENT & PLAN NOTE
Lab Results   Component Value Date    HGBA1C 6 8 (H) 12/25/2019       Recent Labs     12/26/19  2039 12/27/19  0602 12/27/19  1104 12/27/19  1653   POCGLU 195* 134 146* 237*       Blood Sugar Average: Last 72 hrs:  (P) 160 [id number]     Continue sliding scale will restart Lantus lower dose, 10 units q h abdoulaye Steve

## 2019-12-28 LAB
ANION GAP SERPL CALCULATED.3IONS-SCNC: 8 MMOL/L (ref 4–13)
BUN SERPL-MCNC: 20 MG/DL (ref 5–25)
CALCIUM SERPL-MCNC: 8.2 MG/DL (ref 8.3–10.1)
CHLORIDE SERPL-SCNC: 104 MMOL/L (ref 100–108)
CO2 SERPL-SCNC: 28 MMOL/L (ref 21–32)
CREAT SERPL-MCNC: 1.41 MG/DL (ref 0.6–1.3)
GFR SERPL CREATININE-BSD FRML MDRD: 55 ML/MIN/1.73SQ M
GLUCOSE SERPL-MCNC: 112 MG/DL (ref 65–140)
GLUCOSE SERPL-MCNC: 120 MG/DL (ref 65–140)
GLUCOSE SERPL-MCNC: 149 MG/DL (ref 65–140)
GLUCOSE SERPL-MCNC: 206 MG/DL (ref 65–140)
GLUCOSE SERPL-MCNC: 251 MG/DL (ref 65–140)
POTASSIUM SERPL-SCNC: 4.1 MMOL/L (ref 3.5–5.3)
SODIUM SERPL-SCNC: 140 MMOL/L (ref 136–145)

## 2019-12-28 PROCEDURE — 82948 REAGENT STRIP/BLOOD GLUCOSE: CPT

## 2019-12-28 PROCEDURE — 99239 HOSP IP/OBS DSCHRG MGMT >30: CPT | Performed by: INTERNAL MEDICINE

## 2019-12-28 PROCEDURE — 80048 BASIC METABOLIC PNL TOTAL CA: CPT | Performed by: INTERNAL MEDICINE

## 2019-12-28 RX ORDER — INSULIN GLARGINE 100 [IU]/ML
10 INJECTION, SOLUTION SUBCUTANEOUS
Refills: 0 | Status: ON HOLD
Start: 2019-12-28 | End: 2020-03-20 | Stop reason: SDUPTHER

## 2019-12-28 RX ORDER — AMLODIPINE BESYLATE 5 MG/1
5 TABLET ORAL 2 TIMES DAILY
Status: DISCONTINUED | OUTPATIENT
Start: 2019-12-28 | End: 2019-12-29 | Stop reason: HOSPADM

## 2019-12-28 RX ORDER — AMLODIPINE BESYLATE 5 MG/1
5 TABLET ORAL 2 TIMES DAILY
Refills: 0 | Status: ON HOLD
Start: 2019-12-28 | End: 2020-03-20 | Stop reason: SDUPTHER

## 2019-12-28 RX ORDER — ACETAMINOPHEN 325 MG/1
650 TABLET ORAL EVERY 6 HOURS PRN
Qty: 30 TABLET | Refills: 0 | Status: SHIPPED | OUTPATIENT
Start: 2019-12-28 | End: 2020-02-11 | Stop reason: ALTCHOICE

## 2019-12-28 RX ADMIN — INSULIN LISPRO 2 UNITS: 100 INJECTION, SOLUTION INTRAVENOUS; SUBCUTANEOUS at 17:48

## 2019-12-28 RX ADMIN — FOLIC ACID 1000 MCG: 1 TABLET ORAL at 08:25

## 2019-12-28 RX ADMIN — LEVOTHYROXINE SODIUM 25 MCG: 25 TABLET ORAL at 05:07

## 2019-12-28 RX ADMIN — POTASSIUM CHLORIDE 20 MEQ: 1500 TABLET, EXTENDED RELEASE ORAL at 08:25

## 2019-12-28 RX ADMIN — HEPARIN SODIUM 5000 UNITS: 5000 INJECTION INTRAVENOUS; SUBCUTANEOUS at 13:39

## 2019-12-28 RX ADMIN — AMLODIPINE BESYLATE 5 MG: 5 TABLET ORAL at 17:49

## 2019-12-28 RX ADMIN — ATORVASTATIN CALCIUM 20 MG: 20 TABLET, FILM COATED ORAL at 08:25

## 2019-12-28 RX ADMIN — DIVALPROEX SODIUM 500 MG: 500 TABLET, DELAYED RELEASE ORAL at 17:49

## 2019-12-28 RX ADMIN — FUROSEMIDE 40 MG: 40 TABLET ORAL at 08:25

## 2019-12-28 RX ADMIN — CLOPIDOGREL BISULFATE 75 MG: 75 TABLET ORAL at 08:25

## 2019-12-28 RX ADMIN — METOPROLOL TARTRATE 25 MG: 25 TABLET ORAL at 08:25

## 2019-12-28 RX ADMIN — AMLODIPINE BESYLATE 10 MG: 10 TABLET ORAL at 08:26

## 2019-12-28 RX ADMIN — DIVALPROEX SODIUM 500 MG: 500 TABLET, DELAYED RELEASE ORAL at 08:25

## 2019-12-28 RX ADMIN — HEPARIN SODIUM 5000 UNITS: 5000 INJECTION INTRAVENOUS; SUBCUTANEOUS at 05:07

## 2019-12-28 NOTE — DISCHARGE INSTRUCTIONS
Hypoglycemia in a Person with Diabetes   WHAT YOU NEED TO KNOW:   Hypoglycemia is a serious condition that happens when your blood glucose (sugar) level drops too low  The blood sugar level is usually too high in a person with diabetes, but the level can also drop too low  It is important to follow your diabetes management plan to keep your blood sugar level steady  DISCHARGE INSTRUCTIONS:   Call 911 for any of the following:   · You feel you are going to pass out  · You have a seizure or pass out  · You have trouble thinking clearly  Seek care immediately if:  · Your blood sugar is less than 50 mg/dL and does not respond to treatment  Contact your healthcare provider if:   · You have had symptoms of low blood sugar several times  · You have questions about the amount of insulin or diabetes medicine you are taking  · You have questions or concerns about your condition or care  Medicines:   · Insulin or diabetes medicine  help to keep your blood sugar under control  · Glucagon  may be needed if you have severe hypoglycemia  · Take your medicine as directed  Contact your healthcare provider if you think your medicine is not helping or if you have side effects  Tell him or her if you are allergic to any medicine  Keep a list of the medicines, vitamins, and herbs you take  Include the amounts, and when and why you take them  Bring the list or the pill bottles to follow-up visits  Carry your medicine list with you in case of an emergency  Follow up with your healthcare provider or specialist as directed: You may need dose changes to your insulin or oral diabetes medicine if you have hypoglycemia  Write down your questions so you remember to ask them during your visits  Manage hypoglycemia:   · Check your blood sugar level right away if you have symptoms of hypoglycemia  Hypoglycemia is usually 70 mg/dL or below   Ask your healthcare provider what blood sugar level is too low for you     · If your blood sugar level is too low, eat or drink 15 grams of fast-acting carbohydrate  Examples of this amount of fast-acting carbohydrate are 4 ounces (½ cup) of fruit juice or 4 ounces of regular soda  Other examples are 2 tablespoons of raisins or 3 to 4 glucose tablets  Check your blood sugar level 15 minutes later  If the level is still low (less than 100 mg/dL), have another 15 grams of carbohydrate  When the level returns to 100 mg/dL, eat a snack or meal that contains carbohydrates  This will help prevent another drop in blood sugar  Always carefully follow your healthcare provider's instructions on how to treat low blood sugar levels  · Always carry a source of fast-acting carbohydrate  If you have symptoms of hypoglycemia and you do not have a blood glucose meter, have a source of fast-acting carbohydrate anyway  Avoid carbohydrate foods that are high in fat  The fat content may make it take longer to increase your blood sugar level  Ask your healthcare provider if you should carry a glucagon kit  Glucagon is a medicine that is injected when you develop severe hypoglycemia and become unconscious  Check the expiration date every month and replace it before it expires  · Teach others how to help you if you have symptoms of hypoglycemia  Tell them about the symptoms of hypoglycemia  Ask them to give you a source of fast-acting carbohydrate if you cannot get it yourself  Ask them to give you a glucagon injection if you have symptoms of hypoglycemia and you become unconscious or have a seizure  Ask them to call 911   This is an emergency  Tell them never to try to make you swallow anything if you faint or have a seizure  · Wear medical alert jewelry  or carry a card that says you have diabetes  Ask where to get these items  Prevent hypoglycemia:   · Take diabetes medicine as directed  Take your medicine at the right time and in the right amount   Your healthcare provider may change your blood sugar goals if you get hypoglycemia often  · Eat regular meals and snacks  Talk to your dietitian or healthcare provider about a meal plan that is right for you  Do not skip meals  · Check your blood sugar level as directed  Ask your healthcare provider what your blood sugar levels should be before and after you eat  Ask when and how often to check your blood sugar level  You may need to check at least 3 times each day  Record your blood sugar level results and take the record with you when you see your healthcare provider  Your provider may use the record to make changes to your medicine, food, or exercise schedules  · Check your blood sugar level before you exercise  Exercise can decrease your blood sugar level  If your blood sugar level is less than 100 mg/dL, have a carbohydrate snack  Examples are 4 to 6 crackers, ½ banana, 8 ounces (1 cup) of nonfat or 1% milk, or 4 ounces (½ cup) of juice  If you will exercise for more than 1 hour, you may need to check your blood sugar level every 30 minutes  Your healthcare provider may also recommend that you check your blood sugar level after exercise  · Be aware of how alcohol affects your blood sugar level  Alcohol can cause your blood sugar level to drop for up to 12 hours after drinking  Ask your healthcare provider if alcohol is safe for you  If you drink alcohol, always have a snack or meal at the same time  Women should limit alcohol to 1 drink a day  Men should limit alcohol to 2 drinks a day  A drink of alcohol is 12 ounces of beer, 5 ounces of wine, or 1½ ounces of liquor  © 2017 [address]  Information is for End User's use only and may not be sold, redistributed or otherwise used for commercial purposes  All illustrations and images included in CareNotes® are the copyrighted property of [employer] , [employer]  or [employer] [place]  The above information is an  only   It is not intended as medical advice for individual conditions or treatments  Talk to your doctor, nurse or pharmacist before following any medical regimen to see if it is safe and effective for you

## 2019-12-28 NOTE — ASSESSMENT & PLAN NOTE
Lab Results   Component Value Date    HGBA1C 6 8 (H) 12/25/2019       Recent Labs     12/27/19  1653 12/27/19  2103 12/28/19  0558 12/28/19  1118   POCGLU 237* 184* 120 149*       Blood Sugar Average: Last 72 hrs:  (P) 960 [id number]     Continue sliding scale will restart Lantus lower dose, 10 units q h abdoulaye Bar

## 2019-12-28 NOTE — ASSESSMENT & PLAN NOTE
Blood pressure was uncontrolled  He was he was started on Norvasc 10 mg daily  But his blood pressure remains high at night  Will change Norvasc to 5 mg b i d  For better control  Metoprolol also hold because of bradycardia initially  Bradycardia resolved    Will restart metoprolol on a lower dose 25 mg b i d   Monitor blood pressure closely in nursing home and follow up with nursing home physician/PCP

## 2019-12-28 NOTE — PLAN OF CARE
Problem: Potential for Falls  Goal: Patient will remain free of falls  Description  INTERVENTIONS:  - Assess patient frequently for physical needs  -  Identify cognitive and physical deficits and behaviors that affect risk of falls    -  [place] fall precautions as indicated by assessment   - Educate patient/family on patient safety including physical limitations  - Instruct patient to call for assistance with activity based on assessment  - Modify environment to reduce risk of injury  - Consider OT/PT consult to assist with strengthening/mobility  Outcome: Progressing     Problem: Prexisting or High Potential for Compromised Skin Integrity  Goal: Skin integrity is maintained or improved  Description  INTERVENTIONS:  - Identify patients at risk for skin breakdown  - Assess and monitor skin integrity  - Assess and monitor nutrition and hydration status  - Monitor labs   - Assess for incontinence   - Turn and reposition patient  - Assist with mobility/ambulation  - Relieve pressure over bony prominences  - Avoid friction and shearing  - Provide appropriate hygiene as needed including keeping skin clean and dry  - Evaluate need for skin moisturizer/barrier cream  - Collaborate with interdisciplinary team   - Patient/family teaching  - Consider wound care consult   Outcome: Progressing     Problem: METABOLIC, FLUID AND ELECTROLYTES - ADULT  Goal: Glucose maintained within target range  Description  INTERVENTIONS:  - Monitor Blood Glucose as ordered  - Assess for signs and symptoms of hyperglycemia and hypoglycemia  - Administer ordered medications to maintain glucose within target range  - Assess nutritional intake and initiate nutrition service referral as needed  Outcome: Progressing

## 2019-12-28 NOTE — TRANSPORTATION MEDICAL NECESSITY
Section I - General Information    Name of Patient: Michelle Gutierres                 : 1940    Medicare #: [id number]  Transport Date: 19 (PCS is valid for round trips on this date and for all repetitive trips in the 60-day range as noted below )  [facility]: [address]  Is the pt's stay covered under Medicare Part A (PPS/DRG)   [x]     Closest appropriate facility? If no, why is transport to more distant facility required? Yes  If hospice pt, is this transport related to pt's terminal illness? NA       Section II - Medical Necessity Questionnaire  Ambulance transportation is medically necessary only if other means of transport are contraindicated or would be potentially harmful to the patient  To meet this requirement, the patient must either be "bed confined" or suffer from a condition such that transport by means other than ambulance is contraindicated by the patient's condition  The following questions must be answered by the medical professional signing below for this form to be valid:    1)  Describe the MEDICAL CONDITION (physical and/or mental) of this patient AT [address] [place] [address] that requires the patient to be transported in an ambulance and why transport by other means is contraindicated by the patient's condition: hypoglycemia, confused, need assistance with transfers  2) Is the patient "bed confined" as defined below? No  To be "be confined" the patient must satisfy all three of the following conditions: (1) unable to get up from bed without Assistance; AND (2) unable to ambulate; AND (3) unable to sit in a chair or wheelchair  3) Can this patient safely be transported by car or wheelchair van (i e , seated during transport without a medical attendant or monitoring)?    No    4) In addition to completing questions 1-3 above, please check any of the following conditions that apply*:   *Note: supporting documentation for any boxes checked must be maintained in the patient's medical records  If hosp-hosp transfer, describe services needed at 2nd facility not available at 1st facility? Patient is confused  Unable to sit in a chair or wheelchair due to decubitus ulcers or other wounds       Section III - Signature of Physician or Healthcare Professional  I certify that the above information is true and correct based on my evaluation of this patient, and represent that the patient requires transport by ambulance and that other forms of transport are contraindicated  I understand that this information will be used by the Centers for Medicare and Medicaid Services (CMS) to support the determination of medical necessity for ambulance services, and I represent that I have personal knowledge of the patient's condition at time of transport  []  If this box is checked, I also certify that the patient is physically or mentally incapable of signing the ambulance service's claim and that the institution with which I am affiliated has furnished care, services, or assistance to the patient  My signature below is made on behalf of the patient pursuant to 42 CFR §424 36(b)(4)  In accordance with 42 CFR §424 37, the specific reason(s) that the patient is physically or mentally incapable of signing the claim form is as follows: n/a  Signature of Physician* or Healthcare Professional______________________________________________________________  Signature Date 12/28/19 (For scheduled repetitive transports, this form is not valid for transports performed more than 60 days after this date)    Printed Name & Credentials of Physician or Healthcare Professional (MD, DO, RN, etc )________________________________  *Form must be signed by patient's attending physician for scheduled, repetitive transports   For non-repetitive, unscheduled ambulance transports, if unable to obtain the signature of the attending physician, any of the following may sign (choose appropriate option below)  [] Physician Assistant []  Clinical Nurse Specialist []  Registered Nurse  []  Nurse Practitioner  [x] Discharge Planner

## 2019-12-28 NOTE — PLAN OF CARE
Problem: Potential for Falls  Goal: Patient will remain free of falls  Description  INTERVENTIONS:  - Assess patient frequently for physical needs  -  Identify cognitive and physical deficits and behaviors that affect risk of falls    -  Marvin fall precautions as indicated by assessment   - Educate patient/family on patient safety including physical limitations  - Instruct patient to call for assistance with activity based on assessment  - Modify environment to reduce risk of injury  - Consider OT/PT consult to assist with strengthening/mobility  Outcome: Progressing     Problem: Prexisting or High Potential for Compromised Skin Integrity  Goal: Skin integrity is maintained or improved  Description  INTERVENTIONS:  - Identify patients at risk for skin breakdown  - Assess and monitor skin integrity  - Assess and monitor nutrition and hydration status  - Monitor labs   - Assess for incontinence   - Turn and reposition patient  - Assist with mobility/ambulation  - Relieve pressure over bony prominences  - Avoid friction and shearing  - Provide appropriate hygiene as needed including keeping skin clean and dry  - Evaluate need for skin moisturizer/barrier cream  - Collaborate with interdisciplinary team   - Patient/family teaching  - Consider wound care consult   Outcome: Progressing

## 2019-12-28 NOTE — DISCHARGE SUMMARY
Discharge- Derrick Cage 1940, 70 y o  male MRN: [id number]    Unit/Bed#: -01 Encounter: [id number]    Primary Care Provider: Robin Mendes MD   Date and time admitted to hospital: 12/24/2019  8:48 AM        * Hypoglycemia  Assessment & Plan  Likely secondary to poor oral intake  Insulin was on hold initially  After that he started eating  Blood sugar is better now  Started on Lantus 10 units  So far stable  Continue to monitor blood sugar 4 times a day  Continue hypoglycemia protocol in nursing home  Frequent falls  Assessment & Plan  CT head- no new stroke  Patient was evaluated by Neurology  Follow up as outpatient  Physical therapy and occupational therapy  Benign essential hypertension  Assessment & Plan  Blood pressure was uncontrolled  He was he was started on Norvasc 10 mg daily  But his blood pressure remains high at night  Will change Norvasc to 5 mg b i d  For better control  Metoprolol also hold because of bradycardia initially  Bradycardia resolved  Will restart metoprolol on a lower dose 25 mg b i d   Monitor blood pressure closely in nursing home and follow up with nursing home physician/PCP    Seizure disorder [facility])  Assessment & Plan  Continue dilantin  Dilantin level 60  Continue same dose     Chronic diastolic heart failure (HCC)  Assessment & Plan  Wt Readings from Last 3 Encounters:   12/24/19 117 kg (258 lb 6 1 oz)   11/26/19 118 kg (259 lb 3 2 oz)   11/20/19 119 kg (262 lb)     Continue lasix at home dose  History of bilateral subcortical CVA   Assessment & Plan  Frequent fall  Ct head no new stroke  PT recommended short-term rehab  Neurology evaluation appreciated      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[facility])  Assessment & Plan  Lab Results   Component Value Date    HGBA1C 6 8 (H) 12/25/2019       Recent Labs     12/27/19  1653 12/27/19  2103 12/28/19  0558 12/28/19  1118   POCGLU 237* 184* 120 149*       Blood Sugar Average: Last 72 hrs:  (P) 786 [id number]     Continue sliding scale will restart Lantus lower dose, 10 units q h s       Chronic renal insufficiency, stage III (moderate) (HCC)  Assessment & Plan  Baseline creatinine around 1 7  Monitor closely          Discharging Physician / Practitioner: Kaleigh Hawkins MD  PCP: Abhinav Covarrubias MD  Admission Date:   Admission Orders (From admission, onward)     Ordered        12/25/19 1128  Inpatient Admission  Once         12/24/19 1426  Place in Observation  Once                   Discharge Date: 12/29/19    Resolved Problems  Date Reviewed: 12/27/2019    None          Consultations During Hospital Stay:  · Neurology, case management, physical therapy and occupational therapy    Procedures Performed:   · CT head    Significant Findings / Test Results:   · CT head     IMPRESSION:     No acute intracranial abnormality  Microangiopathic changes  Incidental Findings:   ·      Test Results Pending at Discharge (will require follow up):   ·      Outpatient Tests Requested:  · CC CBC, BMP in 1 week and follow up with PCP    Complications:      Reason for Admission:  Hypoglycemia    Hospital Course:     HPI on admission-  Joseph Moore is a 70 y o  male who presents with frequent falls and hypoglycemia  History obtained from patient and wife bedside  Patient having frequent falls for last 1 week  No head injury  He also found to have hypoglycemia  He received lantus last night, but didn't eat anything after that  Blood sugar was around 50  Given d5w in ER  Still hypoglycemic and now admitted for further evaluation  No chest pain, palpitation, SOB  Hospital course-  Patient was started on D5  Blood sugar remained stable  IV fluids stopped and started on Lantus  Blood sugar remained stable  Hypoglycemia likely from poor oral intake  His oral intake improved now  Blood sugar also stable  Evaluated by Neurology for frequent falls  CT head was negative    No further intervention at this point of time  His blood pressure was running high  Started on Norvasc  Medication readjusted to Norvasc 5 mg b i d  Because his blood pressure mostly high at night  Metoprolol was on hold initially  EKG was sinus bradycardia, no high-grade block  Bradycardia resolved  Metoprolol dose decreased to 25 mg b i d   Discussed personally with patient and family bedside  Currently stable to be discharged to SNF  Condition at Discharge: good     Discharge Day Visit / Exam:     Subjective:    Vitals: Blood Pressure: 152/70 (12/28/19 0751)  Pulse: 69 (12/28/19 0727)  Temperature: 98 3 °F (36 8 °C) (12/28/19 0727)  Temp Source: Oral (12/28/19 0727)  Respirations: 18 (12/28/19 0727)  Height: 5' 11" (180 3 cm) (12/24/19 1648)  Weight - Scale: 117 kg (258 lb 6 1 oz) (12/24/19 1648)  SpO2: 96 % (12/28/19 0727)  Exam:   Physical Exam  General- Awake, alert, looks comfortable  CVS- Normal S1/ S2, Regular rate and rhythm  Respiratory system- B/L clear breath sounds  Abdomen- Soft, Non distended, no tenderness  Skin- No new bruise or rash  CNS-  No acute focal neurologic deficit noted      Discussion with Family:  Wife bedside    Discharge instructions/Information to patient and family:   See after visit summary for information provided to patient and family  Provisions for Follow-Up Care:  See after visit summary for information related to follow-up care and any pertinent home health orders  Disposition:     Other [address]    For Discharges to [facility] SNF:   · Not Applicable to this Patient - Not Applicable to this Patient    Planned Readmission:      Discharge Statement:  I spent 35 minutes discharging the patient  This time was spent on the day of discharge  I had direct contact with the patient on the day of discharge   Greater than 50% of the total time was spent examining patient, answering all patient questions, arranging and discussing plan of care with patient as well as directly providing post-discharge instructions  Additional time then spent on discharge activities  Discharge Medications:  See after visit summary for reconciled discharge medications provided to patient and family  ** Please Note: This note has been constructed using a voice recognition system **        Discharge date- 12/29/19

## 2019-12-28 NOTE — SOCIAL WORK
Patient was initially scheduled for transport to [facility] via [employer]  Cm reviewed a follow up call from admissions liaison who reports their team is away from the building and not available to complete the admission until 12/29  RN and MD aware  Patient now has a transport on 12/29 at 1230p    Cm spoke with [facility] admissions liaison who apologized for the inconvenience and Is aware of his dc to her facility 12/29 at 1230p  She is able to accept at that time and will be prepared

## 2019-12-28 NOTE — ASSESSMENT & PLAN NOTE
Likely secondary to poor oral intake  Insulin was on hold initially  After that he started eating  Blood sugar is better now  Started on Lantus 10 units  So far stable  Continue to monitor blood sugar 4 times a day  Continue hypoglycemia protocol in nursing home

## 2019-12-28 NOTE — ASSESSMENT & PLAN NOTE
CT head- no new stroke  Patient was evaluated by Neurology  Follow up as outpatient  Physical therapy and occupational therapy

## 2019-12-28 NOTE — DISCHARGE INSTR - AVS FIRST PAGE
Follow-up with PCP in 1 week  Follow up with Neurology and Cardiology as outpatient  Check blood pressure regularly  Check blood sugars 4 times a day  Hypoglycemia protocol  Come back to the emergency room if condition worsen or recur  CBC and BMP in 1 week and follow up with PCP

## 2019-12-29 VITALS
TEMPERATURE: 97.5 F | BODY MASS INDEX: 36.17 KG/M2 | HEIGHT: 71 IN | OXYGEN SATURATION: 95 % | DIASTOLIC BLOOD PRESSURE: 69 MMHG | WEIGHT: 258.38 LBS | RESPIRATION RATE: 18 BRPM | HEART RATE: 50 BPM | SYSTOLIC BLOOD PRESSURE: 150 MMHG

## 2019-12-29 LAB — GLUCOSE SERPL-MCNC: 116 MG/DL (ref 65–140)

## 2019-12-29 PROCEDURE — 99232 SBSQ HOSP IP/OBS MODERATE 35: CPT | Performed by: INTERNAL MEDICINE

## 2019-12-29 PROCEDURE — 82948 REAGENT STRIP/BLOOD GLUCOSE: CPT

## 2019-12-29 RX ADMIN — INSULIN LISPRO 2 UNITS: 100 INJECTION, SOLUTION INTRAVENOUS; SUBCUTANEOUS at 00:13

## 2019-12-29 RX ADMIN — DIVALPROEX SODIUM 500 MG: 500 TABLET, DELAYED RELEASE ORAL at 08:59

## 2019-12-29 RX ADMIN — POTASSIUM CHLORIDE 20 MEQ: 1500 TABLET, EXTENDED RELEASE ORAL at 08:59

## 2019-12-29 RX ADMIN — LEVOTHYROXINE SODIUM 25 MCG: 25 TABLET ORAL at 05:38

## 2019-12-29 RX ADMIN — INSULIN LISPRO 1 UNITS: 100 INJECTION, SOLUTION INTRAVENOUS; SUBCUTANEOUS at 08:00

## 2019-12-29 RX ADMIN — METOPROLOL TARTRATE 25 MG: 25 TABLET ORAL at 00:14

## 2019-12-29 RX ADMIN — INSULIN GLARGINE 10 UNITS: 100 INJECTION, SOLUTION SUBCUTANEOUS at 00:14

## 2019-12-29 RX ADMIN — HEPARIN SODIUM 5000 UNITS: 5000 INJECTION INTRAVENOUS; SUBCUTANEOUS at 00:14

## 2019-12-29 RX ADMIN — FUROSEMIDE 40 MG: 40 TABLET ORAL at 08:59

## 2019-12-29 RX ADMIN — TRAVOPROST 1 DROP: 0.04 SOLUTION/ DROPS OPHTHALMIC at 00:22

## 2019-12-29 RX ADMIN — CLOPIDOGREL BISULFATE 75 MG: 75 TABLET ORAL at 08:59

## 2019-12-29 RX ADMIN — HEPARIN SODIUM 5000 UNITS: 5000 INJECTION INTRAVENOUS; SUBCUTANEOUS at 05:38

## 2019-12-29 RX ADMIN — METOPROLOL TARTRATE 25 MG: 25 TABLET ORAL at 08:59

## 2019-12-29 RX ADMIN — AMLODIPINE BESYLATE 5 MG: 5 TABLET ORAL at 08:59

## 2019-12-29 RX ADMIN — FOLIC ACID 1000 MCG: 1 TABLET ORAL at 08:59

## 2019-12-29 RX ADMIN — ATORVASTATIN CALCIUM 20 MG: 20 TABLET, FILM COATED ORAL at 08:59

## 2019-12-29 NOTE — PROGRESS NOTES
Progress Note - Mine Man 1940, 70 y o  male MRN: [id number]    Unit/Bed#: -01 Encounter: [id number]    Primary Care Provider: Lien Joya MD   Date and time admitted to hospital: 12/24/2019  8:48 AM        * Hypoglycemia  Assessment & Plan  Likely secondary to poor oral intake  Insulin was on hold initially  After that he started eating  Blood sugar is better now  Started on Lantus 10 units  So far stable  Continue to monitor blood sugar 4 times a day  Continue hypoglycemia protocol in nursing home  Frequent falls  Assessment & Plan  CT head- no new stroke  Patient was evaluated by Neurology  Follow up as outpatient  Physical therapy and occupational therapy  Benign essential hypertension  Assessment & Plan  Blood pressure was uncontrolled  He was he was started on Norvasc 10 mg daily  But his blood pressure remains high at night  Will change Norvasc to 5 mg b i d  For better control  Overnight blood pressure was stable  Metoprolol also hold because of bradycardia initially  Bradycardia resolved  Will restart metoprolol on a lower dose 25 mg b i d  Hold metoprolol if heart rate less than 50  Monitor blood pressure closely in nursing home and follow up with nursing home physician/PCP    Seizure disorder [facility])  Assessment & Plan  Continue dilantin  Dilantin level 60  Continue same dose     Chronic diastolic heart failure (HCC)  Assessment & Plan  Wt Readings from Last 3 Encounters:   12/24/19 117 kg (258 lb 6 1 oz)   11/26/19 118 kg (259 lb 3 2 oz)   11/20/19 119 kg (262 lb)     Continue lasix at home dose  History of bilateral subcortical CVA   Assessment & Plan  Frequent fall  Ct head no new stroke  PT recommended short-term rehab  Neurology evaluation appreciated      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[facility])  Assessment & Plan  Lab Results   Component Value Date    HGBA1C 6 8 (H) 12/25/2019       Recent Labs     12/28/19  1118 19  1555 197 19  0632   POCGLU 149* 251* 206* 116       Blood Sugar Average: Last 72 hrs:  (P) 911 [id number]     Continue sliding scale will restart Lantus lower dose, 10 units q h s       Chronic renal insufficiency, stage III (moderate) (HCC)  Assessment & Plan  Baseline creatinine around 1 7  Monitor closely        VTE Pharmacologic Prophylaxis:   Pharmacologic: Heparin  Mechanical VTE Prophylaxis in Place: Yes    Patient Centered Rounds: I have performed bedside rounds with nursing staff today  Discussions with Specialists or Other Care Team Provider:     Education and Discussions with Family / Patient:     Time Spent for Care: More than 50% of total time spent on counseling and coordination of care as described above  Current Length of Stay: 4 day(s)    Current Patient Status: Inpatient   Certification Statement: The patient will continue to require additional inpatient hospital stay due to Patient was discharged yesterday  Did not leave because of nursing home issues  See discharge summary done yesterday  Discharge Plan:  Discharged today    Code Status: Prior      Subjective:   I have seen and examined the patient bedside this morning  Patient was discharged yesterday but he did not need because of nursing home issues  Overnight his blood pressure remained stable  Patient denies any new complaints  No chest pain, palpitation or shortness of breath  Objective:     Vitals:   Temp (24hrs), Av 8 °F (36 6 °C), Min:97 5 °F (36 4 °C), Max:98 1 °F (36 7 °C)    Temp:  [97 5 °F (36 4 °C)-98 1 °F (36 7 °C)] 97 5 °F (36 4 °C)  HR:  [50-56] 50  Resp:  [18] 18  BP: (132-150)/(69-82) 150/69  SpO2:  [95 %-98 %] 95 %  Body mass index is 36 04 kg/m²  Input and Output Summary (last 24 hours):        Intake/Output Summary (Last 24 hours) at 2019 1722  Last data filed at 2019 0959  Gross per 24 hour   Intake 420 ml   Output    Net 420 ml       Physical Exam: Physical Exam  General- Awake, alert, looks comfortable  HEENT- Normocephalic, atraumatic  Neck- Supple, no JVD  CVS- Normal S1/ S2, Regular rate and rhythm  Respiratory system- B/L clear breath sounds, no wheezing  Abdomen- Soft, Non distended, no tenderness, Bowel sound- present  CNS-  No acute focal neurologic deficit noted  Additional Data:     Labs:    Results from last 7 days   Lab Units 12/25/19  0443   WBC Thousand/uL 5 24   HEMOGLOBIN g/dL 12 5   HEMATOCRIT % 38 0   PLATELETS Thousands/uL 99*   NEUTROS PCT % 47   LYMPHS PCT % 34   MONOS PCT % 16*   EOS PCT % 1     Results from last 7 days   Lab Units 12/28/19  0511  12/24/19  1038   SODIUM mmol/L 140   < > 140   POTASSIUM mmol/L 4 1   < > 4 2   CHLORIDE mmol/L 104   < > 103   CO2 mmol/L 28   < > 31   BUN mg/dL 20   < > 15   CREATININE mg/dL 1 41*   < > 1 59*   ANION GAP mmol/L 8   < > 6   CALCIUM mg/dL 8 2*   < > 8 1*   ALBUMIN g/dL  --   --  2 8*   TOTAL BILIRUBIN mg/dL  --   --  0 60   ALK PHOS U/L  --   --  78   ALT U/L  --   --  19   AST U/L  --   --  20   GLUCOSE RANDOM mg/dL 112   < > 73    < > = values in this interval not displayed  Results from last 7 days   Lab Units 12/24/19  1038   INR  1 20*     Results from last 7 days   Lab Units 12/29/19  0632 12/28/19  2057 12/28/19  1555 12/28/19  1118 12/28/19  0558 12/27/19  2103 12/27/19  1653 12/27/19  1104 12/27/19  0602 12/26/19  2039 12/26/19  1614 12/26/19  1059   POC GLUCOSE mg/dl 116 206* 251* 149* 120 184* 237* 146* 134 195* 214* 216*     Results from last 7 days   Lab Units 12/25/19  0443   HEMOGLOBIN A1C % 6 8*               * I Have Reviewed All Lab Data Listed Above  * Additional Pertinent Lab Tests Reviewed:  All Labs Within Last 24 Hours Reviewed    Imaging:    Imaging Reports Reviewed Today Include:   Imaging Personally Reviewed by Myself Includes:      Recent Cultures (last 7 days):           Last 24 Hours Medication List:        Today, Patient Was Seen By: Anu Villeda, MD    ** Please Note: Dictation voice to text software may have been used in the creation of this document   **

## 2019-12-29 NOTE — ASSESSMENT & PLAN NOTE
Lab Results   Component Value Date    HGBA1C 6 8 (H) 12/25/2019       Recent Labs     12/28/19  1118 12/28/19  1555 12/28/19 2057 12/29/19  0632   POCGLU 149* 251* 206* 116       Blood Sugar Average: Last 72 hrs:  (P) 329 [id number]     Continue sliding scale will restart Lantus lower dose, 10 units q h abdoulaye Chin

## 2019-12-29 NOTE — TRANSPORTATION MEDICAL NECESSITY
Section I - General Information    Name of Patient: Norwood Kehr                 : 1940    Medicare #: [id number]  Transport Date: 19 (PCS is valid for round trips on this date and for all repetitive trips in the 60-day range as noted below )  [facility] [place]  Is the pt's stay covered under Medicare Part A (PPS/DRG)   []     Closest appropriate facility? If no, why is transport to more distant facility required? Yes  If hospice pt, is this transport related to pt's terminal illness? No       Section II - Medical Necessity Questionnaire  Ambulance transportation is medically necessary only if other means of transport are contraindicated or would be potentially harmful to the patient  To meet this requirement, the patient must either be "bed confined" or suffer from a condition such that transport by means other than ambulance is contraindicated by the patient's condition  The following questions must be answered by the medical professional signing below for this form to be valid:    1)  Describe the MEDICAL CONDITION (physical and/or mental) of this patient AT [address] [place] [address] that requires the patient to be transported in an ambulance and why transport by other means is contraindicated by the patient's condition: requiring assist w ambulation and transfer  CVA hx  Dementia  Confusion  Medical attendant required    2) Is the patient "bed confined" as defined below? No  To be "be confined" the patient must satisfy all three of the following conditions: (1) unable to get up from bed without Assistance; AND (2) unable to ambulate; AND (3) unable to sit in a chair or wheelchair  3) Can this patient safely be transported by car or wheelchair van (i e , seated during transport without a medical attendant or monitoring)?    No    4) In addition to completing questions 1-3 above, please check any of the following conditions that apply*:   *Note: supporting documentation for any boxes checked must be maintained in the patient's medical records  If hosp-hosp transfer, describe services needed at 2nd facility not available at 1st facility? Patient is confused    Section III - Signature of Physician or Healthcare Professional  I certify that the above information is true and correct based on my evaluation of this patient, and represent that the patient requires transport by ambulance and that other forms of transport are contraindicated  I understand that this information will be used by the Centers for Medicare and Medicaid Services (CMS) to support the determination of medical necessity for ambulance services, and I represent that I have personal knowledge of the patient's condition at time of transport  []  If this box is checked, I also certify that the patient is physically or mentally incapable of signing the ambulance service's claim and that the institution with which I am affiliated has furnished care, services, or assistance to the patient  My signature below is made on behalf of the patient pursuant to 42 CFR §424 36(b)(4)  In accordance with 42 CFR §424 37, the specific reason(s) that the patient is physically or mentally incapable of signing the claim form is as follows: n/a  Signature of Physician* or Healthcare Professional______________________________________________________________  Signature Date 12/29/19 (For scheduled repetitive transports, this form is not valid for transports performed more than 60 days after this date)    Printed Name & Credentials of Physician or Healthcare Professional (MD, DO, RN, etc )_____linda king lcsw___________________________  *Form must be signed by patient's attending physician for scheduled, repetitive transports   For non-repetitive, unscheduled ambulance transports, if unable to obtain the signature of the attending physician, any of the following may sign (choose appropriate option below)  [] Physician Assistant []  Clinical Nurse Specialist []  Registered Nurse  []  Nurse Practitioner  [] Discharge Planner

## 2019-12-29 NOTE — SOCIAL WORK
Pt transport pushed up from 12:30p to 10am  Wife notified  rn and MD notified  Everardo Charles from [facility] on chart

## 2019-12-29 NOTE — ASSESSMENT & PLAN NOTE
Blood pressure was uncontrolled  He was he was started on Norvasc 10 mg daily  But his blood pressure remains high at night  Will change Norvasc to 5 mg b i d  For better control  Overnight blood pressure was stable  Metoprolol also hold because of bradycardia initially  Bradycardia resolved  Will restart metoprolol on a lower dose 25 mg b i d  Hold metoprolol if heart rate less than 50     Monitor blood pressure closely in nursing home and follow up with nursing home physician/PCP

## 2019-12-30 LAB — PHENYTOIN FREE SERPL-MCNC: NORMAL UG/ML (ref 1–2)

## 2020-02-11 ENCOUNTER — OFFICE VISIT (OUTPATIENT)
Dept: NEUROLOGY | Facility: CLINIC | Age: 80
End: 2020-02-11
Payer: MEDICARE

## 2020-02-11 VITALS
HEIGHT: 71 IN | BODY MASS INDEX: 35.28 KG/M2 | DIASTOLIC BLOOD PRESSURE: 68 MMHG | SYSTOLIC BLOOD PRESSURE: 124 MMHG | HEART RATE: 56 BPM | WEIGHT: 252 LBS

## 2020-02-11 DIAGNOSIS — Z86.73 HISTORY OF CEREBROVASCULAR ACCIDENT (CVA) IN ADULTHOOD: ICD-10-CM

## 2020-02-11 DIAGNOSIS — R29.6 FREQUENT FALLS: ICD-10-CM

## 2020-02-11 DIAGNOSIS — G40.909 SEIZURE DISORDER (HCC): Primary | ICD-10-CM

## 2020-02-11 PROCEDURE — 99215 OFFICE O/P EST HI 40 MIN: CPT | Performed by: PSYCHIATRY & NEUROLOGY

## 2020-02-11 RX ORDER — FERROUS SULFATE 325(65) MG
325 TABLET ORAL
COMMUNITY
End: 2020-03-20 | Stop reason: HOSPADM

## 2020-02-11 RX ORDER — FUROSEMIDE 20 MG/1
20 TABLET ORAL DAILY
Status: ON HOLD | COMMUNITY
End: 2020-03-20 | Stop reason: SDUPTHER

## 2020-02-11 NOTE — PROGRESS NOTES
Lidya Reilly is a 70 y o  male  Chief Complaint   Patient presents with    Gait Problem    Difficulty Walking       Assessment:  1  Seizure disorder ([address] )    2  Frequent falls    3  History of bilateral subcortical CVA           Discussion:  Patient's recent hospital records were reviewed, his CT of the brain was unremarkable for any acute pathology, his seizures have been stable, his frequent falls could have been multifactorial secondary to deconditioning dehydration and neuropathy, he has prior history of lacunar CVAs and also has history of paroxysmal atrial flutter and as per the Cardiology he is not on anticoagulation secondary to his increased risk of falls and is on Lipitor and Plavix, also his seizureshave been stable on Depakote and the last Depakote level was therapeutic at 60, he was advised to continue with the current dose of Depakote, to keep his blood pressure cholesterol and sugar under control, continue with Plavix, his platelet count is 315, he would need that to be monitored, he will discuss with his family physician and monitor that, he was advised to continue with home physical therapy, if no relief of the low back pain with physical therapy then may need an MRI of the lumbar spine in the future, to take fall and safety precautions and seizure precautions, to go to the hospital if has any recurrence of or worsening of the symptoms, he has an appointment to follow-up with Dr Jefferson Maya in 3 months      Subjective:    HPI   Patient is here in follow-up after his recent discharge from the hospital in December of 2019 for falls, according to the patient's wife and the notes he was sleeping to the floor from his chair and was unable to get up, since his discharge from the hospital and rehab he has not had any falls, according to the wife his last seizure was a few years ago, he has been stable on Depakote, last Depakote level was 60, he has not had any new stroke-like symptoms, his recent CT scan of the brain did not show evidence of any acute stroke, it was felt that his falls could be multifactorial, he never lost any consciousness, there is no slurred speech, no headaches, no focal weakness, he uses a walker to ambulate, since his discharge from the hospital he has been getting physical therapy twice a week, no swallowing difficulty , he has been eating good , sleep is good , no bowel and bladder incontinence   no other complaints      Vitals:    02/11/20 1010   BP: 124/68   BP Location: Left arm   Patient Position: Sitting   Cuff Size: Large   Pulse: 56   Weight: 114 kg (252 lb)   Height: 5' 11" (1 803 m)       Current Medications    Current Outpatient Medications:     amLODIPine (NORVASC) 5 mg tablet, Take 1 tablet (5 mg total) by mouth 2 (two) times a day (Patient taking differently: Take 5 mg by mouth daily ), Disp: , Rfl: 0    atorvastatin (LIPITOR) 20 mg tablet, Take 1 tablet by mouth daily, Disp: , Rfl:     B-D UF III MINI PEN NEEDLES 31G X 5 MM MISC, , Disp: , Rfl:     clopidogrel (PLAVIX) 75 mg tablet, Take 75 mg by mouth daily, Disp: , Rfl:     divalproex sodium (DEPAKOTE) 500 mg EC tablet, Take 1 tablet (500 mg total) by mouth 2 (two) times a day 1 additional 500 mg at bedtime (Patient taking differently: No sig reported), Disp: 270 tablet, Rfl: 3    ferrous sulfate 325 (65 Fe) mg tablet, Take 325 mg by mouth daily with breakfast, Disp: , Rfl:     folic acid (FOLVITE) 1 mg tablet, Take 1 tablet by mouth daily, Disp: , Rfl:     furosemide (LASIX) 20 mg tablet, Take 20 mg by mouth daily, Disp: , Rfl:     insulin glargine (LANTUS) 100 units/mL subcutaneous injection, Inject 10 Units under the skin daily at bedtime (Patient taking differently: Inject 30 Units under the skin daily at bedtime ), Disp: , Rfl: 0    levothyroxine 25 mcg tablet, Take 25 mcg by mouth daily in the early morning, Disp: , Rfl:     metoprolol tartrate (LOPRESSOR) 25 mg tablet, Take 1 tablet (25 mg total) by mouth every 12 (twelve) hours, Disp: , Rfl: 0    Multiple Vitamin (MULTIVITAMINS PO), Take 1 tablet by mouth daily, Disp: , Rfl:     potassium chloride (KLOR-CON M20) 20 mEq tablet, Take 1 tablet by mouth daily, Disp: , Rfl:     travoprost (TRAVATAN Z) 0 004 % ophthalmic solution, Apply 1 drop to eye daily at bedtime , Disp: , Rfl:       Allergies  Enalapril    Past Medical History  Past Medical History:   Diagnosis Date    Acute renal failure syndrome (HCC)     Cardiac arrhythmia     Carotid stenosis, bilateral     Cerebrovascular disease     Chronic kidney disease     [facility] [address] [facility]     [facility]     ETOH abuse     Hyperlipidemia     Hyperosmolality     Hypertension     Lymphedema of both lower extremities     Memory loss     [facility]cephalus) [facility])     Seizure disorder ([facility])          Past Surgical History:  Past Surgical History:   Procedure Laterality Date    CATARACT EXTRACTION      COLONOSCOPY  2017    HERNIA REPAIR      MULTIPLE TOOTH EXTRACTIONS           Family History:  Family History   Problem Relation Age of Onset    Hypertension Father     Hypertension Brother     Diabetes Brother     Stroke Brother     Cancer Mother        Social History:   reports that he quit smoking about 8 years ago  His smoking use included cigarettes  He has never used smokeless tobacco  He reports that he drank alcohol  He reports that he has current or past drug history  I have reviewed the past medical history, surgical history, social and family history, current medications, allergies vitals, review of systems, and updated this information as appropriate today  Objective:    Physical Exam    Neurological Exam    GENERAL:  Cooperative in no acute distress  Well-developed and well-nourished    HEAD and NECK   Head is atraumatic normocephalic with no lesions or masses   Neck is supple with full range of motion    CARDIOVASCULAR  Carotid Arteries-no carotid bruits  NEUROLOGIC:  Mental Status-the patient is awake alert and oriented without aphasia or apraxia  Cranial Nerves: Visual fields are full to confrontation  Extraocular movements are full without nystagmus  Pupils are 2-1/2 mm and reactive  Face is symmetrical to light touch  Movements of facial expression move symmetrically  Hearing is normal to finger rub bilaterally  Soft palate lifts symmetrically  Shoulder shrug is symmetrical  Tongue is midline without atrophy  Motor: No drift is noted on arm extension  Strength is full in the upper and lower extremities with normal bulk and tone  Sensory:  Decreased light touch pinprick temperature sensation in a stocking distribution  Cortical function is intact  Coordination: Finger to nose testing is performed accurately  Ambulates with a walker  Reflexes:      1+ and symmetrical Toes are downgoing  No spine tenderness, some paraspinal muscle tenderness in the lumbar area  ROS:  Review of Systems   Constitutional: Negative  Negative for appetite change, chills, fatigue and fever  HENT: Negative  Negative for hearing loss, tinnitus, trouble swallowing and voice change  Eyes: Negative  Negative for photophobia and pain  Respiratory: Negative  Negative for shortness of breath and wheezing  Cardiovascular: Negative  Negative for chest pain and palpitations  Gastrointestinal: Negative  Negative for nausea and vomiting  Endocrine: Negative  Negative for cold intolerance and heat intolerance  Genitourinary: Positive for difficulty urinating  Negative for dysuria, frequency and urgency  Musculoskeletal: Positive for back pain and gait problem  Negative for arthralgias, myalgias, neck pain and neck stiffness  Skin: Negative  Negative for rash  Allergic/Immunologic: Negative  Neurological: Positive for weakness   Negative for dizziness, tremors, seizures, syncope, facial asymmetry, speech difficulty, light-headedness, numbness and headaches  Hematological: Negative  Does not bruise/bleed easily  Psychiatric/Behavioral: Negative  Negative for confusion, decreased concentration, hallucinations and sleep disturbance

## 2020-03-09 ENCOUNTER — APPOINTMENT (EMERGENCY)
Dept: RADIOLOGY | Facility: HOSPITAL | Age: 80
DRG: 280 | End: 2020-03-09
Payer: MEDICARE

## 2020-03-09 ENCOUNTER — APPOINTMENT (EMERGENCY)
Dept: NON INVASIVE DIAGNOSTICS | Facility: HOSPITAL | Age: 80
DRG: 280 | End: 2020-03-09
Payer: MEDICARE

## 2020-03-09 ENCOUNTER — APPOINTMENT (EMERGENCY)
Dept: CT IMAGING | Facility: HOSPITAL | Age: 80
DRG: 280 | End: 2020-03-09
Payer: MEDICARE

## 2020-03-09 ENCOUNTER — HOSPITAL ENCOUNTER (INPATIENT)
Facility: HOSPITAL | Age: 80
LOS: 11 days | Discharge: HOME HEALTH | DRG: 280 | End: 2020-03-20
Attending: EMERGENCY MEDICINE | Admitting: ANESTHESIOLOGY
Payer: MEDICARE

## 2020-03-09 ENCOUNTER — APPOINTMENT (INPATIENT)
Dept: ULTRASOUND IMAGING | Facility: HOSPITAL | Age: 80
DRG: 280 | End: 2020-03-09
Payer: MEDICARE

## 2020-03-09 DIAGNOSIS — I48.92 PAROXYSMAL ATRIAL FLUTTER (HCC): ICD-10-CM

## 2020-03-09 DIAGNOSIS — R13.12 OROPHARYNGEAL DYSPHAGIA: ICD-10-CM

## 2020-03-09 DIAGNOSIS — N18.30 TYPE 2 DIABETES MELLITUS WITH STAGE 3 CHRONIC KIDNEY DISEASE, WITH LONG-TERM CURRENT USE OF INSULIN (HCC): ICD-10-CM

## 2020-03-09 DIAGNOSIS — Z86.73 HISTORY OF CEREBROVASCULAR ACCIDENT (CVA) IN ADULTHOOD: ICD-10-CM

## 2020-03-09 DIAGNOSIS — N17.9 ACUTE RENAL FAILURE (ARF) (HCC): ICD-10-CM

## 2020-03-09 DIAGNOSIS — I21.4 NSTEMI (NON-ST ELEVATED MYOCARDIAL INFARCTION) (HCC): ICD-10-CM

## 2020-03-09 DIAGNOSIS — G93.41 ACUTE METABOLIC ENCEPHALOPATHY: ICD-10-CM

## 2020-03-09 DIAGNOSIS — R45.1 AGITATION: ICD-10-CM

## 2020-03-09 DIAGNOSIS — G40.909 SEIZURE DISORDER (HCC): ICD-10-CM

## 2020-03-09 DIAGNOSIS — E03.9 ACQUIRED HYPOTHYROIDISM: ICD-10-CM

## 2020-03-09 DIAGNOSIS — R41.82 ALTERED MENTAL STATUS: Primary | ICD-10-CM

## 2020-03-09 DIAGNOSIS — R13.10 DYSPHAGIA: ICD-10-CM

## 2020-03-09 DIAGNOSIS — E16.2 HYPOGLYCEMIA: ICD-10-CM

## 2020-03-09 DIAGNOSIS — I50.32 CHRONIC DIASTOLIC HEART FAILURE (HCC): ICD-10-CM

## 2020-03-09 DIAGNOSIS — Z79.4 TYPE 2 DIABETES MELLITUS WITH STAGE 3 CHRONIC KIDNEY DISEASE, WITH LONG-TERM CURRENT USE OF INSULIN (HCC): ICD-10-CM

## 2020-03-09 DIAGNOSIS — E11.22 TYPE 2 DIABETES MELLITUS WITH STAGE 3 CHRONIC KIDNEY DISEASE, WITH LONG-TERM CURRENT USE OF INSULIN (HCC): ICD-10-CM

## 2020-03-09 DIAGNOSIS — R77.8 ELEVATED TROPONIN: ICD-10-CM

## 2020-03-09 DIAGNOSIS — I10 BENIGN ESSENTIAL HYPERTENSION: ICD-10-CM

## 2020-03-09 DIAGNOSIS — N18.30 CHRONIC RENAL INSUFFICIENCY, STAGE III (MODERATE) (HCC): ICD-10-CM

## 2020-03-09 PROBLEM — E87.2 LACTIC ACIDOSIS: Status: ACTIVE | Noted: 2020-03-09

## 2020-03-09 PROBLEM — J18.9 CAP (COMMUNITY ACQUIRED PNEUMONIA): Status: ACTIVE | Noted: 2020-03-09

## 2020-03-09 LAB
ALBUMIN SERPL BCP-MCNC: 2.8 G/DL (ref 3.5–5)
ALP SERPL-CCNC: 89 U/L (ref 46–116)
ALT SERPL W P-5'-P-CCNC: 32 U/L (ref 12–78)
AMMONIA PLAS-SCNC: <10 UMOL/L (ref 11–35)
ANION GAP SERPL CALCULATED.3IONS-SCNC: 11 MMOL/L (ref 4–13)
APTT PPP: 139 SECONDS (ref 23–37)
APTT PPP: 34 SECONDS (ref 23–37)
APTT PPP: 76 SECONDS (ref 23–37)
AST SERPL W P-5'-P-CCNC: 186 U/L (ref 5–45)
ATRIAL RATE: 92 BPM
ATRIAL RATE: 97 BPM
BACTERIA UR QL AUTO: ABNORMAL /HPF
BASOPHILS # BLD AUTO: 0.03 THOUSANDS/ΜL (ref 0–0.1)
BASOPHILS NFR BLD AUTO: 0 % (ref 0–1)
BILIRUB SERPL-MCNC: 0.6 MG/DL (ref 0.2–1)
BILIRUB UR QL STRIP: NEGATIVE
BUN SERPL-MCNC: 16 MG/DL (ref 5–25)
CALCIUM SERPL-MCNC: 8.8 MG/DL (ref 8.3–10.1)
CHLORIDE SERPL-SCNC: 103 MMOL/L (ref 100–108)
CLARITY UR: CLEAR
CO2 SERPL-SCNC: 27 MMOL/L (ref 21–32)
COLOR UR: YELLOW
CREAT SERPL-MCNC: 1.67 MG/DL (ref 0.6–1.3)
EOSINOPHIL # BLD AUTO: 0.02 THOUSAND/ΜL (ref 0–0.61)
EOSINOPHIL NFR BLD AUTO: 0 % (ref 0–6)
ERYTHROCYTE [DISTWIDTH] IN BLOOD BY AUTOMATED COUNT: 15.4 % (ref 11.6–15.1)
ERYTHROCYTE [DISTWIDTH] IN BLOOD BY AUTOMATED COUNT: 15.5 % (ref 11.6–15.1)
FLUAV RNA NPH QL NAA+PROBE: NORMAL
FLUBV RNA NPH QL NAA+PROBE: NORMAL
GFR SERPL CREATININE-BSD FRML MDRD: 45 ML/MIN/1.73SQ M
GLUCOSE SERPL-MCNC: 125 MG/DL (ref 65–140)
GLUCOSE SERPL-MCNC: 154 MG/DL (ref 65–140)
GLUCOSE SERPL-MCNC: 235 MG/DL (ref 65–140)
GLUCOSE UR STRIP-MCNC: ABNORMAL MG/DL
HCT VFR BLD AUTO: 37.9 % (ref 36.5–49.3)
HCT VFR BLD AUTO: 46.5 % (ref 36.5–49.3)
HGB BLD-MCNC: 12.4 G/DL (ref 12–17)
HGB BLD-MCNC: 14.9 G/DL (ref 12–17)
HGB UR QL STRIP.AUTO: ABNORMAL
IMM GRANULOCYTES # BLD AUTO: 0.04 THOUSAND/UL (ref 0–0.2)
IMM GRANULOCYTES NFR BLD AUTO: 1 % (ref 0–2)
INR PPP: 1.14 (ref 0.84–1.19)
INR PPP: 1.27 (ref 0.84–1.19)
KETONES UR STRIP-MCNC: NEGATIVE MG/DL
LACTATE SERPL-SCNC: 2.1 MMOL/L (ref 0.5–2)
LACTATE SERPL-SCNC: 2.7 MMOL/L (ref 0.5–2)
LACTATE SERPL-SCNC: 4 MMOL/L (ref 0.5–2)
LEUKOCYTE ESTERASE UR QL STRIP: NEGATIVE
LYMPHOCYTES # BLD AUTO: 2.52 THOUSANDS/ΜL (ref 0.6–4.47)
LYMPHOCYTES NFR BLD AUTO: 29 % (ref 14–44)
MCH RBC QN AUTO: 31.7 PG (ref 26.8–34.3)
MCH RBC QN AUTO: 32.5 PG (ref 26.8–34.3)
MCHC RBC AUTO-ENTMCNC: 32 G/DL (ref 31.4–37.4)
MCHC RBC AUTO-ENTMCNC: 32.7 G/DL (ref 31.4–37.4)
MCV RBC AUTO: 99 FL (ref 82–98)
MCV RBC AUTO: 99 FL (ref 82–98)
MONOCYTES # BLD AUTO: 1.33 THOUSAND/ΜL (ref 0.17–1.22)
MONOCYTES NFR BLD AUTO: 15 % (ref 4–12)
NEUTROPHILS # BLD AUTO: 4.74 THOUSANDS/ΜL (ref 1.85–7.62)
NEUTS SEG NFR BLD AUTO: 55 % (ref 43–75)
NITRITE UR QL STRIP: NEGATIVE
NON-SQ EPI CELLS URNS QL MICRO: ABNORMAL /HPF
NRBC BLD AUTO-RTO: 0 /100 WBCS
NT-PROBNP SERPL-MCNC: ABNORMAL PG/ML
P AXIS: 44 DEGREES
P AXIS: 45 DEGREES
PH UR STRIP.AUTO: 7.5 [PH]
PLATELET # BLD AUTO: 119 THOUSANDS/UL (ref 149–390)
PLATELET # BLD AUTO: 156 THOUSANDS/UL (ref 149–390)
PMV BLD AUTO: 12 FL (ref 8.9–12.7)
PMV BLD AUTO: 12 FL (ref 8.9–12.7)
POTASSIUM SERPL-SCNC: 4.1 MMOL/L (ref 3.5–5.3)
PR INTERVAL: 152 MS
PR INTERVAL: 178 MS
PROT SERPL-MCNC: 7.9 G/DL (ref 6.4–8.2)
PROT UR STRIP-MCNC: ABNORMAL MG/DL
PROTHROMBIN TIME: 14.6 SECONDS (ref 11.6–14.5)
PROTHROMBIN TIME: 16 SECONDS (ref 11.6–14.5)
QRS AXIS: -20 DEGREES
QRS AXIS: -22 DEGREES
QRSD INTERVAL: 80 MS
QRSD INTERVAL: 88 MS
QT INTERVAL: 392 MS
QT INTERVAL: 404 MS
QTC INTERVAL: 497 MS
QTC INTERVAL: 499 MS
RBC # BLD AUTO: 3.82 MILLION/UL (ref 3.88–5.62)
RBC # BLD AUTO: 4.7 MILLION/UL (ref 3.88–5.62)
RBC #/AREA URNS AUTO: ABNORMAL /HPF
RSV RNA NPH QL NAA+PROBE: NORMAL
SODIUM SERPL-SCNC: 141 MMOL/L (ref 136–145)
SP GR UR STRIP.AUTO: 1.01 (ref 1–1.03)
T WAVE AXIS: 186 DEGREES
T WAVE AXIS: 205 DEGREES
T4 FREE SERPL-MCNC: 1.32 NG/DL (ref 0.76–1.46)
TROPONIN I SERPL-MCNC: 4.12 NG/ML
TROPONIN I SERPL-MCNC: 4.44 NG/ML
TROPONIN I SERPL-MCNC: 4.8 NG/ML
TSH SERPL DL<=0.05 MIU/L-ACNC: 5.38 UIU/ML (ref 0.36–3.74)
UROBILINOGEN UR QL STRIP.AUTO: 4 E.U./DL
VENTRICULAR RATE: 92 BPM
VENTRICULAR RATE: 97 BPM
WBC # BLD AUTO: 7.82 THOUSAND/UL (ref 4.31–10.16)
WBC # BLD AUTO: 8.68 THOUSAND/UL (ref 4.31–10.16)
WBC #/AREA URNS AUTO: ABNORMAL /HPF

## 2020-03-09 PROCEDURE — 84443 ASSAY THYROID STIM HORMONE: CPT | Performed by: PHYSICIAN ASSISTANT

## 2020-03-09 PROCEDURE — 99285 EMERGENCY DEPT VISIT HI MDM: CPT

## 2020-03-09 PROCEDURE — 70450 CT HEAD/BRAIN W/O DYE: CPT

## 2020-03-09 PROCEDURE — 85610 PROTHROMBIN TIME: CPT | Performed by: PHYSICIAN ASSISTANT

## 2020-03-09 PROCEDURE — 87186 SC STD MICRODIL/AGAR DIL: CPT | Performed by: PHYSICIAN ASSISTANT

## 2020-03-09 PROCEDURE — 85730 THROMBOPLASTIN TIME PARTIAL: CPT | Performed by: ANESTHESIOLOGY

## 2020-03-09 PROCEDURE — 85730 THROMBOPLASTIN TIME PARTIAL: CPT | Performed by: PHYSICIAN ASSISTANT

## 2020-03-09 PROCEDURE — 84484 ASSAY OF TROPONIN QUANT: CPT | Performed by: PHYSICIAN ASSISTANT

## 2020-03-09 PROCEDURE — 83605 ASSAY OF LACTIC ACID: CPT | Performed by: PHYSICIAN ASSISTANT

## 2020-03-09 PROCEDURE — 87040 BLOOD CULTURE FOR BACTERIA: CPT | Performed by: PHYSICIAN ASSISTANT

## 2020-03-09 PROCEDURE — C8929 TTE W OR WO FOL WCON,DOPPLER: HCPCS

## 2020-03-09 PROCEDURE — 99291 CRITICAL CARE FIRST HOUR: CPT | Performed by: ANESTHESIOLOGY

## 2020-03-09 PROCEDURE — 83880 ASSAY OF NATRIURETIC PEPTIDE: CPT | Performed by: PHYSICIAN ASSISTANT

## 2020-03-09 PROCEDURE — 82948 REAGENT STRIP/BLOOD GLUCOSE: CPT

## 2020-03-09 PROCEDURE — 85027 COMPLETE CBC AUTOMATED: CPT | Performed by: PHYSICIAN ASSISTANT

## 2020-03-09 PROCEDURE — 85025 COMPLETE CBC W/AUTO DIFF WBC: CPT | Performed by: PHYSICIAN ASSISTANT

## 2020-03-09 PROCEDURE — 84439 ASSAY OF FREE THYROXINE: CPT | Performed by: PHYSICIAN ASSISTANT

## 2020-03-09 PROCEDURE — 92610 EVALUATE SWALLOWING FUNCTION: CPT

## 2020-03-09 PROCEDURE — 82140 ASSAY OF AMMONIA: CPT | Performed by: PHYSICIAN ASSISTANT

## 2020-03-09 PROCEDURE — 96365 THER/PROPH/DIAG IV INF INIT: CPT

## 2020-03-09 PROCEDURE — 87631 RESP VIRUS 3-5 TARGETS: CPT | Performed by: PHYSICIAN ASSISTANT

## 2020-03-09 PROCEDURE — 87449 NOS EACH ORGANISM AG IA: CPT | Performed by: PHYSICIAN ASSISTANT

## 2020-03-09 PROCEDURE — 99222 1ST HOSP IP/OBS MODERATE 55: CPT | Performed by: INTERNAL MEDICINE

## 2020-03-09 PROCEDURE — 93010 ELECTROCARDIOGRAM REPORT: CPT | Performed by: INTERNAL MEDICINE

## 2020-03-09 PROCEDURE — 93971 EXTREMITY STUDY: CPT

## 2020-03-09 PROCEDURE — 80053 COMPREHEN METABOLIC PANEL: CPT | Performed by: PHYSICIAN ASSISTANT

## 2020-03-09 PROCEDURE — 99291 CRITICAL CARE FIRST HOUR: CPT | Performed by: PHYSICIAN ASSISTANT

## 2020-03-09 PROCEDURE — 81001 URINALYSIS AUTO W/SCOPE: CPT | Performed by: PHYSICIAN ASSISTANT

## 2020-03-09 PROCEDURE — 93005 ELECTROCARDIOGRAM TRACING: CPT

## 2020-03-09 PROCEDURE — 96361 HYDRATE IV INFUSION ADD-ON: CPT

## 2020-03-09 PROCEDURE — 96375 TX/PRO/DX INJ NEW DRUG ADDON: CPT

## 2020-03-09 PROCEDURE — 36415 COLL VENOUS BLD VENIPUNCTURE: CPT | Performed by: PHYSICIAN ASSISTANT

## 2020-03-09 PROCEDURE — 71045 X-RAY EXAM CHEST 1 VIEW: CPT

## 2020-03-09 RX ORDER — DIVALPROEX SODIUM 250 MG/1
1000 TABLET, DELAYED RELEASE ORAL EVERY EVENING
Status: DISCONTINUED | OUTPATIENT
Start: 2020-03-09 | End: 2020-03-09

## 2020-03-09 RX ORDER — HEPARIN SODIUM 1000 [USP'U]/ML
4000 INJECTION, SOLUTION INTRAVENOUS; SUBCUTANEOUS ONCE
Status: COMPLETED | OUTPATIENT
Start: 2020-03-09 | End: 2020-03-09

## 2020-03-09 RX ORDER — HEPARIN SODIUM 1000 [USP'U]/ML
2000 INJECTION, SOLUTION INTRAVENOUS; SUBCUTANEOUS AS NEEDED
Status: DISCONTINUED | OUTPATIENT
Start: 2020-03-09 | End: 2020-03-12

## 2020-03-09 RX ORDER — INSULIN GLARGINE 100 [IU]/ML
30 INJECTION, SOLUTION SUBCUTANEOUS
Status: DISCONTINUED | OUTPATIENT
Start: 2020-03-09 | End: 2020-03-12

## 2020-03-09 RX ORDER — CLOPIDOGREL BISULFATE 75 MG/1
75 TABLET ORAL DAILY
Status: DISCONTINUED | OUTPATIENT
Start: 2020-03-09 | End: 2020-03-14

## 2020-03-09 RX ORDER — SODIUM CHLORIDE, SODIUM GLUCONATE, SODIUM ACETATE, POTASSIUM CHLORIDE, MAGNESIUM CHLORIDE, SODIUM PHOSPHATE, DIBASIC, AND POTASSIUM PHOSPHATE .53; .5; .37; .037; .03; .012; .00082 G/100ML; G/100ML; G/100ML; G/100ML; G/100ML; G/100ML; G/100ML
75 INJECTION, SOLUTION INTRAVENOUS CONTINUOUS
Status: DISCONTINUED | OUTPATIENT
Start: 2020-03-09 | End: 2020-03-11

## 2020-03-09 RX ORDER — CHLORHEXIDINE GLUCONATE 0.12 MG/ML
15 RINSE ORAL EVERY 12 HOURS SCHEDULED
Status: DISCONTINUED | OUTPATIENT
Start: 2020-03-09 | End: 2020-03-11

## 2020-03-09 RX ORDER — HEPARIN SODIUM 1000 [USP'U]/ML
4000 INJECTION, SOLUTION INTRAVENOUS; SUBCUTANEOUS AS NEEDED
Status: DISCONTINUED | OUTPATIENT
Start: 2020-03-09 | End: 2020-03-12

## 2020-03-09 RX ORDER — DIVALPROEX SODIUM 250 MG/1
500 TABLET, DELAYED RELEASE ORAL EVERY MORNING
Status: DISCONTINUED | OUTPATIENT
Start: 2020-03-09 | End: 2020-03-09

## 2020-03-09 RX ORDER — ATORVASTATIN CALCIUM 20 MG/1
20 TABLET, FILM COATED ORAL DAILY
Status: DISCONTINUED | OUTPATIENT
Start: 2020-03-09 | End: 2020-03-17

## 2020-03-09 RX ORDER — HEPARIN SODIUM 10000 [USP'U]/100ML
3-20 INJECTION, SOLUTION INTRAVENOUS
Status: DISCONTINUED | OUTPATIENT
Start: 2020-03-09 | End: 2020-03-12

## 2020-03-09 RX ORDER — DIVALPROEX SODIUM 250 MG/1
500 TABLET, DELAYED RELEASE ORAL 2 TIMES DAILY
Status: DISCONTINUED | OUTPATIENT
Start: 2020-03-09 | End: 2020-03-09

## 2020-03-09 RX ORDER — ONDANSETRON 2 MG/ML
4 INJECTION INTRAMUSCULAR; INTRAVENOUS ONCE
Status: COMPLETED | OUTPATIENT
Start: 2020-03-09 | End: 2020-03-09

## 2020-03-09 RX ORDER — CLOPIDOGREL BISULFATE 75 MG/1
75 TABLET ORAL DAILY
Status: DISCONTINUED | OUTPATIENT
Start: 2020-03-09 | End: 2020-03-09 | Stop reason: SDUPTHER

## 2020-03-09 RX ADMIN — VALPROATE SODIUM 500 MG: 100 INJECTION, SOLUTION INTRAVENOUS at 21:48

## 2020-03-09 RX ADMIN — CEFTRIAXONE SODIUM 1000 MG: 10 INJECTION, POWDER, FOR SOLUTION INTRAVENOUS at 08:04

## 2020-03-09 RX ADMIN — SODIUM CHLORIDE, SODIUM GLUCONATE, SODIUM ACETATE, POTASSIUM CHLORIDE AND MAGNESIUM CHLORIDE 75 ML/HR: 526; 502; 368; 37; 30 INJECTION, SOLUTION INTRAVENOUS at 18:22

## 2020-03-09 RX ADMIN — HEPARIN SODIUM 4000 UNITS: 1000 INJECTION, SOLUTION INTRAVENOUS; SUBCUTANEOUS at 08:36

## 2020-03-09 RX ADMIN — METRONIDAZOLE 500 MG: 500 INJECTION, SOLUTION INTRAVENOUS at 21:23

## 2020-03-09 RX ADMIN — NITROGLYCERIN 1 INCH: 20 OINTMENT TOPICAL at 09:06

## 2020-03-09 RX ADMIN — ONDANSETRON 4 MG: 2 INJECTION INTRAMUSCULAR; INTRAVENOUS at 08:13

## 2020-03-09 RX ADMIN — INSULIN GLARGINE 30 UNITS: 100 INJECTION, SOLUTION SUBCUTANEOUS at 21:48

## 2020-03-09 RX ADMIN — HEPARIN SODIUM AND DEXTROSE 11.1 UNITS/KG/HR: 10000; 5 INJECTION INTRAVENOUS at 08:20

## 2020-03-09 RX ADMIN — METRONIDAZOLE 500 MG: 500 INJECTION, SOLUTION INTRAVENOUS at 16:34

## 2020-03-09 RX ADMIN — PERFLUTREN 0.8 ML/MIN: 6.52 INJECTION, SUSPENSION INTRAVENOUS at 08:59

## 2020-03-09 RX ADMIN — SODIUM CHLORIDE 1000 ML: 0.9 INJECTION, SOLUTION INTRAVENOUS at 07:40

## 2020-03-09 RX ADMIN — CHLORHEXIDINE GLUCONATE 0.12% ORAL RINSE 15 ML: 1.2 LIQUID ORAL at 21:23

## 2020-03-09 RX ADMIN — DOXYCYCLINE 100 MG: 100 INJECTION, POWDER, LYOPHILIZED, FOR SOLUTION INTRAVENOUS at 23:54

## 2020-03-09 RX ADMIN — DOXYCYCLINE 100 MG: 100 INJECTION, POWDER, LYOPHILIZED, FOR SOLUTION INTRAVENOUS at 12:54

## 2020-03-09 RX ADMIN — VALPROATE SODIUM 500 MG: 100 INJECTION, SOLUTION INTRAVENOUS at 14:40

## 2020-03-09 RX ADMIN — METOPROLOL TARTRATE 12.5 MG: 25 TABLET ORAL at 21:48

## 2020-03-09 NOTE — ASSESSMENT & PLAN NOTE
Lab Results   Component Value Date    HGBA1C 6 8 (H) 12/25/2019       Recent Labs     03/09/20  1242   POCGLU 125       Blood Sugar Average: Last 72 hrs:  (P) 125   - continue home Lantus  - cover with sliding scale

## 2020-03-09 NOTE — ASSESSMENT & PLAN NOTE
- likely secondary to possible NSTEMI as well as intravascular volume depletion  - improving with volume resuscitation  - gentle fluids

## 2020-03-09 NOTE — ASSESSMENT & PLAN NOTE
- patient has not reportedly had any issues with dysphagia in the past  - over the past couple days, has had decreased p o   Intake and has not been able to swallow as effectively as normal  - in the emergency department, would not even attempt swallowing pills  - we will place patient NPO, obtain swallow evaluation  - hold p o  meds for now, convert Depakote to IV

## 2020-03-09 NOTE — CONSULTS
Consultation - Cardiology   Tiffanie Guerrier 70 y o  male MRN: [id number]  Unit/Bed#: ED 18 Encounter: [id number]  03/09/20  8:33 AM    Assessment/ Plan:  1- Elevated troponins of unknown significance, 4 44/4  80  Continue trending to peak  Manage conservatively for now with IV heparin, Plavix, statin and BB  Obtain TTE for assess LVEF and structural abnormalities  2- Paroxysmal atrial flutter  NSR on telemetry, rate controlled on Lopressor  Not on anticoagulation 2/2 high bleed risk from frequent falls/seizure activity  On Plavix for Hx of CVA  3- Chronic diastolic CHF  Euvolemic today on exam  Salt restrictions, daily weights, strict I&Os  4- Hypertension  BP stable but soft, resume Lopressor at 12 5mg BID  5- Hyperlipidemia  Continue atorvastatin 20mg daily  History of Present Illness   Physician Requesting Consult: Bernadine Chacon, *  Reason for Consult / Principal Problem: Elevated troponins  HPI: Tiffanie Guerrier is a 70y o  year old male with history of chronic diastolic CHF, paroxysmal atrial flutter, hypertension, hyperlipidemia, diabetes mellitus type 2, chronic kidney disease stage 3, bilateral subcortical CVA, seizure disorder, frequent falls who presents with altered mental status and poor oral intake since this morning  History obtained by wife who was at bedside today  She reports her  is socially distant at baseline who is able to answer questions appropriately  She reports a change in her 's exertional capacity over the last 3 days which progressed to where he couldn't get out of bed yesterday AM  She also reports of decreased food and fluid intake  Reports he didn't eat lunch or dinner yesterday  She denies husbands complaint of chest pain, discomfort or SOB  He usually follows with Cardiologist Dr Anurag Dallas for history of AF and CHF  He reports compliance with his home medication regimen       Inpatient consult to Cardiology  Consult performed by: Gabriela Porras, ELY  Consult ordered by: Deann Aguilar PA-C      EKG: Normal sinus rhythm, low-voltage QRS, cannot rule out anterior septal infarct, ST and T-wave abnormalities consider lateral ischemia  Telemetry reviewed: NSR, HR in the 90s  Review of Systems: Unable to assess due to AMS       Historical Information   Past Medical History:   Diagnosis Date    [facility] )     Cardiac arrhythmia     Carotid stenosis, bilateral     Cerebrovascular disease     Chronic kidney disease     [facility]     Diabetes mellitus ([facility] )     ETOH abuse     Hyperlipidemia     Hyperosmolality     Hypertension     Lymphedema of both lower extremities     Memory loss     NPH (normal pressure hydrocephalus) (HCC)     Seizure disorder (HCC)      Past Surgical History:   Procedure Laterality Date    CATARACT EXTRACTION      COLONOSCOPY  2017    HERNIA REPAIR      MULTIPLE TOOTH EXTRACTIONS       Social History     Substance and Sexual Activity   Alcohol Use Not Currently     Social History     Substance and Sexual Activity   Drug Use Not Currently     Social History     Tobacco Use   Smoking Status Former Smoker    Types: Cigarettes    Last attempt to quit:     Years since quittin 1   Smokeless Tobacco Never Used       Family History:   Family History   Problem Relation Age of Onset    Hypertension Father     Hypertension Brother     Diabetes Brother     Stroke Brother     Cancer Mother        Meds/Allergies   current meds:   Current Facility-Administered Medications   Medication Dose Route Frequency    clopidogrel (PLAVIX) tablet 75 mg  75 mg Oral Daily    heparin (porcine) 25,000 units in 250 mL infusion (premix)  3-20 Units/kg/hr (Order-Specific) Intravenous Titrated    heparin (porcine) injection 2,000 Units  2,000 Units Intravenous PRN    heparin (porcine) injection 4,000 Units  4,000 Units Intravenous PRN    metoprolol tartrate (LOPRESSOR) tablet 12 5 mg  12 5 mg Oral Q12H EUGENIO    metroNIDAZOLE (FLAGYL) IVPB (premix) 500 mg  500 mg Intravenous Once    sodium chloride 0 9 % bolus 1,000 mL  1,000 mL Intravenous Once    sodium chloride 0 9 % bolus 1,360 mL  1,360 mL Intravenous Once     Allergies   Allergen Reactions    Enalapril Angioedema, Anaphylaxis and Shortness Of Breath     Other reaction(s): Unknown  Other reaction(s): Respiratory distress, Unknown Reaction       Objective   Vitals: Blood pressure 121/65, pulse 94, temperature 99 6 °F (37 6 °C), temperature source Rectal, resp  rate 22, weight 112 kg (246 lb 14 6 oz), SpO2 97 %  , Body mass index is 34 44 kg/m² ,   Orthostatic Blood Pressures      Most Recent Value   Blood Pressure  121/65 filed at 03/09/2020 0728   Patient Position - Orthostatic VS  Lying filed at 03/09/2020 6326          Systolic (06RNO), FOE:717 , Min:100 , HOF:661     Diastolic (75XCE), GARFIELD:11, Min:58, Max:65        Intake/Output Summary (Last 24 hours) at 3/9/2020 6399  Last data filed at 3/9/2020 0754  Gross per 24 hour   Intake    Output 200 ml   Net -200 ml       Invasive Devices     Peripheral Intravenous Line            Peripheral IV 03/09/20 Left Antecubital less than 1 day    Peripheral IV 03/09/20 Right Antecubital less than 1 day          Drain            Urethral Catheter Latex 16 Fr  less than 1 day                    Physical Exam:  GEN: +stuporous on exam  HEENT: Sclera anicteric, conjunctivae pink, mucous membranes moist  Oropharynx clear  NECK: Supple, no carotid bruits, no significant JVD  Trachea midline, no thyromegaly  HEART: Regular rhythm, normal S1 and S2, no murmurs, clicks, gallops or rubs  PMI nondisplaced, no thrills  LUNGS: Clear to auscultation bilaterally; no wheezes, rales, or rhonchi  No increased work of breathing or signs of respiratory distress  ABDOMEN: Soft, nontender, nondistended, normoactive bowel sounds     EXTREMITIES: Skin warm and well perfused, no clubbing, cyanosis, or edema   NEURO: No focal findings  Normal speech  Mood and affect normal    SKIN: Normal without suspicious lesions on exposed skin        Lab Results:     Troponins:   Results from last 7 days   Lab Units 03/09/20  0643   TROPONIN I ng/mL 4 44*       CBC with diff:   Results from last 7 days   Lab Units 03/09/20  0643   WBC Thousand/uL 8 68   HEMOGLOBIN g/dL 14 9   HEMATOCRIT % 46 5   MCV fL 99*   PLATELETS Thousands/uL 156   MCH pg 31 7   MCHC g/dL 32 0   RDW % 15 4*   MPV fL 12 0   NRBC AUTO /100 WBCs 0         CMP:   Results from last 7 days   Lab Units 03/09/20  0643   POTASSIUM mmol/L 4 1   CHLORIDE mmol/L 103   CO2 mmol/L 27   BUN mg/dL 16   CREATININE mg/dL 1 67*   CALCIUM mg/dL 8 8   AST U/L 186*   ALT U/L 32   ALK PHOS U/L 89   EGFR ml/min/1 73sq m 45

## 2020-03-09 NOTE — ASSESSMENT & PLAN NOTE
Wt Readings from Last 3 Encounters:   03/09/20 112 kg (246 lb 14 6 oz)   02/11/20 114 kg (252 lb)   12/24/19 117 kg (258 lb 6 1 oz)     - patient with documented history, however no echocardiogram   ProBNP 18,000 with unclear baseline  - is on Lasix at home  - appears under filled on echocardiogram today, it appears 2 kg down from last office visit  - will give gentle fluids  - follow-up cardiology consultation which is much appreciated

## 2020-03-09 NOTE — PLAN OF CARE
Initial bedside swallow eval completed  Rec NPO except critical meds crushed in puree when pt fully awake/alert; hold po meds if pt too lethargic, watch for oral holding  Will cont to follow

## 2020-03-09 NOTE — ASSESSMENT & PLAN NOTE
- unclear if type 1 or type 2, however preliminary read on echocardiogram with regional wall motion abnormalities  - patient denying chest pain, patient's wife denies patient complaining of chest pain  - continue heparin drip for now  - continue aspirin, Plavix, beta-blocker  - serial EKGs  - cardiology following  - follow-up official echo read

## 2020-03-09 NOTE — ASSESSMENT & PLAN NOTE
- patient is on Depakote 500 mg p o   In the morning and a 1000 mg at night unfortunately, unable to swallow, will convert to IV formulation  - patient has not had seizure in approximately 7 years

## 2020-03-09 NOTE — ASSESSMENT & PLAN NOTE
- patient with worsening encephalopathy over last 2 days  - this morning, patient very lethargic and weak and therefore was brought to the emergency department his wife  - patient has not been eating or drinking as much as normal  - patient has normal ammonia, no medications that could have caused an overdose, no concern for CNS infection at present  - may be secondary to infection verses volume depletion  - did improve with fluids as well as antibiotics  - trend Q 2 hours  - continue antiepileptics

## 2020-03-09 NOTE — ASSESSMENT & PLAN NOTE
- patient with questionable consolidation on x-ray  - also has mild leukocytosis  - will treat as community-acquired pneumonia with aspiration coverage  - obtain and trend procalcitonin  - obtain blood cultures, urine Legionella and strep antigen  - deescalate antibiotics as able  - flu negative

## 2020-03-09 NOTE — ED PROVIDER NOTES
History  Chief Complaint   Patient presents with    Altered Mental Status     Per wife patient is more altered  not getting or drinking since yesterday  Patient had stroke in the past       79-year-old male with past medical history significant for stroke, diabetes, hypertension and chronic kidney disease presents to the emergency department with chief complaint of alteration in baseline mental status  Majority of symptoms obtained from patient's wife at bedside  She reports the patient has had decreased activity levels, not eating or drinking since yesterday  She reports that he does not seem to be acting like himself and seems to be out of it"  She denies any fall injury or trauma  Patient takes Plavix but no additional blood thinners  Onset of symptoms reported as 1 day ago  Location of symptoms reported as diffuse with no localized symptoms  Quality is reported as decreased interaction, decreased eating and drinking  Severity reported as moderate to severe  Associated symptoms:  Positive for nausea  Denies vomiting  Denies chest pain or shortness of breath  Denies abdominal pain  Denies fevers  Denies headache  Positive for confusion  Modifying factors:  No known aggravating or alleviating factors  Context:  Patient's wife reports no acute fall injury or trauma  She reports his symptoms are vaguely similar to his prior stroke  She reports he goes to try to put something in his mouth but then seems to forget what he is doing  She reports he has eaten or drunk anything since yesterday  She reports he has had seizures in the past but they generally consistent generalized shaking  He has had none of those recently  Reviewed past visits via epic: patient last seen in ed on 12/24/2019 for evaluation of altered mental status         History provided by:  Spouse and patient  History limited by:  Mental status change   used: No        Prior to Admission Medications Prescriptions Last Dose Informant Patient Reported? Taking?    B-D UF III MINI PEN NEEDLES 31G X 5 MM MISC  Spouse/Significant Other Yes No   Multiple Vitamin (MULTIVITAMINS PO)  Spouse/Significant Other Yes No   Sig: Take 1 tablet by mouth daily   amLODIPine (NORVASC) 5 mg tablet   No No   Sig: Take 1 tablet (5 mg total) by mouth 2 (two) times a day   Patient taking differently: Take 5 mg by mouth daily    atorvastatin (LIPITOR) 20 mg tablet  Spouse/Significant Other Yes No   Sig: Take 1 tablet by mouth daily   clopidogrel (PLAVIX) 75 mg tablet  Spouse/Significant Other Yes No   Sig: Take 75 mg by mouth daily   divalproex sodium (DEPAKOTE) 500 mg EC tablet  Spouse/Significant Other No No   Sig: Take 1 tablet (500 mg total) by mouth 2 (two) times a day 1 additional 500 mg at bedtime   Patient taking differently: No sig reported   ferrous sulfate 325 (65 Fe) mg tablet   Yes No   Sig: Take 325 mg by mouth daily with breakfast   folic acid (FOLVITE) 1 mg tablet  Spouse/Significant Other Yes No   Sig: Take 1 tablet by mouth daily   furosemide (LASIX) 20 mg tablet   Yes No   Sig: Take 20 mg by mouth daily   insulin glargine (LANTUS) 100 units/mL subcutaneous injection   No No   Sig: Inject 10 Units under the skin daily at bedtime   Patient taking differently: Inject 30 Units under the skin daily at bedtime    levothyroxine 25 mcg tablet  Spouse/Significant Other Yes No   Sig: Take 25 mcg by mouth daily in the early morning   metoprolol tartrate (LOPRESSOR) 25 mg tablet   No No   Sig: Take 1 tablet (25 mg total) by mouth every 12 (twelve) hours   potassium chloride (KLOR-CON M20) 20 mEq tablet  Spouse/Significant Other Yes No   Sig: Take 1 tablet by mouth daily   travoprost (TRAVATAN Z) 0 004 % ophthalmic solution  Spouse/Significant Other Yes No   Sig: Apply 1 drop to eye daily at bedtime       Facility-Administered Medications: None       Past Medical History:   Diagnosis Date    Acute renal failure syndrome (HCC)  Cardiac arrhythmia     Carotid stenosis, bilateral     Cerebrovascular disease     Chronic kidney disease     [facility]A [facility]cere[facility] accident) [facility] [address] [facility]     [facility]     ETOH abuse     Hyperlipidemia     Hyperosmolality     Hypertension     Lymphedema of both lower extremities     Memory loss     NPH (normal pressure hydrocephalus) (HCC)     Seizure disorder (HCC)        Past Surgical History:   Procedure Laterality Date    CATARACT EXTRACTION      COLONOSCOPY  2017    HERNIA REPAIR      MULTIPLE TOOTH EXTRACTIONS         Family History   Problem Relation Age of Onset    Hypertension Father     Hypertension Brother     Diabetes Brother     Stroke Brother     Cancer Mother      I have reviewed and agree with the history as documented  E-Cigarette/Vaping    E-Cigarette Use Never User      E-Cigarette/Vaping Substances    Nicotine No     THC No     CBD No     Flavoring No     Other No     Unknown No      Social History     Tobacco Use    Smoking status: Former Smoker     Types: Cigarettes     Last attempt to quit:      Years since quittin 1    Smokeless tobacco: Never Used   Substance Use Topics    Alcohol use: Not Currently    Drug use: Not Currently       Review of Systems   Constitutional: Positive for activity change  Negative for appetite change, chills, diaphoresis, fatigue, fever and unexpected weight change  HENT: Negative for congestion, dental problem, drooling, ear discharge, ear pain, facial swelling, hearing loss, mouth sores, nosebleeds, postnasal drip, rhinorrhea, sinus pressure, sinus pain, sneezing, sore throat, tinnitus, trouble swallowing and voice change  Eyes: Negative for photophobia, pain, discharge, redness, itching and visual disturbance  Respiratory: Negative for cough, chest tightness, shortness of breath and wheezing  Cardiovascular: Negative for chest pain, palpitations and leg swelling     Gastrointestinal: Positive for nausea  Negative for abdominal distention, abdominal pain, anal bleeding, blood in stool, constipation, diarrhea and vomiting  Endocrine: Negative for cold intolerance, heat intolerance, polydipsia, polyphagia and polyuria  Genitourinary: Negative for difficulty urinating, dysuria, flank pain, frequency, hematuria and urgency  Musculoskeletal: Negative for arthralgias, back pain, joint swelling, myalgias, neck pain and neck stiffness  Skin: Negative for color change, pallor, rash and wound  Allergic/Immunologic: Negative for environmental allergies, food allergies and immunocompromised state  Neurological: Positive for weakness  Negative for dizziness, tremors, syncope, facial asymmetry, speech difficulty, light-headedness, numbness and headaches  Hematological: Negative for adenopathy  Does not bruise/bleed easily  Psychiatric/Behavioral: Positive for confusion  Negative for agitation and hallucinations  The patient is not nervous/anxious  All other systems reviewed and are negative  Physical Exam  Physical Exam   Constitutional: He appears well-developed and well-nourished  He does not appear ill  No distress   vs   HENT:   Head: Normocephalic and atraumatic  Right Ear: External ear normal    Left Ear: External ear normal    Nose: Nose normal    Mouth/Throat: No oropharyngeal exudate  Mucus membranes dry  Eyes: Pupils are equal, round, and reactive to light  Conjunctivae and EOM are normal  Right eye exhibits no discharge  Left eye exhibits no discharge  No scleral icterus  Neck: Normal range of motion  Neck supple  No JVD present  No tracheal deviation present  No thyromegaly present  Cardiovascular: Normal rate, regular rhythm, S1 normal, S2 normal and intact distal pulses  Pulmonary/Chest: Effort normal  No stridor  No respiratory distress  He has no wheezes  He has rales  He exhibits no tenderness  Abdominal: Soft   Bowel sounds are normal  He exhibits no distension and no mass  There is no tenderness  There is no rebound and no guarding  No hernia  Musculoskeletal: Normal range of motion  He exhibits no edema, tenderness or deformity  Lymphadenopathy:     He has no cervical adenopathy  Neurological: He is alert  He has normal strength  He is disoriented  He displays normal reflexes  No cranial nerve deficit or sensory deficit  He exhibits normal muscle tone  Coordination normal  GCS eye subscore is 4  GCS verbal subscore is 4  GCS motor subscore is 6  Awake, answers questions :  Reports the year at 2001  Reports his location as [facility]  Is able to identify family member at bedside  Moves all extremities x 4 - no weakness  Follows commands  Tongue midline without deviation  Shoulder shrug equal/bilateral/intact  Skin: Skin is warm and dry  Capillary refill takes less than 2 seconds  No rash noted  He is not diaphoretic  No erythema  No pallor  Psychiatric: He has a normal mood and affect  His behavior is normal  Judgment and thought content normal    Nursing note and vitals reviewed        Vital Signs  ED Triage Vitals   Temperature Pulse Respirations Blood Pressure SpO2   03/09/20 0645 03/09/20 0633 03/09/20 0633 03/09/20 0633 03/09/20 0645   99 6 °F (37 6 °C) 83 22 116/63 (!) 76 %      Temp Source Heart Rate Source Patient Position - Orthostatic VS BP Location FiO2 (%)   03/09/20 0645 03/09/20 0633 03/09/20 0728 03/09/20 0728 --   Rectal Monitor Lying Right arm       Pain Score       --                  Vitals:    03/09/20 0633 03/09/20 0645 03/09/20 0728 03/09/20 1125   BP: 116/63 100/58 121/65 115/56   Pulse: 83 85 94 78   Patient Position - Orthostatic VS:   Lying Lying         Visual Acuity      ED Medications  Medications   sodium chloride 0 9 % bolus 1,000 mL (1,000 mL Intravenous Not Given 3/9/20 1106)   sodium chloride 0 9 % bolus 1,360 mL (1,360 mL Intravenous Not Given 3/9/20 1107)   heparin (porcine) 25,000 units in 250 mL infusion (premix) (11 1 Units/kg/hr × 90 kg (Order-Specific) Intravenous New Bag 3/9/20 0820)   heparin (porcine) injection 4,000 Units (has no administration in time range)   heparin (porcine) injection 2,000 Units (has no administration in time range)   metoprolol tartrate (LOPRESSOR) tablet 12 5 mg (0 mg Oral Hold 3/9/20 0908)   atorvastatin (LIPITOR) tablet 20 mg (has no administration in time range)   clopidogrel (PLAVIX) tablet 75 mg (has no administration in time range)   insulin glargine (LANTUS) subcutaneous injection 30 Units 0 3 mL (has no administration in time range)   chlorhexidine (PERIDEX) 0 12 % oral rinse 15 mL (has no administration in time range)   insulin lispro (HumaLOG) 100 units/mL subcutaneous injection 1-6 Units (1 Units Subcutaneous Not Given 3/9/20 1243)   metroNIDAZOLE (FLAGYL) IVPB (premix) 500 mg (has no administration in time range)   ceftriaxone (ROCEPHIN) 1 g/50 mL in dextrose IVPB (has no administration in time range)   multi-electrolyte (PLASMALYTE-A/ISOLYTE-S PH 7 4) IV solution (has no administration in time range)   doxycycline (VIBRAMYCIN) 100 mg in sodium chloride 0 9 % 100 mL IVPB (100 mg Intravenous New Bag 3/9/20 1254)   valproate (DEPACON) 500 mg in sodium chloride 0 9 % 50 mL IVPB (has no administration in time range)   ondansetron (ZOFRAN) injection 4 mg (4 mg Intravenous Given 3/9/20 0813)   sodium chloride 0 9 % bolus 1,000 mL (0 mL Intravenous Stopped 3/9/20 1129)   ceftriaxone (ROCEPHIN) 1 g/50 mL in dextrose IVPB (0 mg Intravenous Stopped 3/9/20 0834)   nitroglycerin (NITRO-BID) 2 % TD ointment 1 inch (1 inch Topical Given 3/9/20 0906)   heparin (porcine) injection 4,000 Units (4,000 Units Intravenous Given 3/9/20 0836)   perflutren lipid microsphere (DEFINITY) injection (0 8 mL/min Intravenous Given 3/9/20 0859)       Diagnostic Studies  Results Reviewed     Procedure Component Value Units Date/Time    Fingerstick Glucose (POCT) [[id number]]  (Normal) Collected:  03/09/20 1242    Lab Status:  Final result Updated:  03/09/20 1254     POC Glucose 125 mg/dl     Lactic acid, plasma x2 [[id number]]  (Abnormal) Collected:  03/09/20 1128    Lab Status:  Final result Specimen:  Blood from Arm, Left Updated:  03/09/20 1229     LACTIC ACID 2 1 mmol/L     Narrative:       Result may be elevated if tourniquet was used during collection  Legionella antigen, urine [[id number]]     Lab Status:  No result Specimen:  Urine     Strep Pneumoniae, Urine [[id number]]     Lab Status:  No result Specimen:  Urine     Troponin I [[id number]]     Lab Status:  No result Specimen:  Blood     Troponin I [[id number]]     Lab Status:  No result Specimen:  Blood     Troponin I [[id number]]  (Abnormal) Collected:  03/09/20 1118    Lab Status:  Final result Specimen:  Blood Updated:  03/09/20 1129     Troponin I 4 80 ng/mL     Lactic acid, plasma x2 [[id number]]  (Abnormal) Collected:  03/09/20 0900    Lab Status:  Final result Specimen:  Blood from Arm, Left Updated:  03/09/20 1044     LACTIC ACID 2 7 mmol/L     Narrative:       Result may be elevated if tourniquet was used during collection      Urine Microscopic [[id number]]  (Abnormal) Collected:  03/09/20 0759    Lab Status:  Final result Specimen:  Urine, Indwelling Hines Catheter Updated:  03/09/20 0821     RBC, UA None Seen /hpf      WBC, UA 0-1 /hpf      Epithelial Cells None Seen /hpf      Bacteria, UA None Seen /hpf     UA w Reflex to Microscopic w Reflex to Culture [[id number]]  (Abnormal) Collected:  03/09/20 0759    Lab Status:  Final result Specimen:  Urine, Indwelling Hines Catheter Updated:  03/09/20 0805     Color, UA Yellow     Clarity, UA Clear     Specific Gravity, UA 1 015     pH, UA 7 5     Leukocytes, UA Negative     Nitrite, UA Negative     Protein, UA Trace mg/dl      Glucose,  (1/10%) mg/dl      Ketones, UA Negative mg/dl      Urobilinogen, UA 4 0 E U /dl      Bilirubin, UA Negative     Blood, UA Small    Protime-INR [[id number]]     Lab Status:  No result Specimen:  Blood     APTT six (6) hours after Heparin bolus/drip initiation or dosing change [[id number]]     Lab Status:  No result Specimen:  Blood     CBC [[id number]]     Lab Status:  No result Specimen:  Blood     Troponin I [[id number]]  (Abnormal) Collected:  03/09/20 0643    Lab Status:  Final result Specimen:  Blood from Arm, Right Updated:  03/09/20 0733     Troponin I 4 44 ng/mL     Lactic acid, plasma x2 [[id number]]  (Abnormal) Collected:  03/09/20 0643    Lab Status:  Final result Specimen:  Blood from Arm, Right Updated:  03/09/20 0727     LACTIC ACID 4 0 mmol/L     Narrative:       Result may be elevated if tourniquet was used during collection      NT-BNP PRO [[id number]]  (Abnormal) Collected:  03/09/20 0643    Lab Status:  Final result Specimen:  Blood from Arm, Right Updated:  03/09/20 0723     NT-proBNP 18,408 pg/mL     Comprehensive metabolic panel [[id number]]  (Abnormal) Collected:  03/09/20 0643    Lab Status:  Final result Specimen:  Blood from Arm, Right Updated:  03/09/20 0715     Sodium 141 mmol/L      Potassium 4 1 mmol/L      Chloride 103 mmol/L      CO2 27 mmol/L      ANION GAP 11 mmol/L      BUN 16 mg/dL      Creatinine 1 67 mg/dL      Glucose 154 mg/dL      Calcium 8 8 mg/dL       U/L      ALT 32 U/L      Alkaline Phosphatase 89 U/L      Total Protein 7 9 g/dL      Albumin 2 8 g/dL      Total Bilirubin 0 60 mg/dL      eGFR 45 ml/min/1 73sq m     Narrative:       [facility] guidelines for Chronic Kidney Disease (CKD):     Stage 1 with normal or high GFR (GFR > 90 mL/min/1 73 square meters)    Stage 2 Mild CKD (GFR = 60-89 mL/min/1 73 square meters)    Stage 3A Moderate CKD (GFR = 45-59 mL/min/1 73 square meters)    Stage 3B Moderate CKD (GFR = 30-44 mL/min/1 73 square meters)    Stage 4 Severe CKD (GFR = 15-29 mL/min/1 73 square meters)    Stage 5 End Stage CKD (GFR <15 mL/min/1 73 square meters)  Note: GFR calculation is accurate only with a steady state creatinine    Ammonia [[id number]]  (Abnormal) Collected:  03/09/20 0643    Lab Status:  Final result Specimen:  Blood from Arm, Right Updated:  03/09/20 0713     Ammonia <10 umol/L     Protime-INR [[id number]]  (Abnormal) Collected:  03/09/20 0643    Lab Status:  Final result Specimen:  Blood from Arm, Right Updated:  03/09/20 0710     Protime 14 6 seconds      INR 1 14    APTT [[id number]]  (Normal) Collected:  03/09/20 0643    Lab Status:  Final result Specimen:  Blood from Arm, Right Updated:  03/09/20 0710     PTT 34 seconds     CBC and differential [[id number]]  (Abnormal) Collected:  03/09/20 0643    Lab Status:  Final result Specimen:  Blood from Arm, Right Updated:  03/09/20 0709     WBC 8 68 Thousand/uL      RBC 4 70 Million/uL      Hemoglobin 14 9 g/dL      Hematocrit 46 5 %      MCV 99 fL      MCH 31 7 pg      MCHC 32 0 g/dL      RDW 15 4 %      MPV 12 0 fL      Platelets 938 Thousands/uL      nRBC 0 /100 WBCs      Neutrophils Relative 55 %      Immat GRANS % 1 %      Lymphocytes Relative 29 %      Monocytes Relative 15 %      Eosinophils Relative 0 %      Basophils Relative 0 %      Neutrophils Absolute 4 74 Thousands/µL      Immature Grans Absolute 0 04 Thousand/uL      Lymphocytes Absolute 2 52 Thousands/µL      Monocytes Absolute 1 33 Thousand/µL      Eosinophils Absolute 0 02 Thousand/µL      Basophils Absolute 0 03 Thousands/µL     Blood culture #2 [[id number]] Collected:  03/09/20 0643    Lab Status: In process Specimen:  Blood from Arm, Left Updated:  03/09/20 0657    Blood culture #1 [[id number]] Collected:  03/09/20 0643    Lab Status: In process Specimen:  Blood from Arm, Right Updated:  03/09/20 0657    Lactic acid, plasma x2 [[id number]]     Lab Status:  No result Specimen:  Blood                  CT head without contrast   Final Result by Anamaria Russell MD (03/09 4417)      No acute intracranial abnormality  Microangiopathic changes                    Workstation performed: [id number]         XR chest portable   Final Result by Eugene Camargo MD (03/09 1045)      Mild vascular congestion  Patchy bibasilar atelectasis  Workstation performed: [id number]                    Procedures  ECG 12 Lead Documentation Only  Date/Time: 3/9/2020 6:57 AM  Performed by: Brandin Colorado PA-C  Authorized by: Brandin Colorado PA-C     Indications / Diagnosis:  AMS  ECG reviewed by me, the ED Provider: yes    Patient location:  ED  Previous ECG:     Previous ECG:  Compared to current    Comparison ECG info:  12/24/2019    Similarity:  Changes noted    Comparison to cardiac monitor: Yes    Interpretation:     Interpretation: normal    Rate:     ECG rate:  97    ECG rate assessment: normal    Rhythm:     Rhythm: sinus rhythm    Ectopy:     Ectopy: none    QRS:     QRS axis:  Normal    QRS intervals:  Normal  Conduction:     Conduction: normal    ST segments:     ST segments:  Normal  T waves:     T waves: inverted      Inverted:  V4, V5, V6, V3 and V2    ECG 12 Lead Documentation Only  Date/Time: 3/9/2020 7:38 AM  Performed by: Brandin Colorado PA-C  Authorized by: Brandin Colorado PA-C     Indications / Diagnosis:  AMS  ECG reviewed by me, the ED Provider: yes    Patient location:  ED  Previous ECG:     Previous ECG:  Compared to current    Comparison ECG info:  3/9/2020    Similarity:  No change    Comparison to cardiac monitor: Yes    Interpretation:     Interpretation: normal    Rate:     ECG rate:  92    ECG rate assessment: normal    Rhythm:     Rhythm: sinus rhythm    Ectopy:     Ectopy: none    QRS:     QRS axis:  Normal    QRS intervals:  Normal  Conduction:     Conduction: normal    ST segments:     ST segments:  Normal  T waves:     T waves: inverted    Q waves:     Q waves:  V2, V3, V4, V5 and V6             ED Course  ED Course as of Mar 09 1403   Mon Mar 09, 2020   0715 Lab results reviewed  Ammonia is less than 10  INR normal 1 1  No coagulopathy    CBC demonstrates normal white blood cell count of 8 6, hemoglobin of 14 9 and hematocrit of 46 5 are normal   No anemia       0726 Bnp elevated at 18,408  Cmp reviewed -- bun 16, creatinine elevated at 1 67  Potassium normal at 4 1         0726 Lactic acid elevated at 4 0  Iv fluids ordered  1162 Patient's bnp elevated at 18,408,  troponin elevated at 4 44  Patient with no chest pain, ekg with t wave inversions V2 through v6, but no st elevations  Lactate elevated at 4 0   UA remarkable for small blood, negative nitrites or leukocytes  WBC normal at 8 6  Patient's symptoms concerning for NSTEMI/pulmonary edema vs aspiration pneumonia/infection  Iv fluids ordered however patient without fever, wbc elevated, or hypotension  Suspect cardiogenic source vs infectious source  Discussed with cardiology and critical care - will give judicious iv fluids due to concern for pulmonary congestion  Will admit/spoke with critical care regarding admission  Heparin ordered for elevated trop - spoke with cardiology will obtain echo/see in consult                HEART Risk Score      Most Recent Value   Heart Score Risk Calculator   History  1 Filed at: 03/09/2020 0838   ECG  2 Filed at: 03/09/2020 4250   Age  2 Filed at: 03/09/2020 8587   Risk Factors  2 Filed at: 03/09/2020 0838   Troponin  2 Filed at: 03/09/2020 1077   HEART Score  9 Filed at: 03/09/2020 6781          Stroke Assessment     Row Name 03/09/20 1402             NIH Stroke Scale    Interval  Baseline      Level of Consciousness (1a )  0      LOC Questions (1b )  2      LOC Commands (1c )  0      Best Gaze (2 )  0      Visual (3 )  0      Facial Palsy (4 )  0      Motor Arm, Left (5a )  0      Motor Arm, Right (5b )  0      Motor Leg, Left (6a )  0      Motor Leg, Right (6b )  0      Limb Ataxia (7 )  0      Sensory (8 )  0      Best Language (9 )  0      Dysarthria (10 )  0      Extinction and Inattention (11 ) (Formerly Neglect)  0      Total  2          [employer] Value   TPA Decision  Patient not a TPA candidate  Patient is not a candidate options  Unclear time of onset outside appropriate time window  BRICE Risk Score      Most Recent Value   Age >= 72  1 Filed at: 03/09/2020 3948   Known CAD (stenosis >= 50%)  0 Filed at: 03/09/2020 0963   Recent (<=24 hrs) Service Angina  0 Filed at: 03/09/2020 0838   ST Deviation >= 0 5 mm  0 Filed at: 03/09/2020 0335   3+ CAD Risk Factors (FHx, HTN, HLP, DM, Smoker)  1 Filed at: 03/09/2020 1138   Aspirin Use Past 7 Days  1 Filed at: 03/09/2020 0253   Elevated Cardiac Markers  1 Filed at: 03/09/2020 3393   BRICE Risk Score (Calculated)  4 Filed at: 03/09/2020 8685              MDM  Number of Diagnoses or Management Options  Acute renal failure (ARF) ([facility] [address] ): new and requires workup  Altered mental status: new and requires workup  Elevated troponin: new and requires workup  Diagnosis management comments: ddx includes but is not limited to aspiration, pneumonia, ICH, SDH, epidural hematoma, CVA, basilar artery occlusion, brain tumor, brain abcess, metabolic sources, seizure, hypoglycemia, hypoexmia, drug effect or overdose, hepatic encephalopathy, hyponatremia, hypothermia, meningioencephalitis, psychogenic  Plan workup for seizure/syncope/metabolic/infectious/neuro sources of symptoms  Ct head images independently visualized by me  Radiology report reviewed: no acute intracranial abnormality  Patient initial workup had elevated troponin, and elevated lactic acid in setting of no fever but no chest pain   ekg with t wave inversions v2-v6, hence heparin stated for consideration of ACS  Iv antibiotics started empirically for elevated lactate however no source for infection identified or clear  Case discussed with Cardiology, Dr Danilo Maher and critical care Dr Safia Lacy regarding admission  Bedside echo performed - read pending    Patient will need admission for further evaluation of symptoms and abnormalities noted on ED workup  Amount and/or Complexity of Data Reviewed  Clinical lab tests: ordered and reviewed  Tests in the radiology section of CPT®: ordered and reviewed  Tests in the medicine section of CPT®: ordered and reviewed  Discussion of test results with the performing providers: yes  Obtain history from someone other than the patient: yes  Review and summarize past medical records: yes  Discuss the patient with other providers: yes  Independent visualization of images, tracings, or specimens: yes    Risk of Complications, Morbidity, and/or Mortality  General comments: Ceftriaxone initially given due to concern for aspiration pneumonia  The critical care time is separate of other billable procedures, treating other patients, and any teaching time  The critical care time was for: obtaining history from patient or surrogate, development of treatment plan with patient or surrogate, discussion with any applicable consultants, examination of the patient,ordering and performing treatments and interventions, ordering and reviewing any applicable laboratory or radiographic studies, reassessment of the patient for response to treatment, reviewing any pertinent and available old records, documentation time  The critical care time was necessary to prevent deterioration of the following organ systems: elevated troponin/altered mental status/elevated renal function/NSTEMI    Time spent (in minutes):60                Disposition  Final diagnoses:    Altered mental status   Elevated troponin   Acute renal failure (ARF) ([facility] [address] )     Time reflects when diagnosis was documented in both MDM as applicable and the Disposition within this note     Time User Action Codes Description Comment    3/9/2020  8:41 AM Aldon Rising Add [R41 82] Altered mental status     3/9/2020  8:41 AM Aldon Rising Add [R79 89] Elevated troponin     3/9/2020  8:42 AM Aldon Rising Add [N17 9] Acute renal failure (ARF) [facility])       ED Disposition     ED Disposition Condition Date/Time Comment    Admit Stable Mon Mar 9, 2020  8:41 AM Case was discussed with dr Emily Meneses and the patient's admission status was agreed to be Admission Status: inpatient status to the service of Dr Rebecca Guzman   Follow-up Information    None         Patient's Medications   Discharge Prescriptions    No medications on file     No discharge procedures on file      PDMP Review     None          ED Provider  Electronically Signed by           Yuli Rhoades PA-C  03/09/20 7992

## 2020-03-09 NOTE — SPEECH THERAPY NOTE
Speech Language/Pathology    Speech-Language Pathology Bedside Swallow Evaluation      Patient Name: Donna Silveira    OZGHF'B Date: 3/9/2020     Problem List  Active Problems:    Chronic renal insufficiency, stage III (moderate) (HCC)    Benign essential hypertension    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(HCC)    Paroxysmal atrial flutter (HCC)    History of bilateral subcortical CVA     Chronic diastolic heart failure (HCC)    Seizure disorder (HCC)    Lymphedema of right lower extremity    Acute metabolic encephalopathy    NSTEMI (non-ST elevated myocardial infarction) ([facility] [address] )    Possible community-acquired pneumonia with possible aspiration component    Dysphagia    Lactic acidosis    Acquired hypothyroidism         Past Medical History  Past Medical History:   Diagnosis Date    Acute renal failure syndrome ([facility])     Cardiac arrhythmia     Carotid stenosis, bilateral     Cerebrovascular disease     Chronic kidney disease     CVA (cerebral vascular accident) ([facility] [address] )     Diabetes mellitus ([facility] [address] )     ETOH abuse     Hyperlipidemia     Hyperosmolality     Hypertension     Lymphedema of both lower extremities     Memory loss     NPH (normal pressure hydrocephalus) ([facility])     Seizure disorder ([facility] [address] )        Past Surgical History  Past Surgical History:   Procedure Laterality Date    CATARACT EXTRACTION      COLONOSCOPY  2017    HERNIA REPAIR      MULTIPLE TOOTH EXTRACTIONS         Summary    Limited assessment 2* poor arousal requiring constant stim to maintain eye opening throughout assessment  Accepted 2oz puree without overt s/s aspiration  Rec to cont NPO except for critical meds crushed in puree when pt fully awake/alert and upright        Recommendations:   Diet: NPO except medications when pt fully awake/alert (watch for oral holding, hold po meds if pt too lethargic)   Meds: crushed with puree   Frequent Oral care: 4x/day  Aspiration precautions and compensatory swallowing strategies: upright posture, only feed when fully alert, slow rate of feeding and small bites/sips   Other Recommendations/ considerations: ST will cont to follow and re-assess swallow function/safety of oral intake as MICHAEL improves       Current Medical Status  Pt is a 70 y o  male who presented to [facility]  with 2 day history of worsening lethargy and p o  Intake  Patient has a past history of CVA, seizure disorder, diabetes type 2, paroxysmal AFib, chronic diastolic heart failure of unknown severity, CKD stage 3, chronic lymphedema in the right lower extremity, hypertension, hypothyroidism  Much of history was obtained via chart review as well as from patient's wife as patient is lethargic  Per patient's wife, patient has normal state of health until about 2 days ago when having increasing lethargy  She also noticed that he was on interested in eating or drinking and also appeared unable to swallow  Yesterday evening, patient was lethargic and weak, therefore was brought into the emergency department  Patient has no signs or symptoms of infection, no fevers, no chills, no sick contacts, complains of no chest pain, shortness of breath  Emergency department, patient was found to have an NSTEMI with a troponin of greater than 4, lactic acid of 4, and was unable to swallow pills  Patient was trialed on gentle fluids, started on antibiotics for CAP with aspiration coverage and a stat echo was obtained  Patient was also started on a heparin drip  He was referred for step-down admission  Past medical history:   Please see H&P for details    Special Studies:  CT head 3/9:  No acute intracranial abnormality  Microangiopathic changes  CXR 3/9: Mild vascular congestion  Patchy bibasilar atelectasis  Social/Education/Vocational Hx:  Pt lives home w/ wife ?      Swallow Information   Current Risks for Dysphagia & Aspiration: hx CVA, lethargy, AMS   Current Symptoms/Concerns: lethargy, AMS, reduced po intake with oral holding, unable to swallow pills in ED per RN   Current Diet: NPO   Baseline Diet: regular diet and thin liquids  Takes pills-unknown; pt poor historian at this time     Baseline Assessment   Behavior/Cognition: lethargic and waxing and waning arousal level  Speech/Language Status: not able to to follow commands and limited verbal output  Patient Positioning: upright in bed     Swallow Mechanism Exam   Facial: symmetrical  Labial: unable to test 2/2 limited command following  Lingual: unable to test 2/2 limited command following  Velum: unable to visualize  Mandible: adequate ROM  Dentition: edentulous- pt stated "yes" to wearing dentures, however unknown if this is accurate information, pt did not answer if dentures are at home   Vocal quality:clear/adequate   Volitional Cough: unable to initiate volitional cough   Respiratory: RA    Consistencies Assessed and Performance   Consistencies Administered: 2oz puree, single sip NTL via straw - deferred further po trials given impaired MICHAEL/arousal as pt became increasingly more lethargic as trials progressed     Oral Stage: adequate oral opening and retrieval  Slow manipulation/transfer with intermittent oral holding towards end of session, use of dry spoon effective to initiate transfer/swallow  Cannot rule out reduced oral control w/ thickened liquid via straw     Pharyngeal Stage: swallow initiation appeared relatively timely w/ laryngeal rise present to palpation  No overt s/s aspiration with puree or single sip of NTL via straw  Deferred further trials 2* increasingly impaired MICHAEL  Esophageal Concerns: none reported      Results Reviewed with: patient, RN and MD   Dysphagia Goals: pt will participate in repeat swallow eval to determine safety of po intake and/or further instrumental testing

## 2020-03-10 ENCOUNTER — APPOINTMENT (INPATIENT)
Dept: CT IMAGING | Facility: HOSPITAL | Age: 80
DRG: 280 | End: 2020-03-10
Payer: MEDICARE

## 2020-03-10 LAB
ANION GAP SERPL CALCULATED.3IONS-SCNC: 6 MMOL/L (ref 4–13)
APTT PPP: 102 SECONDS (ref 23–37)
APTT PPP: 49 SECONDS (ref 23–37)
APTT PPP: 66 SECONDS (ref 23–37)
ATRIAL RATE: 71 BPM
BASOPHILS # BLD AUTO: 0.02 THOUSANDS/ΜL (ref 0–0.1)
BASOPHILS NFR BLD AUTO: 0 % (ref 0–1)
BUN SERPL-MCNC: 16 MG/DL (ref 5–25)
CALCIUM SERPL-MCNC: 7.7 MG/DL (ref 8.3–10.1)
CHLORIDE SERPL-SCNC: 107 MMOL/L (ref 100–108)
CO2 SERPL-SCNC: 29 MMOL/L (ref 21–32)
CREAT SERPL-MCNC: 1.65 MG/DL (ref 0.6–1.3)
EOSINOPHIL # BLD AUTO: 0.07 THOUSAND/ΜL (ref 0–0.61)
EOSINOPHIL NFR BLD AUTO: 1 % (ref 0–6)
ERYTHROCYTE [DISTWIDTH] IN BLOOD BY AUTOMATED COUNT: 15.7 % (ref 11.6–15.1)
GFR SERPL CREATININE-BSD FRML MDRD: 46 ML/MIN/1.73SQ M
GLUCOSE SERPL-MCNC: 107 MG/DL (ref 65–140)
GLUCOSE SERPL-MCNC: 110 MG/DL (ref 65–140)
GLUCOSE SERPL-MCNC: 129 MG/DL (ref 65–140)
GLUCOSE SERPL-MCNC: 63 MG/DL (ref 65–140)
GLUCOSE SERPL-MCNC: 80 MG/DL (ref 65–140)
GLUCOSE SERPL-MCNC: 99 MG/DL (ref 65–140)
HCT VFR BLD AUTO: 34.9 % (ref 36.5–49.3)
HGB BLD-MCNC: 11.3 G/DL (ref 12–17)
IMM GRANULOCYTES # BLD AUTO: 0.02 THOUSAND/UL (ref 0–0.2)
IMM GRANULOCYTES NFR BLD AUTO: 0 % (ref 0–2)
L PNEUMO1 AG UR QL IA.RAPID: NEGATIVE
LACTATE SERPL-SCNC: 1.3 MMOL/L (ref 0.5–2)
LYMPHOCYTES # BLD AUTO: 2.11 THOUSANDS/ΜL (ref 0.6–4.47)
LYMPHOCYTES NFR BLD AUTO: 30 % (ref 14–44)
MAGNESIUM SERPL-MCNC: 2 MG/DL (ref 1.6–2.6)
MCH RBC QN AUTO: 32.4 PG (ref 26.8–34.3)
MCHC RBC AUTO-ENTMCNC: 32.4 G/DL (ref 31.4–37.4)
MCV RBC AUTO: 100 FL (ref 82–98)
MONOCYTES # BLD AUTO: 1.37 THOUSAND/ΜL (ref 0.17–1.22)
MONOCYTES NFR BLD AUTO: 20 % (ref 4–12)
NEUTROPHILS # BLD AUTO: 3.38 THOUSANDS/ΜL (ref 1.85–7.62)
NEUTS SEG NFR BLD AUTO: 49 % (ref 43–75)
NRBC BLD AUTO-RTO: 0 /100 WBCS
P AXIS: 55 DEGREES
PHOSPHATE SERPL-MCNC: 4 MG/DL (ref 2.3–4.1)
PLATELET # BLD AUTO: 110 THOUSANDS/UL (ref 149–390)
PMV BLD AUTO: 11.7 FL (ref 8.9–12.7)
POTASSIUM SERPL-SCNC: 4.3 MMOL/L (ref 3.5–5.3)
PR INTERVAL: 176 MS
PROCALCITONIN SERPL-MCNC: 0.07 NG/ML
QRS AXIS: -29 DEGREES
QRSD INTERVAL: 90 MS
QT INTERVAL: 470 MS
QTC INTERVAL: 510 MS
RBC # BLD AUTO: 3.49 MILLION/UL (ref 3.88–5.62)
S PNEUM AG UR QL: NEGATIVE
SODIUM SERPL-SCNC: 142 MMOL/L (ref 136–145)
T WAVE AXIS: 238 DEGREES
VENTRICULAR RATE: 71 BPM
WBC # BLD AUTO: 6.97 THOUSAND/UL (ref 4.31–10.16)

## 2020-03-10 PROCEDURE — 82948 REAGENT STRIP/BLOOD GLUCOSE: CPT

## 2020-03-10 PROCEDURE — 84145 PROCALCITONIN (PCT): CPT | Performed by: PHYSICIAN ASSISTANT

## 2020-03-10 PROCEDURE — 87040 BLOOD CULTURE FOR BACTERIA: CPT | Performed by: NURSE PRACTITIONER

## 2020-03-10 PROCEDURE — 85025 COMPLETE CBC W/AUTO DIFF WBC: CPT | Performed by: PHYSICIAN ASSISTANT

## 2020-03-10 PROCEDURE — 80048 BASIC METABOLIC PNL TOTAL CA: CPT | Performed by: PHYSICIAN ASSISTANT

## 2020-03-10 PROCEDURE — 93010 ELECTROCARDIOGRAM REPORT: CPT | Performed by: INTERNAL MEDICINE

## 2020-03-10 PROCEDURE — 84100 ASSAY OF PHOSPHORUS: CPT | Performed by: PHYSICIAN ASSISTANT

## 2020-03-10 PROCEDURE — 74176 CT ABD & PELVIS W/O CONTRAST: CPT

## 2020-03-10 PROCEDURE — 92526 ORAL FUNCTION THERAPY: CPT

## 2020-03-10 PROCEDURE — 85730 THROMBOPLASTIN TIME PARTIAL: CPT | Performed by: PHYSICIAN ASSISTANT

## 2020-03-10 PROCEDURE — 85730 THROMBOPLASTIN TIME PARTIAL: CPT | Performed by: ANESTHESIOLOGY

## 2020-03-10 PROCEDURE — 93005 ELECTROCARDIOGRAM TRACING: CPT

## 2020-03-10 PROCEDURE — 93971 EXTREMITY STUDY: CPT | Performed by: SURGERY

## 2020-03-10 PROCEDURE — 99232 SBSQ HOSP IP/OBS MODERATE 35: CPT | Performed by: INTERNAL MEDICINE

## 2020-03-10 PROCEDURE — 71250 CT THORAX DX C-: CPT

## 2020-03-10 PROCEDURE — 99233 SBSQ HOSP IP/OBS HIGH 50: CPT | Performed by: ANESTHESIOLOGY

## 2020-03-10 PROCEDURE — 93306 TTE W/DOPPLER COMPLETE: CPT | Performed by: INTERNAL MEDICINE

## 2020-03-10 PROCEDURE — 83605 ASSAY OF LACTIC ACID: CPT | Performed by: PHYSICIAN ASSISTANT

## 2020-03-10 PROCEDURE — 83735 ASSAY OF MAGNESIUM: CPT | Performed by: PHYSICIAN ASSISTANT

## 2020-03-10 RX ADMIN — HEPARIN SODIUM AND DEXTROSE 10 UNITS/KG/HR: 10000; 5 INJECTION INTRAVENOUS at 09:18

## 2020-03-10 RX ADMIN — CHLORHEXIDINE GLUCONATE 0.12% ORAL RINSE 15 ML: 1.2 LIQUID ORAL at 20:31

## 2020-03-10 RX ADMIN — HEPARIN SODIUM 2000 UNITS: 1000 INJECTION, SOLUTION INTRAVENOUS; SUBCUTANEOUS at 06:49

## 2020-03-10 RX ADMIN — INSULIN GLARGINE 30 UNITS: 100 INJECTION, SOLUTION SUBCUTANEOUS at 23:22

## 2020-03-10 RX ADMIN — CEFTRIAXONE SODIUM 1000 MG: 10 INJECTION, POWDER, FOR SOLUTION INTRAVENOUS at 09:01

## 2020-03-10 RX ADMIN — METOPROLOL TARTRATE 12.5 MG: 25 TABLET ORAL at 09:06

## 2020-03-10 RX ADMIN — METRONIDAZOLE 500 MG: 500 INJECTION, SOLUTION INTRAVENOUS at 20:31

## 2020-03-10 RX ADMIN — CLOPIDOGREL BISULFATE 75 MG: 75 TABLET ORAL at 09:06

## 2020-03-10 RX ADMIN — VALPROATE SODIUM 500 MG: 100 INJECTION, SOLUTION INTRAVENOUS at 06:20

## 2020-03-10 RX ADMIN — DOXYCYCLINE 100 MG: 100 INJECTION, POWDER, LYOPHILIZED, FOR SOLUTION INTRAVENOUS at 12:36

## 2020-03-10 RX ADMIN — ATORVASTATIN CALCIUM 20 MG: 20 TABLET, FILM COATED ORAL at 09:06

## 2020-03-10 RX ADMIN — METRONIDAZOLE 500 MG: 500 INJECTION, SOLUTION INTRAVENOUS at 05:04

## 2020-03-10 RX ADMIN — CHLORHEXIDINE GLUCONATE 0.12% ORAL RINSE 15 ML: 1.2 LIQUID ORAL at 09:05

## 2020-03-10 RX ADMIN — VALPROATE SODIUM 500 MG: 100 INJECTION, SOLUTION INTRAVENOUS at 23:23

## 2020-03-10 RX ADMIN — VALPROATE SODIUM 500 MG: 100 INJECTION, SOLUTION INTRAVENOUS at 15:20

## 2020-03-10 RX ADMIN — METRONIDAZOLE 500 MG: 500 INJECTION, SOLUTION INTRAVENOUS at 14:59

## 2020-03-10 RX ADMIN — SODIUM CHLORIDE, SODIUM GLUCONATE, SODIUM ACETATE, POTASSIUM CHLORIDE AND MAGNESIUM CHLORIDE 75 ML/HR: 526; 502; 368; 37; 30 INJECTION, SOLUTION INTRAVENOUS at 09:20

## 2020-03-10 NOTE — ASSESSMENT & PLAN NOTE
- possible CAP (questionable consolidation on CXR, leukocytosis)  - obtain and trend procalcitonin  - blood cultures pending  - deescalate antibiotics as able  - flu A/B and RSV negative, strep/legionella negative

## 2020-03-10 NOTE — ASSESSMENT & PLAN NOTE
- unclear if type 1 or type 2, however preliminary read on echocardiogram with regional wall motion abnormalities  - patient denying chest pain, patient's wife denies patient complaining of chest pain  - continue heparin drip  - continue aspirin, Plavix, beta-blocker  - serial EKGs  - Troponin peaked at 4 80  - cardiology following  - follow-up official echo read

## 2020-03-10 NOTE — SOCIAL WORK
LOS 1 DAY  PATIENT IS NOT A READMISSION  PATIENT IS NOT BUNDLE  Nurses were in to see patient when CM attempted to complete assessment  CM spoke with patient's wife, Cydney Albarado  Cydney Albarado reports patient lives in [facility] in a 2 story home with his wife  Nilam ernandez [address] in the front of the house and [address] in the back of the house  Cydney Albarado reports patient will go through the back most of the time due to the steps  Cydney Albarado reports patient stays on the first floor due to difficulty ambulating the stairs  Cydney Albarado reports patient will use either cane, walker, or wheelchair depending on how he feels  Cydney Albarado reports patient needs assistance with ADLs  Cydney Albarado reports she provides assistance  Cydney Albarado reports patient currently has [facility] for PT and OT  Cydney Albarado reports patient was recently in [facility]  Cydney Albarado reports if PT/OT recommendation is STR, patient and Nilam [facility]yn [facility]  Cydney Albarado reports [facility] [place] [address] [facility] [address] as pharmacy and denies any issues affording medication  Cydney Albarado reports no hx of mental health or substance abuse  Cydney Albarado reports patient does not work and does not drive  Cydney Albarado reports patient and her completed POA paperwork they just need to get it notarized  Once notarized Cydney Albarado will be patient's POA  Cydney Albarado will provide transportation if patient discharges home  CM reviewed d/c planning process including the following: identifying help at home, patient preference for d/c planning needs, availability of treatment team to discuss questions or concerns patient and/or family may have regarding understanding medications and recognizing signs and symptoms once discharged  CM also encouraged patient to follow up with all recommended appointments after discharge  Patient advised of importance for patient and family to participate in managing patients medical well being  Needs are pending  PT/OT recommendations   Patient's first choice of STR is [facility]  CM department will follow through discharge

## 2020-03-10 NOTE — ASSESSMENT & PLAN NOTE
- patient has not reportedly had any issues with dysphagia in the past  - over the past couple days, has had decreased p o   Intake and has not been able to swallow as effectively as normal  - maintain NPO status pending speech and swallow eval

## 2020-03-10 NOTE — ASSESSMENT & PLAN NOTE
- patient with worsening encephalopathy over last 2 days  - yesterday AM, patient very lethargic and weak and not eating/drinking normally; brought to ED by wife  - Suspect 2/2 infection vs  Volume overload  - Improved with fluids as well as antibiotics  - trend Q 2 hours  - continue antiepileptics

## 2020-03-10 NOTE — ASSESSMENT & PLAN NOTE
- patient is on Depakote 500 mg p o   In the morning and a 1000 mg at night  - Converted to IV while inpatient pending speech and swallow eval  - patient has not had seizure in approximately 7 years

## 2020-03-10 NOTE — PROGRESS NOTES
Progress Note - Mike Kiser 1940, 70 y o  male MRN: [id number]    Unit/Bed#:  Encounter: [id number]    Primary Care Provider: Cara Strong MD   Date and time admitted to hospital: 3/9/2020  6:29 AM        Acquired hypothyroidism  Assessment & Plan  - continue home Synthroid    Lactic acidosis  Assessment & Plan  - likely secondary to possible NSTEMI as well as intravascular volume depletion  - improving with volume resuscitation  - gentle fluids    Dysphagia  Assessment & Plan  - patient has not reportedly had any issues with dysphagia in the past  - over the past couple days, has had decreased p o   Intake and has not been able to swallow as effectively as normal  - maintain NPO status pending speech and swallow eval    Possible community-acquired pneumonia with possible aspiration component  Assessment & Plan  - possible CAP (questionable consolidation on CXR, leukocytosis)  - obtain and trend procalcitonin  - blood cultures pending  - deescalate antibiotics as able  - flu A/B and RSV negative, strep/legionella negative    NSTEMI (non-ST elevated myocardial infarction) ([facility] [address] )  Assessment & Plan  - unclear if type 1 or type 2, however preliminary read on echocardiogram with regional wall motion abnormalities  - patient denying chest pain, patient's wife denies patient complaining of chest pain  - continue heparin drip  - continue aspirin, Plavix, beta-blocker  - serial EKGs  - Troponin peaked at 4 80  - cardiology following  - follow-up official echo read    Acute metabolic encephalopathy  Assessment & Plan  - patient with worsening encephalopathy over last 2 days  - yesterday AM, patient very lethargic and weak and not eating/drinking normally; brought to ED by wife  - Suspect 2/2 infection vs  Volume overload  - Improved with fluids as well as antibiotics  - trend Q 2 hours  - continue antiepileptics    Lymphedema of right lower extremity  Assessment & Plan  - chronic, monitor expectantly    Seizure disorder [facility])  Assessment & Plan  - patient is on Depakote 500 mg p o   In the morning and a 1000 mg at night  - Converted to IV while inpatient pending speech and swallow eval  - patient has not had seizure in approximately 7 years    Chronic diastolic heart failure (HCC)  Assessment & Plan  Wt Readings from Last 3 Encounters:   03/09/20 112 kg (246 lb 14 6 oz)   02/11/20 114 kg (252 lb)   12/24/19 117 kg (258 lb 6 1 oz)     - patient with documented history, however no echocardiogram   ProBNP 18,000 with unclear baseline  - is on Lasix at home  - Appeared underfilled on bedside echo at admission; admission weight down 2 kg from last office visit  - Continue gentle fluids- close monitoring of volume and respiratory status  - follow-up cardiology consultation which is much appreciated        History of bilateral subcortical CVA   Assessment & Plan  - patient patient without significant deficits  - continue Plavix    Paroxysmal atrial flutter (HCC)  Assessment & Plan  - continue metoprolol 12 5mg BID, currently rate controlled  - Appears NSR on tele monitor    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([facility] [address] )  Assessment & Plan  Lab Results   Component Value Date    HGBA1C 6 8 (H) 12/25/2019       Recent Labs     03/09/20  1242 03/09/20  1829   POCGLU 125 235*       Blood Sugar Average: Last 72 hrs:  (P) 180   - continue home Lantus  - cover with sliding scale    Benign essential hypertension  Assessment & Plan  - continue home Lopressor 12 5mg BID, hold amlodipine and Lasix for now    Chronic renal insufficiency, stage III (moderate) ([facility])  Assessment & Plan  - baseline creatinine 1 4-1 6  - monitor with gentle fluid resuscitation  - hold Lasix      ----------------------------------------------------------------------------------------  HPI/24hr events: Overnight, patient with continued encephalopathy, however lethargy improved from last PM to this AM  No acute events  Disposition: Continue Stepdown Level 1 level of care   Code Status: Level 1 - Full Code  ---------------------------------------------------------------------------------------  SUBJECTIVE  Unable to assess 2/2 confusion  Review of Systems  Review of systems was reviewed and negative unless stated above in HPI/24-hour events   ---------------------------------------------------------------------------------------  OBJECTIVE    Vitals   Vitals:    20 1600 20 1751 20 2300   BP: 112/73 99/53 108/55 110/59   BP Location: Right arm Right arm Left arm    Pulse: 89 99 86    Resp: 20 20 (!) 24    Temp:   (!) 97 1 °F (36 2 °C) 97 6 °F (36 4 °C)   TempSrc:   Oral Oral   SpO2: 98% 97%     Weight:         Temp (24hrs), Av 1 °F (36 7 °C), Min:97 1 °F (36 2 °C), Max:99 6 °F (37 6 °C)  Current: Temperature: 97 6 °F (36 4 °C)        SpO2: SpO2: 97 %, SpO2 Activity: SpO2 Activity: At Rest, SpO2 Device: O2 Device: None (Room air)       Physical Exam   Constitutional: No distress  HENT:   Head: Normocephalic and atraumatic  Eyes: Pupils are equal, round, and reactive to light  EOM are normal    Neck: Normal range of motion  Neck supple  Cardiovascular: Normal rate, regular rhythm and normal heart sounds  Exam reveals no friction rub  No murmur heard  Pulmonary/Chest: Effort normal and breath sounds normal  He has no wheezes  He has no rales  Abdominal: Soft  Bowel sounds are normal  He exhibits no distension  There is no tenderness  There is no guarding  Musculoskeletal: He exhibits edema (3+ lymphedema of RLE, 1+ pitting edema of LLE)  Neurological: GCS eye subscore is 4  GCS verbal subscore is 4  GCS motor subscore is 6  Oriented to self and place, not time  In general, more alert this AM compared to last PM  Positive asterixis in bilateral upper extremities  No clonus in bilateral lower extremities  Skin: Skin is warm and dry   Capillary refill takes less than 2 seconds  He is not diaphoretic  Vitals reviewed  Invasive/non-invasive ventilation settings   Respiratory    Lab Data (Last 4 hours)    None         O2/Vent Data (Last 4 hours)    None                Laboratory and Diagnostics:  Results from last 7 days   Lab Units 03/09/20  1843 03/09/20  0643   WBC Thousand/uL 7 82 8 68   HEMOGLOBIN g/dL 12 4 14 9   HEMATOCRIT % 37 9 46 5   PLATELETS Thousands/uL 119* 156   NEUTROS PCT %  --  55   MONOS PCT %  --  15*     Results from last 7 days   Lab Units 03/09/20  0643   SODIUM mmol/L 141   POTASSIUM mmol/L 4 1   CHLORIDE mmol/L 103   CO2 mmol/L 27   ANION GAP mmol/L 11   BUN mg/dL 16   CREATININE mg/dL 1 67*   CALCIUM mg/dL 8 8   GLUCOSE RANDOM mg/dL 154*   ALT U/L 32   AST U/L 186*   ALK PHOS U/L 89   ALBUMIN g/dL 2 8*   TOTAL BILIRUBIN mg/dL 0 60          Results from last 7 days   Lab Units 03/09/20  2151 03/09/20  1500 03/09/20  0643   INR   --  1 27* 1 14   PTT seconds 139* 76* 34      Results from last 7 days   Lab Units 03/09/20  1500 03/09/20  1118 03/09/20  0643   TROPONIN I ng/mL 4 12* 4 80* 4 44*     Results from last 7 days   Lab Units 03/09/20  1844 03/09/20  1500 03/09/20  1128 03/09/20  0900 03/09/20  0643   LACTIC ACID mmol/L 2 1* 2 1* 2 1* 2 7* 4 0*     ABG:    VBG:          Micro  Results from last 7 days   Lab Units 03/09/20  1500 03/09/20  0643   BLOOD CULTURE   --  Received in Microbiology Lab  Culture in Progress  Received in Microbiology Lab  Culture in Progress  LEGIONELLA URINARY ANTIGEN  Negative  --    STREP PNEUMONIAE ANTIGEN, URINE  Negative  --        Imaging: I have personally reviewed pertinent reports     and I have personally reviewed pertinent films in PACS    Intake and Output  I/O       03/08 0701 - 03/09 0700 03/09 0701 - 03/10 0700    I V  (mL/kg)  122 5 (1 1)    IV Piggyback  800    Total Intake(mL/kg)  922 5 (8 2)    Urine (mL/kg/hr)  475 (0 2)    Total Output  475    Net  +447 5                Height and Weights      IBW: -88 kg  Body mass index is 34 44 kg/m²  Weight (last 2 days)     Date/Time   Weight    03/09/20 0645   112 (625 92)                Nutrition       Diet Orders   (From admission, onward)             Start     Ordered    03/09/20 1207  Diet NPO  Diet effective now     Question Answer Comment   Diet Type NPO    RD to adjust diet per protocol?  No        03/09/20 1206                  Active Medications  Scheduled Meds:  Current Facility-Administered Medications:  atorvastatin 20 mg Oral Daily [facility] Min, PA-C    cefTRIAXone 1,000 mg Intravenous Q24H Fredderick Min, PA-C    chlorhexidine 15 mL Swish & Spit Q12H [address] [place], [place]    clopidogrel 75 mg Oral Daily Fredderick Min, [place]    doxycycline 100 mg Intravenous V41C [place], [place] Last Rate: 100 mg (03/09/20 2354)   heparin (porcine) 3-20 Units/kg/hr (Order-Specific) Intravenous Titrated [facility], PA-C Last Rate: 8 1 Units/kg/hr (03/09/20 2320)   heparin (porcine) 2,000 Units Intravenous PRN [facility] Min, PA-C    heparin (porcine) 4,000 Units Intravenous PRN Fredderick Min, PA-C    insulin glargine 30 Units Subcutaneous HS [facility], PA-C    insulin lispro 1-6 Units Subcutaneous Q6H [address] [facility], PA-C    metoprolol tartrate 12 5 mg Oral Q12H [address] [facility], PA-C    metroNIDAZOLE 500 mg Intravenous [place], [place] Last Rate: 500 mg (03/09/20 2123)   multi-electrolyte 75 mL/hr Intravenous Continuous [facility] Min, PA-C Last Rate: 75 mL/hr (03/09/20 1822)   valproate sodium 500 mg Intravenous [place], [place] Last Rate: 500 mg (03/09/20 2148)     Continuous Infusions:    heparin (porcine) 3-20 Units/kg/hr (Order-Specific) Last Rate: 8 1 Units/kg/hr (03/09/20 2320)   multi-electrolyte 75 mL/hr Last Rate: 75 mL/hr (03/09/20 1822)     PRN Meds:     heparin (porcine) 2,000 Units PRN   heparin (porcine) 4,000 Units PRN       Invasive Devices Review  Invasive Devices     Peripheral Intravenous Line            Peripheral IV 03/09/20 Left Antecubital less than 1 day    Peripheral IV 03/09/20 Right Antecubital less than 1 day          Drain            Urethral Catheter Latex 16 Fr  less than 1 day                ---------------------------------------------------------------------------------------  Advance Directive and Living Will:      Power of :    POLST:    ---------------------------------------------------------------------------------------  Care Time Delivered:   No Critical Care time spent       Angi Ware PA-C      Portions of the record may have been created with voice recognition software  Occasional wrong word or "sound a like" substitutions may have occurred due to the inherent limitations of voice recognition software    Read the chart carefully and recognize, using context, where substitutions have occurred

## 2020-03-10 NOTE — SPEECH THERAPY NOTE
Speech Language/Pathology    Speech/Language Pathology Progress Note    Patient Name: Mike Kiser  PPKOS'Q Date: 3/10/2020    Subjective:  RN reports she tried to provide meds with thin liquids earlier and he was noted to have coughing  Objective:  Upon my arrival patient upright and awake although remains confused  He was provided with trials of puree, honey thick, and nectar thick liquids  He was noted to have adequate bolus acceptance and spoon stripping  With primarily oral holding and no bolus formation/transfer  He did benefit at times from dry spoon and/or additional presentation however premature spill is suspected and swallow initiation was inconsistent however primarily not present  Weak cough and wet vocal quality observed across liquids  Assessment:  Patient continues with oropharyngeal dysphagia which appears to be 2' encephalopathy      Plan/Recommendations:  Strict NPO    Will continue to follow    HAMILTON Lucio , CCC-SLP  Speech Language Pathologist   Available via [address] [place] [address] #[id number]  [place] #[id number]

## 2020-03-10 NOTE — PROGRESS NOTES
General Cardiology   Progress Note   Tabatha Moseley 70 y o  male MRN: [id number]  Unit/Bed#:  Encounter: [id number]    Assessment/Plan:    1  Elevated troponins  -troponin elevated at 4 44/4 80/4 12  -etiology unknown, patient with possible pneumonia  -manage conservatively for now with IV heparin, Plavix, statin and beta-blocker  - repeat TTE an EF of 40-50%, mildly reduced systolic dysfunction, mild diffuse hypokinesis and grade 1 diastolic dysfunction    2  Paroxysmal atrial flutter, NSR on telemetry  -currently rate controlled  -continue metoprolol tartrate 12 5 mg b i d  For rate control  - Not on anticoagulation 2/2 high bleed risk from frequent falls/seizure activity     3  Chronic diastolic heart failure  -euvolemic today on exam  -continue metoprolol tartrate 12 5 mg b i d    -strict I&Os and daily weights  -diuretics on hold as patient appears dry on admission     4  Hypertension, currently stable stable but running soft at times   -continue Lopressor with hold parameters  -continue to monitor blood pressures     5  Hyperlipidemia  -continue atorvastatin 20 mg daily      Subjective:   Patient seen and examined  No significant events since the last encounter       REVIEW OF SYSTEMS:  Constitutional:  Denies fever or chills   Eyes:  Denies change in visual acuity   HENT:  Denies nasal congestion or sore throat   Respiratory:  Denies cough or shortness of breath   Cardiovascular:  Denies chest pain or edema   GI:  Denies abdominal pain, nausea, vomiting, bloody stools, constipation or diarrhea   :  Denies dysuria, frequency, difficulty in urination or nocturia  Musculoskeletal:  Denies back pain or joint pain   Neurologic:  Denies headache, focal weakness or sensory changes   Endocrine:  Denies polyuria or polydipsia   Lymphatic:  Denies swollen glands   Psychiatric:  Denies depression or anxiety     Objective:   Vitals:  Vitals:    03/10/20 1205   BP: 121/61   Pulse: 61   Resp: 18   Temp: 98 1 °F (36 7 °C)   SpO2: 100%       Body mass index is 34 56 kg/m²  Systolic (32GIP), TSI:640 , Min:99 , VJF:789     Diastolic (23VVQ), HNU:18, Min:53, Max:73      Intake/Output Summary (Last 24 hours) at 3/10/2020 1302  Last data filed at 3/10/2020 1236  Gross per 24 hour   Intake 1797 43 ml   Output 760 ml   Net 1037 43 ml     Weight (last 2 days)     Date/Time   Weight    03/10/20 0600   112 (247 8)    03/09/20 0645   112 (246 92)              Telemetry Review: No significant arrhythmias seen on telemetry review  PHYSICAL EXAMS  General:  Patient is not in acute distress, laying in the bed comfortably, awake, alert responding to commands  Head: Normocephalic, Atraumatic     HEENT: White sclera, pink conjunctiva  Neck:  Supple, no neck vein distention  Respiratory: clear to P/A  Cardiovascular: I4-R4 normal, + systolic murmur, thrills, gallops, rubs, regular rhythm  GI:  Abdomen soft, nontender, non-distended  Extremities:  Right-sided lower extremity edema, normal pulses  Integument:  No skin rashes or ulceration  Neurologic:  Patient is awake alert, responding to command, oriented to person, place and time    Nd time   LABORATORY RESULTS:  Results from last 7 days   Lab Units 03/09/20  1500 03/09/20  1118 03/09/20  0643   TROPONIN I ng/mL 4 12* 4 80* 4 44*     CBC with diff: Results from last 7 days   Lab Units 03/10/20  0523 03/09/20  1843 03/09/20  0643   WBC Thousand/uL 6 97 7 82 8 68   HEMOGLOBIN g/dL 11 3* 12 4 14 9   HEMATOCRIT % 34 9* 37 9 46 5   MCV fL 100* 99* 99*   PLATELETS Thousands/uL 110* 119* 156   MCH pg 32 4 32 5 31 7   MCHC g/dL 32 4 32 7 32 0   RDW % 15 7* 15 5* 15 4*   MPV fL 11 7 12 0 12 0   NRBC AUTO /100 WBCs 0  --  0       CMP:  Results from last 7 days   Lab Units 03/10/20  0523 03/09/20  0643   POTASSIUM mmol/L 4 3 4 1   CHLORIDE mmol/L 107 103   CO2 mmol/L 29 27   BUN mg/dL 16 16   CREATININE mg/dL 1 65* 1 67*   CALCIUM mg/dL 7 7* 8 8   AST U/L  --  186*   ALT U/L  --  32   ALK PHOS U/L  -- 89   EGFR ml/min/1 73sq m 46 45       BMP:  Results from last 7 days   Lab Units 03/10/20  0523 20  0643   POTASSIUM mmol/L 4 3 4 1   CHLORIDE mmol/L 107 103   CO2 mmol/L 29 27   BUN mg/dL 16 16   CREATININE mg/dL 1 65* 1 67*   CALCIUM mg/dL 7 7* 8 8         Results from last 7 days   Lab Units 20  0643   NT-PRO BNP pg/mL 18,408*      Results from last 7 days   Lab Units 03/10/20  0523   MAGNESIUM mg/dL 2 0         Results from last 7 days   Lab Units 20  1501   TSH 3RD GENERATON uIU/mL 5 379*   FREE T4 ng/dL 1 32     Results from last 7 days   Lab Units 20  1500 20  0643   INR  1 27* 1 14       Lipid Profile:   No results found for: CHOL  No results found for: HDL  No results found for: LDLCALC  No results found for: TRIG    Cardiac testing:   Results for orders placed during the hospital encounter of 20   Echo complete with contrast if indicated    Narrative [address]  [address] [facility] [place], [place] [address]  [phone]    Transthoracic Echocardiogram  2D, M-mode, Doppler, and Color Doppler    Study date:  09-Mar-2020    Patient: Sarah Hodge  MR number: [id number]  Account number: [de-identified]  : 1940  Age: 70 years  Gender: Male  Status: Inpatient  Location: Bedside  Height: 71 in  Weight: 246 lb  BP: 121/ 65 mmHg    Indications: Heart failure, Assess left ventricular function  Diagnoses: I50 9 - Heart failure, unspecified    Sonographer:   [facility] [address] [place]  Referring Physician:  Ivory Everett MD  Group:  [facility]  Interpreting Physician:  Sima Amos MD    SUMMARY    PROCEDURE INFORMATION:  This was a technically difficult study  Echocardiographic views were limited due to poor acoustic window availability, decreased penetration, and lung interference  Intravenous contrast (  8mL of Definity in NSS) was administered to opacify the left ventricle      LEFT VENTRICLE:  Systolic function was mildly reduced  Ejection fraction was estimated in the range of 45 % to 50 %  There was mild diffuse hypokinesis with distinct regional wall motion abnormalities  Wall thickness was at the upper limits of normal   Doppler parameters were consistent with abnormal left ventricular relaxation (grade 1 diastolic dysfunction)  RIGHT VENTRICLE:  The size was normal   Systolic function was normal     MITRAL VALVE:  There was mild annular calcification  There was mild regurgitation  TRICUSPID VALVE:  There was trace regurgitation  Pulmonary artery systolic pressure was within the normal range  HISTORY: PRIOR HISTORY: Seizure disorder, CKD, Atrial fibrillation  Risk factors: hypertension and diabetes  PROCEDURE: The procedure was performed at the bedside  This was a stat study  The transthoracic approach was used  The study included complete 2D imaging, M-mode, complete spectral Doppler, and color Doppler  The heart rate was 93 bpm, at  the start of the study  Images were obtained from the parasternal, apical, subcostal, and suprasternal notch acoustic windows  Intravenous contrast (  8mL of Definity in NSS) was administered to opacify the left ventricle  Echocardiographic views were limited due to poor acoustic window availability, decreased penetration, and lung interference  This was a technically difficult study  LEFT VENTRICLE: Size was normal  Systolic function was mildly reduced  Ejection fraction was estimated in the range of 45 % to 50 %  There was mild diffuse hypokinesis with distinct regional wall motion abnormalities  Wall thickness was at  the upper limits of normal  No evidence of apical thrombus  DOPPLER: Doppler parameters were consistent with abnormal left ventricular relaxation (grade 1 diastolic dysfunction)      RIGHT VENTRICLE: The size was normal  Systolic function was normal  Wall thickness was normal     LEFT ATRIUM: Size was normal     RIGHT ATRIUM: Size was normal     MITRAL VALVE: There was mild annular calcification  There was normal leaflet separation  DOPPLER: The transmitral velocity was within the normal range  There was no evidence for stenosis  There was mild regurgitation  AORTIC VALVE: The valve was trileaflet  Leaflets exhibited mildly increased thickness and normal cuspal separation  DOPPLER: Transaortic velocity was within the normal range  There was no evidence for stenosis  There was no significant  regurgitation  TRICUSPID VALVE: The valve structure was normal  There was normal leaflet separation  DOPPLER: The transtricuspid velocity was within the normal range  There was no evidence for stenosis  There was trace regurgitation  Pulmonary artery  systolic pressure was within the normal range  PULMONIC VALVE: Leaflets exhibited normal thickness, no calcification, and normal cuspal separation  DOPPLER: The transpulmonic velocity was within the normal range  There was no significant regurgitation  PERICARDIUM: There was no pericardial effusion  The pericardium was normal in appearance  AORTA: The root exhibited normal size  SYSTEMIC VEINS: IVC: The inferior vena cava was normal in size  Respirophasic changes were normal     SYSTEM MEASUREMENT TABLES    2D  %FS: 23 7 %  Ao Diam: 3 7 cm  EDV(Teich): 133 4 ml  EF(Teich): 46 9 %  ESV(Teich): 70 8 ml  IVSd: 1 1 cm  LA Diam: 4 4 cm  LVIDd: 5 3 cm  LVIDs: 4 cm  LVPWd: 1 1 cm  SV(Teich): 62 5 ml    CW  TR Vmax: 2 7 m/s  TR maxP 3 mmHg    MM  TAPSE: 2 3 cm    PW  E': 0 1 m/s  E/E': 11 7  MV A Gilberto: 1 2 m/s  MV Dec [place]: 4 6 m/s2  MV DecT: 137 ms  MV E Gilberto: 0 6 m/s  MV E/A Ratio: 0 5  MV PHT: 39 7 ms  MVA By PHT: 5 5 cm2    [employer] Accredited Echocardiography Laboratory    Prepared and electronically signed by    Franca England MD  Signed 10-Mar-2020 12:01:35       No results found for this or any previous visit  No results found for this or any previous visit  No procedure found    No results found for this or any previous visit  Meds/Allergies   all current active meds have been reviewed  Medications Prior to Admission   Medication    amLODIPine (NORVASC) 5 mg tablet    atorvastatin (LIPITOR) 20 mg tablet    B-D UF III MINI PEN NEEDLES 31G X 5 MM MISC    clopidogrel (PLAVIX) 75 mg tablet    divalproex sodium (DEPAKOTE) 500 mg EC tablet    ferrous sulfate 325 (65 Fe) mg tablet    folic acid (FOLVITE) 1 mg tablet    furosemide (LASIX) 20 mg tablet    insulin glargine (LANTUS) 100 units/mL subcutaneous injection    levothyroxine 25 mcg tablet    metoprolol tartrate (LOPRESSOR) 25 mg tablet    Multiple Vitamin (MULTIVITAMINS PO)    potassium chloride (KLOR-CON M20) 20 mEq tablet    travoprost (TRAVATAN Z) 0 004 % ophthalmic solution         heparin (porcine) 3-20 Units/kg/hr (Order-Specific) Last Rate: 10 Units/kg/hr (03/10/20 3105)   multi-electrolyte 75 mL/hr Last Rate: 75 mL/hr (03/10/20 4237)       Counseling / Coordination of Care  Total floor / unit time spent today 15 minutes  Greater than 50% of total time was spent with the patient and / or family counseling and / or coordination of care  ** Please Note: Dragon 360 Dictation voice to text software may have been used in the creation of this document   **

## 2020-03-10 NOTE — PLAN OF CARE
Problem: Prexisting or High Potential for Compromised Skin Integrity  Goal: Skin integrity is maintained or improved  Description  INTERVENTIONS:  - Identify patients at risk for skin breakdown  - Assess and monitor skin integrity  - Assess and monitor nutrition and hydration status  - Monitor labs   - Assess for incontinence   - Turn and reposition patient  - Assist with mobility/ambulation  - Relieve pressure over bony prominences  - Avoid friction and shearing  - Provide appropriate hygiene as needed including keeping skin clean and dry  - Evaluate need for skin moisturizer/barrier cream  - Collaborate with interdisciplinary team   - Patient/family teaching  - Consider wound care consult   Outcome: Progressing     Problem: PAIN - ADULT  Goal: Verbalizes/displays adequate comfort level or baseline comfort level  Description  Interventions:  - Encourage patient to monitor pain and request assistance  - Assess pain using appropriate pain scale  - Administer analgesics based on type and severity of pain and evaluate response  - Implement non-pharmacological measures as appropriate and evaluate response  - Consider cultural and social influences on pain and pain management  - Notify physician/advanced practitioner if interventions unsuccessful or patient reports new pain  Outcome: Progressing     Problem: INFECTION - ADULT  Goal: Absence or prevention of progression during hospitalization  Description  INTERVENTIONS:  - Assess and monitor for signs and symptoms of infection  - Monitor lab/diagnostic results  - Monitor all insertion sites, i e  indwelling lines, tubes, and drains  - Monitor endotracheal if appropriate and nasal secretions for changes in amount and color  - [place] appropriate cooling/warming therapies per order  - Administer medications as ordered  - Instruct and encourage patient and family to use good hand hygiene technique  - Identify and instruct in appropriate isolation precautions for identified infection/condition  Outcome: Progressing  Goal: Absence of fever/infection during neutropenic period  Description  INTERVENTIONS:  - Monitor WBC    Outcome: Progressing     Problem: SAFETY ADULT  Goal: Patient will remain free of falls  Description  INTERVENTIONS:  - Assess patient frequently for physical needs  -  Identify cognitive and physical deficits and behaviors that affect risk of falls    -  [place] fall precautions as indicated by assessment   - Educate patient/family on patient safety including physical limitations  - Instruct patient to call for assistance with activity based on assessment  - Modify environment to reduce risk of injury  - Consider OT/PT consult to assist with strengthening/mobility  Outcome: Progressing  Goal: Maintain or return to baseline ADL function  Description  INTERVENTIONS:  -  Assess patient's ability to carry out ADLs; assess patient's baseline for ADL function and identify physical deficits which impact ability to perform ADLs (bathing, care of mouth/teeth, toileting, grooming, dressing, etc )  - Assess/evaluate cause of self-care deficits   - Assess range of motion  - Assess patient's mobility; develop plan if impaired  - Assess patient's need for assistive devices and provide as appropriate  - Encourage maximum independence but intervene and supervise when necessary  - Involve family in performance of ADLs  - Assess for home care needs following discharge   - Consider OT consult to assist with ADL evaluation and planning for discharge  - Provide patient education as appropriate  Outcome: Progressing  Goal: Maintain or return mobility status to optimal level  Description  INTERVENTIONS:  - Assess patient's baseline mobility status (ambulation, transfers, stairs, etc )    - Identify cognitive and physical deficits and behaviors that affect mobility  - Identify mobility aids required to assist with transfers and/or ambulation (gait belt, sit-to-stand, lift, walker, cane, etc )  - [place] fall precautions as indicated by assessment  - Record patient progress and toleration of activity level on Mobility SBAR; progress patient to next Phase/Stage  - Instruct patient to call for assistance with activity based on assessment  - Consider rehabilitation consult to assist with strengthening/weightbearing, etc   Outcome: Progressing     Problem: DISCHARGE PLANNING  Goal: Discharge to home or other facility with appropriate resources  Description  INTERVENTIONS:  - Identify barriers to discharge w/patient and caregiver  - Arrange for needed discharge resources and transportation as appropriate  - Identify discharge learning needs (meds, wound care, etc )  - Arrange for interpretive services to assist at discharge as needed  - Refer to Case Management Department for coordinating discharge planning if the patient needs post-hospital services based on physician/advanced practitioner order or complex needs related to functional status, cognitive ability, or social support system  Outcome: Progressing     Problem: Knowledge Deficit  Goal: Patient/family/caregiver demonstrates understanding of disease process, treatment plan, medications, and discharge instructions  Description  Complete learning assessment and assess knowledge base    Interventions:  - Provide teaching at level of understanding  - Provide teaching via preferred learning methods  Outcome: Progressing

## 2020-03-10 NOTE — ASSESSMENT & PLAN NOTE
- patient with worsening encephalopathy over last 2 days  - Suspect 2/2 acute illness with current clinical picture; component of progressive dementia as well  - trend Q 2 hours  - continue antiepileptics

## 2020-03-10 NOTE — ASSESSMENT & PLAN NOTE
Wt Readings from Last 3 Encounters:   03/09/20 112 kg (246 lb 14 6 oz)   02/11/20 114 kg (252 lb)   12/24/19 117 kg (258 lb 6 1 oz)     - patient with documented history, however no echocardiogram   ProBNP 18,000 with unclear baseline  - is on Lasix at home  - Appeared underfilled on bedside echo at admission; admission weight down 2 kg from last office visit  - Continue gentle fluids- close monitoring of volume and respiratory status  - follow-up cardiology consultation which is much appreciated

## 2020-03-10 NOTE — ASSESSMENT & PLAN NOTE
Lab Results   Component Value Date    HGBA1C 6 8 (H) 12/25/2019       Recent Labs     03/09/20  1242 03/09/20  1829   POCGLU 125 235*       Blood Sugar Average: Last 72 hrs:  (P) 180   - continue home Lantus  - cover with sliding scale

## 2020-03-11 ENCOUNTER — APPOINTMENT (INPATIENT)
Dept: ULTRASOUND IMAGING | Facility: HOSPITAL | Age: 80
DRG: 280 | End: 2020-03-11
Payer: MEDICARE

## 2020-03-11 PROBLEM — N30.90 CYSTITIS: Status: ACTIVE | Noted: 2020-03-11

## 2020-03-11 PROBLEM — E87.2 LACTIC ACIDOSIS: Status: RESOLVED | Noted: 2020-03-09 | Resolved: 2020-03-11

## 2020-03-11 PROBLEM — R19.09 MASS OF LEFT INGUINAL REGION: Status: ACTIVE | Noted: 2020-03-11

## 2020-03-11 LAB
ANION GAP SERPL CALCULATED.3IONS-SCNC: 4 MMOL/L (ref 4–13)
APTT PPP: 63 SECONDS (ref 23–37)
BUN SERPL-MCNC: 16 MG/DL (ref 5–25)
CALCIUM SERPL-MCNC: 7.8 MG/DL (ref 8.3–10.1)
CHLORIDE SERPL-SCNC: 109 MMOL/L (ref 100–108)
CO2 SERPL-SCNC: 30 MMOL/L (ref 21–32)
CREAT SERPL-MCNC: 1.33 MG/DL (ref 0.6–1.3)
ERYTHROCYTE [DISTWIDTH] IN BLOOD BY AUTOMATED COUNT: 15.5 % (ref 11.6–15.1)
GFR SERPL CREATININE-BSD FRML MDRD: 59 ML/MIN/1.73SQ M
GLUCOSE SERPL-MCNC: 53 MG/DL (ref 65–140)
GLUCOSE SERPL-MCNC: 65 MG/DL (ref 65–140)
GLUCOSE SERPL-MCNC: 74 MG/DL (ref 65–140)
GLUCOSE SERPL-MCNC: 76 MG/DL (ref 65–140)
GLUCOSE SERPL-MCNC: 76 MG/DL (ref 65–140)
GLUCOSE SERPL-MCNC: 98 MG/DL (ref 65–140)
HCT VFR BLD AUTO: 32.2 % (ref 36.5–49.3)
HGB BLD-MCNC: 10.5 G/DL (ref 12–17)
MAGNESIUM SERPL-MCNC: 2.2 MG/DL (ref 1.6–2.6)
MCH RBC QN AUTO: 32.4 PG (ref 26.8–34.3)
MCHC RBC AUTO-ENTMCNC: 32.6 G/DL (ref 31.4–37.4)
MCV RBC AUTO: 99 FL (ref 82–98)
PLATELET # BLD AUTO: 75 THOUSANDS/UL (ref 149–390)
PMV BLD AUTO: 12.7 FL (ref 8.9–12.7)
POTASSIUM SERPL-SCNC: 3.7 MMOL/L (ref 3.5–5.3)
RBC # BLD AUTO: 3.24 MILLION/UL (ref 3.88–5.62)
SODIUM SERPL-SCNC: 143 MMOL/L (ref 136–145)
TROPONIN I SERPL-MCNC: 0.39 NG/ML
WBC # BLD AUTO: 6.2 THOUSAND/UL (ref 4.31–10.16)

## 2020-03-11 PROCEDURE — 99233 SBSQ HOSP IP/OBS HIGH 50: CPT | Performed by: ANESTHESIOLOGY

## 2020-03-11 PROCEDURE — 85730 THROMBOPLASTIN TIME PARTIAL: CPT | Performed by: NURSE PRACTITIONER

## 2020-03-11 PROCEDURE — NC001 PR NO CHARGE: Performed by: ANESTHESIOLOGY

## 2020-03-11 PROCEDURE — 76705 ECHO EXAM OF ABDOMEN: CPT

## 2020-03-11 PROCEDURE — 85027 COMPLETE CBC AUTOMATED: CPT | Performed by: NURSE PRACTITIONER

## 2020-03-11 PROCEDURE — 92526 ORAL FUNCTION THERAPY: CPT

## 2020-03-11 PROCEDURE — 99232 SBSQ HOSP IP/OBS MODERATE 35: CPT | Performed by: INTERNAL MEDICINE

## 2020-03-11 PROCEDURE — C9113 INJ PANTOPRAZOLE SODIUM, VIA: HCPCS | Performed by: NURSE PRACTITIONER

## 2020-03-11 PROCEDURE — 82948 REAGENT STRIP/BLOOD GLUCOSE: CPT

## 2020-03-11 PROCEDURE — 84484 ASSAY OF TROPONIN QUANT: CPT | Performed by: PHYSICIAN ASSISTANT

## 2020-03-11 PROCEDURE — 83735 ASSAY OF MAGNESIUM: CPT | Performed by: NURSE PRACTITIONER

## 2020-03-11 PROCEDURE — 80048 BASIC METABOLIC PNL TOTAL CA: CPT | Performed by: NURSE PRACTITIONER

## 2020-03-11 RX ORDER — DEXTROSE MONOHYDRATE 25 G/50ML
25 INJECTION, SOLUTION INTRAVENOUS ONCE
Status: DISCONTINUED | OUTPATIENT
Start: 2020-03-11 | End: 2020-03-11

## 2020-03-11 RX ORDER — DEXTROSE MONOHYDRATE 25 G/50ML
25 INJECTION, SOLUTION INTRAVENOUS ONCE
Status: COMPLETED | OUTPATIENT
Start: 2020-03-11 | End: 2020-03-11

## 2020-03-11 RX ORDER — DEXTROSE MONOHYDRATE 25 G/50ML
INJECTION, SOLUTION INTRAVENOUS
Status: DISPENSED
Start: 2020-03-11 | End: 2020-03-11

## 2020-03-11 RX ORDER — DEXTROSE AND SODIUM CHLORIDE 5; .45 G/100ML; G/100ML
75 INJECTION, SOLUTION INTRAVENOUS CONTINUOUS
Status: DISCONTINUED | OUTPATIENT
Start: 2020-03-11 | End: 2020-03-12

## 2020-03-11 RX ORDER — PANTOPRAZOLE SODIUM 40 MG/1
40 INJECTION, POWDER, FOR SOLUTION INTRAVENOUS
Status: DISCONTINUED | OUTPATIENT
Start: 2020-03-11 | End: 2020-03-17

## 2020-03-11 RX ORDER — ASPIRIN 81 MG/1
81 TABLET ORAL DAILY
Status: DISCONTINUED | OUTPATIENT
Start: 2020-03-11 | End: 2020-03-14

## 2020-03-11 RX ADMIN — HEPARIN SODIUM AND DEXTROSE 8 UNITS/KG/HR: 10000; 5 INJECTION INTRAVENOUS at 19:42

## 2020-03-11 RX ADMIN — VALPROATE SODIUM 500 MG: 100 INJECTION, SOLUTION INTRAVENOUS at 15:00

## 2020-03-11 RX ADMIN — CLOPIDOGREL BISULFATE 75 MG: 75 TABLET ORAL at 09:28

## 2020-03-11 RX ADMIN — METRONIDAZOLE 500 MG: 500 INJECTION, SOLUTION INTRAVENOUS at 22:26

## 2020-03-11 RX ADMIN — METOPROLOL TARTRATE 12.5 MG: 25 TABLET ORAL at 09:28

## 2020-03-11 RX ADMIN — CHLORHEXIDINE GLUCONATE 0.12% ORAL RINSE 15 ML: 1.2 LIQUID ORAL at 09:28

## 2020-03-11 RX ADMIN — DEXTROSE AND SODIUM CHLORIDE 75 ML/HR: 5; .45 INJECTION, SOLUTION INTRAVENOUS at 13:06

## 2020-03-11 RX ADMIN — ATORVASTATIN CALCIUM 20 MG: 20 TABLET, FILM COATED ORAL at 09:28

## 2020-03-11 RX ADMIN — DEXTROSE 50 % IN WATER (D50W) INTRAVENOUS SYRINGE 25 ML: at 12:07

## 2020-03-11 RX ADMIN — INSULIN GLARGINE 30 UNITS: 100 INJECTION, SOLUTION SUBCUTANEOUS at 22:33

## 2020-03-11 RX ADMIN — SODIUM CHLORIDE, SODIUM GLUCONATE, SODIUM ACETATE, POTASSIUM CHLORIDE AND MAGNESIUM CHLORIDE 75 ML/HR: 526; 502; 368; 37; 30 INJECTION, SOLUTION INTRAVENOUS at 05:31

## 2020-03-11 RX ADMIN — METRONIDAZOLE 500 MG: 500 INJECTION, SOLUTION INTRAVENOUS at 13:08

## 2020-03-11 RX ADMIN — VALPROATE SODIUM 500 MG: 100 INJECTION, SOLUTION INTRAVENOUS at 23:00

## 2020-03-11 RX ADMIN — CEFTRIAXONE SODIUM 1000 MG: 10 INJECTION, POWDER, FOR SOLUTION INTRAVENOUS at 09:27

## 2020-03-11 RX ADMIN — PANTOPRAZOLE SODIUM 40 MG: 40 INJECTION, POWDER, FOR SOLUTION INTRAVENOUS at 11:33

## 2020-03-11 RX ADMIN — METRONIDAZOLE 500 MG: 500 INJECTION, SOLUTION INTRAVENOUS at 05:32

## 2020-03-11 RX ADMIN — DEXTROSE 50 % IN WATER (D50W) INTRAVENOUS SYRINGE 25 ML: at 00:44

## 2020-03-11 RX ADMIN — VALPROATE SODIUM 500 MG: 100 INJECTION, SOLUTION INTRAVENOUS at 06:03

## 2020-03-11 NOTE — PROGRESS NOTES
Progress Note - Marty Encarnacion 1940, 70 y o  male MRN: [id number]    Unit/Bed#:  Encounter: [id number]    Primary Care Provider: Lexii Guajardo MD   Date and time admitted to hospital: 3/9/2020  6:29 AM        Mass of left inguinal region  Assessment & Plan  - Noted on CT  - Evaluate with U/S    Cystitis  Assessment & Plan  - UA and micro relatively unremarkable but bladder wall thickening on CT  - Continue ceftriaxone and flagyl for total 3d course  - Trend WBC and fever curve    Acquired hypothyroidism  Assessment & Plan  - continue home Synthroid    Dysphagia  Assessment & Plan  - patient has not reportedly had any issues with dysphagia in the past  - over the past couple days, has had decreased p o  Intake and has not been able to swallow as effectively as normal  - maintain NPO status pending re-eval by SLP  - If unable to pass speech and swallow today, consider keofed for enteral nutrition    NSTEMI (non-ST elevated myocardial infarction) ([facility] [address] )  Assessment & Plan  - unclear if type 1 or type 2-- TTE with mild diffuse hypokinesis with distinct RWMA  - patient denying chest pain, patient's wife denies patient complaining of chest pain  - continue heparin drip per cardiology  - continue aspirin, Plavix, beta-blocker  - serial EKGs  - Troponin peaked at 4 80  - cardiology following    Lymphedema of right lower extremity  Assessment & Plan  - chronic, monitor expectantly    Seizure disorder [facility])  Assessment & Plan  - patient is on Depakote 500 mg p o   In the morning and a 1000 mg at night  - Converted to IV while inpatient pending speech and swallow eval  - patient has not had seizure in approximately 8 years per wife    Chronic diastolic heart failure ([facility] [address] )  Assessment & Plan  Wt Readings from Last 3 Encounters:   03/11/20 112 kg (246 lb 14 6 oz)   02/11/20 114 kg (252 lb)   12/24/19 117 kg (258 lb 6 1 oz)     - patient with documented history; ProBNP 18,000 with unclear baseline  - is on Lasix at home  - 3/10 TTE: EF 45-50% with G1DD  - Admission weight down 2 kg from last office visit  - Continue gentle fluids- close monitoring of volume and respiratory status  - Cardiology following        History of bilateral subcortical CVA   Assessment & Plan  - patient patient without significant deficits  - continue Plavix    Paroxysmal atrial flutter (HCC)  Assessment & Plan  - continue metoprolol 12 5mg BID, currently rate controlled    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[facility])  Assessment & Plan  Lab Results   Component Value Date    HGBA1C 6 9 (H) 02/07/2020       Recent Labs     03/10/20  1749 03/10/20  2355 03/11/20  0103 03/11/20  0558   POCGLU 80 63* 98 76       Blood Sugar Average: Last 72 hrs:  (P) 683 [id number]   - continue home Lantus  - cover with sliding scale    Benign essential hypertension  Assessment & Plan  - continue home Lopressor 12 5mg BID as BP tolerates, hold amlodipine and Lasix for now    Chronic renal insufficiency, stage III (moderate) (HCC)  Assessment & Plan  - baseline creatinine 1 4-1 6  - suspect Cr peaked at 1 65 yesterday  - Trend BUN/Cr  - Strict I/O  - monitor with gentle fluid resuscitation  - hold Lasix    * Acute metabolic encephalopathy  Assessment & Plan  - patient with worsening encephalopathy over last 2 days prior to admission, has now improved  - Suspect 2/2 acute illness with current clinical picture; component of progressive dementia as well  - neuro checks  - continue antiepileptics    Transfer Note - ICU/Stepdown Transfer to [facility]/MS tele   Nicanor Knox 70 y o  male MRN: [id number]  [address] [facility]   Unit/Bed#:  Encounter: [id number]    Code Status: Level 1 - Full Code    Reason for ICU/Stepdown admission: encephalopathy    Active problems:     Mass of left inguinal region  Assessment & Plan  - Noted on CT  - Evaluate with U/S    Cystitis  Assessment & Plan  - UA and micro relatively unremarkable but bladder wall thickening on CT  - Continue ceftriaxone and flagyl for total 3d course  - Trend WBC and fever curve    Acquired hypothyroidism  Assessment & Plan  - continue home Synthroid    Dysphagia  Assessment & Plan  - patient has not reportedly had any issues with dysphagia in the past  - over the past couple days, has had decreased p o  Intake and has not been able to swallow as effectively as normal  - maintain NPO status pending re-eval by SLP  - If unable to pass speech and swallow today, consider keofed for enteral nutrition    NSTEMI (non-ST elevated myocardial infarction) ([facility] [address] )  Assessment & Plan  - unclear if type 1 or type 2-- TTE with mild diffuse hypokinesis with distinct RWMA  - patient denying chest pain, patient's wife denies patient complaining of chest pain  - continue heparin drip per cardiology  - continue aspirin, Plavix, beta-blocker  - serial EKGs  - Troponin peaked at 4 80  - cardiology following    Lymphedema of right lower extremity  Assessment & Plan  - chronic, monitor expectantly    Seizure disorder [facility])  Assessment & Plan  - patient is on Depakote 500 mg p o   In the morning and a 1000 mg at night  - Converted to IV while inpatient pending speech and swallow eval  - patient has not had seizure in approximately 8 years per wife    Chronic diastolic heart failure (HCC)  Assessment & Plan  Wt Readings from Last 3 Encounters:   03/11/20 112 kg (246 lb 14 6 oz)   02/11/20 114 kg (252 lb)   12/24/19 117 kg (258 lb 6 1 oz)     - patient with documented history; ProBNP 18,000 with unclear baseline  - is on Lasix at home  - 3/10 TTE: EF 45-50% with G1DD  - Admission weight down 2 kg from last office visit  - Continue gentle fluids- close monitoring of volume and respiratory status  - Cardiology following        History of bilateral subcortical CVA   Assessment & Plan  - patient patient without significant deficits  - continue Plavix    Paroxysmal atrial flutter ([facility] [address] )  Assessment & Plan  - continue metoprolol 12 5mg BID, currently rate controlled    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[facility])  Assessment & Plan  Lab Results   Component Value Date    HGBA1C 6 9 (H) 02/07/2020       Recent Labs     03/10/20  1749 03/10/20  2355 03/11/20  0103 03/11/20  0558   POCGLU 80 63* 98 76       Blood Sugar Average: Last 72 hrs:  (P) 809 [id number]   - continue home Lantus  - cover with sliding scale    Benign essential hypertension  Assessment & Plan  - continue home Lopressor 12 5mg BID as BP tolerates, hold amlodipine and Lasix for now    Chronic renal insufficiency, stage III (moderate) ([facility])  Assessment & Plan  - baseline creatinine 1 4-1 6  - suspect Cr peaked at 1 65 yesterday  - Trend BUN/Cr  - Strict I/O  - monitor with gentle fluid resuscitation  - hold Lasix    * Acute metabolic encephalopathy  Assessment & Plan  - patient with worsening encephalopathy over last 2 days prior to admission, has now improved  - Suspect 2/2 acute illness with current clinical picture; component of progressive dementia as well  - neuro checks  - continue antiepileptics        Consultants:   · Cardiology    History of Present Illness/Summary of clinical course:    Donaldo Mondragon is a 70 y o  male who presents with a 2 day history of worsening lethargy and p o  Intake  Patient has a past history of CVA, seizure disorder, diabetes type 2, paroxysmal AFib, chronic diastolic heart failure of unknown severity, CKD stage 3, chronic lymphedema in the right lower extremity, hypertension, hypothyroidism  Much of history was obtained via chart review as well as from patient's wife as patient is lethargic  Per patient's wife, patient has normal state of health until about 2 days ago when having increasing lethargy  She also noticed that he was on interested in eating or drinking and also appeared unable to swallow  Yesterday evening, patient was lethargic and weak, therefore was brought into the emergency department  Patient has no signs or symptoms of infection, no fevers, no chills, no sick contacts, complains of no chest pain, shortness of breath  Emergency department, patient was found to have an NSTEMI with a troponin of greater than 4, lactic acid of 4, and was unable to swallow pills  Patient was trialed on gentle fluids, started on antibiotics for CAP with aspiration coverage and a stat echo was obtained  Patient was also started on a heparin drip  He was referred for step-down admission  Blood cultures 1/2 positive for GNR and GPC in chains  Repeated yesterday, follow up speciation  Pt clinically improving, will stop abx after total 3 day  Encephalopathy improving  Please refer to today's progress note for further clinical details  Recent or scheduled procedures: left Inguinal US    Outstanding/pending diagnostics: left inguinal US       Mobilization Plan: OOB as able, PT/OT    Nutrition Plan: awaiting speech/swallow eval    Discharge Plan: Patient should be ready for discharge to [facility] after medically stable         [  ] Family aware of transfer out of critical care: yes   [  ] Home medications that are not reordered and reason why: lasix/amlodipine on hold      Spoke with Dr Jana Veras regarding transfer @ [phone]  Patient accepted to their service      Jaylin Matthews

## 2020-03-11 NOTE — ASSESSMENT & PLAN NOTE
- patient has not reportedly had any issues with dysphagia in the past  - over the past couple days, has had decreased p o   Intake and has not been able to swallow as effectively as normal  - maintain NPO status pending re-eval by SLP  - If unable to pass speech and swallow today, consider keofed for enteral nutrition

## 2020-03-11 NOTE — ASSESSMENT & PLAN NOTE
Wt Readings from Last 3 Encounters:   03/10/20 112 kg (247 lb 12 8 oz)   02/11/20 114 kg (252 lb)   12/24/19 117 kg (258 lb 6 1 oz)     - patient with documented history; ProBNP 18,000 with unclear baseline  - is on Lasix at home  - 3/10 TTE: EF 45-50% with G1DD  - Admission weight down 2 kg from last office visit  - Continue gentle fluids- close monitoring of volume and respiratory status  - Cardiology following

## 2020-03-11 NOTE — ASSESSMENT & PLAN NOTE
- patient is on Depakote 500 mg p o   In the morning and a 1000 mg at night  - Converted to IV while inpatient pending speech and swallow eval  - patient has not had seizure in approximately 8 years per wife

## 2020-03-11 NOTE — ASSESSMENT & PLAN NOTE
Lab Results   Component Value Date    HGBA1C 6 9 (H) 02/07/2020       Recent Labs     03/10/20  1749 03/10/20  2355 03/11/20  0103 03/11/20  0558   POCGLU 80 63* 98 76       Blood Sugar Average: Last 72 hrs:  (P) 141 [id number]   - continue home Lantus  - cover with sliding scale

## 2020-03-11 NOTE — ASSESSMENT & PLAN NOTE
- possible CAP (questionable consolidation on CXR, leukocytosis)  - trend procalcitonin  - blood cultures growing gram + cocci and gram - rods in 1/2 bottles --- possible contaminant with repeat BCx sent yesterday  - deescalate antibiotics as able  - flu A/B and RSV negative, strep/legionella negative

## 2020-03-11 NOTE — ASSESSMENT & PLAN NOTE
- unclear if type 1 or type 2-- TTE with mild diffuse hypokinesis with distinct RWMA  - patient denying chest pain, patient's wife denies patient complaining of chest pain  - continue heparin drip per cardiology  - continue aspirin, Plavix, beta-blocker  - serial EKGs  - Troponin peaked at 4 80  - cardiology following

## 2020-03-11 NOTE — PROGRESS NOTES
Progress Note - Mike Kiser 1940, 70 y o  male MRN: [id number]    Unit/Bed#:  Encounter: [id number]    Primary Care Provider: Cara Strong MD   Date and time admitted to hospital: 3/9/2020  6:29 AM        Mass of left inguinal region  Assessment & Plan  - Noted on CT  - Evaluate with U/S    Cystitis  Assessment & Plan  - UA and micro relatively unremarkable but bladder wall thickening on CT  - Continue ceftriaxone and flagyl  - Trend WBC and fever curve    Acquired hypothyroidism  Assessment & Plan  - continue home Synthroid    Dysphagia  Assessment & Plan  - patient has not reportedly had any issues with dysphagia in the past  - over the past couple days, has had decreased p o  Intake and has not been able to swallow as effectively as normal  - maintain NPO status pending re-eval by SLP  - If unable to pass speech and swallow today, consider keofed for enteral nutrition    NSTEMI (non-ST elevated myocardial infarction) ([facility] [address] )  Assessment & Plan  - unclear if type 1 or type 2-- TTE with mild diffuse hypokinesis with distinct RWMA  - patient denying chest pain, patient's wife denies patient complaining of chest pain  - continue heparin drip per cardiology  - continue aspirin, Plavix, beta-blocker  - serial EKGs  - Troponin peaked at 4 80  - cardiology following    Lymphedema of right lower extremity  Assessment & Plan  - chronic, monitor expectantly    Seizure disorder [facility])  Assessment & Plan  - patient is on Depakote 500 mg p o   In the morning and a 1000 mg at night  - Converted to IV while inpatient pending speech and swallow eval  - patient has not had seizure in approximately 8 years per wife    Chronic diastolic heart failure ([facility] [address] )  Assessment & Plan  Wt Readings from Last 3 Encounters:   03/10/20 112 kg (247 lb 12 8 oz)   02/11/20 114 kg (252 lb)   12/24/19 117 kg (258 lb 6 1 oz)     - patient with documented history; ProBNP 18,000 with unclear baseline  - is on Lasix at home  - 3/10 TTE: EF 45-50% with G1DD  - Admission weight down 2 kg from last office visit  - Continue gentle fluids- close monitoring of volume and respiratory status  - Cardiology following        History of bilateral subcortical CVA   Assessment & Plan  - patient patient without significant deficits  - continue Plavix    Paroxysmal atrial flutter (HCC)  Assessment & Plan  - continue metoprolol 12 5mg BID, currently rate controlled    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[facility])  Assessment & Plan  Lab Results   Component Value Date    HGBA1C 6 8 (H) 12/25/2019       Recent Labs     03/10/20  0628 03/10/20  1206 03/10/20  1749 03/10/20  2355   POCGLU 99 107 80 63*       Blood Sugar Average: Last 72 hrs:  (P) 119 [id number]   - continue home Lantus  - cover with sliding scale    Benign essential hypertension  Assessment & Plan  - continue home Lopressor 12 5mg BID as BP tolerates, hold amlodipine and Lasix for now    Chronic renal insufficiency, stage III (moderate) ([facility])  Assessment & Plan  - baseline creatinine 1 4-1 6  - suspect Cr peaked at 1 65 yesterday  - Trend BUN/Cr  - Strict I/O  - monitor with gentle fluid resuscitation  - hold Lasix    * Acute metabolic encephalopathy  Assessment & Plan  - patient with worsening encephalopathy over last 2 days  - Suspect 2/2 acute illness with current clinical picture; component of progressive dementia as well  - trend Q 2 hours  - continue antiepileptics    Lactic acidosisresolved as of 3/11/2020  Assessment & Plan  - likely secondary to possible NSTEMI as well as intravascular volume depletion  - improving with volume resuscitation  - gentle fluids    ----------------------------------------------------------------------------------------  HPI/24hr events: Overnight with single hypoglycemic episode of 63 which resolved with 1/2 amp D50  Otherwise no acute events  Remains oriented to person and place, but not time  Still lethargic      Disposition: Continue Stepdown Level 1 level of care   Code Status: Level 1 - Full Code  ---------------------------------------------------------------------------------------  SUBJECTIVE  Denies any current pain  Review of Systems  Review of systems was reviewed and negative unless stated above in HPI/24-hour events   ---------------------------------------------------------------------------------------  OBJECTIVE    Vitals   Vitals:    03/10/20 1908 03/10/20 2000 03/10/20 2318 20 0308   BP: 147/65 119/56 156/72 122/58   BP Location: Left arm Left arm Left arm Left arm   Pulse: 68 61 77 71   Resp: 20 15 16 16   Temp:   98 5 °F (36 9 °C) 98 3 °F (36 8 °C)   TempSrc:   Oral Oral   SpO2: 99% 98% 99% 99%   Weight:       Height:         Temp (24hrs), Av 2 °F (36 8 °C), Min:97 7 °F (36 5 °C), Max:98 5 °F (36 9 °C)  Current: Temperature: 98 3 °F (36 8 °C)        SpO2: SpO2: 99 %, SpO2 Activity: SpO2 Activity: At Rest, SpO2 Device: O2 Device: None (Room air)       Physical Exam   Constitutional: No distress  HENT:   Head: Normocephalic and atraumatic  Eyes: Pupils are equal, round, and reactive to light  Neck: Normal range of motion  Neck supple  Cardiovascular: Normal rate, regular rhythm and normal heart sounds  Exam reveals no friction rub  No murmur heard  Pulmonary/Chest: He has no wheezes  He has no rales  Coarse transmitted upper airway breath sounds   Abdominal: Soft  Bowel sounds are normal  He exhibits no distension  There is no tenderness  There is no guarding  Musculoskeletal: He exhibits edema (Significant chronic RLE lymphedema, 1-2+ edema in LLE)  Neurological:   Oriented to person and place, still lethargic  Moving all extremities equally, follows simple commands  Skin: Skin is warm and dry  Capillary refill takes less than 2 seconds  He is not diaphoretic  Vitals reviewed        Invasive/non-invasive ventilation settings   Respiratory    Lab Data (Last 4 hours)    None         O2/Vent Data (Last 4 hours)    None                Laboratory and Diagnostics:  Results from last 7 days   Lab Units 03/10/20  0523 03/09/20  1843 03/09/20  0643   WBC Thousand/uL 6 97 7 82 8 68   HEMOGLOBIN g/dL 11 3* 12 4 14 9   HEMATOCRIT % 34 9* 37 9 46 5   PLATELETS Thousands/uL 110* 119* 156   NEUTROS PCT % 49  --  55   MONOS PCT % 20*  --  15*     Results from last 7 days   Lab Units 03/10/20  0523 03/09/20  0643   SODIUM mmol/L 142 141   POTASSIUM mmol/L 4 3 4 1   CHLORIDE mmol/L 107 103   CO2 mmol/L 29 27   ANION GAP mmol/L 6 11   BUN mg/dL 16 16   CREATININE mg/dL 1 65* 1 67*   CALCIUM mg/dL 7 7* 8 8   GLUCOSE RANDOM mg/dL 110 154*   ALT U/L  --  32   AST U/L  --  186*   ALK PHOS U/L  --  89   ALBUMIN g/dL  --  2 8*   TOTAL BILIRUBIN mg/dL  --  0 60     Results from last 7 days   Lab Units 03/10/20  0523   MAGNESIUM mg/dL 2 0   PHOSPHORUS mg/dL 4 0      Results from last 7 days   Lab Units 03/10/20  2229 03/10/20  1245 03/10/20  0523 03/09/20  2151 03/09/20  1500 03/09/20  0643   INR   --   --   --   --  1 27* 1 14   PTT seconds 66* 102* 49* 139* 76* 34      Results from last 7 days   Lab Units 03/09/20  1500 03/09/20  1118 03/09/20  0643   TROPONIN I ng/mL 4 12* 4 80* 4 44*     Results from last 7 days   Lab Units 03/10/20  0523 03/09/20  1844 03/09/20  1500 03/09/20  1128 03/09/20  0900 03/09/20  0643   LACTIC ACID mmol/L 1 3 2 1* 2 1* 2 1* 2 7* 4 0*     ABG:    VBG:    Results from last 7 days   Lab Units 03/10/20  0523   PROCALCITONIN ng/ml 0 07       Micro  Results from last 7 days   Lab Units 03/10/20  1414 03/10/20  1413 03/09/20  1500 03/09/20  0643   BLOOD CULTURE  Received in Microbiology Lab  Culture in Progress  Received in Microbiology Lab  Culture in Progress  --  No Growth at 24 hrs     GRAM STAIN RESULT   --   --   --  Gram negative rods*  Gram positive cocci in chains*   LEGIONELLA URINARY ANTIGEN   --   --  Negative  --    STREP PNEUMONIAE ANTIGEN, URINE   --   --  Negative  --        Imaging: I have personally reviewed pertinent reports  and I have personally reviewed pertinent films in PACS     Ct Chest Abdomen Pelvis Wo Contrast    Result Date: 3/10/2020  Impression: 1  Scattered atelectasis with small right pleural effusion  2   Small pericardial effusion  3   Catheterized, thick-walled bladder with perivesical inflammation  Cystitis not excluded  4   Bilobed low density mass in the left inguinal region measuring 6 0 x 3 5 cm, possible postoperative seroma status post herniorrhaphy  Pathologic lymph node not completely excluded on this nonenhanced study  Ultrasound would be of benefit for further characterization  The study was marked in EPIC for significant notification  Workstation performed: [id number]     Xr Chest Portable    Result Date: 3/9/2020  Impression: Mild vascular congestion  Patchy bibasilar atelectasis  Workstation performed: [id number]     Ct Head Without Contrast    Result Date: 3/9/2020  Impression: No acute intracranial abnormality  Microangiopathic changes  Workstation performed: [id number]       Intake and Output  I/O       03/09 0701 - 03/10 0700 03/10 0701 - 03/11 0700    I V  (mL/kg) 872 5 (7 8) 1355 4 (12 1)    IV Piggyback 800     Total Intake(mL/kg) 1672 5 (14 9) 1355 4 (12 1)    Urine (mL/kg/hr) 800 (0 3) 370 (0 1)    Total Output 800 370    Net +872 5 +985 4                Height and Weights   Height: 5' 11" (180 3 cm)  IBW: 75 3 kg  Body mass index is 34 56 kg/m²  Weight (last 2 days)     Date/Time   Weight    03/10/20 0600   112 (247 8)    03/09/20 0645   112 (246 92)                Nutrition       Diet Orders   (From admission, onward)             Start     Ordered    03/09/20 1207  Diet NPO  Diet effective now     Question Answer Comment   Diet Type NPO    RD to adjust diet per protocol?  No        03/09/20 1206                  Active Medications  Scheduled Meds:    Current Facility-Administered Medications:  atorvastatin 20 mg Oral Daily Leana Trimble PA-C cefTRIAXone 1,000 mg Intravenous Q24H Bull Desir PA-C Last Rate: 1,000 mg (03/10/20 0901)   chlorhexidine 15 mL [place], [place]    clopidogrel 75 mg Oral Daily Bull Desir PA-C    dextrose 25 mL Intravenous Once Mary Garcia PA-C    heparin (porcine) 3-20 Units/kg/hr (Order-Specific) Intravenous Titrated Bull Desir PA-C Last Rate: 8 Units/kg/hr (03/10/20 1530)   heparin (porcine) 2,000 Units Intravenous PRN Bull Desir PA-C    heparin (porcine) 4,000 Units Intravenous PRN Bull Desir PA-C    insulin glargine 30 Units Subcutaneous HS Bull Desir PA-C    insulin lispro 1-6 Units Subcutaneous Q6H [address] Bull Desir PA-C    metoprolol tartrate 12 5 mg Oral Q12H [address] Bullmehnaz Desir PA-C    metroNIDAZOLE 500 mg Intravenous [place], [place] Last Rate: 500 mg (03/10/20 2031)   multi-electrolyte 75 mL/hr Intravenous Continuous Bull Desir PA-C Last Rate: 75 mL/hr (03/10/20 0920)   valproate sodium 500 mg Intravenous [place], [place] Last Rate: 500 mg (03/10/20 2323)     Continuous Infusions:    heparin (porcine) 3-20 Units/kg/hr (Order-Specific) Last Rate: 8 Units/kg/hr (03/10/20 1530)   multi-electrolyte 75 mL/hr Last Rate: 75 mL/hr (03/10/20 0920)     PRN Meds:     heparin (porcine) 2,000 Units PRN   heparin (porcine) 4,000 Units PRN       Invasive Devices Review  Invasive Devices     Peripheral Intravenous Line            Peripheral IV 03/09/20 Right Antecubital 1 day    Peripheral IV 03/10/20 Left Wrist less than 1 day          Drain            Urethral Catheter Latex 16 Fr  1 day                ---------------------------------------------------------------------------------------  Advance Directive and Living Will:      Power of :    POLST:    ---------------------------------------------------------------------------------------  Care Time Delivered:   No Critical Care time spent       Mary Garcia PA-C      Portions of the record may have been created with voice recognition software  Occasional wrong word or "sound a like" substitutions may have occurred due to the inherent limitations of voice recognition software    Read the chart carefully and recognize, using context, where substitutions have occurred

## 2020-03-11 NOTE — ASSESSMENT & PLAN NOTE
- patient with worsening encephalopathy over last 2 days prior to admission, has now improved  - Suspect 2/2 acute illness with current clinical picture; component of progressive dementia as well  - neuro checks  - continue antiepileptics

## 2020-03-11 NOTE — PROGRESS NOTES
Cardiology Progress Note - Efra Jensen 70 y o  male MRN: [id number]    Unit/Bed#:  Encounter: [id number]      Assessment/Plan:  1- Elevated troponins, peaked at 4 80  Unknown etiology  Denies CP or discomfort  Repeat troponin x1  Continue with IV heparin, Plavix, statin and BB for now  Patient may need cardiac catheterization prior to discharge to rule out obstructive CAD  2- Mild cardiomyopathy, 40-50%  TTE also shows mild diffuse hypokinesis with distinct regional wall motion abnormalities, grade 1 diastolic dysfunction, mild MR, trace TR  Continue BB  Not on ACEi/ARB due to elevated creatinine  3- Paroxysmal atrial flutter, maintaining NSR on telemetry  Continue Lopressor 12 5mg BID, not on anticoagulation 2/2 frequent falls/seizures  4- Chronic diastolic CHF, euvolemic, continue BB  Lasix currently held  Salt restrictions, daily weights, strict I&Os  5- Hypertension, stable, continue meds as above  6- Hyperlipidemia, continue statin  Subjective:   Patient seen and examined  No significant events overnight  Objective:     Vitals: Blood pressure 140/65, pulse 64, temperature 98 4 °F (36 9 °C), resp  rate 17, height 5' 11" (1 803 m), weight 112 kg (246 lb 14 6 oz), SpO2 99 %  , Body mass index is 34 44 kg/m² ,   Orthostatic Blood Pressures      Most Recent Value   Blood Pressure  140/65 filed at 03/11/2020 5515   Patient Position - Orthostatic VS  Lying filed at 03/11/2020 0308            Intake/Output Summary (Last 24 hours) at 3/11/2020 1105  Last data filed at 3/11/2020 1000  Gross per 24 hour   Intake 1616 9 ml   Output 945 ml   Net 671 9 ml         Physical Exam:    GEN: Efra Jensen appears well, alert and oriented x 3, pleasant and cooperative   HEENT: pupils equal, round, and reactive to light; extraocular muscles intact  NECK: supple, no carotid bruits   HEART: regular rhythm, normal S1 and S2, no murmurs, clicks, gallops or rubs   LUNGS: clear to auscultation bilaterally; no wheezes, rales, or rhonchi   ABDOMEN: normal bowel sounds, soft, no tenderness, no distention  EXTREMITIES: peripheral pulses normal; no clubbing, cyanosis, or edema      Medications:      Current Facility-Administered Medications:     atorvastatin (LIPITOR) tablet 20 mg, 20 mg, Oral, Daily, Monica Bynum PA-C, 20 mg at 03/11/20 9648    ceftriaxone (ROCEPHIN) 1 g/50 mL in dextrose IVPB, 1,000 mg, Intravenous, Q24H, ONDINA Go, Last Rate: 100 mL/hr at 03/11/20 0927, 1,000 mg at 03/11/20 8370    clopidogrel (PLAVIX) tablet 75 mg, 75 mg, Oral, Daily, PIEDAD Littlejohn-C, 75 mg at 03/11/20 0928    dextrose 50 % IV solution 25 mL, 25 mL, Intravenous, Once, Orestes Russell PA-C    heparin (porcine) 25,000 units in 250 mL infusion (premix), 3-20 Units/kg/hr (Order-Specific), Intravenous, Titrated, Monica Bynum PA-C, Last Rate: 7 2 mL/hr at 03/10/20 1530, 8 Units/kg/hr at 03/10/20 1530    heparin (porcine) injection 2,000 Units, 2,000 Units, Intravenous, PRN, Monica Bynum PA-C, 2,000 Units at 03/10/20 6017    heparin (porcine) injection 4,000 Units, 4,000 Units, Intravenous, PRN, Monica Bynum PA-C    insulin glargine (LANTUS) subcutaneous injection 30 Units 0 3 mL, 30 Units, Subcutaneous, HS, Monica Bynum PA-C, 30 Units at 03/10/20 2322    insulin lispro (HumaLOG) 100 units/mL subcutaneous injection 1-6 Units, 1-6 Units, Subcutaneous, Q6H [address] **AND** Fingerstick Glucose (POCT), , , Q6H, Monica Bynum PA-C    metoprolol tartrate (LOPRESSOR) tablet 12 5 mg, 12 5 mg, Oral, Q12H [address], Monica Bynum PA-C, 12 5 mg at 03/11/20 0928    metroNIDAZOLE (FLAGYL) IVPB (premix) 500 mg, 500 mg, Intravenous, Q8H, ONDINA Go, Last Rate: 200 mL/hr at 03/11/20 0532, 500 mg at 03/11/20 0532    multi-electrolyte (PLASMALYTE-A/ISOLYTE-S PH 7 4) IV solution, 75 mL/hr, Intravenous, Continuous, Monica Bynum PA-C, Last Rate: 75 mL/hr at 03/11/20 0531, 75 mL/hr at 03/11/20 0531    pantoprazole (PROTONIX) injection 40 mg, 40 mg, Intravenous, Q24H [address], ONDINA Go    valproate (DEPACON) 500 mg in sodium chloride 0 9 % 50 mL IVPB, 500 mg, Intravenous, Q8H [address], Brandin Vivar PA-C, Last Rate: 50 mL/hr at 03/11/20 0603, 500 mg at 03/11/20 0603     Labs & Results:    Results from last 7 days   Lab Units 03/09/20  1500 03/09/20  1118 03/09/20  0643   TROPONIN I ng/mL 4 12* 4 80* 4 44*     Results from last 7 days   Lab Units 03/11/20  0855 03/10/20  0523 03/09/20  1843   WBC Thousand/uL 6 20 6 97 7 82   HEMOGLOBIN g/dL 10 5* 11 3* 12 4   HEMATOCRIT % 32 2* 34 9* 37 9   PLATELETS Thousands/uL 75* 110* 119*         Results from last 7 days   Lab Units 03/11/20  0855 03/10/20  0523 03/09/20  0643   POTASSIUM mmol/L 3 7 4 3 4 1   CHLORIDE mmol/L 109* 107 103   CO2 mmol/L 30 29 27   BUN mg/dL 16 16 16   CREATININE mg/dL 1 33* 1 65* 1 67*   CALCIUM mg/dL 7 8* 7 7* 8 8   ALK PHOS U/L  --   --  89   ALT U/L  --   --  32   AST U/L  --   --  186*     Results from last 7 days   Lab Units 03/11/20  0510 03/10/20  2229 03/10/20  1245  03/09/20  1500 03/09/20  0643   INR   --   --   --   --  1 27* 1 14   PTT seconds 63* 66* 102*   < > 76* 34    < > = values in this interval not displayed       Results from last 7 days   Lab Units 03/11/20  0855 03/10/20  0523   MAGNESIUM mg/dL 2 2 2 0

## 2020-03-11 NOTE — ASSESSMENT & PLAN NOTE
- baseline creatinine 1 4-1 6  - suspect Cr peaked at 1 65 yesterday  - Trend BUN/Cr  - Strict I/O  - monitor with gentle fluid resuscitation  - hold Lasix

## 2020-03-11 NOTE — SPEECH THERAPY NOTE
Speech Language/Pathology    Speech/Language Pathology Progress Note    Patient Name: Norwood Kehr  NGIFP'Z Date: 3/11/2020    Subjective:  Spoke with patients wife over the phone ( message with request to call left on white board in patients room)  She reports he has not been swallowing much for the last few days PTA and also noted bolus holding  She reports baseline confusion but that patient is currently not at his baseline currently  Objective:  Patient awake and alert  He was seen with trials of puree, honey thick and nectar thick liquids  He was noted to have adequate bolus acceptance and spoon stripping  Little to no oral holding today and limited attempt at bolus formation/transfer strongly suspect premature spill  Swallow initiation was not present or severely delayed (>20secs) puree>honey thick> nectar thick  Some benefit from dry spoon presentation and occasional cues for "fast" swallows however this was not consistent  With ongoing po patient with increased wet respirations delayed coughing and drop in SPO2  He required significant verbal cues to produce strong cough which I am unsure was productive  These symptoms were noted across all consistencies however appeared to increase with liquids  Further po discontinued  Assessment:  Patient continues with significant dysphagia with high risk for aspiration across all po       Plan/Recommendations:  Continue strict NPO    Will continue to follow    HAMILTON Brandon , CCC-SLP  Speech Language Pathologist   Available via [address] [place] [address] #[id number]  [place] #[id number]

## 2020-03-11 NOTE — ASSESSMENT & PLAN NOTE
Lab Results   Component Value Date    HGBA1C 6 8 (H) 12/25/2019       Recent Labs     03/10/20  0628 03/10/20  1206 03/10/20  1749 03/10/20  2355   POCGLU 99 107 80 63*       Blood Sugar Average: Last 72 hrs:  (P) 119 [id number]   - continue home Lantus  - cover with sliding scale

## 2020-03-11 NOTE — ASSESSMENT & PLAN NOTE
- UA and micro relatively unremarkable but bladder wall thickening on CT  - Continue ceftriaxone and flagyl for total 3d course  - Trend WBC and fever curve

## 2020-03-11 NOTE — ASSESSMENT & PLAN NOTE
Wt Readings from Last 3 Encounters:   03/11/20 112 kg (246 lb 14 6 oz)   02/11/20 114 kg (252 lb)   12/24/19 117 kg (258 lb 6 1 oz)     - patient with documented history; ProBNP 18,000 with unclear baseline  - is on Lasix at home  - 3/10 TTE: EF 45-50% with G1DD  - Admission weight down 2 kg from last office visit  - Continue gentle fluids- close monitoring of volume and respiratory status  - Cardiology following

## 2020-03-11 NOTE — ASSESSMENT & PLAN NOTE
- UA and micro relatively unremarkable but bladder wall thickening on CT  - Continue ceftriaxone and flagyl  - Trend WBC and fever curve

## 2020-03-12 LAB
ALBUMIN SERPL BCP-MCNC: 2 G/DL (ref 3.5–5)
ALP SERPL-CCNC: 57 U/L (ref 46–116)
ALT SERPL W P-5'-P-CCNC: 34 U/L (ref 12–78)
ANION GAP SERPL CALCULATED.3IONS-SCNC: 6 MMOL/L (ref 4–13)
APTT PPP: 68 SECONDS (ref 23–37)
AST SERPL W P-5'-P-CCNC: 130 U/L (ref 5–45)
BACTERIA BLD CULT: ABNORMAL
BILIRUB SERPL-MCNC: 0.4 MG/DL (ref 0.2–1)
BUN SERPL-MCNC: 13 MG/DL (ref 5–25)
CALCIUM SERPL-MCNC: 7.8 MG/DL (ref 8.3–10.1)
CHLORIDE SERPL-SCNC: 109 MMOL/L (ref 100–108)
CO2 SERPL-SCNC: 28 MMOL/L (ref 21–32)
CREAT SERPL-MCNC: 1.2 MG/DL (ref 0.6–1.3)
ERYTHROCYTE [DISTWIDTH] IN BLOOD BY AUTOMATED COUNT: 15.4 % (ref 11.6–15.1)
GFR SERPL CREATININE-BSD FRML MDRD: 67 ML/MIN/1.73SQ M
GLUCOSE SERPL-MCNC: 103 MG/DL (ref 65–140)
GLUCOSE SERPL-MCNC: 125 MG/DL (ref 65–140)
GLUCOSE SERPL-MCNC: 63 MG/DL (ref 65–140)
GLUCOSE SERPL-MCNC: 65 MG/DL (ref 65–140)
GLUCOSE SERPL-MCNC: 66 MG/DL (ref 65–140)
GLUCOSE SERPL-MCNC: 74 MG/DL (ref 65–140)
GLUCOSE SERPL-MCNC: 83 MG/DL (ref 65–140)
GLUCOSE SERPL-MCNC: 83 MG/DL (ref 65–140)
GLUCOSE SERPL-MCNC: 85 MG/DL (ref 65–140)
GRAM STN SPEC: ABNORMAL
GRAM STN SPEC: ABNORMAL
HCT VFR BLD AUTO: 34.4 % (ref 36.5–49.3)
HGB BLD-MCNC: 11.1 G/DL (ref 12–17)
MCH RBC QN AUTO: 32.4 PG (ref 26.8–34.3)
MCHC RBC AUTO-ENTMCNC: 32.3 G/DL (ref 31.4–37.4)
MCV RBC AUTO: 100 FL (ref 82–98)
PLATELET # BLD AUTO: 105 THOUSANDS/UL (ref 149–390)
PMV BLD AUTO: 11.6 FL (ref 8.9–12.7)
POTASSIUM SERPL-SCNC: 3.5 MMOL/L (ref 3.5–5.3)
PROT SERPL-MCNC: 5.9 G/DL (ref 6.4–8.2)
RBC # BLD AUTO: 3.43 MILLION/UL (ref 3.88–5.62)
SODIUM SERPL-SCNC: 143 MMOL/L (ref 136–145)
WBC # BLD AUTO: 5.61 THOUSAND/UL (ref 4.31–10.16)

## 2020-03-12 PROCEDURE — 85027 COMPLETE CBC AUTOMATED: CPT | Performed by: NURSE PRACTITIONER

## 2020-03-12 PROCEDURE — 85730 THROMBOPLASTIN TIME PARTIAL: CPT | Performed by: STUDENT IN AN ORGANIZED HEALTH CARE EDUCATION/TRAINING PROGRAM

## 2020-03-12 PROCEDURE — 92526 ORAL FUNCTION THERAPY: CPT

## 2020-03-12 PROCEDURE — C9113 INJ PANTOPRAZOLE SODIUM, VIA: HCPCS | Performed by: NURSE PRACTITIONER

## 2020-03-12 PROCEDURE — 97163 PT EVAL HIGH COMPLEX 45 MIN: CPT

## 2020-03-12 PROCEDURE — 80053 COMPREHEN METABOLIC PANEL: CPT | Performed by: NURSE PRACTITIONER

## 2020-03-12 PROCEDURE — 99233 SBSQ HOSP IP/OBS HIGH 50: CPT | Performed by: STUDENT IN AN ORGANIZED HEALTH CARE EDUCATION/TRAINING PROGRAM

## 2020-03-12 PROCEDURE — 99232 SBSQ HOSP IP/OBS MODERATE 35: CPT | Performed by: INTERNAL MEDICINE

## 2020-03-12 PROCEDURE — 82948 REAGENT STRIP/BLOOD GLUCOSE: CPT

## 2020-03-12 RX ORDER — DEXTROSE MONOHYDRATE 25 G/50ML
25 INJECTION, SOLUTION INTRAVENOUS ONCE
Status: DISCONTINUED | OUTPATIENT
Start: 2020-03-12 | End: 2020-03-12

## 2020-03-12 RX ORDER — DEXTROSE MONOHYDRATE 25 G/50ML
25 INJECTION, SOLUTION INTRAVENOUS ONCE
Status: COMPLETED | OUTPATIENT
Start: 2020-03-12 | End: 2020-03-12

## 2020-03-12 RX ORDER — DEXTROSE MONOHYDRATE 25 G/50ML
INJECTION, SOLUTION INTRAVENOUS
Status: COMPLETED
Start: 2020-03-12 | End: 2020-03-12

## 2020-03-12 RX ORDER — SODIUM CHLORIDE 9 MG/ML
75 INJECTION, SOLUTION INTRAVENOUS CONTINUOUS
Status: DISCONTINUED | OUTPATIENT
Start: 2020-03-12 | End: 2020-03-12

## 2020-03-12 RX ORDER — INSULIN GLARGINE 100 [IU]/ML
10 INJECTION, SOLUTION SUBCUTANEOUS
Status: DISCONTINUED | OUTPATIENT
Start: 2020-03-12 | End: 2020-03-12

## 2020-03-12 RX ORDER — ISOSORBIDE DINITRATE 10 MG/1
20 TABLET ORAL
Status: DISCONTINUED | OUTPATIENT
Start: 2020-03-12 | End: 2020-03-17

## 2020-03-12 RX ORDER — HYDRALAZINE HYDROCHLORIDE 25 MG/1
25 TABLET, FILM COATED ORAL EVERY 8 HOURS SCHEDULED
Status: DISCONTINUED | OUTPATIENT
Start: 2020-03-12 | End: 2020-03-17

## 2020-03-12 RX ORDER — HEPARIN SODIUM 5000 [USP'U]/ML
5000 INJECTION, SOLUTION INTRAVENOUS; SUBCUTANEOUS EVERY 8 HOURS SCHEDULED
Status: DISCONTINUED | OUTPATIENT
Start: 2020-03-12 | End: 2020-03-20 | Stop reason: HOSPADM

## 2020-03-12 RX ORDER — DEXTROSE AND SODIUM CHLORIDE 5; .45 G/100ML; G/100ML
75 INJECTION, SOLUTION INTRAVENOUS CONTINUOUS
Status: DISCONTINUED | OUTPATIENT
Start: 2020-03-12 | End: 2020-03-18

## 2020-03-12 RX ADMIN — DEXTROSE 50 % IN WATER (D50W) INTRAVENOUS SYRINGE 50 ML: at 01:02

## 2020-03-12 RX ADMIN — HYDRALAZINE HYDROCHLORIDE 25 MG: 25 TABLET ORAL at 21:34

## 2020-03-12 RX ADMIN — CLOPIDOGREL BISULFATE 75 MG: 75 TABLET ORAL at 08:56

## 2020-03-12 RX ADMIN — ISOSORBIDE DINITRATE 20 MG: 10 TABLET ORAL at 16:36

## 2020-03-12 RX ADMIN — DEXTROSE AND SODIUM CHLORIDE 75 ML/HR: 5; .45 INJECTION, SOLUTION INTRAVENOUS at 21:33

## 2020-03-12 RX ADMIN — METOPROLOL TARTRATE 12.5 MG: 25 TABLET ORAL at 08:56

## 2020-03-12 RX ADMIN — DEXTROSE 50 % IN WATER (D50W) INTRAVENOUS SYRINGE 50 ML: at 06:24

## 2020-03-12 RX ADMIN — VALPROATE SODIUM 500 MG: 100 INJECTION, SOLUTION INTRAVENOUS at 06:29

## 2020-03-12 RX ADMIN — CEFTRIAXONE SODIUM 1000 MG: 10 INJECTION, POWDER, FOR SOLUTION INTRAVENOUS at 09:00

## 2020-03-12 RX ADMIN — VALPROATE SODIUM 500 MG: 100 INJECTION, SOLUTION INTRAVENOUS at 16:34

## 2020-03-12 RX ADMIN — PANTOPRAZOLE SODIUM 40 MG: 40 INJECTION, POWDER, FOR SOLUTION INTRAVENOUS at 08:57

## 2020-03-12 RX ADMIN — ATORVASTATIN CALCIUM 20 MG: 20 TABLET, FILM COATED ORAL at 08:56

## 2020-03-12 RX ADMIN — METOPROLOL TARTRATE 12.5 MG: 25 TABLET ORAL at 21:34

## 2020-03-12 RX ADMIN — ASPIRIN 81 MG: 81 TABLET, COATED ORAL at 08:56

## 2020-03-12 RX ADMIN — HEPARIN SODIUM 5000 UNITS: 5000 INJECTION INTRAVENOUS; SUBCUTANEOUS at 21:34

## 2020-03-12 RX ADMIN — HYDRALAZINE HYDROCHLORIDE 25 MG: 25 TABLET ORAL at 16:34

## 2020-03-12 RX ADMIN — DEXTROSE 50 % IN WATER (D50W) INTRAVENOUS SYRINGE 25 ML: at 11:56

## 2020-03-12 RX ADMIN — VALPROATE SODIUM 500 MG: 100 INJECTION, SOLUTION INTRAVENOUS at 21:34

## 2020-03-12 NOTE — NURSING NOTE
BS this am was 67  Follow hypoglycemic protocol  Administered dextrose 50% via IV as ordered  Pt is currently NPO  Asymptomatic  Recheck BS as ordered  Repeat BS was 127  Will continue to monitor

## 2020-03-12 NOTE — SOCIAL WORK
CM sent ANDI referral to residential  CM placed referral to [facility] pending PT and [facility]  CM department will follow PT and OT recs  CM department will follow

## 2020-03-12 NOTE — ASSESSMENT & PLAN NOTE
Wt Readings from Last 3 Encounters:   03/12/20 112 kg (246 lb 14 6 oz)   02/11/20 114 kg (252 lb)   12/24/19 117 kg (258 lb 6 1 oz)         a

## 2020-03-12 NOTE — ASSESSMENT & PLAN NOTE
Lab Results   Component Value Date    HGBA1C 6 9 (H) 02/07/2020       Recent Labs     03/12/20  0619 03/12/20  0635 03/12/20  1110 03/12/20  1402   POCGLU 66 125 65 103       Blood Sugar Average: Last 72 hrs:  (P) 88 [id number]     a

## 2020-03-12 NOTE — PHYSICAL THERAPY NOTE
Physical Therapy Evaluation     Patient's Name: Derrick Cage    Admitting Diagnosis  Altered mental status [R41 82]  Elevated troponin [R79 89]  Acute renal failure (ARF) ([facility]) [N17 9]    Problem List  Patient Active Problem List   Diagnosis    Chronic renal insufficiency, stage III (moderate) ([facility])    Benign essential hypertension    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([facility])    Paroxysmal atrial flutter ([facility])    History of bilateral subcortical CVA     Chronic ITP (idiopathic thrombocytopenia) ([facility])    Chronic diastolic heart failure ([facility])    Seizure disorder ([facility])    Lymphedema of right lower extremity    Carotid stenosis, bilateral    Chronic kidney disease-mineral and bone disorder    Hypoglycemia    Frequent falls    Acute metabolic encephalopathy    NSTEMI (non-ST elevated myocardial infarction) ([facility] [address] [facility]    Dysphagia    Acquired hypothyroidism    Cystitis    Mass of left inguinal region       Past Medical History  Past Medical History:   Diagnosis Date    Acute renal failure syndrome ([facility])     Cardiac arrhythmia     Carotid stenosis, bilateral     Cerebrovascular disease     Chronic kidney disease     CVA (cerebral vascular accident) ([facility] [address] [facility]     Diabetes mellitus ([facility] [address] [facility]     ETOH abuse     Hyperlipidemia     Hyperosmolality     Hypertension     Lymphedema of both lower extremities     Memory loss     NPH (normal pressure hydrocephalus) ([facility])     Seizure disorder ([facility] [address] )        Past Surgical History  Past Surgical History:   Procedure Laterality Date    CATARACT EXTRACTION      COLONOSCOPY  2017    HERNIA REPAIR      MULTIPLE TOOTH EXTRACTIONS              03/12/20 0950   Note Type   Note type Eval only   Pain Assessment   Pain Assessment Tool Pain Assessment not indicated - pt denies pain   Home Living   Type of [facility] One level;Stairs to enter with rails; Performs ADLs on one level  (5 JUANITA in front, 2 JUANITA in back)   [place] shower   Bathroom Toilet Raised   Bathroom Equipment Shower chair   Bathroom Accessibility Accessible   Home Equipment Walker;Cane;Wheelchair-manual  (pt uses RW at baseline)   Additional Comments home set-up obtainted from chart review and verified w/ pt that no changes had been made   Prior Function   Level of [place] Independent with ADLs and functional mobility   Lives With Spouse   Receives Help From Family   ADL Assistance Needs assistance   IADLs Needs assistance   Falls in the last 6 months 1 to 4  (3-4 falls OOB)   Vocational Retired   Comments pt does not drive; spouse provides transportation   Restrictions/Precautions   Wells Oni Bearing Precautions Per Order No   Braces or Orthoses Other (Comment)  (none at baseline)   Other Precautions Chair Alarm; Bed Alarm;Multiple lines; Fall Risk;Telemetry; Seizure   General   Family/Caregiver Present No   Cognition   Overall Cognitive Status Impaired   Arousal/Participation Alert   Orientation Level Oriented to person;Oriented to place; Disoriented to time;Disoriented to situation   Memory Decreased recall of biographical information;Decreased short term memory;Decreased recall of recent events   Following Commands Follows one step commands inconsistently   Comments pt agreeable to PT eval; pt ID verified w/ first/last name and , wristband   RUE Assessment   RUE Assessment WFL  (grossly assessed w/ functional mobility; at least 3+/5)   LUE Assessment   LUE Assessment WFL  (grossly assessed w/ functional mobility; at least 3+/5)   RLE Assessment   RLE Assessment WFL  (grossly assessed w/ functional mobility; at least 3+/5)   LLE Assessment   LLE Assessment WFL  (grossly assessed w/ functional mobility; at least 3+/5)   Coordination   Movements are Fluid and Coordinated 1   [facility]   Light Touch   RLE Light Touch Grossly intact   LLE Light Touch Grossly intact   Bed Mobility   Rolling R 3  Moderate assistance   Additional items Assist x 2; Increased time required;Verbal cues;LE management; Bedrails   Rolling L 3  Moderate assistance   Additional items Assist x 2; Increased time required;Verbal cues;LE management; Bedrails   Supine to Sit 3  Moderate assistance   Additional items Assist x 2;HOB elevated; Bedrails; Increased time required;Verbal cues;LE management; Impulsive   Sit to Supine 3  Moderate assistance   Additional items Assist x 2; Increased time required;Verbal cues;LE management   Additional Comments Pt tolerated sitting at EOB w/ bilateral UE support for 2 minutes to allow for line management in preparation for transferring OOB to chair  Patient then with noted agitation and following one step commands inconsistently  Pt immediately re-positioned BTB and further mobility was deferred at this time for pt safety  PIOTR Modi present and assisted in maintaining patient safety during functional mobility trials  Transfers   Sit to Stand Unable to assess   Ambulation/Elevation   Gait pattern Not tested; Not appropriate   Stair Management Assistance Not tested   Balance   Static Sitting Fair -   Dynamic Sitting Poor +   Endurance Deficit   Endurance Deficit Yes   Activity Tolerance   Activity Tolerance Treatment limited secondary to agitation; Other (Comment)  (following one step commands inconsistently)   Medical Staff Made Aware PT Dorina   Nurse Made Aware PIOTR Modi confirmed pt appropriate for therapy and present during evaluation; Post session pt supine in bed w/ HOB elevated, bed alarm on, and all needs w/in reach  Assessment   Prognosis Good   Problem List Decreased strength;Decreased endurance; Impaired balance;Decreased mobility; Decreased cognition; Impaired judgement;Decreased safety awareness   Assessment Pt is 70 y o  male seen for PT evaluation s/p admit to [facility] on 5/5/5122 w/ Acute metabolic encephalopathy  PT consulted to assess pt's functional mobility and d/c needs  Order placed for PT eval and tx, w/ up w/ A order   Comorbidities affecting pt's physical performance at time of assessment include: chronic renal insufficiency stage III, HTN, DM II, paroxysmal atrial flutter, h/o bilateral subcortical CVA, chronic diastolic HF, NSTEMI, dysphagia, and seizure disorder  PTA, pt was independent w/ functional mobility w/ RW, ambulating household distances, living w/ spouse in one level house w/ 5 JUANITA w/ railings vs  2 JUANITA, and retired  Personal factors affecting pt at time of IE include: inaccessible home environment, stairs to enter home, inability to ambulate household distances, decreased cognition, positive fall history, decreased initiation and engagement, impulsivity and inability to perform ADLs  Please find objective findings from PT assessment regarding body systems outlined above with impairments and limitations including weakness, impaired balance, decreased endurance, decreased activity tolerance, decreased functional mobility tolerance, decreased safety awareness, impaired judgement, fall risk and decreased cognition  The following objective measures performed on IE also reveal limitations: Barthel Index: 20/100 and Modified [place]: 5 (severe disability)  Pt's clinical presentation is currently unstable/unpredictable seen in pt's presentation of requiring assistance for all functional mobility, impaired cognition w/ increasing agitation, inability to perform functional transfers, inability to ambulate household distances, and is requiring ongoing medical monitoring/management  Pt to benefit from continued PT tx to address deficits as defined above and maximize level of functional independent mobility and consistency  From PT/mobility standpoint, recommendation at time of d/c would be STR pending progress in order to facilitate return to PLOF     Barriers to Discharge Inaccessible home environment   Goals   Patient Goals none reported: patient presents with impaired cognition   STG Expiration Date 03/22/20   Short Term Goal #1 STGs to be met in 7-10 days: 1  Perform bed mobility w/ supervision to decrease caregiver burden, 2  Tolerate sitting at EOB for 5 minutes w/ bilateral UE support to increase pt tolerance to functional activity, 3  Increase bilateral LE strength 1/2 grade to increase pt ability to perform functional mobility, 4  Increase static and dynamic sitting balance by 1 grade to decrease pt risk of falls, 5  PT to see to assess functional transfers, ambulation, elevations and to set goals when appropriate  PT Treatment Day 0   Plan   Treatment/Interventions LE strengthening/ROM; Therapeutic exercise; Endurance training;Patient/family training;Equipment eval/education; Bed mobility;Spoke to nursing;Continued evaluation   PT Frequency Other (Comment)  (3-5x/wk)   Recommendation   Recommendation Short-term skilled PT   PT - OK to Discharge Yes  (when medically cleared, if to STR)   Modified [place] Scale   Modified [place] Scale 5   Barthel Index   Feeding 5   Bathing 0   Grooming Score 0   Dressing Score 5   Bladder Score 0   Bowels Score 5   Toilet Use Score 0   Transfers (Bed/Chair) Score 5   Mobility (Level Surface) Score 0   Stairs Score 0   Barthel Index Score 20       Luiz Suazo, SPT

## 2020-03-12 NOTE — PROGRESS NOTES
Progress Note - Ariadne Rising 1940, 70 y o  male MRN: [id number]    Unit/Bed#: -01 Encounter: [id number]    Primary Care Provider: Suzette aM MD   Date and time admitted to hospital: 3/9/2020  6:29 AM        * NSTEMI (non-ST elevated myocardial infarction) [facility])  Assessment & Plan  Troponin peaked 4 80 trended down 0 39  Unclear if non STEMI type 1 versus type 2  Echo noted with EF 40-50%, mildly reduced dysfunction, mild diffuse hypokinesis, grade 1 diastolic dysfunction  Currently patient denies chest pain, dyspnea, abdominal pain, any new complaints  Patient was treated with IV heparin drip  now discontinued per Cardiology  Continue aspirin, Plavix, statin, beta-blocker  Tentative plan for cardiac catheterization prior to discharge if patient is able tolerate per Cardiology  Cardiology following  Mass of left inguinal region  Assessment & Plan  Noted on CT  Groin ultrasound report noted for possible seroma or hematoma and discussed with patient and wife at bedside  Asymptomatic  Cystitis  Assessment & Plan  Complete ceftriaxone plus Flagyl for 3 day course per ICU  Acquired hypothyroidism  Assessment & Plan  Continue home dose Synthroid  Outpatient follow-up  Dysphagia  Assessment & Plan  Per wife patient has decreased p o  Intake for past couple days without clear etiology  Currently NPO  Speech and swallow recommendation appreciated  Acute metabolic encephalopathy  Assessment & Plan  Patient presented with worsening encephalopathy over last 2 days prior to admission  Now improved  Suspected secondary to acute illness as stated above as well as component of progressive dementia  Currently patient is awake, alert, oriented x3  Lymphedema of right lower extremity  Assessment & Plan  Chronic    Seizure disorder [facility])  Assessment & Plan  Home medication includes Depakote 500 mg p o  In the morning and 1000 mg at night    Currently on IV while inpatient NPO   Pending speech and swallow recommendations  Patient has not had seizure in approximately 8 years per wife  Chronic diastolic heart failure (HCC)  Assessment & Plan  Wt Readings from Last 3 Encounters:   03/12/20 112 kg (246 lb 14 6 oz)   02/11/20 114 kg (252 lb)   12/24/19 117 kg (258 lb 6 1 oz)       Echo report noted with EF of 45-50%  With grade 1 diastolic dysfunction  Continue aspirin, beta-blocker, Isordil, hydralazine per Cardiology  Low-salt, fluid restriction diet  Not on Ace or Arb secondary to allergy to enalapril with angioedema  Noted with Hines catheter: Will keep for 24 hours for close I&O monitoring will consider removing it tomorrow  Cardiology following    History of bilateral subcortical CVA   Assessment & Plan  History of CVA  Patient without significant deficit  Continue aspirin, Plavix  PT OT eval    Paroxysmal atrial flutter (HCC)  Assessment & Plan  History of paroxysmal AFib  Continue metoprolol 12 5 mg b i d  Currently rate controlled  Current  Not on anticoagulation due to dementia, risk of fall, increased risk of bleeding  Cardiology recommendations appreciated  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[facility])  Assessment & Plan  Lab Results   Component Value Date    HGBA1C 6 9 (H) 02/07/2020       Recent Labs     03/12/20  0619 03/12/20  0635 03/12/20  1110 03/12/20  1402   POCGLU 66 125 65 103       Blood Sugar Average: Last 72 hrs:  (P) 96 [id number]     Hemoglobin A1c 6 9  Patient was getting Lantus 30 units q h s     Due to episodes of hypoglycemia and currently being NPO will decrease it to Lantus 10 units q h s     Currently patient is on D5 half NS  Accu-Cheks monitoring  Sliding scale coverage    Benign essential hypertension  Assessment & Plan  Continue Lopressor with holding parameters,  Started on hydralazine, Isordil per Cardiology  Continue to monitor      Chronic renal insufficiency, stage III (moderate) (HCC)  Assessment & Plan  Baseline creatinine is around 1 6 range  Currently at baseline  VTE Pharmacologic Prophylaxis:   Pharmacologic: Heparin  Mechanical VTE Prophylaxis in Place: Yes    Patient Centered Rounds: I have performed bedside rounds with nursing staff today  Education and Discussions with Family / Patient:  Patient  Wife present at bedside and had discussion at length  Time Spent for Care: 30 minutes  More than 50% of total time spent on counseling and coordination of care as described above  Current Length of Stay: 3 day(s)    Current Patient Status: Inpatient   Certification Statement: The patient will continue to require additional inpatient hospital stay due to Above-stated multiple medical patient conditions  Discharge Plan:  Expected 48 hours or more    Code Status: Level 1 - Full Code      Subjective:   Downgraded from ICU to slim  Appears comfortable not in distress  Patient is awake, alert, oriented x3 at this time  poor historian  Denies chest pain, dyspnea, fever, chills, nausea, vomiting, any other new complaints  No other events reported  Objective:     Vitals:   Temp (24hrs), Av 6 °F (36 4 °C), Min:97 3 °F (36 3 °C), Max:98 °F (36 7 °C)    Temp:  [97 3 °F (36 3 °C)-98 °F (36 7 °C)] 97 6 °F (36 4 °C)  HR:  [60-69] 61  Resp:  [11-21] 18  BP: (135-171)/(67-84) 135/75  SpO2:  [93 %-100 %] 96 %  Body mass index is 34 44 kg/m²  Input and Output Summary (last 24 hours): Intake/Output Summary (Last 24 hours) at 3/12/2020 1818  Last data filed at 3/12/2020 1424  Gross per 24 hour   Intake 428 8 ml   Output 1085 ml   Net -656 2 ml       Physical Exam:     Physical Exam   Constitutional: He is oriented to person, place, and time  No distress  Elderly, frail, deconditioned male patient, chronically ill, acutely nontoxic appearing not in acute distress  HENT:   Head: Normocephalic and atraumatic  Eyes: Pupils are equal, round, and reactive to light   EOM are normal  Neck: Normal range of motion  Neck supple  No JVD present  Cardiovascular: Normal rate and regular rhythm  Pulmonary/Chest: Effort normal and breath sounds normal  No stridor  No respiratory distress  Abdominal: Soft  Bowel sounds are normal  There is no tenderness  Genitourinary:   Genitourinary Comments: Hines catheter noted with dark, clear yellow urine  Musculoskeletal: Normal range of motion  He exhibits edema  Neurological: He is alert and oriented to person, place, and time  Poor historian  Currently he is redirectable  Cooperative during exam    Skin: He is not diaphoretic  Additional Data:     Labs:    Results from last 7 days   Lab Units 03/12/20  0450  03/10/20  0523   WBC Thousand/uL 5 61   < > 6 97   HEMOGLOBIN g/dL 11 1*   < > 11 3*   HEMATOCRIT % 34 4*   < > 34 9*   PLATELETS Thousands/uL 105*   < > 110*   NEUTROS PCT %  --   --  49   LYMPHS PCT %  --   --  30   MONOS PCT %  --   --  20*   EOS PCT %  --   --  1    < > = values in this interval not displayed  Results from last 7 days   Lab Units 03/12/20  0450   SODIUM mmol/L 143   POTASSIUM mmol/L 3 5   CHLORIDE mmol/L 109*   CO2 mmol/L 28   BUN mg/dL 13   CREATININE mg/dL 1 20   ANION GAP mmol/L 6   CALCIUM mg/dL 7 8*   ALBUMIN g/dL 2 0*   TOTAL BILIRUBIN mg/dL 0 40   ALK PHOS U/L 57   ALT U/L 34   AST U/L 130*   GLUCOSE RANDOM mg/dL 83     Results from last 7 days   Lab Units 03/09/20  1500   INR  1 27*     Results from last 7 days   Lab Units 03/12/20  1402 03/12/20  1110 03/12/20  0635 03/12/20  0619 03/12/20  0013 03/11/20  1847 03/11/20  1302 03/11/20  1203 03/11/20  0558 03/11/20  0103 03/10/20  2355 03/10/20  1749   POC GLUCOSE mg/dl 103 65 125 66 63* 76 74 53* 76 98 63* 80         Results from last 7 days   Lab Units 03/10/20  0523 03/09/20  1844 03/09/20  1500 03/09/20  1128   LACTIC ACID mmol/L 1 3 2 1* 2 1* 2 1*   PROCALCITONIN ng/ml 0 07  --   --   --            * I Have Reviewed All Lab Data Listed Above    * Additional Pertinent Lab Tests Reviewed: All Labs Within Last 24 Hours Reviewed        Recent Cultures (last 7 days):     Results from last 7 days   Lab Units 03/10/20  1414 03/10/20  1413 03/09/20  1500 03/09/20  0643   BLOOD CULTURE  No Growth at 24 hrs  No Growth at 24 hrs   --  Acinetobacter species*  Vagococcus*  Gram-positive natacha*  No Growth at 48 hrs  GRAM STAIN RESULT   --   --   --  Gram negative rods*  Gram positive cocci in chains*   LEGIONELLA URINARY ANTIGEN   --   --  Negative  --        Last 24 Hours Medication List:     Current Facility-Administered Medications:  aspirin 81 mg Oral Daily ONDINA Go    atorvastatin 20 mg Oral Daily ONDINA Go    cefTRIAXone 1,000 mg Intravenous Q24H ONDINA Go Last Rate: 1,000 mg (03/12/20 0900)   clopidogrel 75 mg Oral Daily ONDINA Go    dextrose 5 % and sodium chloride 0 45 % 75 mL/hr Intravenous Continuous ONDINA Go Last Rate: 75 mL/hr (03/11/20 1306)   hydrALAZINE 25 mg Oral Q8H [facility] ONDINA Ruiz    insulin glargine 30 Units Subcutaneous HS ONDINA Go    insulin lispro 1-6 Units Subcutaneous Q6H [facility] ONDINA Go    isosorbide dinitrate 20 mg Oral TID after meals ONDINA Ruiz    metoprolol tartrate 12 5 mg Oral Q12H [facility] ONDINA Go    pantoprazole 40 mg Intravenous Q24H [facility] ONDINA Go    valproate sodium 500 mg Intravenous [facility] ONDINA Go Last Rate: 500 mg (03/12/20 1634)        Today, Patient Was Seen By: Don Smith MD    ** Please Note: Dictation voice to text software may have been used in the creation of this document   **

## 2020-03-12 NOTE — SPEECH THERAPY NOTE
Speech Language/Pathology    Speech/Language Pathology Progress Note    Patient Name: Norwood Kehr  RKDWI'L Date: 3/12/2020    Subjective:  Per RN she provided meds earlier today    Objective:  Patient asleep but able to arouse given verbal stimuli however required stimuli throughout my visit to maintain  He was seen with trials of puree, honey thick and nectar thick liquids  He was noted to have adequate bolus acceptance and spoon stripping  He presented with occasional oral holding today and limited attempt at bolus formation/transfer  Premature spill is strongly suspected  Swallow initiation was again variable ranging from not present to moderately dealyed ( 10secs) with lingual pumping present again  Some benefit from dry spoon presentation and occasional cues for "fast" swallows however this was not consistently beneficial  He was noted to fall asleep at times with no swallow attempted  With ongoing po increased wet respirations were noted however this was also observed at baseline ( from prior po with meds?) with subsequent delayed coughing  Patient also with choking episode x1  Further po was discontinued  RN educated on recommendations  Assessment:  Patient continues with oropharyngeal dysphagia (slightly improved timing today although still variable)- primary concerns include fluctuating alertness, delay in the swallow initiation, and likely premature spill with sxs of aspiration  His aspiration risk remains high      Plan/Recommendations:  Continue NPO  Allow meds crushed in puree administered by educated RN  - only provide meds if alert  - ensure swallow is triggered via palpation or visual confirmation  - attempt dry swallow and verbal cues to aid in swallow trigger as needed  - discontinue po if patient is unable to trigger swallow    Will continue to follow    HAMILTON Brandon S , [id number] [facility]  Speech Language Pathologist   Available via [address] [place] [address] #[id number]  [place] #[id number]

## 2020-03-12 NOTE — PLAN OF CARE
Problem: PHYSICAL THERAPY ADULT  Goal: Performs mobility at highest level of function for planned discharge setting  See evaluation for individualized goals  Description  Treatment/Interventions: LE strengthening/ROM, Therapeutic exercise, Endurance training, Patient/family training, Equipment eval/education, Bed mobility, Spoke to nursing, Continued evaluation          See flowsheet documentation for full assessment, interventions and recommendations  Note:   Prognosis: Good  Problem List: Decreased strength, Decreased endurance, Impaired balance, Decreased mobility, Decreased cognition, Impaired judgement, Decreased safety awareness  Assessment: Pt is 70 y o  male seen for PT evaluation s/p admit to [facility] on 8/4/7010 w/ Acute metabolic encephalopathy  PT consulted to assess pt's functional mobility and d/c needs  Order placed for PT eval and tx, w/ up w/ A order  Comorbidities affecting pt's physical performance at time of assessment include: chronic renal insufficiency stage III, HTN, DM II, paroxysmal atrial flutter, h/o bilateral subcortical CVA, chronic diastolic HF, NSTEMI, dysphagia, and seizure disorder  PTA, pt was independent w/ functional mobility w/ RW, ambulating household distances, living w/ spouse in one level house w/ 5 JUANITA w/ railings vs  2 JUANITA, and retired  Personal factors affecting pt at time of IE include: inaccessible home environment, stairs to enter home, inability to ambulate household distances, decreased cognition, positive fall history, decreased initiation and engagement, impulsivity and inability to perform ADLs  Please find objective findings from PT assessment regarding body systems outlined above with impairments and limitations including weakness, impaired balance, decreased endurance, decreased activity tolerance, decreased functional mobility tolerance, decreased safety awareness, impaired judgement, fall risk and decreased cognition   The following objective measures performed on IE also reveal limitations: Barthel Index: 20/100 and Modified [place]: 5 (severe disability)  Pt's clinical presentation is currently unstable/unpredictable seen in pt's presentation of requiring assistance for all functional mobility, impaired cognition w/ increasing agitation, inability to perform functional transfers, inability to ambulate household distances, and is requiring ongoing medical monitoring/management  Pt to benefit from continued PT tx to address deficits as defined above and maximize level of functional independent mobility and consistency  From PT/mobility standpoint, recommendation at time of d/c would be STR pending progress in order to facilitate return to PLOF  Barriers to Discharge: Inaccessible home environment     Recommendation: Short-term skilled PT     PT - OK to Discharge: Yes(when medically cleared, if to STR)    See flowsheet documentation for full assessment

## 2020-03-12 NOTE — PROGRESS NOTES
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%  - TTE also shows mild diffuse hypokinesis with distinct regional wall motion abnormalities, grade 1 diastolic dysfunction, mild MR, trace TR  -continue continue beta-blocker  -no ACE/ARB secondary to allergy to enalapril with angioedema reaction, started Isordil and hydralazine    3  Paroxysmal atrial flutter,  maintaining NSR on telemetry  -currently rate controlled  -continue metoprolol tartrate 12 5 mg b i d    -Not on anticoagulation 2/2 high bleed risk from frequent falls/seizure activity     4  Chronic diastolic heart failure  -euvolemic today on exam  -continue metoprolol tartrate 12 5 mg b i d    -strict I&Os and daily weights  -diuretics on hold as patient appears dry on admission     5  Hypertension, blood pressures elevated  -continue Lopressor with hold parameters  -start hydralazine 25 mg t i d  and Isordil 20 mg t i d   -continue to monitor blood pressures     5  Hyperlipidemia  -continue atorvastatin 20 mg daily      Subjective:   Patient seen and examined  No significant events since the last encounter  REVIEW OF SYSTEMS:  Unobtainable secondary to mental status    Objective:   Vitals:  Vitals:    03/12/20 0734   BP: (!) 171/81   Pulse: 69   Resp: 20   Temp: 97 7 °F (36 5 °C)   SpO2: 93%       Body mass index is 34 44 kg/m²    Systolic (01PYL), DID:081 , Min:136 , CLS:275     Diastolic (08OMI), Av, Min:63, Max:84      Intake/Output Summary (Last 24 hours) at 3/12/2020 1017  Last data filed at 3/12/2020 0456  Gross per 24 hour   Intake 1453 9 ml   Output 1035 ml   Net 418 9 ml     Weight (last 2 days)     Date/Time   Weight    20 0600   112 (246 92)    20 0600   112 (246 92)    03/10/20 0600   112 (247 8)              Telemetry Review: No significant arrhythmias seen on telemetry review  Sinus rhythm in the 60s        PHYSICAL EXAMS  General:  Patient is not in acute distress, laying in the bed comfortably, awake, alert responding to commands  Head: Normocephalic, Atraumatic  HEENT: White sclera, pink conjunctiva  Neck:  Supple, no neck vein distention  Respiratory: clear to P/A  Cardiovascular: S1-S2 normal, no murmurs, thrills, gallops, rubs, regular rhythm  GI:  Abdomen soft, nontender, non-distended  Extremities: No edema, normal pulses  Integument:  No skin rashes or ulceration  Neurologic:  Patient is awake alert, responding to command, oriented to person, place and time    Nd time   LABORATORY RESULTS:  Results from last 7 days   Lab Units 20  1259 20  1500 20  1118   TROPONIN I ng/mL 0 39* 4 12* 4 80*     CBC with diff: Results from last 7 days   Lab Units 20  0450 20  0855 03/10/20  0523  20  0643   WBC Thousand/uL 5 61 6 20 6 97   < > 8 68   HEMOGLOBIN g/dL 11 1* 10 5* 11 3*   < > 14 9   HEMATOCRIT % 34 4* 32 2* 34 9*   < > 46 5   MCV fL 100* 99* 100*   < > 99*   PLATELETS Thousands/uL 105* 75* 110*   < > 156   MCH pg 32 4 32 4 32 4   < > 31 7   MCHC g/dL 32 3 32 6 32 4   < > 32 0   RDW % 15 4* 15 5* 15 7*   < > 15 4*   MPV fL 11 6 12 7 11 7   < > 12 0   NRBC AUTO /100 WBCs  --   --  0  --  0    < > = values in this interval not displayed         CMP:  Results from last 7 days   Lab Units 20  0450 20  0855 03/10/20  0523 20  0643   POTASSIUM mmol/L 3 5 3 7 4 3 4 1   CHLORIDE mmol/L 109* 109* 107 103   CO2 mmol/L 28 30 29 27 BUN mg/dL 13 16 16 16   CREATININE mg/dL 1 20 1 33* 1 65* 1 67*   CALCIUM mg/dL 7 8* 7 8* 7 7* 8 8   AST U/L 130*  --   --  186*   ALT U/L 34  --   --  32   ALK PHOS U/L 57  --   --  89   EGFR ml/min/1 73sq m 67 59 46 45       BMP:  Results from last 7 days   Lab Units 20  0450 20  0855 03/10/20  0523   POTASSIUM mmol/L 3 5 3 7 4 3   CHLORIDE mmol/L 109* 109* 107   CO2 mmol/L 28 30 29   BUN mg/dL 13 16 16   CREATININE mg/dL 1 20 1 33* 1 65*   CALCIUM mg/dL 7 8* 7 8* 7 7*         Results from last 7 days   Lab Units 20  0643   NT-PRO BNP pg/mL 18,408*      Results from last 7 days   Lab Units 20  0855 03/10/20  0523   MAGNESIUM mg/dL 2 2 2 0         Results from last 7 days   Lab Units 20  1501   TSH 3RD GENERATON uIU/mL 5 379*   FREE T4 ng/dL 1 32     Results from last 7 days   Lab Units 20  1500 20  0643   INR  1 27* 1 14       Lipid Profile:   No results found for: CHOL  No results found for: HDL  No results found for: LDLCALC  No results found for: TRIG    Cardiac testing:   Results for orders placed during the hospital encounter of 20   Echo complete with contrast if indicated    Narrative [address] [place] [address] [place], [place] [address] [phone]    Transthoracic Echocardiogram  2D, M-mode, Doppler, and Color Doppler    Study date:  09-Mar-2020    Patient: Bart Rojas  MR number: [id number]  Account number: [de-identified]  : 1940  Age: 70 years  Gender: Male  Status: Inpatient  Location: Bedside  Height: 71 in  Weight: 246 lb  BP: 121/ 65 mmHg    Indications: Heart failure, Assess left ventricular function  Diagnoses: I50 9 - Heart failure, unspecified    Sonographer:   Medical [place] Maxine Mathis  Referring Physician:  Guerrero Ramos MD  Group:  [facility]  Interpreting Physician:  Mai Cleveland MD    SUMMARY    PROCEDURE INFORMATION:  This was a technically difficult study    Echocardiographic views were limited due to poor acoustic window availability, decreased penetration, and lung interference  Intravenous contrast (  8mL of Definity in NSS) was administered to opacify the left ventricle  LEFT VENTRICLE:  Systolic function was mildly reduced  Ejection fraction was estimated in the range of 45 % to 50 %  There was mild diffuse hypokinesis with distinct regional wall motion abnormalities  Wall thickness was at the upper limits of normal   Doppler parameters were consistent with abnormal left ventricular relaxation (grade 1 diastolic dysfunction)  RIGHT VENTRICLE:  The size was normal   Systolic function was normal     MITRAL VALVE:  There was mild annular calcification  There was mild regurgitation  TRICUSPID VALVE:  There was trace regurgitation  Pulmonary artery systolic pressure was within the normal range  HISTORY: PRIOR HISTORY: Seizure disorder, CKD, Atrial fibrillation  Risk factors: hypertension and diabetes  PROCEDURE: The procedure was performed at the bedside  This was a stat study  The transthoracic approach was used  The study included complete 2D imaging, M-mode, complete spectral Doppler, and color Doppler  The heart rate was 93 bpm, at  the start of the study  Images were obtained from the parasternal, apical, subcostal, and suprasternal notch acoustic windows  Intravenous contrast (  8mL of Definity in NSS) was administered to opacify the left ventricle  Echocardiographic views were limited due to poor acoustic window availability, decreased penetration, and lung interference  This was a technically difficult study  LEFT VENTRICLE: Size was normal  Systolic function was mildly reduced  Ejection fraction was estimated in the range of 45 % to 50 %  There was mild diffuse hypokinesis with distinct regional wall motion abnormalities  Wall thickness was at  the upper limits of normal  No evidence of apical thrombus   DOPPLER: Doppler parameters were consistent with abnormal left ventricular relaxation (grade 1 diastolic dysfunction)  RIGHT VENTRICLE: The size was normal  Systolic function was normal  Wall thickness was normal     LEFT ATRIUM: Size was normal     RIGHT ATRIUM: Size was normal     MITRAL VALVE: There was mild annular calcification  There was normal leaflet separation  DOPPLER: The transmitral velocity was within the normal range  There was no evidence for stenosis  There was mild regurgitation  AORTIC VALVE: The valve was trileaflet  Leaflets exhibited mildly increased thickness and normal cuspal separation  DOPPLER: Transaortic velocity was within the normal range  There was no evidence for stenosis  There was no significant  regurgitation  TRICUSPID VALVE: The valve structure was normal  There was normal leaflet separation  DOPPLER: The transtricuspid velocity was within the normal range  There was no evidence for stenosis  There was trace regurgitation  Pulmonary artery  systolic pressure was within the normal range  PULMONIC VALVE: Leaflets exhibited normal thickness, no calcification, and normal cuspal separation  DOPPLER: The transpulmonic velocity was within the normal range  There was no significant regurgitation  PERICARDIUM: There was no pericardial effusion  The pericardium was normal in appearance  AORTA: The root exhibited normal size  SYSTEMIC VEINS: IVC: The inferior vena cava was normal in size   Respirophasic changes were normal     SYSTEM MEASUREMENT TABLES    2D  %FS: 23 7 %  Ao Diam: 3 7 cm  EDV(Teich): 133 4 ml  EF(Teich): 46 9 %  ESV(Teich): 70 8 ml  IVSd: 1 1 cm  LA Diam: 4 4 cm  LVIDd: 5 3 cm  LVIDs: 4 cm  LVPWd: 1 1 cm  SV(Teich): 62 5 ml    CW  TR Vmax: 2 7 m/s  TR maxP 3 mmHg    MM  TAPSE: 2 3 cm    PW  E': 0 1 m/s  E/E': 11 7  MV A Gilberto: 1 2 m/s  MV Dec [place]: 4 6 m/s2  MV DecT: 137 ms  MV E Gilberto: 0 6 m/s  MV E/A Ratio: 0 5  MV PHT: 39 7 ms  MVA By PHT: 5 5 cm2    [facility] Accredited Echocardiography Laboratory    Prepared and electronically signed by    Coty Maldonado MD  Signed 10-Mar-2020 12:01:35       No results found for this or any previous visit  No results found for this or any previous visit  No procedure found  No results found for this or any previous visit  Meds/Allergies   all current active meds have been reviewed  Medications Prior to Admission   Medication    amLODIPine (NORVASC) 5 mg tablet    atorvastatin (LIPITOR) 20 mg tablet    B-D UF III MINI PEN NEEDLES 31G X 5 MM MISC    clopidogrel (PLAVIX) 75 mg tablet    divalproex sodium (DEPAKOTE) 500 mg EC tablet    ferrous sulfate 325 (65 Fe) mg tablet    folic acid (FOLVITE) 1 mg tablet    furosemide (LASIX) 20 mg tablet    insulin glargine (LANTUS) 100 units/mL subcutaneous injection    levothyroxine 25 mcg tablet    metoprolol tartrate (LOPRESSOR) 25 mg tablet    Multiple Vitamin (MULTIVITAMINS PO)    potassium chloride (KLOR-CON M20) 20 mEq tablet    travoprost (TRAVATAN Z) 0 004 % ophthalmic solution         dextrose 5 % and sodium chloride 0 45 % 75 mL/hr Last Rate: 75 mL/hr (03/11/20 1306)       Counseling / Coordination of Care  Total floor / unit time spent today 15 minutes  Greater than 50% of total time was spent with the patient and / or family counseling and / or coordination of care  ** Please Note: Dragon 360 Dictation voice to text software may have been used in the creation of this document   **

## 2020-03-13 ENCOUNTER — APPOINTMENT (INPATIENT)
Dept: RADIOLOGY | Facility: HOSPITAL | Age: 80
DRG: 280 | End: 2020-03-13
Payer: MEDICARE

## 2020-03-13 PROBLEM — E46 PROTEIN CALORIE MALNUTRITION (HCC): Status: ACTIVE | Noted: 2020-03-13

## 2020-03-13 LAB
ANION GAP SERPL CALCULATED.3IONS-SCNC: 6 MMOL/L (ref 4–13)
APTT PPP: 38 SECONDS (ref 23–37)
BASOPHILS # BLD AUTO: 0.02 THOUSANDS/ΜL (ref 0–0.1)
BASOPHILS NFR BLD AUTO: 0 % (ref 0–1)
BUN SERPL-MCNC: 10 MG/DL (ref 5–25)
CALCIUM SERPL-MCNC: 7.4 MG/DL (ref 8.3–10.1)
CHLORIDE SERPL-SCNC: 110 MMOL/L (ref 100–108)
CO2 SERPL-SCNC: 27 MMOL/L (ref 21–32)
CREAT SERPL-MCNC: 1.19 MG/DL (ref 0.6–1.3)
EOSINOPHIL # BLD AUTO: 0.19 THOUSAND/ΜL (ref 0–0.61)
EOSINOPHIL NFR BLD AUTO: 4 % (ref 0–6)
ERYTHROCYTE [DISTWIDTH] IN BLOOD BY AUTOMATED COUNT: 15.4 % (ref 11.6–15.1)
GFR SERPL CREATININE-BSD FRML MDRD: 68 ML/MIN/1.73SQ M
GLUCOSE SERPL-MCNC: 104 MG/DL (ref 65–140)
GLUCOSE SERPL-MCNC: 111 MG/DL (ref 65–140)
GLUCOSE SERPL-MCNC: 129 MG/DL (ref 65–140)
GLUCOSE SERPL-MCNC: 86 MG/DL (ref 65–140)
HCT VFR BLD AUTO: 33.8 % (ref 36.5–49.3)
HGB BLD-MCNC: 10.7 G/DL (ref 12–17)
IMM GRANULOCYTES # BLD AUTO: 0.03 THOUSAND/UL (ref 0–0.2)
IMM GRANULOCYTES NFR BLD AUTO: 1 % (ref 0–2)
LYMPHOCYTES # BLD AUTO: 1.67 THOUSANDS/ΜL (ref 0.6–4.47)
LYMPHOCYTES NFR BLD AUTO: 34 % (ref 14–44)
MCH RBC QN AUTO: 31.8 PG (ref 26.8–34.3)
MCHC RBC AUTO-ENTMCNC: 31.7 G/DL (ref 31.4–37.4)
MCV RBC AUTO: 100 FL (ref 82–98)
MONOCYTES # BLD AUTO: 0.81 THOUSAND/ΜL (ref 0.17–1.22)
MONOCYTES NFR BLD AUTO: 17 % (ref 4–12)
NEUTROPHILS # BLD AUTO: 2.17 THOUSANDS/ΜL (ref 1.85–7.62)
NEUTS SEG NFR BLD AUTO: 44 % (ref 43–75)
NRBC BLD AUTO-RTO: 0 /100 WBCS
PLATELET # BLD AUTO: 104 THOUSANDS/UL (ref 149–390)
PMV BLD AUTO: 11.4 FL (ref 8.9–12.7)
POTASSIUM SERPL-SCNC: 3.4 MMOL/L (ref 3.5–5.3)
RBC # BLD AUTO: 3.37 MILLION/UL (ref 3.88–5.62)
SODIUM SERPL-SCNC: 143 MMOL/L (ref 136–145)
WBC # BLD AUTO: 4.89 THOUSAND/UL (ref 4.31–10.16)

## 2020-03-13 PROCEDURE — 82550 ASSAY OF CK (CPK): CPT | Performed by: PHYSICIAN ASSISTANT

## 2020-03-13 PROCEDURE — 85730 THROMBOPLASTIN TIME PARTIAL: CPT | Performed by: STUDENT IN AN ORGANIZED HEALTH CARE EDUCATION/TRAINING PROGRAM

## 2020-03-13 PROCEDURE — 82746 ASSAY OF FOLIC ACID SERUM: CPT | Performed by: STUDENT IN AN ORGANIZED HEALTH CARE EDUCATION/TRAINING PROGRAM

## 2020-03-13 PROCEDURE — 92526 ORAL FUNCTION THERAPY: CPT

## 2020-03-13 PROCEDURE — 80048 BASIC METABOLIC PNL TOTAL CA: CPT | Performed by: STUDENT IN AN ORGANIZED HEALTH CARE EDUCATION/TRAINING PROGRAM

## 2020-03-13 PROCEDURE — 82948 REAGENT STRIP/BLOOD GLUCOSE: CPT

## 2020-03-13 PROCEDURE — 82607 VITAMIN B-12: CPT | Performed by: STUDENT IN AN ORGANIZED HEALTH CARE EDUCATION/TRAINING PROGRAM

## 2020-03-13 PROCEDURE — 99232 SBSQ HOSP IP/OBS MODERATE 35: CPT | Performed by: INTERNAL MEDICINE

## 2020-03-13 PROCEDURE — C9113 INJ PANTOPRAZOLE SODIUM, VIA: HCPCS | Performed by: NURSE PRACTITIONER

## 2020-03-13 PROCEDURE — 99222 1ST HOSP IP/OBS MODERATE 55: CPT | Performed by: INTERNAL MEDICINE

## 2020-03-13 PROCEDURE — 82553 CREATINE MB FRACTION: CPT | Performed by: PHYSICIAN ASSISTANT

## 2020-03-13 PROCEDURE — 71045 X-RAY EXAM CHEST 1 VIEW: CPT

## 2020-03-13 PROCEDURE — 99232 SBSQ HOSP IP/OBS MODERATE 35: CPT | Performed by: STUDENT IN AN ORGANIZED HEALTH CARE EDUCATION/TRAINING PROGRAM

## 2020-03-13 PROCEDURE — 85025 COMPLETE CBC W/AUTO DIFF WBC: CPT | Performed by: STUDENT IN AN ORGANIZED HEALTH CARE EDUCATION/TRAINING PROGRAM

## 2020-03-13 RX ORDER — LEVOTHYROXINE SODIUM 0.03 MG/1
25 TABLET ORAL
Status: DISCONTINUED | OUTPATIENT
Start: 2020-03-14 | End: 2020-03-15

## 2020-03-13 RX ADMIN — HEPARIN SODIUM 5000 UNITS: 5000 INJECTION INTRAVENOUS; SUBCUTANEOUS at 06:39

## 2020-03-13 RX ADMIN — ATORVASTATIN CALCIUM 20 MG: 20 TABLET, FILM COATED ORAL at 10:29

## 2020-03-13 RX ADMIN — ISOSORBIDE DINITRATE 20 MG: 10 TABLET ORAL at 10:29

## 2020-03-13 RX ADMIN — PANTOPRAZOLE SODIUM 40 MG: 40 INJECTION, POWDER, FOR SOLUTION INTRAVENOUS at 10:30

## 2020-03-13 RX ADMIN — CLOPIDOGREL BISULFATE 75 MG: 75 TABLET ORAL at 10:29

## 2020-03-13 RX ADMIN — VALPROATE SODIUM 500 MG: 100 INJECTION, SOLUTION INTRAVENOUS at 06:29

## 2020-03-13 RX ADMIN — VALPROATE SODIUM 500 MG: 100 INJECTION, SOLUTION INTRAVENOUS at 22:47

## 2020-03-13 RX ADMIN — METOPROLOL TARTRATE 12.5 MG: 25 TABLET ORAL at 10:29

## 2020-03-13 RX ADMIN — HEPARIN SODIUM 5000 UNITS: 5000 INJECTION INTRAVENOUS; SUBCUTANEOUS at 22:47

## 2020-03-13 RX ADMIN — DEXTROSE AND SODIUM CHLORIDE 75 ML/HR: 5; .45 INJECTION, SOLUTION INTRAVENOUS at 14:41

## 2020-03-13 NOTE — SOCIAL WORK
CM spoke with patients spouse, and patient daughter at bedside to review discharge plan and provide updated  CM determined that patient is not medically stable at this time  Patient spouse and daughter made aware of PT and OT recommendations  Patient spouse and daughter requesting [address] admission once medically stable  Patient maybe medically stable nextweek which is pending improvement  CM department will follow through discharge

## 2020-03-13 NOTE — PLAN OF CARE
Problem: Nutrition/Hydration-ADULT  Goal: Nutrient/Hydration intake appropriate for improving, restoring or maintaining nutritional needs  Description  Monitor and assess patient's nutrition/hydration status for malnutrition  Collaborate with interdisciplinary team and initiate plan and interventions as ordered  Monitor patient's weight and dietary intake as ordered or per policy  Utilize nutrition screening tool and intervene as necessary  Determine patient's food preferences and provide high-protein, high-caloric foods as appropriate       INTERVENTIONS:  - Monitor oral intake, urinary output, labs, and treatment plans  - Assess nutrition and hydration status and recommend course of action  - Evaluate amount of meals eaten  - Assist patient with eating if necessary   - Allow adequate time for meals  - Recommend/ encourage appropriate diets, oral nutritional supplements, and vitamin/mineral supplements  - Order, calculate, and assess calorie counts as needed  - Recommend, monitor, and adjust tube feedings and TPN/PPN based on assessed needs  - Assess need for intravenous fluids  - Provide nutrition/hydration education as appropriate  - Include patient/family/caregiver in decisions related to nutrition   Outcome: Not Progressing

## 2020-03-13 NOTE — SPEECH THERAPY NOTE
Speech Language/Pathology    Speech/Language Pathology Progress Note    Patient Name: Derrick Cage  [id number] Date: 3/13/2020    Subjective:  Family present throughout my visit ( wife and daughter)  Patients wife reports recent progressive decline in sequencing in swallowing prior to admission  She also admits "they said he might have early onset dementia"    Objective:  Patient alert and with verbalizations today  Family reports he is at his baseline function  He was seen with trials of puree with meds crushed ( approx 3oz)  He was noted to have adequate bolus acceptance and spoon stripping  He continues to present with occasional oral holding and limited/no attempt at bolus formation/transfer  Premature spill is strongly suspected  Swallow initiation was again variable ranging from not present to moderately dealyed ( 5 secs) with lingual pumping present again when the swallow was triggered  Some benefit from dry spoon presentation and occasional cues for "fast" swallows however this was not consistently beneficial  Delayed coughing and wet respirations were observed across all presentations except when he was able to follow directives for a secondary swallow  This hwoever was only successful on x1 occasion  Additional po was discontinued  Extensive education was provided to the patient and family (RN also present throughout) on progress in POC, aspiration risks/concerns as well as concern for ongoing malnutrition and dehydration  Assessment:  Patients oropharyngeal swallow function appears relatively unchanged  It does appear that there may be a dementia related component to the swallow function c/b loss of sequencing however I am unclear if there was any premorbid dysphagia from his old CVA  Family reports they don't think that [place] worked on dysphagia however his RN today admits she remembers seeing ST doing vital stim with him at that time which is a treatment for dysphagia   Consider GI/ palliative care to determine goals of care and candidacy for PEG as family reports a desire for pursuing this option      Plan/Recommendations:  Continue strict NPO  No meds    Will continue to follow  HAMILTON Blum , CCC-SLP  Speech Language Pathologist   Available via [address] [place] [address] #[id number]  [place] #[id number]

## 2020-03-13 NOTE — CONSULTS
Consultation - Palliative & Supportive Care   Lynn Murray  70 y o   male  /-01   MRN: [id number]  Encounter: [id number]    ASSESSMENT:    Patient Active Problem List   Diagnosis    Chronic renal insufficiency, stage III (moderate) (HCC)    Benign essential hypertension    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([facility] )    Paroxysmal atrial flutter (HCC)    History of bilateral subcortical CVA     Chronic ITP (idiopathic thrombocytopenia) (HCC)    Chronic diastolic heart failure (HCC)    Seizure disorder (HCC)    Lymphedema of right lower extremity    Carotid stenosis, bilateral    Chronic kidney disease-mineral and bone disorder    Hypoglycemia    Frequent falls    Acute metabolic encephalopathy    NSTEMI (non-ST elevated myocardial infarction) ([facility] )    Dysphagia    Acquired hypothyroidism    Cystitis    Mass of left inguinal region    Protein calorie malnutrition     79M w/ bilateral subcortical CVA, seizure disorder, DM2, pAflutter not on AC d/t fall risk, chronic HFpEF, CKD, chronic RLE lymphedema admitted to [facility] for NSTEMI after p/w 2d of progressive lethargy and dysphagia  PLAN:    1  Symptom management:   Per primary team    Appreciate Nutrition consult for protein calorie malnutrition  2  Goals:   · Consider Neurology consult (IM has reached out to patient's Neurologist to establish patient's baseline function, as patient's wife and daughter have different impressions of his function)   Patient is willing to get a PEG for nutrition to see if his rapid decline may be reversible  He would not want to use the PEG long-term but is amenable to weeks/months   o As per Neurology on 2/11/20 there were no stroke-like symptoms this seems to be a relatively acute process, and a PEG for nutrition while investigating reversible causes seems reasonable   Patient wishes to get CPR, despite counseling on likelihood that it would not be successful   He would accept short-term but not long-term intubation/ventilation   Should a major surgery (brain surgery, etc) be indicated/offered, patient would decline   Patient declines heart catheterization  Code status: Level 1 - Full Code   Decisional apparatus:  Patient does have capacity to make medical decisions on my exam today  If such capacity is lost, patient's substitute decision maker would default to his spouse by [place] 169  Advance Directive / Living Will / POLST:  None in EMR: Yuval Link states she is a named HCR in a living will they have at home  I have reviewed the patient's controlled substance dispensing history in the Prescription Drug Monitoring Program in compliance with the [facility] regulations before prescribing any controlled substances  We appreciate the opportunity to participate in this patient's care  We will continue to follow  Please do not hesitate to contact our on-call provider through our clinic answering service at [phone] should you have acute symptom control concerns  IDENTIFICATION:  Inpatient consult to Palliative Care  Consult performed by: Niels Nelson MD  Consult ordered by: Alberto West MD      Reason for Consult / Principal Problem: goals of care    HISTORY OF PRESENT ILLNESS:    Ida Galarza is a right-handed 70 y o  male with bilateral subcortical CVA, seizure disorder, DM2, pAflutter not on AC d/t fall risk, chronic HFpEF, CKD, chronic RLE lymphedema, HTN, hypothyroidism who was admitted to [facility] on 3/9/20 for NSTEMI after coming to the ED for 2 days of progressive lethargy and dysphagia  Per wife, patient had not been able to eat anything for days prior to admission  On admission patient's GCS was 14 but he was disoriented  Patient has remained NPO per SLP recommendations  Today the patient and his wife discuss his increasing weakness   While he had focal deficits since his CVA, wife reports the patient "had been able to sign his name a week ago" and now he "can barely move his right hand"  She says he had been able to eat normally 1 week ago  Wife states the patient had deficits since "he had a stroke around [facility]"; per Neurology consult on 12/26/19 the patient had grossly normal age-appropriate function in all 4 limbs  Outpatient Neurology visit from 2/11/20 documented "he has not had any new stroke-like symptoms     no slurred speech, no headaches, no focal weakness, he uses a walker to ambulate, since his discharge from the hospital he has been getting physical therapy twice a week, no swallowing difficulty"  Interview and exam limited by: patient difficulty in communicating d/t dysarthria    Review of Systems   Constitutional: Positive for activity change, appetite change and fatigue  HENT: Positive for trouble swallowing and voice change  Cardiovascular: Negative for chest pain  Gastrointestinal: Negative for abdominal pain and nausea  Musculoskeletal:        Denies discomfort   Neurological: Positive for speech difficulty and weakness       Past Medical History:   Diagnosis Date    Acute renal failure syndrome ([facility])     Cardiac arrhythmia     Carotid stenosis, bilateral     Cerebrovascular disease     Chronic kidney disease     [facility]     Diabetes mellitus [facility]     ETOH abuse     Hyperlipidemia     Hyperosmolality     Hypertension     Lymphedema of both lower extremities     Memory loss     NPH (normal pressure hydrocephalus) ([facility])     Seizure disorder ([facility])      Past Surgical History:   Procedure Laterality Date    CATARACT EXTRACTION      COLONOSCOPY  2017    HERNIA REPAIR      MULTIPLE TOOTH EXTRACTIONS       Social History     Socioeconomic History    Marital status: /Civil Union     Spouse name: Not on file    Number of children: Not on file    Years of education: Not on file    Highest education level: Not on file   Occupational History    Not on file   Social Needs    Financial resource strain: Not on file    Food insecurity:     Worry: Not on file     Inability: Not on file    Transportation needs:     Medical: Not on file     Non-medical: Not on file   Tobacco Use    Smoking status: Former Smoker     Types: Cigarettes     Last attempt to quit: 2012     Years since quittin 2    Smokeless tobacco: Never Used   Substance and Sexual Activity    Alcohol use: Not Currently    Drug use: Not Currently    Sexual activity: Not on file   Lifestyle    Physical activity:     Days per week: Not on file     Minutes per session: Not on file    Stress: Not on file   Relationships    Social connections:     Talks on phone: Not on file     Gets together: Not on file     Attends Alevism service: Not on file     Active member of club or organization: Not on file     Attends meetings of clubs or organizations: Not on file     Relationship status: Not on file    Intimate partner violence:     Fear of current or ex partner: Not on file     Emotionally abused: Not on file     Physically abused: Not on file     Forced sexual activity: Not on file   Other Topics Concern    Not on file   Social History Narrative    Not on file     Family History   Problem Relation Age of Onset    Hypertension Father     Hypertension Brother     Diabetes Brother     Stroke Brother     Cancer Mother      MEDICATIONS / ALLERGIES:  all current active meds have been reviewed and current meds:   Current Facility-Administered Medications   Medication Dose Route Frequency    aspirin (ECOTRIN LOW STRENGTH) EC tablet 81 mg  81 mg Oral Daily    atorvastatin (LIPITOR) tablet 20 mg  20 mg Oral Daily    clopidogrel (PLAVIX) tablet 75 mg  75 mg Oral Daily    dextrose 5 % and sodium chloride 0 45 % infusion  75 mL/hr Intravenous Continuous    heparin (porcine) subcutaneous injection 5,000 Units  5,000 Units Subcutaneous Q8H [address]    hydrALAZINE (APRESOLINE) tablet 25 mg  25 mg Oral Q8H [address]    insulin lispro (HumaLOG) 100 units/mL subcutaneous injection 1-6 Units  1-6 Units Subcutaneous Q6H [address]    isosorbide dinitrate (ISORDIL) tablet 20 mg  20 mg Oral TID after meals    [START ON 3/14/2020] levothyroxine tablet 25 mcg  25 mcg Oral Early Morning    metoprolol tartrate (LOPRESSOR) tablet 12 5 mg  12 5 mg Oral Q12H [address]    pantoprazole (PROTONIX) injection 40 mg  40 mg Intravenous Q24H [address]    valproate (DEPACON) 500 mg in sodium chloride 0 9 % 50 mL IVPB  500 mg Intravenous Q8H [address]       Allergies   Allergen Reactions    Enalapril Angioedema, Anaphylaxis and Shortness Of Breath     Other reaction(s): Unknown  Other reaction(s): Respiratory distress, Unknown Reaction     OBJECTIVE:  /68 (BP Location: Left arm)   Pulse 59   Temp 97 5 °F (36 4 °C) (Oral)   Resp 19   Ht 5' 11" (1 803 m)   Wt 113 kg (249 lb 1 9 oz)   SpO2 98%   BMI 34 75 kg/m²   Physical Exam:  Constitutional: Frail, chronically ill-appearing elderly male  In no acute physical or emotional distress  Head: Normocephalic and atraumatic  Eyes: EOM are normal  No ocular discharge  No scleral icterus  Neck: No visible adenopathy or masses  Respiratory: Effort normal  No stridor  No respiratory distress  Gastrointestinal: No abdominal distension  Musculoskeletal: Edema noted  Neurological: Alert, oriented to self and situation  R-sided hemiparesis noted  Dysarthric speech  Able to answer most questions appropriately but prefers to use hand gestures instead of speaking  Follows commands  Skin: Dry, no diaphoresis  Psychiatric: Withdrawn  Flat affect  Behavior, judgement and thought content appear normal      Lab Results: I have personally reviewed pertinent labs  Imaging Studies: I have personally reviewed pertinent reports  EKG, Pathology, and Other Studies: I have personally reviewed pertinent reports  Counseling / Coordination of Care: Total floor / unit time spent today 40+ minutes   Greater than 50% of total time was spent with the patient and / or family counseling and / or coordination of care  A description of the counseling / coordination of care: symptom assessment and management, medication review, psychosocial support, chart review, imaging review, lab review, goals of care, advanced directives, hospice services      MD Danika [address] and Supportive Care  [phone]

## 2020-03-13 NOTE — PROGRESS NOTES
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%  TTE also shows mild diffuse hypokinesis with distinct regional wall motion abnormalities, grade 1 diastolic dysfunction, mild MR, trace TR  Continue BB  Not on ACEi/ARB due to elevated creatinine        3- Paroxysmal atrial flutter, NSR on telemetry  Continue Lopressor 12 5mg BID, not on anticoagulation 2/2 frequent falls/seizures       4- Chronic diastolic CHF, euvolemic, continue BB  Lasix currently held  Salt restrictions, daily weights, strict I&Os     5- Hypertension, stable, continue meds as above       6- Hyperlipidemia, continue statin  Subjective:   Patient continues to have dysphagia and has not been able to tolerate PO  Wife denies husbands complaint of CP or discomfort  GI consulted for possible PEG tube placement  Palliative care consulted for goals of care at this time  Objective:     Vitals: Blood pressure 146/60, pulse 68, temperature 97 8 °F (36 6 °C), temperature source Tympanic, resp  rate 20, height 5' 11" (1 803 m), weight 113 kg (249 lb 1 9 oz), SpO2 96 %  , Body mass index is 34 75 kg/m² ,   Orthostatic Blood Pressures      Most Recent Value   Blood Pressure  146/60 filed at 03/13/2020 1029   Patient Position - Orthostatic VS  Lying filed at 03/13/2020 0300            Intake/Output Summary (Last 24 hours) at 3/13/2020 1404  Last data filed at 3/13/2020 0541  Gross per 24 hour   Intake 900 ml   Output 325 ml   Net 575 ml         Physical Exam:    GEN: Esequiel Bryant appears well, alert and oriented x 3, pleasant and cooperative   HEENT: pupils equal, round, and reactive to light; extraocular muscles intact  NECK: supple, no carotid bruits   HEART: regular rhythm, normal S1 and S2, no murmurs, clicks, gallops or rubs   LUNGS: clear to auscultation bilaterally; no wheezes, rales, or rhonchi   ABDOMEN: normal bowel sounds, soft, no tenderness, no distention  EXTREMITIES: peripheral pulses normal; no clubbing, cyanosis, or edema      Medications:      Current Facility-Administered Medications:     aspirin (ECOTRIN LOW STRENGTH) EC tablet 81 mg, 81 mg, Oral, Daily, ONDINA Go, 81 mg at 03/12/20 0856    atorvastatin (LIPITOR) tablet 20 mg, 20 mg, Oral, Daily, ONDINA Go, 20 mg at 03/13/20 1029    clopidogrel (PLAVIX) tablet 75 mg, 75 mg, Oral, Daily, ONDINA Go, 75 mg at 03/13/20 1029    dextrose 5 % and sodium chloride 0 45 % infusion, 75 mL/hr, Intravenous, Continuous, ONDINA Mills, Last Rate: 75 mL/hr at 03/12/20 2133, 75 mL/hr at 03/12/20 2133    heparin (porcine) subcutaneous injection 5,000 Units, 5,000 Units, Subcutaneous, Q8H [address], Rigo SKINNER MD, 5,000 Units at 03/13/20 3884    hydrALAZINE (APRESOLINE) tablet 25 mg, 25 mg, Oral, Q8H [address], ONDINA Ruiz, 25 mg at 03/12/20 2134    insulin lispro (HumaLOG) 100 units/mL subcutaneous injection 1-6 Units, 1-6 Units, Subcutaneous, Q6H [address] **AND** Fingerstick Glucose (POCT), , , Q6H, ONDINA Go    isosorbide dinitrate (ISORDIL) tablet 20 mg, 20 mg, Oral, TID after meals, ONDINA Ruiz, 20 mg at 03/13/20 1029    [START ON 3/14/2020] levothyroxine tablet 25 mcg, 25 mcg, Oral, Early Morning, Hugo SKINNER MD    metoprolol tartrate (LOPRESSOR) tablet 12 5 mg, 12 5 mg, Oral, Q12H Grisel BECKER CRNP, 12 5 mg at 03/13/20 1029    pantoprazole (PROTONIX) injection 40 mg, 40 mg, Intravenous, Q24H Grisel BECKER CRNP, 40 mg at 03/13/20 1030    valproate (DEPACON) 500 mg in sodium chloride 0 9 % 50 mL IVPB, 500 mg, Intravenous, Q8H EUGENIO, Grisel Dawe, DATNP, Last Rate: 50 mL/hr at 03/13/20 0629, 500 mg at 03/13/20 0629     Labs & Results:    Results from last 7 days   Lab Units 03/11/20  1259 03/09/20  1500 03/09/20  1118   TROPONIN I ng/mL 0 39* 4 12* 4 80*     Results from last 7 days   Lab Units 03/13/20  1210 03/12/20  0450 03/11/20  0855   WBC Thousand/uL 4 89 5 61 6 20   HEMOGLOBIN g/dL 10 7* 11 1* 10 5*   HEMATOCRIT % 33 8* 34 4* 32 2*   PLATELETS Thousands/uL 104* 105* 75*         Results from last 7 days   Lab Units 03/13/20  1210 03/12/20  0450 03/11/20  0855  03/09/20  0643   POTASSIUM mmol/L 3 4* 3 5 3 7   < > 4 1   CHLORIDE mmol/L 110* 109* 109*   < > 103   CO2 mmol/L 27 28 30   < > 27   BUN mg/dL 10 13 16   < > 16   CREATININE mg/dL 1 19 1 20 1 33*   < > 1 67*   CALCIUM mg/dL 7 4* 7 8* 7 8*   < > 8 8   ALK PHOS U/L  --  57  --   --  89   ALT U/L  --  34  --   --  32   AST U/L  --  130*  --   --  186*    < > = values in this interval not displayed  Results from last 7 days   Lab Units 03/13/20  1210 03/12/20  0450 03/11/20  0510  03/09/20  1500 03/09/20  0643   INR   --   --   --   --  1 27* 1 14   PTT seconds 38* 68* 63*   < > 76* 34    < > = values in this interval not displayed       Results from last 7 days   Lab Units 03/11/20  0855 03/10/20  0523   MAGNESIUM mg/dL 2 2 2 0

## 2020-03-13 NOTE — PLAN OF CARE
Problem: Prexisting or High Potential for Compromised Skin Integrity  Goal: Skin integrity is maintained or improved  Description  INTERVENTIONS:  - Identify patients at risk for skin breakdown  - Assess and monitor skin integrity  - Assess and monitor nutrition and hydration status  - Monitor labs   - Assess for incontinence   - Turn and reposition patient  - Assist with mobility/ambulation  - Relieve pressure over bony prominences  - Avoid friction and shearing  - Provide appropriate hygiene as needed including keeping skin clean and dry  - Evaluate need for skin moisturizer/barrier cream  - Collaborate with interdisciplinary team   - Patient/family teaching  - Consider wound care consult   Outcome: Progressing     Problem: PAIN - ADULT  Goal: Verbalizes/displays adequate comfort level or baseline comfort level  Description  Interventions:  - Encourage patient to monitor pain and request assistance  - Assess pain using appropriate pain scale  - Administer analgesics based on type and severity of pain and evaluate response  - Implement non-pharmacological measures as appropriate and evaluate response  - Consider cultural and social influences on pain and pain management  - Notify physician/advanced practitioner if interventions unsuccessful or patient reports new pain  Outcome: Progressing     Problem: INFECTION - ADULT  Goal: Absence or prevention of progression during hospitalization  Description  INTERVENTIONS:  - Assess and monitor for signs and symptoms of infection  - Monitor lab/diagnostic results  - Monitor all insertion sites, i e  indwelling lines, tubes, and drains  - Monitor endotracheal if appropriate and nasal secretions for changes in amount and color  - [place] appropriate cooling/warming therapies per order  - Administer medications as ordered  - Instruct and encourage patient and family to use good hand hygiene technique  - Identify and instruct in appropriate isolation precautions for identified infection/condition  Outcome: Progressing  Goal: Absence of fever/infection during neutropenic period  Description  INTERVENTIONS:  - Monitor WBC    Outcome: Progressing     Problem: SAFETY ADULT  Goal: Patient will remain free of falls  Description  INTERVENTIONS:  - Assess patient frequently for physical needs  -  Identify cognitive and physical deficits and behaviors that affect risk of falls    -  [place] fall precautions as indicated by assessment   - Educate patient/family on patient safety including physical limitations  - Instruct patient to call for assistance with activity based on assessment  - Modify environment to reduce risk of injury  - Consider OT/PT consult to assist with strengthening/mobility  Outcome: Progressing  Goal: Maintain or return to baseline ADL function  Description  INTERVENTIONS:  -  Assess patient's ability to carry out ADLs; assess patient's baseline for ADL function and identify physical deficits which impact ability to perform ADLs (bathing, care of mouth/teeth, toileting, grooming, dressing, etc )  - Assess/evaluate cause of self-care deficits   - Assess range of motion  - Assess patient's mobility; develop plan if impaired  - Assess patient's need for assistive devices and provide as appropriate  - Encourage maximum independence but intervene and supervise when necessary  - Involve family in performance of ADLs  - Assess for home care needs following discharge   - Consider OT consult to assist with ADL evaluation and planning for discharge  - Provide patient education as appropriate  Outcome: Progressing  Goal: Maintain or return mobility status to optimal level  Description  INTERVENTIONS:  - Assess patient's baseline mobility status (ambulation, transfers, stairs, etc )    - Identify cognitive and physical deficits and behaviors that affect mobility  - Identify mobility aids required to assist with transfers and/or ambulation (gait belt, sit-to-stand, lift, walker, cane, etc )  - [place] fall precautions as indicated by assessment  - Record patient progress and toleration of activity level on Mobility SBAR; progress patient to next Phase/Stage  - Instruct patient to call for assistance with activity based on assessment  - Consider rehabilitation consult to assist with strengthening/weightbearing, etc   Outcome: Progressing     Problem: DISCHARGE PLANNING  Goal: Discharge to home or other facility with appropriate resources  Description  INTERVENTIONS:  - Identify barriers to discharge w/patient and caregiver  - Arrange for needed discharge resources and transportation as appropriate  - Identify discharge learning needs (meds, wound care, etc )  - Arrange for interpretive services to assist at discharge as needed  - Refer to Case Management Department for coordinating discharge planning if the patient needs post-hospital services based on physician/advanced practitioner order or complex needs related to functional status, cognitive ability, or social support system  Outcome: Progressing     Problem: Knowledge Deficit  Goal: Patient/family/caregiver demonstrates understanding of disease process, treatment plan, medications, and discharge instructions  Description  Complete learning assessment and assess knowledge base  Interventions:  - Provide teaching at level of understanding  - Provide teaching via preferred learning methods  Outcome: Progressing     Problem: Nutrition/Hydration-ADULT  Goal: Nutrient/Hydration intake appropriate for improving, restoring or maintaining nutritional needs  Description  Monitor and assess patient's nutrition/hydration status for malnutrition  Collaborate with interdisciplinary team and initiate plan and interventions as ordered  Monitor patient's weight and dietary intake as ordered or per policy  Utilize nutrition screening tool and intervene as necessary   Determine patient's food preferences and provide high-protein, high-caloric foods as appropriate  INTERVENTIONS:  - Monitor oral intake, urinary output, labs, and treatment plans  - Assess nutrition and hydration status and recommend course of action  - Evaluate amount of meals eaten  - Assist patient with eating if necessary   - Allow adequate time for meals  - Recommend/ encourage appropriate diets, oral nutritional supplements, and vitamin/mineral supplements  - Order, calculate, and assess calorie counts as needed  - Recommend, monitor, and adjust tube feedings and TPN/PPN based on assessed needs  - Assess need for intravenous fluids  - Provide nutrition/hydration education as appropriate  - Include patient/family/caregiver in decisions related to nutrition   Outcome: Progressing     Problem: Potential for Falls  Goal: Patient will remain free of falls  Description  INTERVENTIONS:  - Assess patient frequently for physical needs  -  Identify cognitive and physical deficits and behaviors that affect risk of falls    -  [place] fall precautions as indicated by assessment   - Educate patient/family on patient safety including physical limitations  - Instruct patient to call for assistance with activity based on assessment  - Modify environment to reduce risk of injury  - Consider OT/PT consult to assist with strengthening/mobility  Outcome: Progressing

## 2020-03-13 NOTE — NUTRITION
03/13/20 1252   Recommendations/Interventions   Summary Day 5 NPO  No significant wt changes noted since admission  D/t prolonged intadequate intake & mutliple failed dysphagia screens would recommend intiating enteral nutrition - begin Jevity 1 5 at 10 mL/hr and increase by 10 mL q4 until goal rate of 65 mL/hr is reached  At goal rate will provide 1560 mL TV, 2340 kcal, 100 g protein, 1186 mL free water  If IVF d/c'd, additional 250 mL flushes q4 will provide 2686 mL total fluid

## 2020-03-13 NOTE — PROGRESS NOTES
Progress Note - Ariadne Rising 1940, 70 y o  male MRN: [id number]    Unit/Bed#: -01 Encounter: [id number]    Primary Care Provider: Suzette Ma MD   Date and time admitted to hospital: 3/9/2020  6:29 AM        * NSTEMI (non-ST elevated myocardial infarction) [facility])  Assessment & Plan  Troponin peaked 4 80 trended down 0 39  Unclear if non STEMI type 1 versus type 2  Echo noted with EF 40-50%, mildly reduced dysfunction, mild diffuse hypokinesis, grade 1 diastolic dysfunction  Currently patient denies chest pain, dyspnea, abdominal pain, any new complaints  Patient was treated with IV heparin drip  now discontinued per Cardiology  At this time will hold off on further invasive workup considering patient's risk  Patient and wife both agreeable with above plan  Continue aspirin, Plavix, statin, beta-blocker  Cardiology recommendation appreciated  Mass of left inguinal region  Assessment & Plan  Noted on CT  Groin ultrasound report noted for possible seroma or hematoma and discussed with patient and wife at bedside  Asymptomatic  Cystitis  Assessment & Plan  Completed antibiotic course  Currently asymptomatic  Acquired hypothyroidism  Assessment & Plan  Continue home dose Synthroid  Outpatient follow-up  Dysphagia  Assessment & Plan  Per wife patient has decreased p o  Intake for past couple days without clear etiology  Currently NPO  Speech and swallow recommendation appreciated  After lengthy discussion regarding risk and benefits of feeding tube patient and family both requesting discussed with gastroenterologist about possibility of PEG tube placement  Follow-up with GI consultation  Acute metabolic encephalopathy  Assessment & Plan  Patient presented with worsening encephalopathy over last 2 days prior to admission  Now improved  Suspected secondary to acute illness as stated above as well as component of progressive dementia    Currently patient is awake, alert, oriented x3  I do suspect underlying cognitive decline may be worsening  Will try to reach out to patient's primary neurologist Dr Evalee Gowers discussed patient's baseline and get through understanding about his baseline functional status  Lymphedema of right lower extremity  Assessment & Plan  Chronic    Seizure disorder [facility])  Assessment & Plan  Home medication includes Depakote 500 mg p o  In the morning and 1000 mg at night  Currently on IV while inpatient NPO  Pending speech and swallow recommendations  Patient has not had seizure in approximately 8 years per wife  Chronic diastolic heart failure (HCC)  Assessment & Plan  Wt Readings from Last 3 Encounters:   03/12/20 112 kg (246 lb 14 6 oz)   02/11/20 114 kg (252 lb)   12/24/19 117 kg (258 lb 6 1 oz)       Echo report noted with EF of 45-50%  With grade 1 diastolic dysfunction  Continue aspirin, beta-blocker, Isordil, hydralazine per Cardiology  Low-salt, fluid restriction diet  Not on Ace or Arb secondary to allergy to enalapril with angioedema  Removed Hines catheter and start voiding trial     History of bilateral subcortical CVA   Assessment & Plan  History of CVA  Patient without significant deficit  Continue aspirin, Plavix  PT OT eval    Paroxysmal atrial flutter (HCC)  Assessment & Plan  History of paroxysmal AFib  Continue metoprolol 12 5 mg b i d  Currently rate controlled  Current  Not on anticoagulation due to dementia, risk of fall, increased risk of bleeding  Cardiology recommendations appreciated  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[facility])  Assessment & Plan  Lab Results   Component Value Date    HGBA1C 6 9 (H) 02/07/2020       Recent Labs     03/12/20  0619 03/12/20  0635 03/12/20  1110 03/12/20  1402   POCGLU 66 125 65 103       Blood Sugar Average: Last 72 hrs:  (P) 96 [id number]     Hemoglobin A1c 6 9  Patient was getting Lantus 30 units q h s     Due to episodes of hypoglycemia and currently being NPO will decrease it to Lantus 5 units q h s     Currently patient is on D5 half NS  Accu-Cheks monitoring  Sliding scale coverage    Benign essential hypertension  Assessment & Plan  Continue Lopressor with holding parameters,  Started on hydralazine, Isordil per Cardiology  Continue to monitor  Chronic renal insufficiency, stage III (moderate) (HCC)  Assessment & Plan  Baseline creatinine is around 1 6 range  Currently at baseline  VTE Pharmacologic Prophylaxis:   Pharmacologic: Heparin  Mechanical VTE Prophylaxis in Place: Yes    Patient Centered Rounds: I have performed bedside rounds with nursing staff today  Education and Discussions with Family / Patient:  Patient  Wife present at bedside and had discussion at length  Time Spent for Care: 30 minutes  More than 50% of total time spent on counseling and coordination of care as described above  Current Length of Stay: 4 day(s)    Current Patient Status: Inpatient   Certification Statement: The patient will continue to require additional inpatient hospital stay due to Above-stated multiple medical patient conditions  Discharge Plan:  Expected 48 hours or more    Code Status: Level 1 - Full Code      Subjective:   Appears comfortable  Not in distress  Currently patient is awake, alert, oriented x3, thoug appears poor historian and do suspect underlying cognitive decline based on history presented by his wife at bedside  Patient denies any complaints  No other events reported  Objective:     Vitals:   Temp (24hrs), Av 9 °F (36 6 °C), Min:97 5 °F (36 4 °C), Max:98 7 °F (37 1 °C)    Temp:  [97 5 °F (36 4 °C)-98 7 °F (37 1 °C)] 97 5 °F (36 4 °C)  HR:  [59-68] 59  Resp:  [18-20] 19  BP: (141-162)/(60-74) 146/68  SpO2:  [94 %-98 %] 98 %  Body mass index is 34 75 kg/m²  Input and Output Summary (last 24 hours):        Intake/Output Summary (Last 24 hours) at 3/13/2020 1958  Last data filed at 3/13/2020 1801  Gross per 24 hour   Intake 900 ml   Output 250 ml   Net 650 ml       Physical Exam:     Physical Exam   Constitutional: He is oriented to person, place, and time  No distress  Elderly, frail, deconditioned male patient, chronically ill, acutely nontoxic appearing not in acute distress  HENT:   Head: Normocephalic and atraumatic  Eyes: Pupils are equal, round, and reactive to light  EOM are normal    Neck: Normal range of motion  Neck supple  No JVD present  Cardiovascular: Normal rate and regular rhythm  Pulmonary/Chest: Effort normal and breath sounds normal  No stridor  No respiratory distress  Abdominal: Soft  Bowel sounds are normal  There is no tenderness  Genitourinary:   Genitourinary Comments: Hines catheter noted with dark, clear yellow urine  Musculoskeletal: Normal range of motion  He exhibits edema  Neurological: He is alert and oriented to person, place, and time  Currently patient is orient x3  Cooperative exam   I do believe that patient has capacity to make decision at this time  Skin: He is not diaphoretic         Additional Data:     Labs:    Results from last 7 days   Lab Units 03/13/20  1210   WBC Thousand/uL 4 89   HEMOGLOBIN g/dL 10 7*   HEMATOCRIT % 33 8*   PLATELETS Thousands/uL 104*   NEUTROS PCT % 44   LYMPHS PCT % 34   MONOS PCT % 17*   EOS PCT % 4     Results from last 7 days   Lab Units 03/13/20  1210 03/12/20  0450   SODIUM mmol/L 143 143   POTASSIUM mmol/L 3 4* 3 5   CHLORIDE mmol/L 110* 109*   CO2 mmol/L 27 28   BUN mg/dL 10 13   CREATININE mg/dL 1 19 1 20   ANION GAP mmol/L 6 6   CALCIUM mg/dL 7 4* 7 8*   ALBUMIN g/dL  --  2 0*   TOTAL BILIRUBIN mg/dL  --  0 40   ALK PHOS U/L  --  57   ALT U/L  --  34   AST U/L  --  130*   GLUCOSE RANDOM mg/dL 129 83     Results from last 7 days   Lab Units 03/09/20  1500   INR  1 27*     Results from last 7 days   Lab Units 03/13/20  1641 03/13/20  1209 03/13/20  0549 03/12/20  2333 03/12/20  2050 03/12/20  1844 03/12/20  1402 03/12/20  1110 03/12/20  0635 03/12/20  0619 03/12/20  0013 03/11/20  1847   POC GLUCOSE mg/dl 104 111 86 85 83 74 103 65 125 66 63* 76         Results from last 7 days   Lab Units 03/10/20  0523 03/09/20  1844 03/09/20  1500 03/09/20  1128   LACTIC ACID mmol/L 1 3 2 1* 2 1* 2 1*   PROCALCITONIN ng/ml 0 07  --   --   --            * I Have Reviewed All Lab Data Listed Above  * Additional Pertinent Lab Tests Reviewed: All Labs Within Last 24 Hours Reviewed        Recent Cultures (last 7 days):     Results from last 7 days   Lab Units 03/10/20  1414 03/10/20  1413 03/09/20  1500 03/09/20  0643   BLOOD CULTURE  No Growth at 48 hrs  No Growth at 48 hrs  --  No Growth at 72 hrs    Acinetobacter species*  Vagococcus*  Gram-positive natacha*   GRAM STAIN RESULT   --   --   --  Gram negative rods*  Gram positive cocci in chains*   LEGIONELLA URINARY ANTIGEN   --   --  Negative  --        Last 24 Hours Medication List:     Current Facility-Administered Medications:  aspirin 81 mg Oral Daily ONDINA Go    atorvastatin 20 mg Oral Daily ONDINA Go    clopidogrel 75 mg Oral Daily ONDINA Go    dextrose 5 % and sodium chloride 0 45 % 75 mL/hr Intravenous Continuous ONDINA Mills Last Rate: 75 mL/hr (03/13/20 1441)   heparin (porcine) 5,000 Units Subcutaneous Q8H [facility] Hugo SKINNER MD    hydrALAZINE 25 mg Oral Q8H [facility] ONDINA Ruiz    insulin lispro 1-6 Units Subcutaneous Q6H [facility] ONDINA Go    isosorbide dinitrate 20 mg Oral TID after meals ONDINA Hutchinson    [START ON 3/14/2020] levothyroxine 25 mcg Oral Early Morning Hugo SKINNER MD    metoprolol tartrate 12 5 mg Oral Q12H [facility] ONDINA Go    pantoprazole 40 mg Intravenous Q24H [facility] ONDINA Go    valproate sodium 500 mg Intravenous [facility] ONDINA Go Last Rate: 500 mg (03/13/20 0629)        Today, Patient Was Seen By: Marissa Avila MD    ** Please Note: Dictation voice to text software may have been used in the creation of this document   **

## 2020-03-14 ENCOUNTER — APPOINTMENT (INPATIENT)
Dept: RADIOLOGY | Facility: HOSPITAL | Age: 80
DRG: 280 | End: 2020-03-14
Payer: MEDICARE

## 2020-03-14 LAB
BACTERIA BLD CULT: NORMAL
CK MB SERPL-MCNC: 5.4 NG/ML (ref 0–5)
CK MB SERPL-MCNC: <1 % (ref 0–2.5)
CK SERPL-CCNC: 635 U/L (ref 39–308)
GLUCOSE SERPL-MCNC: 117 MG/DL (ref 65–140)
GLUCOSE SERPL-MCNC: 120 MG/DL (ref 65–140)
GLUCOSE SERPL-MCNC: 90 MG/DL (ref 65–140)
GLUCOSE SERPL-MCNC: 93 MG/DL (ref 65–140)

## 2020-03-14 PROCEDURE — 74018 RADEX ABDOMEN 1 VIEW: CPT

## 2020-03-14 PROCEDURE — C9113 INJ PANTOPRAZOLE SODIUM, VIA: HCPCS | Performed by: NURSE PRACTITIONER

## 2020-03-14 PROCEDURE — 82948 REAGENT STRIP/BLOOD GLUCOSE: CPT

## 2020-03-14 PROCEDURE — 99223 1ST HOSP IP/OBS HIGH 75: CPT | Performed by: INTERNAL MEDICINE

## 2020-03-14 PROCEDURE — 99232 SBSQ HOSP IP/OBS MODERATE 35: CPT | Performed by: STUDENT IN AN ORGANIZED HEALTH CARE EDUCATION/TRAINING PROGRAM

## 2020-03-14 RX ORDER — ALPRAZOLAM 0.25 MG/1
0.25 TABLET ORAL 2 TIMES DAILY PRN
Status: DISCONTINUED | OUTPATIENT
Start: 2020-03-14 | End: 2020-03-17

## 2020-03-14 RX ORDER — HYDRALAZINE HYDROCHLORIDE 20 MG/ML
5 INJECTION INTRAMUSCULAR; INTRAVENOUS EVERY 6 HOURS PRN
Status: DISCONTINUED | OUTPATIENT
Start: 2020-03-14 | End: 2020-03-20 | Stop reason: HOSPADM

## 2020-03-14 RX ADMIN — VALPROATE SODIUM 500 MG: 100 INJECTION, SOLUTION INTRAVENOUS at 22:37

## 2020-03-14 RX ADMIN — ALPRAZOLAM 0.25 MG: 0.25 TABLET ORAL at 22:27

## 2020-03-14 RX ADMIN — DEXTROSE AND SODIUM CHLORIDE 75 ML/HR: 5; .45 INJECTION, SOLUTION INTRAVENOUS at 05:34

## 2020-03-14 RX ADMIN — METOPROLOL TARTRATE 12.5 MG: 25 TABLET ORAL at 22:27

## 2020-03-14 RX ADMIN — HYDRALAZINE HYDROCHLORIDE 5 MG: 20 INJECTION INTRAMUSCULAR; INTRAVENOUS at 09:35

## 2020-03-14 RX ADMIN — DEXTROSE AND SODIUM CHLORIDE 75 ML/HR: 5; .45 INJECTION, SOLUTION INTRAVENOUS at 22:26

## 2020-03-14 RX ADMIN — HYDRALAZINE HYDROCHLORIDE 5 MG: 20 INJECTION INTRAMUSCULAR; INTRAVENOUS at 15:36

## 2020-03-14 RX ADMIN — VALPROATE SODIUM 500 MG: 100 INJECTION, SOLUTION INTRAVENOUS at 05:29

## 2020-03-14 RX ADMIN — HYDRALAZINE HYDROCHLORIDE 25 MG: 25 TABLET ORAL at 22:30

## 2020-03-14 RX ADMIN — VALPROATE SODIUM 500 MG: 100 INJECTION, SOLUTION INTRAVENOUS at 16:39

## 2020-03-14 RX ADMIN — HEPARIN SODIUM 5000 UNITS: 5000 INJECTION INTRAVENOUS; SUBCUTANEOUS at 05:30

## 2020-03-14 RX ADMIN — HEPARIN SODIUM 5000 UNITS: 5000 INJECTION INTRAVENOUS; SUBCUTANEOUS at 15:36

## 2020-03-14 RX ADMIN — PANTOPRAZOLE SODIUM 40 MG: 40 INJECTION, POWDER, FOR SOLUTION INTRAVENOUS at 10:36

## 2020-03-14 RX ADMIN — HEPARIN SODIUM 5000 UNITS: 5000 INJECTION INTRAVENOUS; SUBCUTANEOUS at 22:29

## 2020-03-14 NOTE — NURSING NOTE
Walked by pt room and noted pt NGT was pulled out of his nose and laying at the foot of the bed  Pt was irritated and stated " The nurse took it out!"  Although off duty, a message was sent to Dr Adelina Nageotte to make him aware and also made SLIM on call Anay Thornton aware  PO meds on hold  No new orders

## 2020-03-14 NOTE — ASSESSMENT & PLAN NOTE
Noted on CT  Groin ultrasound report noted for possible seroma or hematoma and discussed with patient and wife at bedside  Asymptomatic

## 2020-03-14 NOTE — PLAN OF CARE
Problem: Prexisting or High Potential for Compromised Skin Integrity  Goal: Skin integrity is maintained or improved  Description  INTERVENTIONS:  - Identify patients at risk for skin breakdown  - Assess and monitor skin integrity  - Assess and monitor nutrition and hydration status  - Monitor labs   - Assess for incontinence   - Turn and reposition patient  - Assist with mobility/ambulation  - Relieve pressure over bony prominences  - Avoid friction and shearing  - Provide appropriate hygiene as needed including keeping skin clean and dry  - Evaluate need for skin moisturizer/barrier cream  - Collaborate with interdisciplinary team   - Patient/family teaching  - Consider wound care consult   Outcome: Progressing     Problem: PAIN - ADULT  Goal: Verbalizes/displays adequate comfort level or baseline comfort level  Description  Interventions:  - Encourage patient to monitor pain and request assistance  - Assess pain using appropriate pain scale  - Administer analgesics based on type and severity of pain and evaluate response  - Implement non-pharmacological measures as appropriate and evaluate response  - Consider cultural and social influences on pain and pain management  - Notify physician/advanced practitioner if interventions unsuccessful or patient reports new pain  Outcome: Progressing     Problem: INFECTION - ADULT  Goal: Absence or prevention of progression during hospitalization  Description  INTERVENTIONS:  - Assess and monitor for signs and symptoms of infection  - Monitor lab/diagnostic results  - Monitor all insertion sites, i e  indwelling lines, tubes, and drains  - Monitor endotracheal if appropriate and nasal secretions for changes in amount and color  - [place] appropriate cooling/warming therapies per order  - Administer medications as ordered  - Instruct and encourage patient and family to use good hand hygiene technique  - Identify and instruct in appropriate isolation precautions for identified infection/condition  Outcome: Progressing  Goal: Absence of fever/infection during neutropenic period  Description  INTERVENTIONS:  - Monitor WBC    Outcome: Progressing     Problem: SAFETY ADULT  Goal: Patient will remain free of falls  Description  INTERVENTIONS:  - Assess patient frequently for physical needs  -  Identify cognitive and physical deficits and behaviors that affect risk of falls    -  [place] fall precautions as indicated by assessment   - Educate patient/family on patient safety including physical limitations  - Instruct patient to call for assistance with activity based on assessment  - Modify environment to reduce risk of injury  - Consider OT/PT consult to assist with strengthening/mobility  Outcome: Progressing  Goal: Maintain or return to baseline ADL function  Description  INTERVENTIONS:  -  Assess patient's ability to carry out ADLs; assess patient's baseline for ADL function and identify physical deficits which impact ability to perform ADLs (bathing, care of mouth/teeth, toileting, grooming, dressing, etc )  - Assess/evaluate cause of self-care deficits   - Assess range of motion  - Assess patient's mobility; develop plan if impaired  - Assess patient's need for assistive devices and provide as appropriate  - Encourage maximum independence but intervene and supervise when necessary  - Involve family in performance of ADLs  - Assess for home care needs following discharge   - Consider OT consult to assist with ADL evaluation and planning for discharge  - Provide patient education as appropriate  Outcome: Progressing  Goal: Maintain or return mobility status to optimal level  Description  INTERVENTIONS:  - Assess patient's baseline mobility status (ambulation, transfers, stairs, etc )    - Identify cognitive and physical deficits and behaviors that affect mobility  - Identify mobility aids required to assist with transfers and/or ambulation (gait belt, sit-to-stand, lift, walker, cane, etc )  - [place] fall precautions as indicated by assessment  - Record patient progress and toleration of activity level on Mobility SBAR; progress patient to next Phase/Stage  - Instruct patient to call for assistance with activity based on assessment  - Consider rehabilitation consult to assist with strengthening/weightbearing, etc   Outcome: Progressing     Problem: DISCHARGE PLANNING  Goal: Discharge to home or other facility with appropriate resources  Description  INTERVENTIONS:  - Identify barriers to discharge w/patient and caregiver  - Arrange for needed discharge resources and transportation as appropriate  - Identify discharge learning needs (meds, wound care, etc )  - Arrange for interpretive services to assist at discharge as needed  - Refer to Case Management Department for coordinating discharge planning if the patient needs post-hospital services based on physician/advanced practitioner order or complex needs related to functional status, cognitive ability, or social support system  Outcome: Progressing     Problem: Knowledge Deficit  Goal: Patient/family/caregiver demonstrates understanding of disease process, treatment plan, medications, and discharge instructions  Description  Complete learning assessment and assess knowledge base  Interventions:  - Provide teaching at level of understanding  - Provide teaching via preferred learning methods  Outcome: Progressing     Problem: Nutrition/Hydration-ADULT  Goal: Nutrient/Hydration intake appropriate for improving, restoring or maintaining nutritional needs  Description  Monitor and assess patient's nutrition/hydration status for malnutrition  Collaborate with interdisciplinary team and initiate plan and interventions as ordered  Monitor patient's weight and dietary intake as ordered or per policy  Utilize nutrition screening tool and intervene as necessary   Determine patient's food preferences and provide high-protein, high-caloric foods as appropriate  INTERVENTIONS:  - Monitor oral intake, urinary output, labs, and treatment plans  - Assess nutrition and hydration status and recommend course of action  - Evaluate amount of meals eaten  - Assist patient with eating if necessary   - Allow adequate time for meals  - Recommend/ encourage appropriate diets, oral nutritional supplements, and vitamin/mineral supplements  - Order, calculate, and assess calorie counts as needed  - Recommend, monitor, and adjust tube feedings and TPN/PPN based on assessed needs  - Assess need for intravenous fluids  - Provide nutrition/hydration education as appropriate  - Include patient/family/caregiver in decisions related to nutrition   Outcome: Progressing     Problem: Potential for Falls  Goal: Patient will remain free of falls  Description  INTERVENTIONS:  - Assess patient frequently for physical needs  -  Identify cognitive and physical deficits and behaviors that affect risk of falls    -  [place] fall precautions as indicated by assessment   - Educate patient/family on patient safety including physical limitations  - Instruct patient to call for assistance with activity based on assessment  - Modify environment to reduce risk of injury  - Consider OT/PT consult to assist with strengthening/mobility  Outcome: Progressing

## 2020-03-14 NOTE — ASSESSMENT & PLAN NOTE
Lab Results   Component Value Date    HGBA1C 6 9 (H) 02/07/2020       Recent Labs     03/13/20  1641 03/14/20  0047 03/14/20  0640 03/14/20  1037   POCGLU 104 93 90 117       Blood Sugar Average: Last 72 hrs:  (P) 00 [id number]     Hemoglobin A1c 6 9  Patient was getting Lantus 30 units q h s     Due to episodes of hypoglycemia and currently being NPO will decrease it to Lantus 5 units q h s     Currently patient is on D5 half NS    Accu-Cheks monitoring  Sliding scale coverage

## 2020-03-14 NOTE — ASSESSMENT & PLAN NOTE
Troponin peaked 4 80 trended down 0 39  Unclear if non STEMI type 1 versus type 2  Echo noted with EF 40-50%, mildly reduced dysfunction, mild diffuse hypokinesis, grade 1 diastolic dysfunction  Currently patient denies chest pain, dyspnea, abdominal pain, any new complaints  Patient was treated with IV heparin drip  now discontinued per Cardiology  At this time will hold off on further invasive workup considering patient's risk  Patient and wife both agreeable with above plan  Continue aspirin, Plavix, statin, beta-blocker  Cardiology recommendation appreciated

## 2020-03-14 NOTE — CONSULTS
Consultation - [address] [facility]  Francoise Pillai 70 y o  male MRN: [id number]  Unit/Bed#: -01 Encounter: [id number]        Consults    Reason for Consult / Principal Problem: PEG Tube Placement    HPI: Mr Rodolfo De Oliveira is a 71 yo M with a PMH of CVA about 8 years ago, seizure disorder, DM2, paroxysmal Afib, chronic diastolic heart failure, CKD stage III, hypothyroidism, HTN, who presented on 3/9 due to lethargy and poor PO intake for about 2 days of unclear etiology  He was found to have a NSTEMI and difficulty swallowing on admission and was started on antibiotics for possible CAP/aspiration pneumonia  He has been evaluated by speech therapy and has been strict NPO since admission  His family at bedside reports that his stroke was about 8 years ago and they were never told of any swallowing problems after that  He typically feeds himself at home well but does cough sometimes after PO intake  It was a very acute change during the 2 days before admission where he was lethargic and had poor PO intake  They are all concerned about his nutrition because he has not eaten anything since Sunday  An NG tube was placed yesterday but he was very agitated and unfortunately removed it last night  His family wants to try this again and see if he can have some sort of restraint      REVIEW OF SYSTEMS: Negative except for as stated above      Historical Information   Past Medical History:   Diagnosis Date    Acute renal failure syndrome ([facility] [address] [facility]     Cardiac arrhythmia     Carotid stenosis, bilateral     Cerebrovascular disease     Chronic kidney disease     CVA (cerebral vascular accident) ([facility] [address] [facility]     Diabetes mellitus ([facility] [address] [facility]     ETOH abuse     Hyperlipidemia     Hyperosmolality     Hypertension     Lymphedema of both lower extremities     Memory loss     NPH (normal pressure hydrocephalus) (HCC)     Seizure disorder [facility]      Past Surgical History:   Procedure Laterality Date    CATARACT EXTRACTION      COLONOSCOPY  2017    HERNIA REPAIR      MULTIPLE TOOTH EXTRACTIONS       Social History   Social History     Substance and Sexual Activity   Alcohol Use Not Currently     Social History     Substance and Sexual Activity   Drug Use Not Currently     Social History     Tobacco Use   Smoking Status Former Smoker    Types: Cigarettes    Last attempt to quit: 2012    Years since quittin 2   Smokeless Tobacco Never Used     Family History   Problem Relation Age of Onset    Hypertension Father     Hypertension Brother     Diabetes Brother     Stroke Brother     Cancer Mother        Meds/Allergies     Medications Prior to Admission   Medication    amLODIPine (NORVASC) 5 mg tablet    atorvastatin (LIPITOR) 20 mg tablet    B-D UF III MINI PEN NEEDLES 31G X 5 MM MISC    clopidogrel (PLAVIX) 75 mg tablet    divalproex sodium (DEPAKOTE) 500 mg EC tablet    ferrous sulfate 325 (65 Fe) mg tablet    folic acid (FOLVITE) 1 mg tablet    furosemide (LASIX) 20 mg tablet    insulin glargine (LANTUS) 100 units/mL subcutaneous injection    levothyroxine 25 mcg tablet    metoprolol tartrate (LOPRESSOR) 25 mg tablet    Multiple Vitamin (MULTIVITAMINS PO)    potassium chloride (KLOR-CON M20) 20 mEq tablet    travoprost (TRAVATAN Z) 0 004 % ophthalmic solution     Current Facility-Administered Medications   Medication Dose Route Frequency    atorvastatin (LIPITOR) tablet 20 mg  20 mg Oral Daily    dextrose 5 % and sodium chloride 0 45 % infusion  75 mL/hr Intravenous Continuous    heparin (porcine) subcutaneous injection 5,000 Units  5,000 Units Subcutaneous Q8H [facility]    hydrALAZINE (APRESOLINE) injection 5 mg  5 mg Intravenous Q6H PRN    hydrALAZINE (APRESOLINE) tablet 25 mg  25 mg Oral Q8H [facility]    insulin lispro (HumaLOG) 100 units/mL subcutaneous injection 1-6 Units  1-6 Units Subcutaneous Q6H [facility]    isosorbide dinitrate (ISORDIL) tablet 20 mg  20 mg Oral TID after meals    levothyroxine tablet 25 mcg  25 mcg Oral Early Morning    metoprolol tartrate (LOPRESSOR) tablet 12 5 mg  12 5 mg Oral Q12H [facility]    pantoprazole (PROTONIX) injection 40 mg  40 mg Intravenous Q24H EUGEN[facility]    valproate (DEPACON) 500 mg in sodium chloride 0 9 % 50 mL IVPB  500 mg Intravenous Q8H [facility]       Allergies   Allergen Reactions    Enalapril Angioedema, Anaphylaxis and Shortness Of Breath     Other reaction(s): Unknown  Other reaction(s): Respiratory distress, Unknown Reaction           Objective     Blood pressure (!) 176/80, pulse 65, temperature (!) 97 3 °F (36 3 °C), temperature source Axillary, resp  rate 20, height 5' 11" (1 803 m), weight 114 kg (251 lb 12 3 oz), SpO2 99 %  Intake/Output Summary (Last 24 hours) at 3/14/2020 1200  Last data filed at 3/13/2020 1801  Gross per 24 hour   Intake    Output 250 ml   Net -250 ml         PHYSICAL EXAM:      General Appearance:   Alert, cooperative, no distress, appears stated age    HEENT:   Normocephalic, atraumatic, anicteric      Neck:  Supple, symmetrical, trachea midline, no adenopathy;    thyroid: no enlargement/tenderness/nodules; no carotid  bruit or JVD    Lungs:   Clear to auscultation bilaterally; no rales, rhonchi or wheezing; respirations unlabored    Heart[de-identified]   S1 and S2 normal; regular rate and rhythm; no murmur, rub, or gallop     Abdomen:   Soft, non-tender, non-distended; normal bowel sounds; no masses, no organomegaly    Genitalia:   Deferred    Rectal:   Deferred    Extremities:  No cyanosis, clubbing or edema    Pulses:  2+ and symmetric all extremities    Skin:  Skin color, texture, turgor normal, no rashes or lesions    Lymph nodes:  No palpable cervical, axillary or inguinal lymphadenopathy        Lab Results:   Results from last 7 days   Lab Units 03/13/20  1210   WBC Thousand/uL 4 89   HEMOGLOBIN g/dL 10 7*   HEMATOCRIT % 33 8*   PLATELETS Thousands/uL 104*   NEUTROS PCT % 44   LYMPHS PCT % 34   MONOS PCT % 17*   EOS PCT % 4     Results from last 7 days   Lab Units 03/13/20  1210 03/12/20  0450   POTASSIUM mmol/L 3 4* 3 5   CHLORIDE mmol/L 110* 109*   CO2 mmol/L 27 28   BUN mg/dL 10 13   CREATININE mg/dL 1 19 1 20   CALCIUM mg/dL 7 4* 7 8*   ALK PHOS U/L  --  57   ALT U/L  --  34   AST U/L  --  130*     Results from last 7 days   Lab Units 03/09/20  1500   INR  1 27*           Imaging Studies: I have personally reviewed pertinent imaging studies  Ct Chest Abdomen Pelvis Wo Contrast  Result Date: 3/10/2020  Impression: 1  Scattered atelectasis with small right pleural effusion  2   Small pericardial effusion  3   Catheterized, thick-walled bladder with perivesical inflammation  Cystitis not excluded  4   Bilobed low density mass in the left inguinal region measuring 6 0 x 3 5 cm, possible postoperative seroma status post herniorrhaphy  Pathologic lymph node not completely excluded on this nonenhanced study  Ultrasound would be of benefit for further characterization  The study was marked in EPIC for significant notification  Workstation performed: [id number]     Xr Chest Portable  Result Date: 3/13/2020  Impression: Enteric tube courses to the stomach  Workstation performed: [id number]     Xr Chest Portable  Result Date: 3/9/2020  Impression: Mild vascular congestion  Patchy bibasilar atelectasis  Workstation performed: [id number]     Ct Head Without Contrast  Result Date: 3/9/2020  Impression: No acute intracranial abnormality  Microangiopathic changes  Workstation performed: [id number]     Us Groin/inguinal Area  Result Date: 3/11/2020  Impression: 7 1 x 6 3 x 3 8 cm complex mass in the left groin corresponding to the prior CT finding  Findings most suggestive of a complex seroma or organizing hematoma    Lymphadenopathy felt less likely, however recommend clinical follow-up to ensure ability or resolution with repeated imaging for any suspicious interval change Workstation performed: [id number]       ASSESSMENT and PLAN:      Oropharyngeal dysphagia  - Speech pathology evaluated the patient and he has oropharyngeal dysphagia with high risk of aspiration making him strict NPO  - Unclear if he has baseline oropharyngeal dysphagia from prior CVA and if event on admission exacerbated this or if he has waxing/waning mentation with intermittent aspiration long term  - Discussed benefits and risks of PEG tube placement, family would like to proceed and allow him to work with speech therapy over the next several months to see if his swallowing function improves  - Will need to hold ASA/plavix at least 3-5 days prior to PEG tube placement, last dose was 3/13, continue to hold  - Will attempt NG tube placement again in the meantime, family okay with soft restraints to avoid him removing NG tube again  - Abdominal XR to confirm placement then can start tube feedings via NG tube per nutrition recommendations  - Tentatively plan for PEG tube placement next week  - Will check ammonia level given he is on depakote and this could leave to hyperammonemia/AMS    Thrombocytopenia  - Chronic, unclear etiology  - Reviewed imaging and there is no evidence of liver disease/cirrhosis or splenomegaly    Elevated AST  - Remaining LFTs normal  - May be 2/2 NSTEMI, check CK to rule out rhabdo      The patient will be seen by Dr Tessie Davis

## 2020-03-14 NOTE — ASSESSMENT & PLAN NOTE
Home medication includes Depakote 500 mg p o  In the morning and 1000 mg at night  Currently on IV while inpatient NPO  Attempted NG tube yesterday which patient pulled out  We will try again for NG tube placement if patient and family agrees  Pending speech and swallow recommendations  Patient has not had seizure in approximately 8 years per wife

## 2020-03-14 NOTE — ASSESSMENT & PLAN NOTE
Patient presented with worsening encephalopathy over last 2 days prior to admission  Now improved  Suspected secondary to acute illness as stated above as well as component of progressive dementia  Currently patient is awake, alert, oriented x3  I do suspect underlying cognitive decline may be worsening vs hypothyroidism since TSH elevated  Will try to reach out to patient's primary neurologist Dr Kenny Wing discussed patient's baseline and get through understanding about his baseline functional status

## 2020-03-14 NOTE — ASSESSMENT & PLAN NOTE
Continue Lopressor with holding parameters,  Started on hydralazine, Isordil per Cardiology  Currently patient is NPO per speech and swallow recommendation  Patient had removed NG tube and will attempt again if family agrees pain  Continue IV p r n  For now  Continue to monitor

## 2020-03-14 NOTE — ASSESSMENT & PLAN NOTE
TSH noted elevated  Unsure if patient was taking Synthroid on not    Resume home medication of Synthroid  Outpatient follow-up

## 2020-03-14 NOTE — ASSESSMENT & PLAN NOTE
Per wife patient has decreased p o  Intake for past couple days without clear etiology  Currently NPO  Speech and swallow recommendation appreciated  After lengthy discussion regarding risk and benefits of feeding tube patient and family both requesting discussed with gastroenterologist about possibility of PEG tube placement  Patient pulled out NG tube will re-attempt again if family agrees  Follow-up with GI consultation

## 2020-03-14 NOTE — ASSESSMENT & PLAN NOTE
Wt Readings from Last 3 Encounters:   03/14/20 114 kg (251 lb 12 3 oz)   02/11/20 114 kg (252 lb)   12/24/19 117 kg (258 lb 6 1 oz)     Echo report noted with EF of 45-50%    With grade 1 diastolic dysfunction  Continue aspirin, beta-blocker, Isordil, hydralazine per Cardiology  Low-salt, fluid restriction diet  Not on Ace or Arb secondary to allergy to enalapril with angioedema  Removed Hines catheter and start voiding trial

## 2020-03-14 NOTE — PROGRESS NOTES
Progress Note - Michelle Gutierres 1940, 70 y o  male MRN: [id number]    Unit/Bed#: -01 Encounter: [id number]    Primary Care Provider: Nam Sevilla MD   Date and time admitted to hospital: 3/9/2020  6:29 AM        * NSTEMI (non-ST elevated myocardial infarction) [facility])  Assessment & Plan  Troponin peaked 4 80 trended down 0 39  Unclear if non STEMI type 1 versus type 2  Echo noted with EF 40-50%, mildly reduced dysfunction, mild diffuse hypokinesis, grade 1 diastolic dysfunction  Currently patient denies chest pain, dyspnea, abdominal pain, any new complaints  Patient was treated with IV heparin drip  now discontinued per Cardiology  At this time will hold off on further invasive workup considering patient's risk  Patient and wife both agreeable with above plan  Continue aspirin, Plavix, statin, beta-blocker  Cardiology recommendation appreciated  Mass of left inguinal region  Assessment & Plan  Noted on CT  Groin ultrasound report noted for possible seroma or hematoma and discussed with patient and wife at bedside  Asymptomatic  Cystitis  Assessment & Plan  Completed antibiotic course  Currently asymptomatic  Acquired hypothyroidism  Assessment & Plan  TSH noted elevated  Unsure if patient was taking Synthroid on not  Resume home medication of Synthroid  Outpatient follow-up    Dysphagia  Assessment & Plan  Per wife patient has decreased p o  Intake for past couple days without clear etiology  Currently NPO  Speech and swallow recommendation appreciated  After lengthy discussion regarding risk and benefits of feeding tube patient and family both requesting discussed with gastroenterologist about possibility of PEG tube placement  Patient pulled out NG tube will re-attempt again if family agrees  Follow-up with GI consultation  Acute metabolic encephalopathy  Assessment & Plan  Patient presented with worsening encephalopathy over last 2 days prior to admission    Now improved  Suspected secondary to acute illness as stated above as well as component of progressive dementia  Currently patient is awake, alert, oriented x3  I do suspect underlying cognitive decline may be worsening vs hypothyroidism since TSH elevated  Will try to reach out to patient's primary neurologist Dr Zara Farris discussed patient's baseline and get through understanding about his baseline functional status  Lymphedema of right lower extremity  Assessment & Plan  Chronic  Noted  Seizure disorder [facility])  Assessment & Plan  Home medication includes Depakote 500 mg p o  In the morning and 1000 mg at night  Currently on IV while inpatient NPO  Attempted NG tube yesterday which patient pulled out  We will try again for NG tube placement if patient and family agrees  Pending speech and swallow recommendations  Patient has not had seizure in approximately 8 years per wife  Chronic diastolic heart failure (HCC)  Assessment & Plan  Wt Readings from Last 3 Encounters:   03/14/20 114 kg (251 lb 12 3 oz)   02/11/20 114 kg (252 lb)   12/24/19 117 kg (258 lb 6 1 oz)     Echo report noted with EF of 45-50%  With grade 1 diastolic dysfunction  Continue aspirin, beta-blocker, Isordil, hydralazine per Cardiology  Low-salt, fluid restriction diet  Not on Ace or Arb secondary to allergy to enalapril with angioedema  Removed Hines catheter and start voiding trial       History of bilateral subcortical CVA   Assessment & Plan  History of CVA  Patient without significant deficit  Continue aspirin, Plavix  PT OT eval    Paroxysmal atrial flutter (HCC)  Assessment & Plan  History of paroxysmal AFib  Continue metoprolol 12 5 mg b i d  Currently rate controlled  Current  Not on anticoagulation due to dementia, risk of fall, increased risk of bleeding  Cardiology recommendations appreciated      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[facility])  Assessment & Plan  Lab Results   Component Value Date HGBA1C 6 9 (H) 2020       Recent Labs     20  1641 20  0047 20  0640 20  1037   POCGLU 104 93 90 117       Blood Sugar Average: Last 72 hrs:  (P) 73 [id number]     Hemoglobin A1c 6 9  Patient was getting Lantus 30 units q h s     Due to episodes of hypoglycemia and currently being NPO will decrease it to Lantus 5 units q h s     Currently patient is on D5 half NS  Accu-Cheks monitoring  Sliding scale coverage    Benign essential hypertension  Assessment & Plan  Continue Lopressor with holding parameters,  Started on hydralazine, Isordil per Cardiology  Currently patient is NPO per speech and swallow recommendation  Patient had removed NG tube and will attempt again if family agrees pain  Continue IV p r n  For now  Continue to monitor  Chronic renal insufficiency, stage III (moderate) ([facility])  Assessment & Plan  Baseline creatinine is around 1 6 range  Currently at baseline  VTE Pharmacologic Prophylaxis:   Pharmacologic: Heparin  Patient Centered Rounds: I have performed bedside rounds with nursing staff today  Education and Discussions with Family / Patient:  Patient, family at bedside    Time Spent for Care: 30 minutes  More than 50% of total time spent on counseling and coordination of care as described above  Current Length of Stay: 5 day(s)    Current Patient Status: Inpatient   Certification Statement: The patient will continue to require additional inpatient hospital stay due to Currently NPO, possibility of PEG tube placement  Discharge Plan: tbd    Code Status: Level 1 - Full Code      Subjective:   Afebrile, hemodynamically stable  Patient had pulled out NG tube yesterday evening  Will attempt again if patient and family agrees  Awake, alert, oriented x3  Does not offer any complaints  Awaiting GI evaluation for possibility of PEG tube placement  No other events reported      Objective:     Vitals:   Temp (24hrs), Av 6 °F (36 4 °C), Min:97 3 °F (36 3 °C), Max:97 9 °F (36 6 °C)    Temp:  [97 3 °F (36 3 °C)-97 9 °F (36 6 °C)] 97 3 °F (36 3 °C)  HR:  [59-65] 65  Resp:  [18-20] 20  BP: (144-178)/(63-80) 176/80  SpO2:  [95 %-99 %] 99 %  Body mass index is 35 11 kg/m²  Input and Output Summary (last 24 hours): Intake/Output Summary (Last 24 hours) at 3/14/2020 1112  Last data filed at 3/13/2020 1801  Gross per 24 hour   Intake    Output 250 ml   Net -250 ml       Physical Exam:     Physical Exam   Constitutional: He is oriented to person, place, and time  No distress  Elderly, frail, deconditioned male patient, chronically ill, acutely nontoxic appearing not in acute distress  HENT:   Head: Normocephalic and atraumatic  Eyes: Pupils are equal, round, and reactive to light  EOM are normal    Neck: Normal range of motion  Neck supple  No JVD present  Cardiovascular: Normal rate and regular rhythm  Pulmonary/Chest: Effort normal and breath sounds normal  No stridor  No respiratory distress  Abdominal: Soft  Bowel sounds are normal  There is no tenderness  Genitourinary:   Genitourinary Comments: Hines catheter noted with dark, clear yellow urine  Musculoskeletal: Normal range of motion  He exhibits edema  Neurological: He is alert and oriented to person, place, and time  Currently patient is orient x3  Cooperative exam   I do believe that patient has capacity to make decision at this time  Skin: He is not diaphoretic         Additional Data:     Labs:    Results from last 7 days   Lab Units 03/13/20  1210   WBC Thousand/uL 4 89   HEMOGLOBIN g/dL 10 7*   HEMATOCRIT % 33 8*   PLATELETS Thousands/uL 104*   NEUTROS PCT % 44   LYMPHS PCT % 34   MONOS PCT % 17*   EOS PCT % 4     Results from last 7 days   Lab Units 03/13/20  1210 03/12/20  0450   SODIUM mmol/L 143 143   POTASSIUM mmol/L 3 4* 3 5   CHLORIDE mmol/L 110* 109*   CO2 mmol/L 27 28   BUN mg/dL 10 13   CREATININE mg/dL 1 19 1 20   ANION GAP mmol/L 6 6   CALCIUM mg/dL 7  4* 7 8*   ALBUMIN g/dL  --  2 0*   TOTAL BILIRUBIN mg/dL  --  0 40   ALK PHOS U/L  --  57   ALT U/L  --  34   AST U/L  --  130*   GLUCOSE RANDOM mg/dL 129 83     Results from last 7 days   Lab Units 03/09/20  1500   INR  1 27*     Results from last 7 days   Lab Units 03/14/20  1037 03/14/20  0640 03/14/20  0047 03/13/20  1641 03/13/20  1209 03/13/20  0549 03/12/20  2333 03/12/20  2050 03/12/20  1844 03/12/20  1402 03/12/20  1110 03/12/20  0635   POC GLUCOSE mg/dl 117 90 93 104 111 86 85 83 74 103 65 125         Results from last 7 days   Lab Units 03/10/20  0523 03/09/20  1844 03/09/20  1500 03/09/20  1128   LACTIC ACID mmol/L 1 3 2 1* 2 1* 2 1*   PROCALCITONIN ng/ml 0 07  --   --   --            * I Have Reviewed All Lab Data Listed Above  * Additional Pertinent Lab Tests Reviewed: All Labs Within Last 24 Hours Reviewed        Recent Cultures (last 7 days):     Results from last 7 days   Lab Units 03/10/20  1414 03/10/20  1413 03/09/20  1500 03/09/20  0643   BLOOD CULTURE  No Growth at 72 hrs  No Growth at 72 hrs   --  No Growth After 4 Days    Acinetobacter species*  Vagococcus*  Gram-positive natacha*   GRAM STAIN RESULT   --   --   --  Gram negative rods*  Gram positive cocci in chains*   LEGIONELLA URINARY ANTIGEN   --   --  Negative  --        Last 24 Hours Medication List:     Current Facility-Administered Medications:  aspirin 81 mg Oral Daily ONDINA Go    atorvastatin 20 mg Oral Daily ONDINA Go    clopidogrel 75 mg Oral Daily ONDINA Go    dextrose 5 % and sodium chloride 0 45 % 75 mL/hr Intravenous Continuous ONDINA Mills Last Rate: 75 mL/hr (03/14/20 0534)   heparin (porcine) 5,000 Units Subcutaneous Q8H [address] Hugo SKINNER MD    hydrALAZINE 5 mg Intravenous Q6H PRN Hugo SKINNER MD    hydrALAZINE 25 mg Oral Q8H [address] ONDINA Ruiz    insulin lispro 1-6 Units Subcutaneous Q6H [address] ONDINA Go    isosorbide dinitrate 20 mg Oral TID after meals Deana SALINAS [employer] [place], ONDINA    levothyroxine 25 mcg Oral Early Morning Hugo SKINNER MD    metoprolol tartrate 12 5 mg Oral Q12H [facility] ONDINA Go    pantoprazole 40 mg Intravenous Q24H [facility] ONDINA Go    valproate sodium 500 mg Intravenous [facility] ONDINA Go Last Rate: 500 mg (03/14/20 0529)        Today, Patient Was Seen By: Alba Shine MD    ** Please Note: Dictation voice to text software may have been used in the creation of this document   **

## 2020-03-14 NOTE — ASSESSMENT & PLAN NOTE
History of paroxysmal AFib  Continue metoprolol 12 5 mg b i d  Currently rate controlled  Current  Not on anticoagulation due to dementia, risk of fall, increased risk of bleeding  Cardiology recommendations appreciated

## 2020-03-15 ENCOUNTER — APPOINTMENT (INPATIENT)
Dept: MRI IMAGING | Facility: HOSPITAL | Age: 80
DRG: 280 | End: 2020-03-15
Payer: MEDICARE

## 2020-03-15 LAB
AMMONIA PLAS-SCNC: 13 UMOL/L (ref 11–35)
BACTERIA BLD CULT: NORMAL
BACTERIA BLD CULT: NORMAL
FOLATE SERPL-MCNC: 19.7 NG/ML (ref 3.1–17.5)
GLUCOSE SERPL-MCNC: 112 MG/DL (ref 65–140)
GLUCOSE SERPL-MCNC: 112 MG/DL (ref 65–140)
GLUCOSE SERPL-MCNC: 120 MG/DL (ref 65–140)
GLUCOSE SERPL-MCNC: 124 MG/DL (ref 65–140)
GLUCOSE SERPL-MCNC: 91 MG/DL (ref 65–140)
GLUCOSE SERPL-MCNC: 99 MG/DL (ref 65–140)
VIT B12 SERPL-MCNC: 923 PG/ML (ref 100–900)

## 2020-03-15 PROCEDURE — 99232 SBSQ HOSP IP/OBS MODERATE 35: CPT | Performed by: INTERNAL MEDICINE

## 2020-03-15 PROCEDURE — 82140 ASSAY OF AMMONIA: CPT | Performed by: PHYSICIAN ASSISTANT

## 2020-03-15 PROCEDURE — 82948 REAGENT STRIP/BLOOD GLUCOSE: CPT

## 2020-03-15 PROCEDURE — C9113 INJ PANTOPRAZOLE SODIUM, VIA: HCPCS | Performed by: NURSE PRACTITIONER

## 2020-03-15 PROCEDURE — 99232 SBSQ HOSP IP/OBS MODERATE 35: CPT | Performed by: STUDENT IN AN ORGANIZED HEALTH CARE EDUCATION/TRAINING PROGRAM

## 2020-03-15 PROCEDURE — 70551 MRI BRAIN STEM W/O DYE: CPT

## 2020-03-15 RX ORDER — LEVOTHYROXINE SODIUM 0.05 MG/1
50 TABLET ORAL
Status: DISCONTINUED | OUTPATIENT
Start: 2020-03-16 | End: 2020-03-15

## 2020-03-15 RX ORDER — LEVOTHYROXINE SODIUM 0.03 MG/1
25 TABLET ORAL
Status: DISCONTINUED | OUTPATIENT
Start: 2020-03-16 | End: 2020-03-17

## 2020-03-15 RX ADMIN — HEPARIN SODIUM 5000 UNITS: 5000 INJECTION INTRAVENOUS; SUBCUTANEOUS at 22:43

## 2020-03-15 RX ADMIN — ISOSORBIDE DINITRATE 20 MG: 10 TABLET ORAL at 19:38

## 2020-03-15 RX ADMIN — ISOSORBIDE DINITRATE 20 MG: 10 TABLET ORAL at 15:25

## 2020-03-15 RX ADMIN — METOPROLOL TARTRATE 12.5 MG: 25 TABLET ORAL at 22:43

## 2020-03-15 RX ADMIN — METOPROLOL TARTRATE 12.5 MG: 25 TABLET ORAL at 09:17

## 2020-03-15 RX ADMIN — LEVOTHYROXINE SODIUM 25 MCG: 25 TABLET ORAL at 05:59

## 2020-03-15 RX ADMIN — HEPARIN SODIUM 5000 UNITS: 5000 INJECTION INTRAVENOUS; SUBCUTANEOUS at 05:59

## 2020-03-15 RX ADMIN — VALPROATE SODIUM 500 MG: 100 INJECTION, SOLUTION INTRAVENOUS at 15:26

## 2020-03-15 RX ADMIN — VALPROATE SODIUM 500 MG: 100 INJECTION, SOLUTION INTRAVENOUS at 05:59

## 2020-03-15 RX ADMIN — HYDRALAZINE HYDROCHLORIDE 25 MG: 25 TABLET ORAL at 06:38

## 2020-03-15 RX ADMIN — ATORVASTATIN CALCIUM 20 MG: 20 TABLET, FILM COATED ORAL at 09:17

## 2020-03-15 RX ADMIN — DEXTROSE AND SODIUM CHLORIDE 75 ML/HR: 5; .45 INJECTION, SOLUTION INTRAVENOUS at 15:35

## 2020-03-15 RX ADMIN — VALPROATE SODIUM 500 MG: 100 INJECTION, SOLUTION INTRAVENOUS at 22:44

## 2020-03-15 RX ADMIN — HYDRALAZINE HYDROCHLORIDE 25 MG: 25 TABLET ORAL at 22:43

## 2020-03-15 RX ADMIN — HEPARIN SODIUM 5000 UNITS: 5000 INJECTION INTRAVENOUS; SUBCUTANEOUS at 15:26

## 2020-03-15 RX ADMIN — PANTOPRAZOLE SODIUM 40 MG: 40 INJECTION, POWDER, FOR SOLUTION INTRAVENOUS at 09:17

## 2020-03-15 RX ADMIN — ISOSORBIDE DINITRATE 20 MG: 10 TABLET ORAL at 09:17

## 2020-03-15 RX ADMIN — HYDRALAZINE HYDROCHLORIDE 25 MG: 25 TABLET ORAL at 15:26

## 2020-03-15 NOTE — PROGRESS NOTES
Progress Note - Deann Parra 1940, 70 y o  male MRN: [id number]    Unit/Bed#: -01 Encounter: [id number]    Primary Care Provider: Petra Medrano MD   Date and time admitted to hospital: 3/9/2020  6:29 AM        * NSTEMI (non-ST elevated myocardial infarction) [facility])  Assessment & Plan  Troponin peaked 4 80 trended down 0 39  Unclear if non STEMI type 1 versus type 2  Echo noted with EF 40-50%, mildly reduced dysfunction, mild diffuse hypokinesis, grade 1 diastolic dysfunction  Currently patient denies chest pain, dyspnea, abdominal pain, any new complaints  Patient was treated with IV heparin drip  now discontinued per Cardiology  Currently asymptomatic, hemodynamically stable  At this time will hold off on further invasive workup considering patient's risk per Cardiology  Patient and wife both agreeable with above plan  Continue aspirin, Plavix, statin, beta-blocker  Cardiology recommendation appreciated  Dysphagia  Assessment & Plan  Per wife patient has decreased p o  Intake for past couple days without clear etiology  Currently NPO  Speech and swallow recommendation appreciated  After lengthy discussion regarding risk and benefits of feeding tube patient and family both requesting discussed with gastroenterologist about possibility of PEG tube placement  Currently being fed via NG tube  Wife and patient agreeable with PEG tube placement  Plavix on hold per GI for planned PEG tube placement on Tuesday  Follow-up with GI consultation  Protein calorie malnutrition ([facility] [address] )  Assessment & Plan  Malnutrition Findings:           BMI Findings: Body mass index is 34 62 kg/m²  Currently being fed by NG tube  Plan is for PEG tube placement on Tuesday  Mass of left inguinal region  Assessment & Plan  Noted on CT  Groin ultrasound report noted for possible seroma or hematoma and discussed with patient and wife at bedside  Asymptomatic        Cystitis  Assessment & Plan  Completed antibiotic course  Currently asymptomatic  Acquired hypothyroidism  Assessment & Plan  TSH noted elevated  Wife reports that patient takes all medication as prescribed  Resume home medication of Synthroid  Outpatient follow-up    Acute metabolic encephalopathy  Assessment & Plan  Patient presented with worsening encephalopathy over last 2 days prior to admission  Now improved  Suspected secondary to acute illness as stated above as well as component of progressive dementia  Currently patient does appear slightly confused  Cooperative during exam exam   Wife does not appear to be concerned  I do suspect underlying cognitive decline may be worsening vs hypothyroidism since TSH elevated  Will try to reach out to patient's primary neurologist Dr Evalee Gowers discussed patient's baseline and get through understanding about his baseline functional status  Lymphedema of right lower extremity  Assessment & Plan  Chronic  Noted  Seizure disorder [facility])  Assessment & Plan  Home medication includes Depakote 500 mg p o  In the morning and 1000 mg at night  Currently on IV while inpatient NPO  Attempted NG tube yesterday which patient pulled out  We will try again for NG tube placement if patient and family agrees  Pending speech and swallow recommendations  Patient has not had seizure in approximately 8 years per wife  Chronic diastolic heart failure (HCC)  Assessment & Plan  Wt Readings from Last 3 Encounters:   03/15/20 113 kg (248 lb 3 8 oz)   03/15/20 113 kg (250 lb)   02/11/20 114 kg (252 lb)     Echo report noted with EF of 45-50%  With grade 1 diastolic dysfunction  Continue aspirin, beta-blocker, Isordil, hydralazine per Cardiology  Low-salt, fluid restriction diet  Not on Ace or Arb secondary to allergy to enalapril with angioedema  Removed Hines catheter and start voiding trial       History of bilateral subcortical CVA   Assessment & Plan  History of CVA    Patient without significant deficit  Continue aspirin, Plavix  PT OT eval    Paroxysmal atrial flutter (HCC)  Assessment & Plan  History of paroxysmal AFib  Continue metoprolol 12 5 mg b i d  Currently rate controlled  Current  Not on anticoagulation due to dementia, risk of fall, increased risk of bleeding  Cardiology recommendations appreciated  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[facility])  Assessment & Plan  Lab Results   Component Value Date    HGBA1C 6 9 (H) 02/07/2020       Recent Labs     03/15/20  0003 03/15/20  0616 03/15/20  1105 03/15/20  1556   POCGLU 91 99 120 112       Blood Sugar Average: Last 72 hrs:  (P) 56 [id number]     Hemoglobin A1c 6 9  Patient was getting Lantus 30 units q h s     Due to episodes of hypoglycemia and currently being NPO will decrease it to Lantus 5 units q h s     Will start losartan or Jevity via NG tube  Currently patient is on D5 half NS  Accu-Cheks monitoring  Sliding scale coverage    Benign essential hypertension  Assessment & Plan  Continue Lopressor with holding parameters,  Started on hydralazine, Isordil per Cardiology  Currently patient is NPO per speech and swallow recommendation  Patient had removed NG tube and will attempt again if family agrees pain  Continue IV p r n  For now  Continue to monitor  Chronic renal insufficiency, stage III (moderate) (HCC)  Assessment & Plan  Baseline creatinine is around 1 6 range  Currently at baseline  VTE Pharmacologic Prophylaxis:   Pharmacologic: Heparin    Patient Centered Rounds: I have performed bedside rounds with nursing staff today  Discussions with Specialists or Other Care Team Provider:  GI    Education and Discussions with Family / Patient:  Patient, wife at bedside  Time Spent for Care: 30 minutes  More than 50% of total time spent on counseling and coordination of care as described above      Current Length of Stay: 6 day(s)    Current Patient Status: Inpatient   Certification Statement: The patient will continue to require additional inpatient hospital stay due to NPO status, awaiting PEG tube placement on Tuesday  Discharge Plan: tbd    Code Status: Level 1 - Full Code      Subjective:   Afebrile, hemodynamically stable  Tolerating NG feeds well  Today patient appears slightly confused  Cooperative during exam   Using hand mittens to avoid agitation and pulling lines since patient had poorly NG tube previously  Plan is PEG tube placement on Tuesday  Wife and patient agreeable with above plan  No other events reported  Objective:     Vitals:   Temp (24hrs), Av 6 °F (36 4 °C), Min:97 5 °F (36 4 °C), Max:97 7 °F (36 5 °C)    Temp:  [97 5 °F (36 4 °C)-97 7 °F (36 5 °C)] 97 5 °F (36 4 °C)  HR:  [59-73] 73  Resp:  [18-20] 20  BP: (143-168)/(63-77) 168/76  SpO2:  [93 %-100 %] 98 %  Body mass index is 34 62 kg/m²  Input and Output Summary (last 24 hours): Intake/Output Summary (Last 24 hours) at 3/15/2020 1644  Last data filed at 3/15/2020 0734  Gross per 24 hour   Intake    Output 154 ml   Net -154 ml       Physical Exam:     Physical Exam   Constitutional: No distress  Elderly, frail, deconditioned male patient, chronically ill, acutely nontoxic appearing not in acute distress  HENT:   Head: Normocephalic and atraumatic  Eyes: Pupils are equal, round, and reactive to light  EOM are normal    Neck: Normal range of motion  Neck supple  No JVD present  Cardiovascular: Normal rate and regular rhythm  Pulmonary/Chest: Effort normal and breath sounds normal  No stridor  No respiratory distress  Abdominal: Soft  Bowel sounds are normal  There is no tenderness  Musculoskeletal: Normal range of motion  He exhibits edema  Neurological:   Patient appears slightly confused  Cooperative during exam    Skin: He is not diaphoretic            Additional Data:     Labs:    Results from last 7 days   Lab Units 20  1210   WBC Thousand/uL 4 89   HEMOGLOBIN g/dL 10 7*   HEMATOCRIT % 33 8*   PLATELETS Thousands/uL 104*   NEUTROS PCT % 44   LYMPHS PCT % 34   MONOS PCT % 17*   EOS PCT % 4     Results from last 7 days   Lab Units 03/13/20  1210 03/12/20  0450   SODIUM mmol/L 143 143   POTASSIUM mmol/L 3 4* 3 5   CHLORIDE mmol/L 110* 109*   CO2 mmol/L 27 28   BUN mg/dL 10 13   CREATININE mg/dL 1 19 1 20   ANION GAP mmol/L 6 6   CALCIUM mg/dL 7 4* 7 8*   ALBUMIN g/dL  --  2 0*   TOTAL BILIRUBIN mg/dL  --  0 40   ALK PHOS U/L  --  57   ALT U/L  --  34   AST U/L  --  130*   GLUCOSE RANDOM mg/dL 129 83     Results from last 7 days   Lab Units 03/09/20  1500   INR  1 27*     Results from last 7 days   Lab Units 03/15/20  1556 03/15/20  1105 03/15/20  0616 03/15/20  0003 03/14/20  1926 03/14/20  1037 03/14/20  0640 03/14/20  0047 03/13/20  1641 03/13/20  1209 03/13/20  0549 03/12/20  2333   POC GLUCOSE mg/dl 112 120 99 91 120 117 90 93 104 111 86 85         Results from last 7 days   Lab Units 03/10/20  0523 03/09/20  1844 03/09/20  1500 03/09/20  1128   LACTIC ACID mmol/L 1 3 2 1* 2 1* 2 1*   PROCALCITONIN ng/ml 0 07  --   --   --            * I Have Reviewed All Lab Data Listed Above  * Additional Pertinent Lab Tests Reviewed: All Labs Within Last 24 Hours Reviewed      Recent Cultures (last 7 days):     Results from last 7 days   Lab Units 03/10/20  1414 03/10/20  1413 03/09/20  1500 03/09/20  0643   BLOOD CULTURE  No Growth After 4 Days  No Growth After 4 Days  --  No Growth After 5 Days    Acinetobacter species*  Vagococcus*  Gram-positive natacha*   GRAM STAIN RESULT   --   --   --  Gram negative rods*  Gram positive cocci in chains*   LEGIONELLA URINARY ANTIGEN   --   --  Negative  --        Last 24 Hours Medication List:     Current Facility-Administered Medications:  ALPRAZolam 0 25 mg Oral BID PRN Sukhdeep SKINNER MD    atorvastatin 20 mg Oral Daily ONDINA Go    dextrose 5 % and sodium chloride 0 45 % 75 mL/hr Intravenous Continuous Claudene Sines, CRNP Last Rate: 75 mL/hr (03/15/20 1535)   heparin (porcine) 5,000 Units Subcutaneous Q8H [address] Hugo SKINNER MD    hydrALAZINE 5 mg Intravenous Q6H PRN Hugo SKINNER MD    hydrALAZINE 25 mg Oral Q8H [address] ONDINA Ruiz    insulin lispro 1-6 Units Subcutaneous Q6H [address] ONDINA Go    isosorbide dinitrate 20 mg Oral TID after meals ONDINA Hutchinson    [START ON 3/16/2020] levothyroxine 25 mcg Oral Early Morning Hugo SKINNER MD    metoprolol tartrate 12 5 mg Oral Q12H [address] ONDINA Go    pantoprazole 40 mg Intravenous Q24H [address] ONDINA Go    valproate sodium 500 mg Intravenous Q8H [address] ONDINA Go Last Rate: 500 mg (03/15/20 1526)        Today, Patient Was Seen By: Heather Ewing MD    ** Please Note: Dictation voice to text software may have been used in the creation of this document   **

## 2020-03-15 NOTE — ASSESSMENT & PLAN NOTE
Wt Readings from Last 3 Encounters:   03/15/20 113 kg (248 lb 3 8 oz)   03/15/20 113 kg (250 lb)   02/11/20 114 kg (252 lb)     Echo report noted with EF of 45-50%    With grade 1 diastolic dysfunction  Continue aspirin, beta-blocker, Isordil, hydralazine per Cardiology  Low-salt, fluid restriction diet  Not on Ace or Arb secondary to allergy to enalapril with angioedema  Removed Hines catheter and start voiding trial

## 2020-03-15 NOTE — PLAN OF CARE
Problem: Prexisting or High Potential for Compromised Skin Integrity  Goal: Skin integrity is maintained or improved  Description  INTERVENTIONS:  - Identify patients at risk for skin breakdown  - Assess and monitor skin integrity  - Assess and monitor nutrition and hydration status  - Monitor labs   - Assess for incontinence   - Turn and reposition patient  - Assist with mobility/ambulation  - Relieve pressure over bony prominences  - Avoid friction and shearing  - Provide appropriate hygiene as needed including keeping skin clean and dry  - Evaluate need for skin moisturizer/barrier cream  - Collaborate with interdisciplinary team   - Patient/family teaching  - Consider wound care consult   Outcome: Progressing     Problem: PAIN - ADULT  Goal: Verbalizes/displays adequate comfort level or baseline comfort level  Description  Interventions:  - Encourage patient to monitor pain and request assistance  - Assess pain using appropriate pain scale  - Administer analgesics based on type and severity of pain and evaluate response  - Implement non-pharmacological measures as appropriate and evaluate response  - Consider cultural and social influences on pain and pain management  - Notify physician/advanced practitioner if interventions unsuccessful or patient reports new pain  Outcome: Progressing     Problem: INFECTION - ADULT  Goal: Absence or prevention of progression during hospitalization  Description  INTERVENTIONS:  - Assess and monitor for signs and symptoms of infection  - Monitor lab/diagnostic results  - Monitor all insertion sites, i e  indwelling lines, tubes, and drains  - Monitor endotracheal if appropriate and nasal secretions for changes in amount and color  - [place] appropriate cooling/warming therapies per order  - Administer medications as ordered  - Instruct and encourage patient and family to use good hand hygiene technique  - Identify and instruct in appropriate isolation precautions for identified infection/condition  Outcome: Progressing  Goal: Absence of fever/infection during neutropenic period  Description  INTERVENTIONS:  - Monitor WBC    Outcome: Progressing     Problem: SAFETY ADULT  Goal: Patient will remain free of falls  Description  INTERVENTIONS:  - Assess patient frequently for physical needs  -  Identify cognitive and physical deficits and behaviors that affect risk of falls    -  [place] fall precautions as indicated by assessment   - Educate patient/family on patient safety including physical limitations  - Instruct patient to call for assistance with activity based on assessment  - Modify environment to reduce risk of injury  - Consider OT/PT consult to assist with strengthening/mobility  Outcome: Progressing  Goal: Maintain or return to baseline ADL function  Description  INTERVENTIONS:  -  Assess patient's ability to carry out ADLs; assess patient's baseline for ADL function and identify physical deficits which impact ability to perform ADLs (bathing, care of mouth/teeth, toileting, grooming, dressing, etc )  - Assess/evaluate cause of self-care deficits   - Assess range of motion  - Assess patient's mobility; develop plan if impaired  - Assess patient's need for assistive devices and provide as appropriate  - Encourage maximum independence but intervene and supervise when necessary  - Involve family in performance of ADLs  - Assess for home care needs following discharge   - Consider OT consult to assist with ADL evaluation and planning for discharge  - Provide patient education as appropriate  Outcome: Progressing  Goal: Maintain or return mobility status to optimal level  Description  INTERVENTIONS:  - Assess patient's baseline mobility status (ambulation, transfers, stairs, etc )    - Identify cognitive and physical deficits and behaviors that affect mobility  - Identify mobility aids required to assist with transfers and/or ambulation (gait belt, sit-to-stand, lift, walker, cane, etc )  - [place] fall precautions as indicated by assessment  - Record patient progress and toleration of activity level on Mobility SBAR; progress patient to next Phase/Stage  - Instruct patient to call for assistance with activity based on assessment  - Consider rehabilitation consult to assist with strengthening/weightbearing, etc   Outcome: Progressing     Problem: DISCHARGE PLANNING  Goal: Discharge to home or other facility with appropriate resources  Description  INTERVENTIONS:  - Identify barriers to discharge w/patient and caregiver  - Arrange for needed discharge resources and transportation as appropriate  - Identify discharge learning needs (meds, wound care, etc )  - Arrange for interpretive services to assist at discharge as needed  - Refer to Case Management Department for coordinating discharge planning if the patient needs post-hospital services based on physician/advanced practitioner order or complex needs related to functional status, cognitive ability, or social support system  Outcome: Progressing     Problem: Knowledge Deficit  Goal: Patient/family/caregiver demonstrates understanding of disease process, treatment plan, medications, and discharge instructions  Description  Complete learning assessment and assess knowledge base  Interventions:  - Provide teaching at level of understanding  - Provide teaching via preferred learning methods  Outcome: Progressing     Problem: Nutrition/Hydration-ADULT  Goal: Nutrient/Hydration intake appropriate for improving, restoring or maintaining nutritional needs  Description  Monitor and assess patient's nutrition/hydration status for malnutrition  Collaborate with interdisciplinary team and initiate plan and interventions as ordered  Monitor patient's weight and dietary intake as ordered or per policy  Utilize nutrition screening tool and intervene as necessary   Determine patient's food preferences and provide high-protein, high-caloric foods as appropriate  INTERVENTIONS:  - Monitor oral intake, urinary output, labs, and treatment plans  - Assess nutrition and hydration status and recommend course of action  - Evaluate amount of meals eaten  - Assist patient with eating if necessary   - Allow adequate time for meals  - Recommend/ encourage appropriate diets, oral nutritional supplements, and vitamin/mineral supplements  - Order, calculate, and assess calorie counts as needed  - Recommend, monitor, and adjust tube feedings and TPN/PPN based on assessed needs  - Assess need for intravenous fluids  - Provide nutrition/hydration education as appropriate  - Include patient/family/caregiver in decisions related to nutrition   Outcome: Progressing     Problem: Potential for Falls  Goal: Patient will remain free of falls  Description  INTERVENTIONS:  - Assess patient frequently for physical needs  -  Identify cognitive and physical deficits and behaviors that affect risk of falls    -  [place] fall precautions as indicated by assessment   - Educate patient/family on patient safety including physical limitations  - Instruct patient to call for assistance with activity based on assessment  - Modify environment to reduce risk of injury  - Consider OT/PT consult to assist with strengthening/mobility  Outcome: Progressing     Problem: SAFETY,RESTRAINT: NV/NON-SELF DESTRUCTIVE BEHAVIOR  Goal: Remains free of harm/injury (restraint for non violent/non self-detsructive behavior)  Description  INTERVENTIONS:  - Instruct patient/family regarding restraint use   - Assess and monitor physiologic and psychological status   - Provide interventions and comfort measures to meet assessed patient needs   - Identify and implement measures to help patient regain control  - Assess readiness for release of restraint   Outcome: Progressing  Goal: Returns to optimal restraint-free functioning  Description  INTERVENTIONS:  - Assess the patient's behavior and symptoms that indicate continued need for restraint  - Identify and implement measures to help patient regain control  - Assess readiness for release of restraint   Outcome: Progressing

## 2020-03-15 NOTE — ASSESSMENT & PLAN NOTE
Lab Results   Component Value Date    HGBA1C 6 9 (H) 02/07/2020       Recent Labs     03/15/20  0003 03/15/20  0616 03/15/20  1105 03/15/20  1556   POCGLU 91 99 120 112       Blood Sugar Average: Last 72 hrs:  (P) 02 [id number]     Hemoglobin A1c 6 9  Patient was getting Lantus 30 units q h s     Due to episodes of hypoglycemia and currently being NPO will decrease it to Lantus 5 units q h s     Will start losartan or Jevity via NG tube  Currently patient is on D5 half NS    Accu-Cheks monitoring  Sliding scale coverage

## 2020-03-15 NOTE — NURSING NOTE
Xray results not back for NGT placement  NGT placement checked/ confirmed by RN by ausculation while instilling 20 cc of air into NGT  Placement appropriate   Meds administered via NGT

## 2020-03-15 NOTE — PROGRESS NOTES
Progress Note  Marty Encarnacion 70 y o  male MRN: [id number]  Unit/Bed#: -01 Encounter: [id number]    Subjective:  He had the NG tube placed last night  He is confused  I spoke to his wife at length  He has had no melena hematochezia no fevers chills  Objective:     Vitals:   Vitals:    03/15/20 0713   BP: 165/77   Pulse: 69   Resp: 20   Temp: 97 7 °F (36 5 °C)   SpO2: 100%   ,Body mass index is 34 62 kg/m²  Intake/Output Summary (Last 24 hours) at 3/15/2020 1034  Last data filed at 3/15/2020 0734  Gross per 24 hour   Intake    Output 284 ml   Net -284 ml       Physical Exam:     General Appearance:  Confused, alert, has NG tube  Lungs: Clear to auscultation bilaterally, no rales or rhonchi, no labored breathing/accessory muscle use  Heart: Regular rate and rhythm, S1, S2 normal, no murmur, click, rub or gallop  Abdomen: Soft, non-tender, non-distended; bowel sounds normal; no masses or no organomegaly  Extremities: No cyanosis, edema    Invasive Devices     Peripheral Intravenous Line            Peripheral IV 03/15/20 Right Antecubital less than 1 day          Drain            NG/OG/Enteral Tube Nasogastric Right nares less than 1 day                Lab Results:  No results displayed because visit has over 200 results  Imaging Studies:   I have personally reviewed pertinent imaging studies  Ct Chest Abdomen Pelvis Wo Contrast    Result Date: 3/10/2020  Narrative: CT CHEST, ABDOMEN AND PELVIS WITHOUT IV CONTRAST INDICATION:   Sepsis  COMPARISON:  None  TECHNIQUE: CT examination of the chest, abdomen and pelvis was performed without intravenous contrast   Axial, sagittal, and coronal 2D reformatted images were created from the source data and submitted for interpretation  Radiation dose length product (DLP) for this visit:  659 [id number] 913 mGy-cm     This examination, like all CT scans performed in the [facility], was performed utilizing techniques to minimize radiation dose exposure, including the use of iterative reconstruction and automated exposure control  Enteric contrast was not administered  FINDINGS: CHEST LUNGS:  Scattered parenchymal and paraseptal bullae  Scattered atelectasis  No tracheal or endobronchial lesions  PLEURA:  Small right pleural effusion  No pneumothorax  HEART/GREAT VESSELS:  Small pericardial effusion  MEDIASTINUM AND MAXWELL:  No pathologic lymphadenopathy  Small hiatal hernia  CHEST WALL AND LOWER NECK:   Unremarkable  ABDOMEN LIVER/BILIARY TREE:  Unremarkable  GALLBLADDER:  High density sludge in the nondistended gallbladder  No acute changes  SPLEEN:  Unremarkable  PANCREAS:  Unremarkable  ADRENAL GLANDS:  Unremarkable  KIDNEYS/URETERS:  Unremarkable  No hydronephrosis  Small left intrarenal arterial calcification  STOMACH AND BOWEL:  There is colonic diverticulosis without evidence of acute diverticulitis  APPENDIX:  No findings to suggest appendicitis  ABDOMINOPELVIC CAVITY:  No ascites or free intraperitoneal air  No lymphadenopathy  VESSELS:  Atherosclerotic changes are present  No evidence of aneurysm  PELVIS REPRODUCTIVE ORGANS:  Unremarkable for patient's age  URINARY BLADDER:  Catheterized bladder is thick-walled with mild perivesical inflammatory changes  Cystitis not excluded  ABDOMINAL WALL/[id number] REGIONS:  A bilobed well-circumscribed  low density mass in the left inguinal region measures 6 0 x 3 5 cm possible postoperative seroma status post herniorrhaphy  An enlarged lymph node however is not excluded  Ultrasound may be of benefit for further characterization  OSSEOUS STRUCTURES:  No acute fracture or destructive osseous lesion  Impression: 1  Scattered atelectasis with small right pleural effusion  2   Small pericardial effusion  3   Catheterized, thick-walled bladder with perivesical inflammation  Cystitis not excluded   4   Bilobed low density mass in the left inguinal region measuring 6 0 x 3 5 cm, possible postoperative seroma status post herniorrhaphy  Pathologic lymph node not completely excluded on this nonenhanced study  Ultrasound would be of benefit for further characterization  The study was marked in EPIC for significant notification  Workstation performed: [id number]     Xr Chest Portable    Result Date: 3/13/2020  Narrative: CHEST INDICATION:   NG Placement  COMPARISON:  3/9/2020, CT chest, abdomen and pelvis 3/10/2020 EXAM PERFORMED/VIEWS:  XR CHEST PORTABLE FINDINGS:  The patient is rotated to the right  Interval placement of enteric tube, tip courses to the stomach  Cardiomediastinal silhouette appears stable  The lungs are clear  No pneumothorax  Osseous structures appear within normal limits for patient age  Impression: Enteric tube courses to the stomach  Workstation performed: [id number]     Xr Chest Portable    Result Date: 3/9/2020  Narrative: CHEST INDICATION:   AMS  COMPARISON:  Chest x-ray 12/24/2019 EXAM PERFORMED/VIEWS:  XR CHEST PORTABLE FINDINGS: Cardiomediastinal silhouette appears stable  Patchy bibasilar subsegmental atelectasis  Mild vascular congestion  No pneumothorax or pleural effusion  Osseous structures appear within normal limits for patient age  Impression: Mild vascular congestion  Patchy bibasilar atelectasis  Workstation performed: [id number]     Xr Abdomen 1 View Kub    Result Date: 3/15/2020  Narrative: ABDOMEN INDICATION:   after NG tube placement to confirm it is in the stomach  COMPARISON:  None VIEWS:  AP supine Single portable view FINDINGS: Nasogastric tube has been placed, typical course, the tip projecting in the expected location of the esophagogastric junction  It could be advanced several cm at least 2 achieve gastric body location if clinically appropriate There is a nonobstructive bowel gas pattern  No discernible free air on this supine study  Upright or left lateral decubitus imaging is more sensitive to detect subtle free air in the appropriate setting   No pathologic calcifications or soft tissue masses  Visualized lung bases are clear  Visualized osseous structures are unremarkable for the patient's age  Impression: Interval placement of nasogastric tube which projects in the expected location of the esophagogastric junction  It could be advanced to achieve more distal gastric location if clinically appropriate Workstation performed: [id number]     Ct Head Without Contrast    Result Date: 3/9/2020  Narrative: CT BRAIN - WITHOUT CONTRAST INDICATION:   altered mental status/pmh stroke    COMPARISON:  12/24/2019 TECHNIQUE:  CT examination of the brain was performed  In addition to axial images, coronal 2D reformatted images were created and submitted for interpretation  Radiation dose length product (DLP) for this visit:  815 mGy-cm   This examination, like all CT scans performed in the [facility], was performed utilizing techniques to minimize radiation dose exposure, including the use of iterative reconstruction and automated exposure control  IMAGE QUALITY:  Diagnostic  FINDINGS: PARENCHYMA: Decreased attenuation is noted in periventricular and subcortical white matter demonstrating an appearance that is statistically most likely to represent advanced microangiopathic change; this appearance is similar when compared to most recent prior examination  No CT signs of acute infarction  No intracranial mass, mass effect or midline shift  No acute parenchymal hemorrhage  VENTRICLES AND EXTRA-AXIAL SPACES:  Normal for the patient's age  VISUALIZED ORBITS AND PARANASAL SINUSES:  Unremarkable  CALVARIUM AND EXTRACRANIAL SOFT TISSUES:  Normal      Impression: No acute intracranial abnormality  Microangiopathic changes  Workstation performed: [id number]     Us Groin/inguinal Area    Result Date: 3/11/2020  Narrative: GROIN INGUINAL AREA ULTRASOUND INDICATION:   attn: left groin, abnormal CT scan   COMPARISON:  CT 3/10/2020 TECHNIQUE:   Real-time ultrasound of the area of concern was performed with a linear transducer with both volumetric sweeps and still imaging techniques  FINDINGS:  Corresponding to the CT finding is the presence of a complex septated/multiloculated and lobular contoured mass  This measures 7 1 x 6 3 x 3 8 cm  This is felt to be filled with complex fluid  With the exception of vascularity noted within a septum or in between loculations, the mass is avascular  Impression: 7 1 x 6 3 x 3 8 cm complex mass in the left groin corresponding to the prior CT finding  Findings most suggestive of a complex seroma or organizing hematoma  Lymphadenopathy felt less likely, however recommend clinical follow-up to ensure ability or resolution with repeated imaging for any suspicious interval change Workstation performed: [id number]     Vas Lower Limb Venous Duplex Study, Unilateral/limited    Result Date: 3/10/2020  Narrative:  [facility] REPORT CLINICAL: Indications: Swelling of Limb [R22 4]  Bilateral swelling  left worse than right  Hx of CHF and kidney failure  No history of DVT  Risk Factors The patient has history of HTN, Diabetes (Yes) and HLD  FINDINGS:  Left     Impression       CFV      Normal (Patent)     CONCLUSION:  Impression: RIGHT LOWER LIMB LIMITED: Evaluation shows no evidence of thrombus in the common femoral vein  Doppler evaluation shows a normal response to augmentation maneuvers  LEFT LOWER LIMB: No evidence of acute or chronic deep vein thrombosis No evidence of superficial thrombophlebitis noted  Doppler evaluation shows a normal response to augmentation maneuvers  Popliteal, posterior tibial and anterior tibial arterial Doppler waveforms are monophasic  Technical findings were given to covering ER nurse at time of scan    SIGNATURE: Electronically Signed by: Prosper Ware on 2020-03-10 07:08:08 AM        Assessment & Plan  Principal Problem:    NSTEMI (non-ST elevated myocardial infarction) ([facility] [address] )  Active Problems:    Chronic renal insufficiency, stage III (moderate) (HCC)    Benign essential hypertension    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(HCC)    Paroxysmal atrial flutter (HCC)    History of bilateral subcortical CVA     Chronic diastolic heart failure (HCC)    Seizure disorder (HCC)    Lymphedema of right lower extremity    Acute metabolic encephalopathy    Dysphagia    Acquired hypothyroidism    Cystitis    Mass of left inguinal region    Protein calorie malnutrition ([facility] [address] )      He is a 33-year-old male with a remote history of CVA 8 years ago with severe oropharyngeal dysphagia high risk for aspiration  We have spoken to his family regarding G-tube placement as a medium term strategy for him to get nutrition while he undergo speech therapy and hopefully is able to eat again  His aspirin and Plavix was stopped on Friday      EGD with PEG tube placement on Tuesday  He will need a dose of Ancef prior to the procedure  His Plavix has been on hold since Friday; continue his aspirin  Continued speech therapy  Overall plan per slim; he will get going to subacute rehab after the hospital stay    Medina Fernando MD  3/15/2020,10:34 AM

## 2020-03-15 NOTE — ASSESSMENT & PLAN NOTE
Per wife patient has decreased p o  Intake for past couple days without clear etiology  Currently NPO  Speech and swallow recommendation appreciated  After lengthy discussion regarding risk and benefits of feeding tube patient and family both requesting discussed with gastroenterologist about possibility of PEG tube placement  Currently being fed via NG tube  Wife and patient agreeable with PEG tube placement  Plavix on hold per GI for planned PEG tube placement on Tuesday  Follow-up with GI consultation

## 2020-03-15 NOTE — ASSESSMENT & PLAN NOTE
Patient presented with worsening encephalopathy over last 2 days prior to admission  Now improved  Suspected secondary to acute illness as stated above as well as component of progressive dementia  Currently patient does appear slightly confused  Cooperative during exam exam   Wife does not appear to be concerned  I do suspect underlying cognitive decline may be worsening vs hypothyroidism since TSH elevated  Will try to reach out to patient's primary neurologist Dr Socorro Jiang discussed patient's baseline and get through understanding about his baseline functional status

## 2020-03-15 NOTE — ASSESSMENT & PLAN NOTE
Troponin peaked 4 80 trended down 0 39  Unclear if non STEMI type 1 versus type 2  Echo noted with EF 40-50%, mildly reduced dysfunction, mild diffuse hypokinesis, grade 1 diastolic dysfunction  Currently patient denies chest pain, dyspnea, abdominal pain, any new complaints  Patient was treated with IV heparin drip  now discontinued per Cardiology  Currently asymptomatic, hemodynamically stable  At this time will hold off on further invasive workup considering patient's risk per Cardiology  Patient and wife both agreeable with above plan  Continue aspirin, Plavix, statin, beta-blocker  Cardiology recommendation appreciated

## 2020-03-15 NOTE — ASSESSMENT & PLAN NOTE
Malnutrition Findings:           BMI Findings: Body mass index is 34 62 kg/m²  Currently being fed by NG tube  Plan is for PEG tube placement on Tuesday

## 2020-03-15 NOTE — ASSESSMENT & PLAN NOTE
TSH noted elevated  Wife reports that patient takes all medication as prescribed    Resume home medication of Synthroid  Outpatient follow-up

## 2020-03-16 LAB
ANION GAP SERPL CALCULATED.3IONS-SCNC: 8 MMOL/L (ref 4–13)
BASOPHILS # BLD AUTO: 0.02 THOUSANDS/ΜL (ref 0–0.1)
BASOPHILS NFR BLD AUTO: 0 % (ref 0–1)
BUN SERPL-MCNC: 8 MG/DL (ref 5–25)
CALCIUM SERPL-MCNC: 7.8 MG/DL (ref 8.3–10.1)
CHLORIDE SERPL-SCNC: 108 MMOL/L (ref 100–108)
CO2 SERPL-SCNC: 26 MMOL/L (ref 21–32)
CREAT SERPL-MCNC: 1.13 MG/DL (ref 0.6–1.3)
EOSINOPHIL # BLD AUTO: 0.21 THOUSAND/ΜL (ref 0–0.61)
EOSINOPHIL NFR BLD AUTO: 5 % (ref 0–6)
ERYTHROCYTE [DISTWIDTH] IN BLOOD BY AUTOMATED COUNT: 15.2 % (ref 11.6–15.1)
GFR SERPL CREATININE-BSD FRML MDRD: 72 ML/MIN/1.73SQ M
GLUCOSE SERPL-MCNC: 122 MG/DL (ref 65–140)
GLUCOSE SERPL-MCNC: 131 MG/DL (ref 65–140)
GLUCOSE SERPL-MCNC: 138 MG/DL (ref 65–140)
GLUCOSE SERPL-MCNC: 143 MG/DL (ref 65–140)
GLUCOSE SERPL-MCNC: 148 MG/DL (ref 65–140)
HCT VFR BLD AUTO: 36.3 % (ref 36.5–49.3)
HGB BLD-MCNC: 11.8 G/DL (ref 12–17)
IMM GRANULOCYTES # BLD AUTO: 0.03 THOUSAND/UL (ref 0–0.2)
IMM GRANULOCYTES NFR BLD AUTO: 1 % (ref 0–2)
LYMPHOCYTES # BLD AUTO: 1.98 THOUSANDS/ΜL (ref 0.6–4.47)
LYMPHOCYTES NFR BLD AUTO: 44 % (ref 14–44)
MCH RBC QN AUTO: 32.1 PG (ref 26.8–34.3)
MCHC RBC AUTO-ENTMCNC: 32.5 G/DL (ref 31.4–37.4)
MCV RBC AUTO: 99 FL (ref 82–98)
MONOCYTES # BLD AUTO: 0.78 THOUSAND/ΜL (ref 0.17–1.22)
MONOCYTES NFR BLD AUTO: 17 % (ref 4–12)
NEUTROPHILS # BLD AUTO: 1.49 THOUSANDS/ΜL (ref 1.85–7.62)
NEUTS SEG NFR BLD AUTO: 33 % (ref 43–75)
NRBC BLD AUTO-RTO: 0 /100 WBCS
PLATELET # BLD AUTO: 112 THOUSANDS/UL (ref 149–390)
PMV BLD AUTO: 11.4 FL (ref 8.9–12.7)
POTASSIUM SERPL-SCNC: 3.7 MMOL/L (ref 3.5–5.3)
RBC # BLD AUTO: 3.68 MILLION/UL (ref 3.88–5.62)
SODIUM SERPL-SCNC: 142 MMOL/L (ref 136–145)
WBC # BLD AUTO: 4.51 THOUSAND/UL (ref 4.31–10.16)

## 2020-03-16 PROCEDURE — 85025 COMPLETE CBC W/AUTO DIFF WBC: CPT | Performed by: STUDENT IN AN ORGANIZED HEALTH CARE EDUCATION/TRAINING PROGRAM

## 2020-03-16 PROCEDURE — C9113 INJ PANTOPRAZOLE SODIUM, VIA: HCPCS | Performed by: NURSE PRACTITIONER

## 2020-03-16 PROCEDURE — 99232 SBSQ HOSP IP/OBS MODERATE 35: CPT | Performed by: STUDENT IN AN ORGANIZED HEALTH CARE EDUCATION/TRAINING PROGRAM

## 2020-03-16 PROCEDURE — 99232 SBSQ HOSP IP/OBS MODERATE 35: CPT | Performed by: INTERNAL MEDICINE

## 2020-03-16 PROCEDURE — 0DH63UZ INSERTION OF FEEDING DEVICE INTO STOMACH, PERCUTANEOUS APPROACH: ICD-10-PCS | Performed by: INTERNAL MEDICINE

## 2020-03-16 PROCEDURE — 82948 REAGENT STRIP/BLOOD GLUCOSE: CPT

## 2020-03-16 PROCEDURE — 80048 BASIC METABOLIC PNL TOTAL CA: CPT | Performed by: STUDENT IN AN ORGANIZED HEALTH CARE EDUCATION/TRAINING PROGRAM

## 2020-03-16 RX ADMIN — LEVOTHYROXINE SODIUM 25 MCG: 25 TABLET ORAL at 05:34

## 2020-03-16 RX ADMIN — VALPROATE SODIUM 500 MG: 100 INJECTION, SOLUTION INTRAVENOUS at 13:59

## 2020-03-16 RX ADMIN — METOPROLOL TARTRATE 12.5 MG: 25 TABLET ORAL at 09:08

## 2020-03-16 RX ADMIN — ATORVASTATIN CALCIUM 20 MG: 20 TABLET, FILM COATED ORAL at 09:08

## 2020-03-16 RX ADMIN — VALPROATE SODIUM 500 MG: 100 INJECTION, SOLUTION INTRAVENOUS at 21:38

## 2020-03-16 RX ADMIN — HYDRALAZINE HYDROCHLORIDE 25 MG: 25 TABLET ORAL at 09:08

## 2020-03-16 RX ADMIN — HYDRALAZINE HYDROCHLORIDE 25 MG: 25 TABLET ORAL at 21:38

## 2020-03-16 RX ADMIN — HEPARIN SODIUM 5000 UNITS: 5000 INJECTION INTRAVENOUS; SUBCUTANEOUS at 05:34

## 2020-03-16 RX ADMIN — HEPARIN SODIUM 5000 UNITS: 5000 INJECTION INTRAVENOUS; SUBCUTANEOUS at 13:54

## 2020-03-16 RX ADMIN — ISOSORBIDE DINITRATE 20 MG: 10 TABLET ORAL at 18:22

## 2020-03-16 RX ADMIN — HEPARIN SODIUM 5000 UNITS: 5000 INJECTION INTRAVENOUS; SUBCUTANEOUS at 21:38

## 2020-03-16 RX ADMIN — ISOSORBIDE DINITRATE 20 MG: 10 TABLET ORAL at 09:08

## 2020-03-16 RX ADMIN — PANTOPRAZOLE SODIUM 40 MG: 40 INJECTION, POWDER, FOR SOLUTION INTRAVENOUS at 09:08

## 2020-03-16 RX ADMIN — VALPROATE SODIUM 500 MG: 100 INJECTION, SOLUTION INTRAVENOUS at 09:08

## 2020-03-16 RX ADMIN — DEXTROSE AND SODIUM CHLORIDE 75 ML/HR: 5; .45 INJECTION, SOLUTION INTRAVENOUS at 15:21

## 2020-03-16 RX ADMIN — METOPROLOL TARTRATE 12.5 MG: 25 TABLET ORAL at 21:38

## 2020-03-16 RX ADMIN — ISOSORBIDE DINITRATE 20 MG: 10 TABLET ORAL at 13:54

## 2020-03-16 RX ADMIN — HYDRALAZINE HYDROCHLORIDE 5 MG: 20 INJECTION INTRAMUSCULAR; INTRAVENOUS at 23:51

## 2020-03-16 RX ADMIN — HYDRALAZINE HYDROCHLORIDE 25 MG: 25 TABLET ORAL at 13:54

## 2020-03-16 NOTE — ASSESSMENT & PLAN NOTE
Malnutrition Findings:           BMI Findings: Body mass index is 36 28 kg/m²  Currently being fed by NG tube  Plan is for PEG tube placement on Tuesday

## 2020-03-16 NOTE — PROGRESS NOTES
Progress Note - Oz Mehta 1940, 70 y o  male MRN: [id number]    Unit/Bed#: -01 Encounter: [id number]    Primary Care Provider: Clark Norris MD   Date and time admitted to hospital: 3/9/2020  6:29 AM        * NSTEMI (non-ST elevated myocardial infarction) [facility])  Assessment & Plan  Troponin peaked 4 80 trended down 0 39  Unclear if non STEMI type 1 versus type 2  Echo noted with EF 40-50%, mildly reduced dysfunction, mild diffuse hypokinesis, grade 1 diastolic dysfunction  Currently patient denies chest pain, dyspnea, abdominal pain, any new complaints  Patient was treated with IV heparin drip  now discontinued per Cardiology  Currently asymptomatic, hemodynamically stable  At this time will hold off on further invasive workup considering patient's risk per Cardiology  Patient and wife both agreeable with above plan  Continue aspirin, Plavix, statin, beta-blocker  Cardiology recommendation appreciated  Dysphagia  Assessment & Plan  Per wife patient has decreased p o  Intake for past couple days without clear etiology  Currently NPO  Speech and swallow recommendation appreciated  After lengthy discussion regarding risk and benefits of feeding tube patient and family both requesting discussed with gastroenterologist about possibility of PEG tube placement  Currently being fed via NG tube  Wife and patient agreeable with PEG tube placement  Plavix on hold per GI for planned PEG tube placement on Tuesday  Follow-up with GI consultation  Protein calorie malnutrition ([facility] [address] )  Assessment & Plan  Malnutrition Findings:           BMI Findings: Body mass index is 36 28 kg/m²  Currently being fed by NG tube  Plan is for PEG tube placement on Tuesday  Mass of left inguinal region  Assessment & Plan  Noted on CT  Groin ultrasound report noted for possible seroma or hematoma and discussed with patient and wife at bedside  Asymptomatic        Cystitis  Assessment & Plan  Completed antibiotic course  Currently asymptomatic  Acquired hypothyroidism  Assessment & Plan  TSH noted elevated  Wife reports that patient takes all medication as prescribed  Resume home medication of Synthroid  Outpatient follow-up    Acute metabolic encephalopathy  Assessment & Plan  Patient presented with worsening encephalopathy over last 2 days prior to admission  Folate, B12 level normal range  Now improved  Suspected secondary to acute illness as stated above as well as component of progressive dementia  MRI brain shows no acute infarct, no acute hemorrhage, moderate, chronic microangiopathic changes, and scattered chronic lacunar infarction  Currently patient does appear slightly confused  Cooperative during exam exam   Wife does not appear to be concerned  I do suspect underlying cognitive decline may be worsening  Currently patient is at baseline per his wife at bedside  Lymphedema of right lower extremity  Assessment & Plan  Chronic  Noted  Seizure disorder [facility])  Assessment & Plan  Home medication includes Depakote 500 mg p o  In the morning and 1000 mg at night  Currently on IV while inpatient NPO  Attempted NG tube yesterday which patient pulled out  We will try again for NG tube placement if patient and family agrees  Pending speech and swallow recommendations  Patient has not had seizure in approximately 8 years per wife  Chronic diastolic heart failure (HCC)  Assessment & Plan  Wt Readings from Last 3 Encounters:   03/16/20 118 kg (260 lb 2 3 oz)   03/15/20 113 kg (250 lb)   02/11/20 114 kg (252 lb)     Echo report noted with EF of 45-50%  With grade 1 diastolic dysfunction  Continue aspirin, beta-blocker, Isordil, hydralazine per Cardiology  Low-salt, fluid restriction diet  Not on Ace or Arb secondary to allergy to enalapril with angioedema        History of bilateral subcortical CVA   Assessment & Plan  History of CVA    Patient without significant deficit  Continue aspirin, Plavix  PT OT eval    Paroxysmal atrial flutter (HCC)  Assessment & Plan  History of paroxysmal AFib  Continue metoprolol 12 5 mg b i d  Currently rate controlled  Current  Not on anticoagulation due to dementia, risk of fall, increased risk of bleeding  Cardiology recommendations appreciated  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[facility])  Assessment & Plan  Lab Results   Component Value Date    HGBA1C 6 9 (H) 02/07/2020       Recent Labs     03/15/20  1829 03/15/20  2351 03/16/20  0553 03/16/20  1232   POCGLU 112 124 122 138       Blood Sugar Average: Last 72 hrs:  (P) 580 [id number]     Hemoglobin A1c 6 9  Patient was getting Lantus 30 units q h s     Due to episodes of hypoglycemia and currently being NPO will decrease it to Lantus 5 units q h s     Will start losartan or Jevity via NG tube  Currently patient is on D5 half NS  Accu-Cheks monitoring  Sliding scale coverage    Benign essential hypertension  Assessment & Plan  Continue Lopressor with holding parameters,  Started on hydralazine, Isordil per Cardiology  Currently patient is NPO per speech and swallow recommendation  Patient had removed NG tube earlier  Currently tolerating NG tube well with mittens  Continue feeding through NG tube for now  Plan for PEG tube tomorrow  Chronic renal insufficiency, stage III (moderate) (HCC)  Assessment & Plan  Baseline creatinine is around 1 6 range  Currently at baseline  VTE Pharmacologic Prophylaxis:   Pharmacologic: Heparin  Mechanical VTE Prophylaxis in Place: Yes    Patient Centered Rounds: I have performed bedside rounds with nursing staff today  Education and Discussions with Family / Patient: pt  Spoke with wife over the phone [phone] at  4:58pm      Time Spent for Care: 30 minutes  More than 50% of total time spent on counseling and coordination of care as described above      Current Length of Stay: 7 day(s)    Current Patient Status: Inpatient   Certification Statement: The patient will continue to require additional inpatient hospital stay due to Plan for PEG tube normal    Discharge Plan:  Discharge expected in 48 hours    Code Status: Level 1 - Full Code      Subjective:   Awake, alert, oriented at his baseline  Appears comfortable not in distress  Afebrile, hemodynamically stable  Requires mittens for safety and prevent pulling lines  No other events reported  Objective:     Vitals:   Temp (24hrs), Av 9 °F (36 6 °C), Min:97 4 °F (36 3 °C), Max:98 4 °F (36 9 °C)    Temp:  [97 4 °F (36 3 °C)-98 4 °F (36 9 °C)] 98 °F (36 7 °C)  HR:  [61-62] 61  Resp:  [20-21] 20  BP: (148-176)/(64-77) 156/70  SpO2:  [97 %-98 %] 98 %  Body mass index is 36 28 kg/m²  Input and Output Summary (last 24 hours): Intake/Output Summary (Last 24 hours) at 3/16/2020 1654  Last data filed at 3/16/2020 1201  Gross per 24 hour   Intake 0 ml   Output 545 ml   Net -545 ml       Physical Exam:     Physical Exam   Constitutional: No distress  Elderly, frail, deconditioned male patient, chronically ill, acutely nontoxic appearing not in acute distress  HENT:   Head: Normocephalic and atraumatic  Eyes: Pupils are equal, round, and reactive to light  EOM are normal    Neck: Normal range of motion  Neck supple  No JVD present  Cardiovascular: Normal rate and regular rhythm  Pulmonary/Chest: Effort normal and breath sounds normal  No stridor  No respiratory distress  Abdominal: Soft  Bowel sounds are normal  There is no tenderness  Musculoskeletal: Normal range of motion  He exhibits edema  Neurological:   Patient appears slightly confused  Cooperative during exam    Skin: He is not diaphoretic       Additional Data:      Labs:    Results from last 7 days   Lab Units 20  0546   WBC Thousand/uL 4 51   HEMOGLOBIN g/dL 11 8*   HEMATOCRIT % 36 3*   PLATELETS Thousands/uL 112*   NEUTROS PCT % 33*   LYMPHS PCT % 44   MONOS PCT % 17* EOS PCT % 5     Results from last 7 days   Lab Units 03/16/20  0546  03/12/20  0450   SODIUM mmol/L 142   < > 143   POTASSIUM mmol/L 3 7   < > 3 5   CHLORIDE mmol/L 108   < > 109*   CO2 mmol/L 26   < > 28   BUN mg/dL 8   < > 13   CREATININE mg/dL 1 13   < > 1 20   ANION GAP mmol/L 8   < > 6   CALCIUM mg/dL 7 8*   < > 7 8*   ALBUMIN g/dL  --   --  2 0*   TOTAL BILIRUBIN mg/dL  --   --  0 40   ALK PHOS U/L  --   --  57   ALT U/L  --   --  34   AST U/L  --   --  130*   GLUCOSE RANDOM mg/dL 131   < > 83    < > = values in this interval not displayed  Results from last 7 days   Lab Units 03/16/20  1232 03/16/20  0553 03/15/20  2351 03/15/20  1829 03/15/20  1556 03/15/20  1105 03/15/20  0616 03/15/20  0003 03/14/20  1926 03/14/20  1037 03/14/20  0640 03/14/20  0047   POC GLUCOSE mg/dl 138 122 124 112 112 120 99 91 120 117 90 93         Results from last 7 days   Lab Units 03/10/20  0523 03/09/20  1844   LACTIC ACID mmol/L 1 3 2 1*   PROCALCITONIN ng/ml 0 07  --        * I Have Reviewed All Lab Data Listed Above  * Additional Pertinent Lab Tests Reviewed: All Labs Within Last 24 Hours Reviewed      Recent Cultures (last 7 days):     Results from last 7 days   Lab Units 03/10/20  1414 03/10/20  1413   BLOOD CULTURE  No Growth After 5 Days  No Growth After 5 Days         Last 24 Hours Medication List:     Current Facility-Administered Medications:  ALPRAZolam 0 25 mg Oral BID PRN Cipriano SKINNER MD    atorvastatin 20 mg Oral Daily ONDINA Go    dextrose 5 % and sodium chloride 0 45 % 75 mL/hr Intravenous Continuous ONDINA Mills Last Rate: 75 mL/hr (03/16/20 1521)   heparin (porcine) 5,000 Units Subcutaneous Q8H [address] Hugo SKINNER MD    hydrALAZINE 5 mg Intravenous Q6H PRN Hugo SKINNER MD    hydrALAZINE 25 mg Oral Q8H [address] ONDINA Ruiz    insulin lispro 1-6 Units Subcutaneous Q6H [address] ONDINA Go    isosorbide dinitrate 20 mg Oral TID after meals [facility] levothyroxine 25 mcg Oral Early Morning Hugo SKINNER MD    metoprolol tartrate 12 5 mg Oral Q12H [address] ONDINA Go    pantoprazole 40 mg Intravenous Q24H [address] ONDINA Go    valproate sodium 500 mg Intravenous [facility] ONDINA Go Last Rate: 500 mg (03/16/20 1359)        Today, Patient Was Seen By: Susi Rodriguez MD    ** Please Note: Dictation voice to text software may have been used in the creation of this document   **

## 2020-03-16 NOTE — ASSESSMENT & PLAN NOTE
Patient presented with worsening encephalopathy over last 2 days prior to admission  Folate, B12 level normal range  Now improved  Suspected secondary to acute illness as stated above as well as component of progressive dementia  MRI brain shows no acute infarct, no acute hemorrhage, moderate, chronic microangiopathic changes, and scattered chronic lacunar infarction  Currently patient does appear slightly confused  Cooperative during exam exam   Wife does not appear to be concerned  I do suspect underlying cognitive decline may be worsening  Currently patient is at baseline per his wife at bedside

## 2020-03-16 NOTE — SOCIAL WORK
CM received a call from [facility]bill[facility] supportive [facility] that counseling for home hospice  CM determined that they have been following for a period of time  CM determined that the jakob counselor reached to [facility] at who work with [facility] and asked for follow up from provider to family  CM spoke with provider and CM department and provider will plan for follow up

## 2020-03-16 NOTE — PLAN OF CARE
Problem: Nutrition/Hydration-ADULT  Goal: Nutrient/Hydration intake appropriate for improving, restoring or maintaining nutritional needs  Description  Monitor and assess patient's nutrition/hydration status for malnutrition  Collaborate with interdisciplinary team and initiate plan and interventions as ordered  Monitor patient's weight and dietary intake as ordered or per policy  Utilize nutrition screening tool and intervene as necessary  Determine patient's food preferences and provide high-protein, high-caloric foods as appropriate       INTERVENTIONS:  - Monitor oral intake, urinary output, labs, and treatment plans  - Assess nutrition and hydration status and recommend course of action  - Evaluate amount of meals eaten  - Assist patient with eating if necessary   - Allow adequate time for meals  - Recommend/ encourage appropriate diets, oral nutritional supplements, and vitamin/mineral supplements  - Order, calculate, and assess calorie counts as needed  - Recommend, monitor, and adjust tube feedings and TPN/PPN based on assessed needs  - Assess need for intravenous fluids  - Provide nutrition/hydration education as appropriate  - Include patient/family/caregiver in decisions related to nutrition   Outcome: Progressing

## 2020-03-16 NOTE — PLAN OF CARE
Problem: Prexisting or High Potential for Compromised Skin Integrity  Goal: Skin integrity is maintained or improved  Description  INTERVENTIONS:  - Identify patients at risk for skin breakdown  - Assess and monitor skin integrity  - Assess and monitor nutrition and hydration status  - Monitor labs   - Assess for incontinence   - Turn and reposition patient  - Assist with mobility/ambulation  - Relieve pressure over bony prominences  - Avoid friction and shearing  - Provide appropriate hygiene as needed including keeping skin clean and dry  - Evaluate need for skin moisturizer/barrier cream  - Collaborate with interdisciplinary team   - Patient/family teaching  - Consider wound care consult   Outcome: Progressing     Problem: PAIN - ADULT  Goal: Verbalizes/displays adequate comfort level or baseline comfort level  Description  Interventions:  - Encourage patient to monitor pain and request assistance  - Assess pain using appropriate pain scale  - Administer analgesics based on type and severity of pain and evaluate response  - Implement non-pharmacological measures as appropriate and evaluate response  - Consider cultural and social influences on pain and pain management  - Notify physician/advanced practitioner if interventions unsuccessful or patient reports new pain  Outcome: Progressing     Problem: INFECTION - ADULT  Goal: Absence or prevention of progression during hospitalization  Description  INTERVENTIONS:  - Assess and monitor for signs and symptoms of infection  - Monitor lab/diagnostic results  - Monitor all insertion sites, i e  indwelling lines, tubes, and drains  - Monitor endotracheal if appropriate and nasal secretions for changes in amount and color  - [place] appropriate cooling/warming therapies per order  - Administer medications as ordered  - Instruct and encourage patient and family to use good hand hygiene technique  - Identify and instruct in appropriate isolation precautions for identified infection/condition  Outcome: Progressing  Goal: Absence of fever/infection during neutropenic period  Description  INTERVENTIONS:  - Monitor WBC    Outcome: Progressing     Problem: SAFETY ADULT  Goal: Patient will remain free of falls  Description  INTERVENTIONS:  - Assess patient frequently for physical needs  -  Identify cognitive and physical deficits and behaviors that affect risk of falls    -  [place] fall precautions as indicated by assessment   - Educate patient/family on patient safety including physical limitations  - Instruct patient to call for assistance with activity based on assessment  - Modify environment to reduce risk of injury  - Consider OT/PT consult to assist with strengthening/mobility  Outcome: Progressing  Goal: Maintain or return to baseline ADL function  Description  INTERVENTIONS:  -  Assess patient's ability to carry out ADLs; assess patient's baseline for ADL function and identify physical deficits which impact ability to perform ADLs (bathing, care of mouth/teeth, toileting, grooming, dressing, etc )  - Assess/evaluate cause of self-care deficits   - Assess range of motion  - Assess patient's mobility; develop plan if impaired  - Assess patient's need for assistive devices and provide as appropriate  - Encourage maximum independence but intervene and supervise when necessary  - Involve family in performance of ADLs  - Assess for home care needs following discharge   - Consider OT consult to assist with ADL evaluation and planning for discharge  - Provide patient education as appropriate  Outcome: Progressing  Goal: Maintain or return mobility status to optimal level  Description  INTERVENTIONS:  - Assess patient's baseline mobility status (ambulation, transfers, stairs, etc )    - Identify cognitive and physical deficits and behaviors that affect mobility  - Identify mobility aids required to assist with transfers and/or ambulation (gait belt, sit-to-stand, lift, walker, cane, etc )  - [place] fall precautions as indicated by assessment  - Record patient progress and toleration of activity level on Mobility SBAR; progress patient to next Phase/Stage  - Instruct patient to call for assistance with activity based on assessment  - Consider rehabilitation consult to assist with strengthening/weightbearing, etc   Outcome: Progressing     Problem: DISCHARGE PLANNING  Goal: Discharge to home or other facility with appropriate resources  Description  INTERVENTIONS:  - Identify barriers to discharge w/patient and caregiver  - Arrange for needed discharge resources and transportation as appropriate  - Identify discharge learning needs (meds, wound care, etc )  - Arrange for interpretive services to assist at discharge as needed  - Refer to Case Management Department for coordinating discharge planning if the patient needs post-hospital services based on physician/advanced practitioner order or complex needs related to functional status, cognitive ability, or social support system  Outcome: Progressing     Problem: Knowledge Deficit  Goal: Patient/family/caregiver demonstrates understanding of disease process, treatment plan, medications, and discharge instructions  Description  Complete learning assessment and assess knowledge base  Interventions:  - Provide teaching at level of understanding  - Provide teaching via preferred learning methods  Outcome: Progressing     Problem: Nutrition/Hydration-ADULT  Goal: Nutrient/Hydration intake appropriate for improving, restoring or maintaining nutritional needs  Description  Monitor and assess patient's nutrition/hydration status for malnutrition  Collaborate with interdisciplinary team and initiate plan and interventions as ordered  Monitor patient's weight and dietary intake as ordered or per policy  Utilize nutrition screening tool and intervene as necessary   Determine patient's food preferences and provide high-protein, high-caloric foods as appropriate  INTERVENTIONS:  - Monitor oral intake, urinary output, labs, and treatment plans  - Assess nutrition and hydration status and recommend course of action  - Evaluate amount of meals eaten  - Assist patient with eating if necessary   - Allow adequate time for meals  - Recommend/ encourage appropriate diets, oral nutritional supplements, and vitamin/mineral supplements  - Order, calculate, and assess calorie counts as needed  - Recommend, monitor, and adjust tube feedings and TPN/PPN based on assessed needs  - Assess need for intravenous fluids  - Provide nutrition/hydration education as appropriate  - Include patient/family/caregiver in decisions related to nutrition   Outcome: Progressing     Problem: Potential for Falls  Goal: Patient will remain free of falls  Description  INTERVENTIONS:  - Assess patient frequently for physical needs  -  Identify cognitive and physical deficits and behaviors that affect risk of falls    -  [place] fall precautions as indicated by assessment   - Educate patient/family on patient safety including physical limitations  - Instruct patient to call for assistance with activity based on assessment  - Modify environment to reduce risk of injury  - Consider OT/PT consult to assist with strengthening/mobility  Outcome: Progressing     Problem: SAFETY,RESTRAINT: NV/NON-SELF DESTRUCTIVE BEHAVIOR  Goal: Remains free of harm/injury (restraint for non violent/non self-detsructive behavior)  Description  INTERVENTIONS:  - Instruct patient/family regarding restraint use   - Assess and monitor physiologic and psychological status   - Provide interventions and comfort measures to meet assessed patient needs   - Identify and implement measures to help patient regain control  - Assess readiness for release of restraint   Outcome: Progressing  Goal: Returns to optimal restraint-free functioning  Description  INTERVENTIONS:  - Assess the patient's behavior and symptoms that indicate continued need for restraint  - Identify and implement measures to help patient regain control  - Assess readiness for release of restraint   Outcome: Progressing

## 2020-03-16 NOTE — ASSESSMENT & PLAN NOTE
Wt Readings from Last 3 Encounters:   03/16/20 118 kg (260 lb 2 3 oz)   03/15/20 113 kg (250 lb)   02/11/20 114 kg (252 lb)     Echo report noted with EF of 45-50%    With grade 1 diastolic dysfunction  Continue aspirin, beta-blocker, Isordil, hydralazine per Cardiology  Low-salt, fluid restriction diet  Not on Ace or Arb secondary to allergy to enalapril with angioedema

## 2020-03-16 NOTE — SOCIAL WORK
CM spoke with Saud at [address] [facility]  CM determined that patient is still pending Peg placement, food toleration, and CM department will continue to follow through discharge

## 2020-03-16 NOTE — PROGRESS NOTES
GI Progress Note - Kwadwo Acosta 70 y o  male MRN: [id number]    Unit/Bed#: -01 Encounter: [id number]    Subjective: Mr Amrita Chacko is in soft restraints with NG tube in place  He has no complaints  No family at bedside  Objective:     Vitals: Blood pressure (!) 176/77, pulse 61, temperature 98 4 °F (36 9 °C), temperature source Axillary, resp  rate 20, height 5' 11" (1 803 m), weight 118 kg (260 lb 2 3 oz), SpO2 97 %  ,Body mass index is 36 28 kg/m²  Intake/Output Summary (Last 24 hours) at 3/16/2020 1034  Last data filed at 3/16/2020 1029  Gross per 24 hour   Intake 0 ml   Output 545 ml   Net -545 ml       Physical Exam:     General Appearance: Alert, does not answer orientation questions, pleasant, no acute distress, (+) NG tube in place  Lungs: Clear to auscultation bilaterally, no rales or rhonchi, no labored breathing/accessory muscle use  Heart: Regular rate and rhythm, S1, S2 normal, no murmur, click, rub or gallop  Abdomen: Soft, non-tender, non-distended; bowel sounds normal; no masses or no organomegaly  Extremities: No cyanosis, edema    Invasive Devices     Peripheral Intravenous Line            Peripheral IV 03/15/20 Right Antecubital 1 day          Drain            NG/OG/Enteral Tube Nasogastric Right nares 1 day    External Urinary Catheter Extra large less than 1 day                Lab Results:  Results from last 7 days   Lab Units 03/16/20  0546   WBC Thousand/uL 4 51   HEMOGLOBIN g/dL 11 8*   HEMATOCRIT % 36 3*   PLATELETS Thousands/uL 112*   NEUTROS PCT % 33*   LYMPHS PCT % 44   MONOS PCT % 17*   EOS PCT % 5     Results from last 7 days   Lab Units 03/16/20  0546  03/12/20  0450   POTASSIUM mmol/L 3 7   < > 3 5   CHLORIDE mmol/L 108   < > 109*   CO2 mmol/L 26   < > 28   BUN mg/dL 8   < > 13   CREATININE mg/dL 1 13   < > 1 20   CALCIUM mg/dL 7 8*   < > 7 8*   ALK PHOS U/L  --   --  57   ALT U/L  --   --  34   AST U/L  --   --  130*    < > = values in this interval not displayed       Results from last 7 days   Lab Units 03/09/20  1500   INR  1 27*           Imaging Studies: I have personally reviewed pertinent imaging studies  Ct Chest Abdomen Pelvis Wo Contrast    Result Date: 3/10/2020  Impression: 1  Scattered atelectasis with small right pleural effusion  2   Small pericardial effusion  3   Catheterized, thick-walled bladder with perivesical inflammation  Cystitis not excluded  4   Bilobed low density mass in the left inguinal region measuring 6 0 x 3 5 cm, possible postoperative seroma status post herniorrhaphy  Pathologic lymph node not completely excluded on this nonenhanced study  Ultrasound would be of benefit for further characterization  The study was marked in EPIC for significant notification  Workstation performed: [id number]     Xr Chest Portable    Result Date: 3/13/2020  Impression: Enteric tube courses to the stomach  Workstation performed: [id number]     Xr Chest Portable    Result Date: 3/9/2020  Impression: Mild vascular congestion  Patchy bibasilar atelectasis  Workstation performed: [id number]     Xr Abdomen 1 View Kub    Result Date: 3/15/2020  Impression: Interval placement of nasogastric tube which projects in the expected location of the esophagogastric junction  It could be advanced to achieve more distal gastric location if clinically appropriate Workstation performed: [id number]     Ct Head Without Contrast    Result Date: 3/9/2020  Impression: No acute intracranial abnormality  Microangiopathic changes  Workstation performed: [id number]     Us Groin/inguinal Area    Result Date: 3/11/2020  Impression: 7 1 x 6 3 x 3 8 cm complex mass in the left groin corresponding to the prior CT finding  Findings most suggestive of a complex seroma or organizing hematoma    Lymphadenopathy felt less likely, however recommend clinical follow-up to ensure ability or resolution with repeated imaging for any suspicious interval change Workstation performed: [id number]       Assessment and Plan:     Oropharyngeal dysphagia  - Pt with high risk of aspiration making him strict NPO  - Extensive discussion with family held over the weekend and given he had an acute decompensation from their standpoint with unclear baseline swallowing prior to admission, will plan for PEG tube placement and continued speech therapy after discharge  - Currently with NG tube in place with Jevity 1 5 running, increase as tolerated to goal rate of 65 ml/hr  - Tentatively plan for EGD with PEG tube placement tomorrow since plavix will be held for 72 hours (last dose Friday AM)  - Stop tube feedings at midnight tonight  - His family will need to consent for the procedure, his wife should be available by phone for consent  - Will plan for dose of Ancef prior to PEG placement     Thrombocytopenia  - Chronic, unclear etiology  - Reviewed imaging and there is no evidence of liver disease/cirrhosis or splenomegaly     Elevated AST  - Remaining LFTs normal  - Likely 2/2 NSTEMI and elevated CK level  - Continue IVF  - Recheck CK and LFTs tomorrow AM      The patient will be seen by Dr Mcmanus Lank

## 2020-03-16 NOTE — NUTRITION
03/16/20 1034   Recommendations/Interventions   Summary Consult to nutrition services for TF recommendations  Recommendations provided 3/13/20 remain appropriate  Would d/c D5 infusion with initation of recommended EN  Pt is planned for PEG tube placement tomorrow 3/17/20  Currently feeding is through NGT and pt is wearing soft mitts  AMS persists  Will continue to monitor

## 2020-03-16 NOTE — ASSESSMENT & PLAN NOTE
Lab Results   Component Value Date    HGBA1C 6 9 (H) 02/07/2020       Recent Labs     03/15/20  1829 03/15/20  2351 03/16/20  0553 03/16/20  1232   POCGLU 112 124 122 138       Blood Sugar Average: Last 72 hrs:  (P) 962 [id number]     Hemoglobin A1c 6 9  Patient was getting Lantus 30 units q h s     Due to episodes of hypoglycemia and currently being NPO will decrease it to Lantus 5 units q h s     Will start losartan or Jevity via NG tube  Currently patient is on D5 half NS    Accu-Cheks monitoring  Sliding scale coverage

## 2020-03-16 NOTE — ASSESSMENT & PLAN NOTE
Continue Lopressor with holding parameters,  Started on hydralazine, Isordil per Cardiology  Currently patient is NPO per speech and swallow recommendation  Patient had removed NG tube earlier  Currently tolerating NG tube well with mittens  Continue feeding through NG tube for now  Plan for PEG tube tomorrow

## 2020-03-17 ENCOUNTER — ANESTHESIA EVENT (INPATIENT)
Dept: GASTROENTEROLOGY | Facility: HOSPITAL | Age: 80
DRG: 280 | End: 2020-03-17
Payer: MEDICARE

## 2020-03-17 ENCOUNTER — APPOINTMENT (INPATIENT)
Dept: PERIOP | Facility: HOSPITAL | Age: 80
DRG: 280 | End: 2020-03-17
Payer: MEDICARE

## 2020-03-17 ENCOUNTER — ANESTHESIA (INPATIENT)
Dept: GASTROENTEROLOGY | Facility: HOSPITAL | Age: 80
DRG: 280 | End: 2020-03-17
Payer: MEDICARE

## 2020-03-17 LAB
ALBUMIN SERPL BCP-MCNC: 2.1 G/DL (ref 3.5–5)
ALP SERPL-CCNC: 60 U/L (ref 46–116)
ALT SERPL W P-5'-P-CCNC: 34 U/L (ref 12–78)
ANION GAP SERPL CALCULATED.3IONS-SCNC: 8 MMOL/L (ref 4–13)
AST SERPL W P-5'-P-CCNC: 58 U/L (ref 5–45)
BILIRUB SERPL-MCNC: 0.6 MG/DL (ref 0.2–1)
BUN SERPL-MCNC: 7 MG/DL (ref 5–25)
CALCIUM SERPL-MCNC: 7.8 MG/DL (ref 8.3–10.1)
CHLORIDE SERPL-SCNC: 108 MMOL/L (ref 100–108)
CK MB SERPL-MCNC: 2.3 % (ref 0–2.5)
CK MB SERPL-MCNC: 5.9 NG/ML (ref 0–5)
CK SERPL-CCNC: 261 U/L (ref 39–308)
CO2 SERPL-SCNC: 27 MMOL/L (ref 21–32)
CREAT SERPL-MCNC: 1.08 MG/DL (ref 0.6–1.3)
ERYTHROCYTE [DISTWIDTH] IN BLOOD BY AUTOMATED COUNT: 15.1 % (ref 11.6–15.1)
GFR SERPL CREATININE-BSD FRML MDRD: 76 ML/MIN/1.73SQ M
GLUCOSE SERPL-MCNC: 109 MG/DL (ref 65–140)
GLUCOSE SERPL-MCNC: 114 MG/DL (ref 65–140)
GLUCOSE SERPL-MCNC: 114 MG/DL (ref 65–140)
GLUCOSE SERPL-MCNC: 120 MG/DL (ref 65–140)
GLUCOSE SERPL-MCNC: 123 MG/DL (ref 65–140)
GLUCOSE SERPL-MCNC: 125 MG/DL (ref 65–140)
GLUCOSE SERPL-MCNC: 132 MG/DL (ref 65–140)
HCT VFR BLD AUTO: 35.8 % (ref 36.5–49.3)
HGB BLD-MCNC: 11.8 G/DL (ref 12–17)
INR PPP: 1.15 (ref 0.84–1.19)
MCH RBC QN AUTO: 32.2 PG (ref 26.8–34.3)
MCHC RBC AUTO-ENTMCNC: 33 G/DL (ref 31.4–37.4)
MCV RBC AUTO: 98 FL (ref 82–98)
PLATELET # BLD AUTO: 115 THOUSANDS/UL (ref 149–390)
PMV BLD AUTO: 11.7 FL (ref 8.9–12.7)
POTASSIUM SERPL-SCNC: 3.6 MMOL/L (ref 3.5–5.3)
PROT SERPL-MCNC: 6.1 G/DL (ref 6.4–8.2)
PROTHROMBIN TIME: 14.8 SECONDS (ref 11.6–14.5)
RBC # BLD AUTO: 3.67 MILLION/UL (ref 3.88–5.62)
SODIUM SERPL-SCNC: 143 MMOL/L (ref 136–145)
WBC # BLD AUTO: 5.15 THOUSAND/UL (ref 4.31–10.16)

## 2020-03-17 PROCEDURE — 43246 EGD PLACE GASTROSTOMY TUBE: CPT | Performed by: INTERNAL MEDICINE

## 2020-03-17 PROCEDURE — 99232 SBSQ HOSP IP/OBS MODERATE 35: CPT | Performed by: STUDENT IN AN ORGANIZED HEALTH CARE EDUCATION/TRAINING PROGRAM

## 2020-03-17 PROCEDURE — C9113 INJ PANTOPRAZOLE SODIUM, VIA: HCPCS | Performed by: NURSE PRACTITIONER

## 2020-03-17 PROCEDURE — 82550 ASSAY OF CK (CPK): CPT | Performed by: PHYSICIAN ASSISTANT

## 2020-03-17 PROCEDURE — 85027 COMPLETE CBC AUTOMATED: CPT | Performed by: PHYSICIAN ASSISTANT

## 2020-03-17 PROCEDURE — 82553 CREATINE MB FRACTION: CPT | Performed by: PHYSICIAN ASSISTANT

## 2020-03-17 PROCEDURE — 97167 OT EVAL HIGH COMPLEX 60 MIN: CPT

## 2020-03-17 PROCEDURE — 80053 COMPREHEN METABOLIC PANEL: CPT | Performed by: PHYSICIAN ASSISTANT

## 2020-03-17 PROCEDURE — 85610 PROTHROMBIN TIME: CPT | Performed by: PHYSICIAN ASSISTANT

## 2020-03-17 PROCEDURE — 82948 REAGENT STRIP/BLOOD GLUCOSE: CPT

## 2020-03-17 RX ORDER — ALPRAZOLAM 0.25 MG/1
0.25 TABLET ORAL 2 TIMES DAILY PRN
Status: DISCONTINUED | OUTPATIENT
Start: 2020-03-17 | End: 2020-03-20 | Stop reason: HOSPADM

## 2020-03-17 RX ORDER — LIDOCAINE HYDROCHLORIDE 10 MG/ML
INJECTION, SOLUTION EPIDURAL; INFILTRATION; INTRACAUDAL; PERINEURAL CODE/TRAUMA/SEDATION MEDICATION
Status: COMPLETED | OUTPATIENT
Start: 2020-03-17 | End: 2020-03-17

## 2020-03-17 RX ORDER — GLYCOPYRROLATE 0.2 MG/ML
INJECTION INTRAMUSCULAR; INTRAVENOUS AS NEEDED
Status: DISCONTINUED | OUTPATIENT
Start: 2020-03-17 | End: 2020-03-17 | Stop reason: SURG

## 2020-03-17 RX ORDER — DIVALPROEX SODIUM 125 MG/1
1000 CAPSULE, COATED PELLETS ORAL
Status: DISCONTINUED | OUTPATIENT
Start: 2020-03-17 | End: 2020-03-20 | Stop reason: HOSPADM

## 2020-03-17 RX ORDER — SODIUM CHLORIDE, SODIUM LACTATE, POTASSIUM CHLORIDE, CALCIUM CHLORIDE 600; 310; 30; 20 MG/100ML; MG/100ML; MG/100ML; MG/100ML
INJECTION, SOLUTION INTRAVENOUS CONTINUOUS PRN
Status: DISCONTINUED | OUTPATIENT
Start: 2020-03-17 | End: 2020-03-17 | Stop reason: SURG

## 2020-03-17 RX ORDER — HYDRALAZINE HYDROCHLORIDE 25 MG/1
25 TABLET, FILM COATED ORAL EVERY 8 HOURS SCHEDULED
Status: DISCONTINUED | OUTPATIENT
Start: 2020-03-17 | End: 2020-03-19

## 2020-03-17 RX ORDER — DIVALPROEX SODIUM 125 MG/1
500 CAPSULE, COATED PELLETS ORAL DAILY
Status: DISCONTINUED | OUTPATIENT
Start: 2020-03-18 | End: 2020-03-20 | Stop reason: HOSPADM

## 2020-03-17 RX ORDER — LEVOTHYROXINE SODIUM 0.03 MG/1
25 TABLET ORAL
Status: DISCONTINUED | OUTPATIENT
Start: 2020-03-18 | End: 2020-03-20 | Stop reason: HOSPADM

## 2020-03-17 RX ORDER — PROPOFOL 10 MG/ML
INJECTION, EMULSION INTRAVENOUS AS NEEDED
Status: DISCONTINUED | OUTPATIENT
Start: 2020-03-17 | End: 2020-03-17 | Stop reason: SURG

## 2020-03-17 RX ORDER — LIDOCAINE HYDROCHLORIDE 20 MG/ML
INJECTION, SOLUTION EPIDURAL; INFILTRATION; INTRACAUDAL; PERINEURAL AS NEEDED
Status: DISCONTINUED | OUTPATIENT
Start: 2020-03-17 | End: 2020-03-17 | Stop reason: SURG

## 2020-03-17 RX ORDER — ATORVASTATIN CALCIUM 20 MG/1
20 TABLET, FILM COATED ORAL DAILY
Status: DISCONTINUED | OUTPATIENT
Start: 2020-03-18 | End: 2020-03-20 | Stop reason: HOSPADM

## 2020-03-17 RX ORDER — ISOSORBIDE DINITRATE 10 MG/1
20 TABLET ORAL
Status: DISCONTINUED | OUTPATIENT
Start: 2020-03-17 | End: 2020-03-20 | Stop reason: HOSPADM

## 2020-03-17 RX ORDER — FAMOTIDINE 20 MG/1
20 TABLET, FILM COATED ORAL 2 TIMES DAILY
Status: DISCONTINUED | OUTPATIENT
Start: 2020-03-17 | End: 2020-03-20 | Stop reason: HOSPADM

## 2020-03-17 RX ORDER — CEFAZOLIN SODIUM 2 G/50ML
2000 SOLUTION INTRAVENOUS ONCE
Status: COMPLETED | OUTPATIENT
Start: 2020-03-17 | End: 2020-03-17

## 2020-03-17 RX ADMIN — METOPROLOL TARTRATE 12.5 MG: 25 TABLET ORAL at 10:01

## 2020-03-17 RX ADMIN — DIVALPROEX SODIUM 1000 MG: 125 CAPSULE, COATED PELLETS ORAL at 22:16

## 2020-03-17 RX ADMIN — PROPOFOL 20 MG: 10 INJECTION, EMULSION INTRAVENOUS at 12:48

## 2020-03-17 RX ADMIN — LIDOCAINE HYDROCHLORIDE 100 MG: 20 INJECTION, SOLUTION EPIDURAL; INFILTRATION; INTRACAUDAL; PERINEURAL at 12:45

## 2020-03-17 RX ADMIN — HEPARIN SODIUM 5000 UNITS: 5000 INJECTION INTRAVENOUS; SUBCUTANEOUS at 16:04

## 2020-03-17 RX ADMIN — FAMOTIDINE 20 MG: 20 TABLET ORAL at 17:58

## 2020-03-17 RX ADMIN — CEFAZOLIN SODIUM 2000 MG: 2 SOLUTION INTRAVENOUS at 12:40

## 2020-03-17 RX ADMIN — HEPARIN SODIUM 5000 UNITS: 5000 INJECTION INTRAVENOUS; SUBCUTANEOUS at 22:26

## 2020-03-17 RX ADMIN — LEVOTHYROXINE SODIUM 25 MCG: 25 TABLET ORAL at 05:40

## 2020-03-17 RX ADMIN — HEPARIN SODIUM 5000 UNITS: 5000 INJECTION INTRAVENOUS; SUBCUTANEOUS at 05:40

## 2020-03-17 RX ADMIN — ISOSORBIDE DINITRATE 20 MG: 10 TABLET ORAL at 10:01

## 2020-03-17 RX ADMIN — DEXTROSE AND SODIUM CHLORIDE 75 ML/HR: 5; .45 INJECTION, SOLUTION INTRAVENOUS at 16:04

## 2020-03-17 RX ADMIN — ISOSORBIDE DINITRATE 20 MG: 10 TABLET ORAL at 17:58

## 2020-03-17 RX ADMIN — PROPOFOL 100 MG: 10 INJECTION, EMULSION INTRAVENOUS at 12:46

## 2020-03-17 RX ADMIN — GLYCOPYRROLATE 0.1 MG: 0.2 INJECTION, SOLUTION INTRAMUSCULAR; INTRAVENOUS at 12:41

## 2020-03-17 RX ADMIN — HYDRALAZINE HYDROCHLORIDE 25 MG: 25 TABLET ORAL at 05:40

## 2020-03-17 RX ADMIN — METOPROLOL TARTRATE 12.5 MG: 25 TABLET ORAL at 21:56

## 2020-03-17 RX ADMIN — PANTOPRAZOLE SODIUM 40 MG: 40 INJECTION, POWDER, FOR SOLUTION INTRAVENOUS at 10:01

## 2020-03-17 RX ADMIN — LIDOCAINE HYDROCHLORIDE 5 ML: 10 INJECTION, SOLUTION EPIDURAL; INFILTRATION; INTRACAUDAL; PERINEURAL at 12:57

## 2020-03-17 RX ADMIN — PHENYLEPHRINE HYDROCHLORIDE 200 MCG: 10 INJECTION INTRAVENOUS at 12:53

## 2020-03-17 RX ADMIN — HYDRALAZINE HYDROCHLORIDE 25 MG: 25 TABLET ORAL at 22:00

## 2020-03-17 RX ADMIN — VALPROATE SODIUM 500 MG: 100 INJECTION, SOLUTION INTRAVENOUS at 05:40

## 2020-03-17 RX ADMIN — TOPICAL ANESTHETIC 1 SPRAY: 200 SPRAY DENTAL; PERIODONTAL at 12:41

## 2020-03-17 RX ADMIN — PHENYLEPHRINE HYDROCHLORIDE 200 MCG: 10 INJECTION INTRAVENOUS at 12:51

## 2020-03-17 RX ADMIN — ATORVASTATIN CALCIUM 20 MG: 20 TABLET, FILM COATED ORAL at 10:01

## 2020-03-17 RX ADMIN — SODIUM CHLORIDE, SODIUM LACTATE, POTASSIUM CHLORIDE, AND CALCIUM CHLORIDE: .6; .31; .03; .02 INJECTION, SOLUTION INTRAVENOUS at 12:20

## 2020-03-17 NOTE — ANESTHESIA POSTPROCEDURE EVALUATION
Post-Op Assessment Note    CV Status:  Stable  Pain Score: 0    Pain management: adequate     Mental Status:  Awake   Hydration Status:  Stable   PONV Controlled:  Controlled   Airway Patency:  Patent   Post Op Vitals Reviewed: Yes      Staff: CRNA           BP   103/58   Temp     Pulse  85   Resp   16   SpO2   99

## 2020-03-17 NOTE — PLAN OF CARE
Problem: Prexisting or High Potential for Compromised Skin Integrity  Goal: Skin integrity is maintained or improved  Description  INTERVENTIONS:  - Identify patients at risk for skin breakdown  - Assess and monitor skin integrity  - Assess and monitor nutrition and hydration status  - Monitor labs   - Assess for incontinence   - Turn and reposition patient  - Assist with mobility/ambulation  - Relieve pressure over bony prominences  - Avoid friction and shearing  - Provide appropriate hygiene as needed including keeping skin clean and dry  - Evaluate need for skin moisturizer/barrier cream  - Collaborate with interdisciplinary team   - Patient/family teaching  - Consider wound care consult   Outcome: Progressing     Problem: PAIN - ADULT  Goal: Verbalizes/displays adequate comfort level or baseline comfort level  Description  Interventions:  - Encourage patient to monitor pain and request assistance  - Assess pain using appropriate pain scale  - Administer analgesics based on type and severity of pain and evaluate response  - Implement non-pharmacological measures as appropriate and evaluate response  - Consider cultural and social influences on pain and pain management  - Notify physician/advanced practitioner if interventions unsuccessful or patient reports new pain  Outcome: Progressing     Problem: INFECTION - ADULT  Goal: Absence or prevention of progression during hospitalization  Description  INTERVENTIONS:  - Assess and monitor for signs and symptoms of infection  - Monitor lab/diagnostic results  - Monitor all insertion sites, i e  indwelling lines, tubes, and drains  - Monitor endotracheal if appropriate and nasal secretions for changes in amount and color  - [place] appropriate cooling/warming therapies per order  - Administer medications as ordered  - Instruct and encourage patient and family to use good hand hygiene technique  - Identify and instruct in appropriate isolation precautions for identified infection/condition  Outcome: Progressing  Goal: Absence of fever/infection during neutropenic period  Description  INTERVENTIONS:  - Monitor WBC    Outcome: Progressing     Problem: SAFETY ADULT  Goal: Patient will remain free of falls  Description  INTERVENTIONS:  - Assess patient frequently for physical needs  -  Identify cognitive and physical deficits and behaviors that affect risk of falls    -  [place] fall precautions as indicated by assessment   - Educate patient/family on patient safety including physical limitations  - Instruct patient to call for assistance with activity based on assessment  - Modify environment to reduce risk of injury  - Consider OT/PT consult to assist with strengthening/mobility  Outcome: Progressing  Goal: Maintain or return to baseline ADL function  Description  INTERVENTIONS:  -  Assess patient's ability to carry out ADLs; assess patient's baseline for ADL function and identify physical deficits which impact ability to perform ADLs (bathing, care of mouth/teeth, toileting, grooming, dressing, etc )  - Assess/evaluate cause of self-care deficits   - Assess range of motion  - Assess patient's mobility; develop plan if impaired  - Assess patient's need for assistive devices and provide as appropriate  - Encourage maximum independence but intervene and supervise when necessary  - Involve family in performance of ADLs  - Assess for home care needs following discharge   - Consider OT consult to assist with ADL evaluation and planning for discharge  - Provide patient education as appropriate  Outcome: Progressing  Goal: Maintain or return mobility status to optimal level  Description  INTERVENTIONS:  - Assess patient's baseline mobility status (ambulation, transfers, stairs, etc )    - Identify cognitive and physical deficits and behaviors that affect mobility  - Identify mobility aids required to assist with transfers and/or ambulation (gait belt, sit-to-stand, lift, walker, cane, etc )  - [place] fall precautions as indicated by assessment  - Record patient progress and toleration of activity level on Mobility SBAR; progress patient to next Phase/Stage  - Instruct patient to call for assistance with activity based on assessment  - Consider rehabilitation consult to assist with strengthening/weightbearing, etc   Outcome: Progressing     Problem: DISCHARGE PLANNING  Goal: Discharge to home or other facility with appropriate resources  Description  INTERVENTIONS:  - Identify barriers to discharge w/patient and caregiver  - Arrange for needed discharge resources and transportation as appropriate  - Identify discharge learning needs (meds, wound care, etc )  - Arrange for interpretive services to assist at discharge as needed  - Refer to Case Management Department for coordinating discharge planning if the patient needs post-hospital services based on physician/advanced practitioner order or complex needs related to functional status, cognitive ability, or social support system  Outcome: Progressing     Problem: Knowledge Deficit  Goal: Patient/family/caregiver demonstrates understanding of disease process, treatment plan, medications, and discharge instructions  Description  Complete learning assessment and assess knowledge base  Interventions:  - Provide teaching at level of understanding  - Provide teaching via preferred learning methods  Outcome: Progressing     Problem: Nutrition/Hydration-ADULT  Goal: Nutrient/Hydration intake appropriate for improving, restoring or maintaining nutritional needs  Description  Monitor and assess patient's nutrition/hydration status for malnutrition  Collaborate with interdisciplinary team and initiate plan and interventions as ordered  Monitor patient's weight and dietary intake as ordered or per policy  Utilize nutrition screening tool and intervene as necessary   Determine patient's food preferences and provide high-protein, high-caloric foods as appropriate  INTERVENTIONS:  - Monitor oral intake, urinary output, labs, and treatment plans  - Assess nutrition and hydration status and recommend course of action  - Evaluate amount of meals eaten  - Assist patient with eating if necessary   - Allow adequate time for meals  - Recommend/ encourage appropriate diets, oral nutritional supplements, and vitamin/mineral supplements  - Order, calculate, and assess calorie counts as needed  - Recommend, monitor, and adjust tube feedings and TPN/PPN based on assessed needs  - Assess need for intravenous fluids  - Provide nutrition/hydration education as appropriate  - Include patient/family/caregiver in decisions related to nutrition   Outcome: Progressing     Problem: Potential for Falls  Goal: Patient will remain free of falls  Description  INTERVENTIONS:  - Assess patient frequently for physical needs  -  Identify cognitive and physical deficits and behaviors that affect risk of falls    -  [place] fall precautions as indicated by assessment   - Educate patient/family on patient safety including physical limitations  - Instruct patient to call for assistance with activity based on assessment  - Modify environment to reduce risk of injury  - Consider OT/PT consult to assist with strengthening/mobility  Outcome: Progressing     Problem: SAFETY,RESTRAINT: NV/NON-SELF DESTRUCTIVE BEHAVIOR  Goal: Remains free of harm/injury (restraint for non violent/non self-detsructive behavior)  Description  INTERVENTIONS:  - Instruct patient/family regarding restraint use   - Assess and monitor physiologic and psychological status   - Provide interventions and comfort measures to meet assessed patient needs   - Identify and implement measures to help patient regain control  - Assess readiness for release of restraint   Outcome: Progressing  Goal: Returns to optimal restraint-free functioning  Description  INTERVENTIONS:  - Assess the patient's behavior and symptoms that indicate continued need for restraint  - Identify and implement measures to help patient regain control  - Assess readiness for release of restraint   Outcome: Progressing

## 2020-03-17 NOTE — ASSESSMENT & PLAN NOTE
Per wife patient has decreased p o  Intake for past couple days without clear etiology  Currently NPO  Speech and swallow recommendation appreciated  After lengthy discussion regarding risk and benefits of feeding tube patient and family both requesting discussed with gastroenterologist about possibility of PEG tube placement  Currently being fed via NG tube  Wife and patient agreeable with PEG tube placement  Plavix on hold per GI for planned PEG tube placement on Tuesday  Resume Plavix per GI after PEG tube placement  Follow-up with GI consultation

## 2020-03-17 NOTE — PROGRESS NOTES
Progress Note - Lidya Reilly 1940, 70 y o  male MRN: [id number]    Unit/Bed#: -01 Encounter: [id number]    Primary Care Provider: Duong Lubin MD   Date and time admitted to hospital: 3/9/2020  6:29 AM        * NSTEMI (non-ST elevated myocardial infarction) [facility])  Assessment & Plan  Troponin peaked 4 80 trended down 0 39  Unclear if non STEMI type 1 versus type 2  Echo noted with EF 40-50%, mildly reduced dysfunction, mild diffuse hypokinesis, grade 1 diastolic dysfunction  Currently patient denies chest pain, dyspnea, abdominal pain, any new complaints  Patient was treated with IV heparin drip  now discontinued per Cardiology  Currently asymptomatic, hemodynamically stable  At this time will hold off on further invasive workup considering patient's risk per Cardiology  Patient and wife both agreeable with above plan  Continue aspirin, Plavix, statin, beta-blocker  Cardiology recommendation appreciated  Dysphagia  Assessment & Plan  Per wife patient has decreased p o  Intake for past couple days without clear etiology  Currently NPO  Speech and swallow recommendation appreciated  After lengthy discussion regarding risk and benefits of feeding tube patient and family both requesting discussed with gastroenterologist about possibility of PEG tube placement  Currently being fed via NG tube  Wife and patient agreeable with PEG tube placement  Plavix on hold per GI for planned PEG tube placement on Tuesday  Resume Plavix per GI after PEG tube placement  Follow-up with GI consultation  Protein calorie malnutrition ([facility] )  Assessment & Plan  Malnutrition Findings:           BMI Findings: Body mass index is 37 51 kg/m²  Currently being fed by NG tube  Plan is for PEG tube placement on Tuesday  Mass of left inguinal region  Assessment & Plan  Noted on CT    Groin ultrasound report noted for possible seroma or hematoma and discussed with patient and wife at bedside  Asymptomatic  Cystitis  Assessment & Plan  Completed antibiotic course  Currently asymptomatic  Acquired hypothyroidism  Assessment & Plan  TSH noted elevated  Wife reports that patient takes all medication as prescribed  Resume home medication of Synthroid  Outpatient follow-up    Acute metabolic encephalopathy  Assessment & Plan  Patient presented with worsening encephalopathy over last 2 days prior to admission  Folate, B12 level normal range  Now improved  Suspected secondary to acute illness as stated above as well as component of progressive dementia  MRI brain shows no acute infarct, no acute hemorrhage, moderate, chronic microangiopathic changes, and scattered chronic lacunar infarction  Currently patient does appear slightly confused  Cooperative during exam exam   Wife does not appear to be concerned  I do suspect underlying cognitive decline may be worsening  Currently patient is at baseline  Lymphedema of right lower extremity  Assessment & Plan  Chronic  Noted  Seizure disorder [facility])  Assessment & Plan  Home medication includes Depakote 500 mg p o  In the morning and 1000 mg at night  Currently on IV while inpatient NPO  Attempted NG tube yesterday which patient pulled out  We will try again for NG tube placement if patient and family agrees  Pending speech and swallow recommendations  Patient has not had seizure in approximately 8 years per wife  Chronic diastolic heart failure (HCC)  Assessment & Plan  Wt Readings from Last 3 Encounters:   03/17/20 122 kg (268 lb 15 4 oz)   03/15/20 113 kg (250 lb)   02/11/20 114 kg (252 lb)     Echo report noted with EF of 45-50%    With grade 1 diastolic dysfunction  Continue aspirin, beta-blocker, Isordil, hydralazine per Cardiology  Low-salt, fluid restriction diet  Not on Ace or Arb secondary to allergy to enalapril with angioedema        History of bilateral subcortical CVA   Assessment & Plan  History of CVA   Patient without significant deficit  Continue aspirin, Plavix  PT OT eval noted for rehab  Patient requesting him to come home on hospice with residual home hospice after PEG tube placement per CM  Paroxysmal atrial flutter (HCC)  Assessment & Plan  History of paroxysmal AFib  Continue metoprolol 12 5 mg b i d  Currently rate controlled  Current  Not on anticoagulation due to dementia, risk of fall, increased risk of bleeding  Cardiology recommendations appreciated  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[facility])  Assessment & Plan  Lab Results   Component Value Date    HGBA1C 6 9 (H) 02/07/2020       Recent Labs     03/16/20  2329 03/17/20  0027 03/17/20  0618 03/17/20  1047   POCGLU 143* 123 109 125       Blood Sugar Average: Last 72 hrs:  (P) 116 [id number]     Hemoglobin A1c 6 9  Patient was getting Lantus 30 units q h s     Due to episodes of hypoglycemia and currently being NPO will decrease it to Lantus 5 units q h s     Will start losartan or Jevity via NG tube  Currently patient is on D5 half NS  Accu-Cheks monitoring  Sliding scale coverage    Benign essential hypertension  Assessment & Plan  Continue Lopressor with holding parameters,  Started on hydralazine, Isordil per Cardiology  Currently patient is NPO per speech and swallow recommendation  Patient had removed NG tube earlier  Currently tolerating NG tube well with mittens  Continue feeding through NG tube for now  Plan for PEG tube placement Today  Chronic renal insufficiency, stage III (moderate) (HCC)  Assessment & Plan  Baseline creatinine is around 1 6 range  Currently at baseline  VTE Pharmacologic Prophylaxis:   Pharmacologic: Heparin    Patient Centered Rounds: I have performed bedside rounds with nursing staff today  Discussions with Specialists or Other Care Team Provider: GI    Education and Discussions with Family / Patient: pt  Family currently not present at bedside    Will call wife tomorrow  Time Spent for Care: 30 minutes  More than 50% of total time spent on counseling and coordination of care as described above  Current Length of Stay: 8 day(s)    Current Patient Status: Inpatient   Certification Statement: The patient will continue to require additional inpatient hospital stay due to Plan for PEG tube placement today  Discharge Plan:  Likely discharge tomorrow if tolerates diet well  Code Status: Level 1 - Full Code      Subjective:   Afebrile, hemodynamically stable  Stable at his baseline  Does not offer any complaints  noted with restrain for safety and help him prevent pulling lines and NG tube  Plan for PEG tube placement today  No other events reported  Objective:     Vitals:   Temp (24hrs), Av 8 °F (36 6 °C), Min:97 4 °F (36 3 °C), Max:98 3 °F (36 8 °C)    Temp:  [97 4 °F (36 3 °C)-98 3 °F (36 8 °C)] 98 3 °F (36 8 °C)  HR:  [61-82] 82  Resp:  [16-20] 18  BP: ()/(56-75) 98/56  SpO2:  [91 %-100 %] 97 %  Body mass index is 37 51 kg/m²  Input and Output Summary (last 24 hours): Intake/Output Summary (Last 24 hours) at 3/17/2020 1438  Last data filed at 3/17/2020 0615  Gross per 24 hour   Intake 360 ml   Output 1625 ml   Net -1265 ml       Physical Exam:     Physical Exam   Constitutional: No distress  Elderly, frail, deconditioned male patient, chronically ill, acutely nontoxic appearing not in acute distress  HENT:   Head: Normocephalic and atraumatic  Eyes: Pupils are equal, round, and reactive to light  EOM are normal    Neck: Normal range of motion  Neck supple  No JVD present  Cardiovascular: Normal rate and regular rhythm  Pulmonary/Chest: Effort normal and breath sounds normal  No stridor  No respiratory distress  Abdominal: Soft  Bowel sounds are normal  There is no tenderness  Musculoskeletal: Normal range of motion  He exhibits edema  Neurological:   Patient appears slightly confused    Cooperative during exam  Skin: He is not diaphoretic  Additional Data:     Labs:    Results from last 7 days   Lab Units 03/17/20  0533 03/16/20  0546   WBC Thousand/uL 5 15 4 51   HEMOGLOBIN g/dL 11 8* 11 8*   HEMATOCRIT % 35 8* 36 3*   PLATELETS Thousands/uL 115* 112*   NEUTROS PCT %  --  33*   LYMPHS PCT %  --  44   MONOS PCT %  --  17*   EOS PCT %  --  5     Results from last 7 days   Lab Units 03/17/20  0533   SODIUM mmol/L 143   POTASSIUM mmol/L 3 6   CHLORIDE mmol/L 108   CO2 mmol/L 27   BUN mg/dL 7   CREATININE mg/dL 1 08   ANION GAP mmol/L 8   CALCIUM mg/dL 7 8*   ALBUMIN g/dL 2 1*   TOTAL BILIRUBIN mg/dL 0 60   ALK PHOS U/L 60   ALT U/L 34   AST U/L 58*   GLUCOSE RANDOM mg/dL 114     Results from last 7 days   Lab Units 03/17/20  0533   INR  1 15     Results from last 7 days   Lab Units 03/17/20  1047 03/17/20  0618 03/17/20  0027 03/16/20  2329 03/16/20  1753 03/16/20  1232 03/16/20  0553 03/15/20  2351 03/15/20  1829 03/15/20  1556 03/15/20  1105 03/15/20  0616   POC GLUCOSE mg/dl 125 109 123 143* 148* 138 122 124 112 112 120 99                   * I Have Reviewed All Lab Data Listed Above  * Additional Pertinent Lab Tests Reviewed:  All Labs Within Last 24 Hours Reviewed      Recent Cultures (last 7 days):           Last 24 Hours Medication List:     Current Facility-Administered Medications:  ALPRAZolam 0 25 mg Oral BID PRN Felicie Birch, DO    atorvastatin 20 mg Oral Daily Felicie Gayatri, DO    dextrose 5 % and sodium chloride 0 45 % 75 mL/hr Intravenous Continuous Felicie Birch, DO Last Rate: 75 mL/hr (03/16/20 1521)   heparin (porcine) 5,000 Units Subcutaneous [facility] Felicie Gayatri, DO    hydrALAZINE 5 mg Intravenous Q6H PRN Felicie Birch, DO    hydrALAZINE 25 mg Oral [facility] Gopi Terrazas mere, DO    insulin lispro 1-6 Units Subcutaneous Q6H [address] Felicie Gayatri, DO    isosorbide dinitrate 20 mg Oral TID after meals Christianne Mendieta, DO    levothyroxine 25 mcg Oral Early Morning Christianne Mendieta, DO    metoprolol tartrate 12 5 mg Oral Q12H [address] Gopi Cox DO    pantoprazole 40 mg Intravenous Q24H [address] Mary Jo Juarez DO    valproate sodium 500 mg Intravenous [facility] Mary Jo Juarez DO Last Rate: 500 mg (03/17/20 0540)        Today, Patient Was Seen By: Racquel Walker MD    ** Please Note: Dictation voice to text software may have been used in the creation of this document   **

## 2020-03-17 NOTE — ANESTHESIA PREPROCEDURE EVALUATION
Review of Systems/Medical History  Patient summary reviewed    No history of anesthetic complications     Cardiovascular  Exercise tolerance (METS): <4,  Hyperlipidemia, Hypertension , Past MI , CAD , Dysrhythmias , atrial fibrillation, CHF ,   Comment: NSTEMI this admission,  Pulmonary  Not a smoker ,        GI/Hepatic  Dysphagia,          Chronic kidney disease stage 3,        Endo/Other  Diabetes type 2 , History of thyroid disease , hypothyroidism,      GYN       Hematology   Musculoskeletal  Negative musculoskeletal ROS        Neurology  Seizures ,  CVA ,   Comment:  metabolic encephalopathy Psychology       TTE 3/2020:  LEFT VENTRICLE:  Systolic function was mildly reduced  Ejection fraction was estimated in the range of 45 % to 50 %  There was mild diffuse hypokinesis with distinct regional wall motion abnormalities  Wall thickness was at the upper limits of normal   Doppler parameters were consistent with abnormal left ventricular relaxation (grade 1 diastolic dysfunction)      RIGHT VENTRICLE:  The size was normal   Systolic function was normal      MITRAL VALVE:  There was mild annular calcification  There was mild regurgitation      TRICUSPID VALVE:  There was trace regurgitation  Pulmonary artery systolic pressure was within the normal range  Anesthesia Plan  ASA Score- 3     Anesthesia Type- IV sedation with anesthesia with ASA Monitors  Additional Monitors:   Airway Plan:     Comment: Phone consent for IV sedation obtained via pt's wife Curry Spearing  Risks including obstruction to breathing, aspiration, cardiac and pulmonary complications explained to pt's wife  Plan Factors-    Induction- intravenous  Postoperative Plan-     Informed Consent- Anesthetic plan and risks discussed with patient and spouse  I personally reviewed this patient with the CRNA  Discussed and agreed on the Anesthesia Plan with the CRNA  Keven Bar

## 2020-03-17 NOTE — SPEECH THERAPY NOTE
Patient is NPO for PEG placement today  Will f/u when able      HAMILTON Treviño , [address] [facility]  Speech Language Pathologist   Available via [address] [place] [address] #[id number]  [place] #[id number]

## 2020-03-17 NOTE — SOCIAL WORK
CM received a message on CM desk requesting CM call spouse regarding discharge planning  CM called patients spouse and reviewed discharge plan  CM determined that patients wife at this time out prefer to transition patient home on hospice [facility]  CM was informed that Sue Pérez was working with patient  Cm will call Sue Pérez and review information as requested by spouse  CM request attending to place hospice consult for care management and CM department will continue with plan  Cm department will follow through discharge

## 2020-03-17 NOTE — PLAN OF CARE
Problem: Prexisting or High Potential for Compromised Skin Integrity  Goal: Skin integrity is maintained or improved  Description  INTERVENTIONS:  - Identify patients at risk for skin breakdown  - Assess and monitor skin integrity  - Assess and monitor nutrition and hydration status  - Monitor labs   - Assess for incontinence   - Turn and reposition patient  - Assist with mobility/ambulation  - Relieve pressure over bony prominences  - Avoid friction and shearing  - Provide appropriate hygiene as needed including keeping skin clean and dry  - Evaluate need for skin moisturizer/barrier cream  - Collaborate with interdisciplinary team   - Patient/family teaching  - Consider wound care consult   Outcome: Progressing     Problem: PAIN - ADULT  Goal: Verbalizes/displays adequate comfort level or baseline comfort level  Description  Interventions:  - Encourage patient to monitor pain and request assistance  - Assess pain using appropriate pain scale  - Administer analgesics based on type and severity of pain and evaluate response  - Implement non-pharmacological measures as appropriate and evaluate response  - Consider cultural and social influences on pain and pain management  - Notify physician/advanced practitioner if interventions unsuccessful or patient reports new pain  Outcome: Progressing     Problem: INFECTION - ADULT  Goal: Absence or prevention of progression during hospitalization  Description  INTERVENTIONS:  - Assess and monitor for signs and symptoms of infection  - Monitor lab/diagnostic results  - Monitor all insertion sites, i e  indwelling lines, tubes, and drains  - Monitor endotracheal if appropriate and nasal secretions for changes in amount and color  - [place] appropriate cooling/warming therapies per order  - Administer medications as ordered  - Instruct and encourage patient and family to use good hand hygiene technique  - Identify and instruct in appropriate isolation precautions for identified infection/condition  Outcome: Progressing  Goal: Absence of fever/infection during neutropenic period  Description  INTERVENTIONS:  - Monitor WBC    Outcome: Progressing     Problem: SAFETY ADULT  Goal: Patient will remain free of falls  Description  INTERVENTIONS:  - Assess patient frequently for physical needs  -  Identify cognitive and physical deficits and behaviors that affect risk of falls    -  [place] fall precautions as indicated by assessment   - Educate patient/family on patient safety including physical limitations  - Instruct patient to call for assistance with activity based on assessment  - Modify environment to reduce risk of injury  - Consider OT/PT consult to assist with strengthening/mobility  Outcome: Progressing  Goal: Maintain or return to baseline ADL function  Description  INTERVENTIONS:  -  Assess patient's ability to carry out ADLs; assess patient's baseline for ADL function and identify physical deficits which impact ability to perform ADLs (bathing, care of mouth/teeth, toileting, grooming, dressing, etc )  - Assess/evaluate cause of self-care deficits   - Assess range of motion  - Assess patient's mobility; develop plan if impaired  - Assess patient's need for assistive devices and provide as appropriate  - Encourage maximum independence but intervene and supervise when necessary  - Involve family in performance of ADLs  - Assess for home care needs following discharge   - Consider OT consult to assist with ADL evaluation and planning for discharge  - Provide patient education as appropriate  Outcome: Progressing  Goal: Maintain or return mobility status to optimal level  Description  INTERVENTIONS:  - Assess patient's baseline mobility status (ambulation, transfers, stairs, etc )    - Identify cognitive and physical deficits and behaviors that affect mobility  - Identify mobility aids required to assist with transfers and/or ambulation (gait belt, sit-to-stand, lift, walker, cane, etc )  - [place] fall precautions as indicated by assessment  - Record patient progress and toleration of activity level on Mobility SBAR; progress patient to next Phase/Stage  - Instruct patient to call for assistance with activity based on assessment  - Consider rehabilitation consult to assist with strengthening/weightbearing, etc   Outcome: Progressing     Problem: DISCHARGE PLANNING  Goal: Discharge to home or other facility with appropriate resources  Description  INTERVENTIONS:  - Identify barriers to discharge w/patient and caregiver  - Arrange for needed discharge resources and transportation as appropriate  - Identify discharge learning needs (meds, wound care, etc )  - Arrange for interpretive services to assist at discharge as needed  - Refer to Case Management Department for coordinating discharge planning if the patient needs post-hospital services based on physician/advanced practitioner order or complex needs related to functional status, cognitive ability, or social support system  Outcome: Progressing     Problem: Knowledge Deficit  Goal: Patient/family/caregiver demonstrates understanding of disease process, treatment plan, medications, and discharge instructions  Description  Complete learning assessment and assess knowledge base  Interventions:  - Provide teaching at level of understanding  - Provide teaching via preferred learning methods  Outcome: Progressing     Problem: Nutrition/Hydration-ADULT  Goal: Nutrient/Hydration intake appropriate for improving, restoring or maintaining nutritional needs  Description  Monitor and assess patient's nutrition/hydration status for malnutrition  Collaborate with interdisciplinary team and initiate plan and interventions as ordered  Monitor patient's weight and dietary intake as ordered or per policy  Utilize nutrition screening tool and intervene as necessary   Determine patient's food preferences and provide high-protein, high-caloric foods as appropriate  INTERVENTIONS:  - Monitor oral intake, urinary output, labs, and treatment plans  - Assess nutrition and hydration status and recommend course of action  - Evaluate amount of meals eaten  - Assist patient with eating if necessary   - Allow adequate time for meals  - Recommend/ encourage appropriate diets, oral nutritional supplements, and vitamin/mineral supplements  - Order, calculate, and assess calorie counts as needed  - Recommend, monitor, and adjust tube feedings and TPN/PPN based on assessed needs  - Assess need for intravenous fluids  - Provide nutrition/hydration education as appropriate  - Include patient/family/caregiver in decisions related to nutrition   Outcome: Progressing     Problem: Potential for Falls  Goal: Patient will remain free of falls  Description  INTERVENTIONS:  - Assess patient frequently for physical needs  -  Identify cognitive and physical deficits and behaviors that affect risk of falls    -  [place] fall precautions as indicated by assessment   - Educate patient/family on patient safety including physical limitations  - Instruct patient to call for assistance with activity based on assessment  - Modify environment to reduce risk of injury  - Consider OT/PT consult to assist with strengthening/mobility  Outcome: Progressing     Problem: SAFETY,RESTRAINT: NV/NON-SELF DESTRUCTIVE BEHAVIOR  Goal: Remains free of harm/injury (restraint for non violent/non self-detsructive behavior)  Description  INTERVENTIONS:  - Instruct patient/family regarding restraint use   - Assess and monitor physiologic and psychological status   - Provide interventions and comfort measures to meet assessed patient needs   - Identify and implement measures to help patient regain control  - Assess readiness for release of restraint   Outcome: Progressing  Goal: Returns to optimal restraint-free functioning  Description  INTERVENTIONS:  - Assess the patient's behavior and symptoms that indicate continued need for restraint  - Identify and implement measures to help patient regain control  - Assess readiness for release of restraint   Outcome: Progressing

## 2020-03-17 NOTE — PLAN OF CARE
Problem: Prexisting or High Potential for Compromised Skin Integrity  Goal: Skin integrity is maintained or improved  Description  INTERVENTIONS:  - Identify patients at risk for skin breakdown  - Assess and monitor skin integrity  - Assess and monitor nutrition and hydration status  - Monitor labs   - Assess for incontinence   - Turn and reposition patient  - Assist with mobility/ambulation  - Relieve pressure over bony prominences  - Avoid friction and shearing  - Provide appropriate hygiene as needed including keeping skin clean and dry  - Evaluate need for skin moisturizer/barrier cream  - Collaborate with interdisciplinary team   - Patient/family teaching  - Consider wound care consult   Outcome: Progressing     Problem: PAIN - ADULT  Goal: Verbalizes/displays adequate comfort level or baseline comfort level  Description  Interventions:  - Encourage patient to monitor pain and request assistance  - Assess pain using appropriate pain scale  - Administer analgesics based on type and severity of pain and evaluate response  - Implement non-pharmacological measures as appropriate and evaluate response  - Consider cultural and social influences on pain and pain management  - Notify physician/advanced practitioner if interventions unsuccessful or patient reports new pain  Outcome: Progressing     Problem: INFECTION - ADULT  Goal: Absence or prevention of progression during hospitalization  Description  INTERVENTIONS:  - Assess and monitor for signs and symptoms of infection  - Monitor lab/diagnostic results  - Monitor all insertion sites, i e  indwelling lines, tubes, and drains  - Monitor endotracheal if appropriate and nasal secretions for changes in amount and color  - [place] appropriate cooling/warming therapies per order  - Administer medications as ordered  - Instruct and encourage patient and family to use good hand hygiene technique  - Identify and instruct in appropriate isolation precautions for identified infection/condition  Outcome: Progressing  Goal: Absence of fever/infection during neutropenic period  Description  INTERVENTIONS:  - Monitor WBC    Outcome: Progressing     Problem: SAFETY ADULT  Goal: Patient will remain free of falls  Description  INTERVENTIONS:  - Assess patient frequently for physical needs  -  Identify cognitive and physical deficits and behaviors that affect risk of falls    -  [place] fall precautions as indicated by assessment   - Educate patient/family on patient safety including physical limitations  - Instruct patient to call for assistance with activity based on assessment  - Modify environment to reduce risk of injury  - Consider OT/PT consult to assist with strengthening/mobility  Outcome: Progressing  Goal: Maintain or return to baseline ADL function  Description  INTERVENTIONS:  -  Assess patient's ability to carry out ADLs; assess patient's baseline for ADL function and identify physical deficits which impact ability to perform ADLs (bathing, care of mouth/teeth, toileting, grooming, dressing, etc )  - Assess/evaluate cause of self-care deficits   - Assess range of motion  - Assess patient's mobility; develop plan if impaired  - Assess patient's need for assistive devices and provide as appropriate  - Encourage maximum independence but intervene and supervise when necessary  - Involve family in performance of ADLs  - Assess for home care needs following discharge   - Consider OT consult to assist with ADL evaluation and planning for discharge  - Provide patient education as appropriate  Outcome: Progressing  Goal: Maintain or return mobility status to optimal level  Description  INTERVENTIONS:  - Assess patient's baseline mobility status (ambulation, transfers, stairs, etc )    - Identify cognitive and physical deficits and behaviors that affect mobility  - Identify mobility aids required to assist with transfers and/or ambulation (gait belt, sit-to-stand, lift, walker, cane, etc )  - [place] fall precautions as indicated by assessment  - Record patient progress and toleration of activity level on Mobility SBAR; progress patient to next Phase/Stage  - Instruct patient to call for assistance with activity based on assessment  - Consider rehabilitation consult to assist with strengthening/weightbearing, etc   Outcome: Progressing     Problem: DISCHARGE PLANNING  Goal: Discharge to home or other facility with appropriate resources  Description  INTERVENTIONS:  - Identify barriers to discharge w/patient and caregiver  - Arrange for needed discharge resources and transportation as appropriate  - Identify discharge learning needs (meds, wound care, etc )  - Arrange for interpretive services to assist at discharge as needed  - Refer to Case Management Department for coordinating discharge planning if the patient needs post-hospital services based on physician/advanced practitioner order or complex needs related to functional status, cognitive ability, or social support system  Outcome: Progressing     Problem: Knowledge Deficit  Goal: Patient/family/caregiver demonstrates understanding of disease process, treatment plan, medications, and discharge instructions  Description  Complete learning assessment and assess knowledge base  Interventions:  - Provide teaching at level of understanding  - Provide teaching via preferred learning methods  Outcome: Progressing     Problem: Nutrition/Hydration-ADULT  Goal: Nutrient/Hydration intake appropriate for improving, restoring or maintaining nutritional needs  Description  Monitor and assess patient's nutrition/hydration status for malnutrition  Collaborate with interdisciplinary team and initiate plan and interventions as ordered  Monitor patient's weight and dietary intake as ordered or per policy  Utilize nutrition screening tool and intervene as necessary   Determine patient's food preferences and provide high-protein, high-caloric foods as appropriate  INTERVENTIONS:  - Monitor oral intake, urinary output, labs, and treatment plans  - Assess nutrition and hydration status and recommend course of action  - Evaluate amount of meals eaten  - Assist patient with eating if necessary   - Allow adequate time for meals  - Recommend/ encourage appropriate diets, oral nutritional supplements, and vitamin/mineral supplements  - Order, calculate, and assess calorie counts as needed  - Recommend, monitor, and adjust tube feedings and TPN/PPN based on assessed needs  - Assess need for intravenous fluids  - Provide nutrition/hydration education as appropriate  - Include patient/family/caregiver in decisions related to nutrition   Outcome: Progressing     Problem: Potential for Falls  Goal: Patient will remain free of falls  Description  INTERVENTIONS:  - Assess patient frequently for physical needs  -  Identify cognitive and physical deficits and behaviors that affect risk of falls    -  [place] fall precautions as indicated by assessment   - Educate patient/family on patient safety including physical limitations  - Instruct patient to call for assistance with activity based on assessment  - Modify environment to reduce risk of injury  - Consider OT/PT consult to assist with strengthening/mobility  Outcome: Progressing     Problem: SAFETY,RESTRAINT: NV/NON-SELF DESTRUCTIVE BEHAVIOR  Goal: Remains free of harm/injury (restraint for non violent/non self-detsructive behavior)  Description  INTERVENTIONS:  - Instruct patient/family regarding restraint use   - Assess and monitor physiologic and psychological status   - Provide interventions and comfort measures to meet assessed patient needs   - Identify and implement measures to help patient regain control  - Assess readiness for release of restraint   Outcome: Progressing  Goal: Returns to optimal restraint-free functioning  Description  INTERVENTIONS:  - Assess the patient's behavior and symptoms that indicate continued need for restraint  - Identify and implement measures to help patient regain control  - Assess readiness for release of restraint   Outcome: Progressing

## 2020-03-17 NOTE — ASSESSMENT & PLAN NOTE
Wt Readings from Last 3 Encounters:   03/17/20 122 kg (268 lb 15 4 oz)   03/15/20 113 kg (250 lb)   02/11/20 114 kg (252 lb)     Echo report noted with EF of 45-50%    With grade 1 diastolic dysfunction  Continue aspirin, beta-blocker, Isordil, hydralazine per Cardiology  Low-salt, fluid restriction diet  Not on Ace or Arb secondary to allergy to enalapril with angioedema

## 2020-03-17 NOTE — ASSESSMENT & PLAN NOTE
Continue Lopressor with holding parameters,  Started on hydralazine, Isordil per Cardiology  Currently patient is NPO per speech and swallow recommendation  Patient had removed NG tube earlier  Currently tolerating NG tube well with mittens  Continue feeding through NG tube for now  Plan for PEG tube placement Today

## 2020-03-17 NOTE — ASSESSMENT & PLAN NOTE
Lab Results   Component Value Date    HGBA1C 6 9 (H) 02/07/2020       Recent Labs     03/16/20  2329 03/17/20  0027 03/17/20  0618 03/17/20  1047   POCGLU 143* 123 109 125       Blood Sugar Average: Last 72 hrs:  (P) 116 [id number]     Hemoglobin A1c 6 9  Patient was getting Lantus 30 units q h s     Due to episodes of hypoglycemia and currently being NPO will decrease it to Lantus 5 units q h s     Will start losartan or Jevity via NG tube  Currently patient is on D5 half NS    Accu-Cheks monitoring  Sliding scale coverage

## 2020-03-17 NOTE — OCCUPATIONAL THERAPY NOTE
Occupational Therapy Evaluation        Patient Name: Araceli Lorenzana  JCPMO'M Date: 3/17/2020       03/17/20 0900   Note Type   Note type Eval only   Restrictions/Precautions   Weight Bearing Precautions Per Order No   Braces or Orthoses   (none at baseline)   Other Precautions Agitated; Restraints; Fall Risk;Cognitive   Pain Assessment   Pain Assessment Tool Pain Assessment not indicated - pt denies pain   Home Living   Type of [address] One level;Stairs to enter with rails; Performs ADLs on one level  (5 JUANITA in front, 2 JUANITA in back)   Bathroom Shower/Tub Walk-in shower   Bathroom Toilet Raised   Bathroom Equipment Shower chair   Bathroom Accessibility Accessible   Home Equipment Walker;Cane;Wheelchair-manual  (pt uses RW at baseline)   Additional Comments home setup obtained from chart review   Prior Function   Level of [place] Needs assistance with ADLs and functional mobility; Needs assistance with IADLs   Lives With Spouse   Receives Help From Family   ADL Assistance Needs assistance   IADLs Needs assistance   Falls in the last 6 months 1 to 4   Vocational Retired   Comments spouse provides transportation   Lifestyle   Autonomy Patient unable to proviode a description of home set up or PLOF, chart review indicates that patient lives with spouse in a 1 story house with 5 JUANITA  pt uses RW at baseline required assistance for ADLs/ IADLs     Reciprocal Relationships Supportive Family   Psychosocial   Psychosocial (WDL) X   Patient Behaviors/Mood Irritable   Ability to Express Feelings Needs assistance   Ability to Express Needs Needs assistance   Ability to Express Thoughts Needs assistance   Ability to Understand Others Usually understands   ADL   Eating Assistance Other (Comment)  (NPO/ anticipate able to self feed)   Grooming Assistance 3  Moderate Assistance   UB Bathing Assistance 2  Maximal Assistance   LB Bathing Assistance 1  Total Assistance   UB Dressing Assistance 2  Maximal Assistance   LB Dressing Assistance 1  Total Assistance   Toileting Assistance  2  Maximal Assistance   Bed Mobility   Rolling R 3  Moderate assistance   Additional items Assist x 2;Bedrails;Verbal cues; Increased time required   Supine to Sit 3  Moderate assistance   Additional items Assist x 2;HOB elevated; Bedrails; Increased time required;Verbal cues;LE management; Impulsive   Sit to Supine 3  Moderate assistance   Additional items Assist x 2; Increased time required;Verbal cues;LE management   Additional Comments Pt tolerated sitting at EOB w/ bialteral UE support for 3 minutes to allow for PCA Josue Banerjee to change bed linens and complete pt's UB bathing  Transfers   Sit to Stand 3  Moderate assistance   Additional items Verbal cues; Assist x 2; Increased time required   Stand to Sit 3  Moderate assistance   Additional items Assist x 2;Verbal cues; Increased time required   Additional Comments patient completed lateral stepping towards head of bed with mod assist of 2 and use of RW  Functional Mobility   Functional Mobility 3  Moderate assistance   Additional items Rolling walker   Balance   Static Sitting Fair -   Dynamic Sitting Poor   Static Standing Poor  (Tolerated standing for 30 seconds w/ RW)   Dynamic Standing Poor -   Activity Tolerance   Activity Tolerance Treatment limited secondary to agitation   Medical Staff Made Aware PIOTR Warner made aware of therapy outcome     Nurse Made Aware PIOTR Warner confirmed pt appropriate for therapy; PCA Josue Valentevais present during evaluation; Post session pt supine in bed w/ HOB elevated, bed alarm on, and needs w/in reach   RUE Assessment   RUE Assessment X   RUE Overall AROM   R Shoulder Flexion AAROM; ~120 degrees   R Elbow Flexion AAROM; ~100 degrees; able to independently touch top of head   R Elbow Extension AAROM; able to independently reach to touch therapist's hand with fully extended elbow   R Mass Grasp Able to make a full fist and squeeze OT's hand   RUE Strength   RUE Overall Strength Deficits   LUE Assessment   LUE Assessment X   LUE Overall AROM   L Shoulder Flexion AAROM; ~120 degrees   L Elbow Flexion AAROM; ~120 degrees; able to independently touch top of head   L Elbow Extension AAROM; full elbow extension noted   L Mass Grasp Able to make a full fist and squeeze OT's hand   LUE Strength   LUE Overall Strength Deficits   Hand Function   Gross Motor Coordination Impaired   Fine Motor Coordination Impaired   Sensation   Light Touch No apparent deficits  (BUEs)   Vision-Basic Assessment   Current Vision Does not wear glasses   Patient Visual Report Other (Comment)  (no significant changes reported/ noted)   Vision - Complex Assessment   Additional Comments observed patient tracking people and objects in room  Cognition   Overall Cognitive Status Impaired   Arousal/Participation Persistent stimuli required;Arousable   Attention Difficulty attending to directions  (Pt required frequent vc's to attend to directions)   Orientation Level Oriented to person;Oriented to place; Disoriented to time;Disoriented to situation   Memory Decreased recall of recent events;Decreased recall of biographical information   Following Commands Follows one step commands inconsistently   Comments Pt agreeable to OT eval; pt ID verified w/ first/last name and    Assessment   Limitation Decreased ADL status; Decreased UE ROM; Decreased UE strength;Decreased Safe judgement during ADL;Decreased cognition;Decreased endurance;Decreased fine motor control;Decreased self-care trans;Decreased high-level ADLs   Prognosis Good   Assessment Patient is a 70 y o  male seen for OT evaluation s/p admit to [address] [facility] on 3/9/2020 w/NSTEMI (non-ST elevated myocardial infarction) ([facility] [address] )   Commorbidities affecting patient's functional performance at time of assessment include: HTN, hypothyroidism, type 2 diabetes, paroxysmal Afib, chronic diastolic heart failure of unknown severity, CKD stage 3, chronic lymphedema in the right lower extremity, and a history of CVA  Performed at least two patient identifiers during session including name and wristband  Patient unable to proviode a description of home set up or PLOF, chart review indicates that patient lives with spouse in a 1 story house with 5 JUANITA  Pt uses RW at baseline, and prior to admission, pt required assistance from spouse for various ADLs, including bathing, and is dependent for IADLs, including cleaning, laundry, and cooking  Prior level of functioning for other ADLs was not able to be obtained during eval due to pt's impaired cognition  Upon evaluation, patient requires maximal assist for UB ADLs, total assist for LB ADLs  Occupational performance is affected by the following deficits: orientation, attention span, irritability, impulsive behavior, decreased functional use of BUEs, limited active ROM, dynamic sit/ stand balance deficit with poor standing tolerance time for self care and functional mobility, decreased activity tolerance, impaired memory and decreased safety awareness, inability to consistently follow 1-step directions  Therapist completed  extensive additional review of medical records and additional review of physical, cognitive or psychosocial history, clinical examination identifying 5 or more performance deficits, clinical decision making of a high complexity , consistent with a high complexity level evaluation  Patient to benefit from continued Occupational Therapy treatment while in the hospital to address deficits as defined above and maximize level of functional independence with ADLs and functional mobility  Occupational Performance areas to address include: grooming , bathing/ shower, dressing, toilet hygiene, transfer to all surfaces, functional ambulation, functional mobility and health maintenance  From OT standpoint, recommendation at time of d/c would be Short Term Rehab       Goals   Patient Goals none reported; patient presents with impaired cognition   Plan   Treatment Interventions ADL retraining;Functional transfer training;UE strengthening/ROM; Endurance training;Patient/family training;Equipment evaluation/education; Fine motor coordination activities; Compensatory technique education;Continued evaluation; Energy conservation; Activityengagement   Goal Expiration Date 03/31/20   OT Frequency 3-5x/wk   Recommendation   OT Discharge Recommendation Short Term Rehab   Barthel Index   Feeding 0   Bathing 0   Grooming Score 0   Dressing Score 5   Bladder Score 0   Bowels Score 5   Toilet Use Score 0   Transfers (Bed/Chair) Score 5   Mobility (Level Surface) Score 0   Stairs Score 0   Barthel Index Score 15   Modified Walker Scale   Modified [place]ah Scale 5         1- Pt will increase dynamic sitting tolerance to 20 mins while sitting at the EOB in order to participate in UB ADLs  2- Pt will participate in a light grooming task with min A using setup on tray table with min VC's for sequencing, safety, and attention  3- Pt will consistently follow 1-step commands 100% of the time and be A&O x4 consistently with environmental cues to increased activity participation  4- Pt will complete toileting w/ mod assist w/ G hygiene/thoroughness using DME  5- Pt will improve attention to familiar tasks ~3-5 mins with minimal prompting  6- Pt will transfer bed to [address] chair with mod assist of 1,  to  increase OOB/ sitting tolerance 2-4 hrs/ day

## 2020-03-17 NOTE — SOCIAL WORK
Cm placed referral to residential home Hospice  Cm called and left VM with Batsheva LlanesOoyedam-[phone]  CM department will follow and await return call  Cm department will continue to follow through discharge

## 2020-03-17 NOTE — PLAN OF CARE
Problem: OCCUPATIONAL THERAPY ADULT  Goal: Performs self-care activities at highest level of function for planned discharge setting  See evaluation for individualized goals  Description  Treatment Interventions: ADL retraining, Functional transfer training, UE strengthening/ROM, Endurance training, Patient/family training, Equipment evaluation/education, Fine motor coordination activities, Compensatory technique education, Continued evaluation, Energy conservation, Activityengagement          See flowsheet documentation for full assessment, interventions and recommendations  Note:   Limitation: Decreased ADL status, Decreased UE ROM, Decreased UE strength, Decreased Safe judgement during ADL, Decreased cognition, Decreased endurance, Decreased fine motor control, Decreased self-care trans, Decreased high-level ADLs  Prognosis: Good  Assessment: Patient is a 70 y o  male seen for OT evaluation s/p admit to [address] [facility] on 3/9/2020 w/NSTEMI (non-ST elevated myocardial infarction) ([facility] [address] )  Commorbidities affecting patient's functional performance at time of assessment include: HTN, hypothyroidism, type 2 diabetes, paroxysmal Afib, chronic diastolic heart failure of unknown severity, CKD stage 3, chronic lymphedema in the right lower extremity, and a history of CVA  Performed at least two patient identifiers during session including name and wristband  Patient unable to proviode a description of home set up or PLOF, chart review indicates that patient lives with spouse in a 1 story house with 5 JUANITA  Pt uses RW at baseline, and prior to admission, pt required assistance from spouse for various ADLs, including bathing, and is dependent for IADLs, including cleaning, laundry, and cooking  Prior level of functioning for other ADLs was not able to be obtained during eval due to pt's impaired cognition  Upon evaluation, patient requires maximal assist for UB ADLs, total assist for LB ADLs   Occupational performance is affected by the following deficits: orientation, attention span, irritability, impulsive behavior, decreased functional use of BUEs, limited active ROM, dynamic sit/ stand balance deficit with poor standing tolerance time for self care and functional mobility, decreased activity tolerance, impaired memory and decreased safety awareness, inability to consistently follow 1-step directions  Therapist completed  extensive additional review of medical records and additional review of physical, cognitive or psychosocial history, clinical examination identifying 5 or more performance deficits, clinical decision making of a high complexity , consistent with a high complexity level evaluation  Patient to benefit from continued Occupational Therapy treatment while in the hospital to address deficits as defined above and maximize level of functional independence with ADLs and functional mobility  Occupational Performance areas to address include: grooming , bathing/ shower, dressing, toilet hygiene, transfer to all surfaces, functional ambulation, functional mobility and health maintenance  From OT standpoint, recommendation at time of d/c would be Short Term Rehab         OT Discharge Recommendation: Short Term Rehab

## 2020-03-17 NOTE — SOCIAL WORK
Per Rn during rounds pt is confused and on mitt and hand restraints  Per rn pt will have peg tube placed today  CM Dept to follow up with family to discuss dcp

## 2020-03-17 NOTE — ASSESSMENT & PLAN NOTE
Malnutrition Findings:           BMI Findings: Body mass index is 37 51 kg/m²  Currently being fed by NG tube  Plan is for PEG tube placement on Tuesday

## 2020-03-17 NOTE — ASSESSMENT & PLAN NOTE
Patient presented with worsening encephalopathy over last 2 days prior to admission  Folate, B12 level normal range  Now improved  Suspected secondary to acute illness as stated above as well as component of progressive dementia  MRI brain shows no acute infarct, no acute hemorrhage, moderate, chronic microangiopathic changes, and scattered chronic lacunar infarction  Currently patient does appear slightly confused  Cooperative during exam exam   Wife does not appear to be concerned  I do suspect underlying cognitive decline may be worsening  Currently patient is at baseline

## 2020-03-18 LAB
ANION GAP SERPL CALCULATED.3IONS-SCNC: 9 MMOL/L (ref 4–13)
BUN SERPL-MCNC: 7 MG/DL (ref 5–25)
CALCIUM SERPL-MCNC: 8.3 MG/DL (ref 8.3–10.1)
CHLORIDE SERPL-SCNC: 107 MMOL/L (ref 100–108)
CO2 SERPL-SCNC: 24 MMOL/L (ref 21–32)
CREAT SERPL-MCNC: 1.12 MG/DL (ref 0.6–1.3)
ERYTHROCYTE [DISTWIDTH] IN BLOOD BY AUTOMATED COUNT: 15.6 % (ref 11.6–15.1)
GFR SERPL CREATININE-BSD FRML MDRD: 73 ML/MIN/1.73SQ M
GLUCOSE SERPL-MCNC: 114 MG/DL (ref 65–140)
GLUCOSE SERPL-MCNC: 114 MG/DL (ref 65–140)
GLUCOSE SERPL-MCNC: 115 MG/DL (ref 65–140)
GLUCOSE SERPL-MCNC: 121 MG/DL (ref 65–140)
GLUCOSE SERPL-MCNC: 134 MG/DL (ref 65–140)
HCT VFR BLD AUTO: 38.5 % (ref 36.5–49.3)
HGB BLD-MCNC: 12.3 G/DL (ref 12–17)
MCH RBC QN AUTO: 32 PG (ref 26.8–34.3)
MCHC RBC AUTO-ENTMCNC: 31.9 G/DL (ref 31.4–37.4)
MCV RBC AUTO: 100 FL (ref 82–98)
PLATELET # BLD AUTO: 111 THOUSANDS/UL (ref 149–390)
PMV BLD AUTO: 11.5 FL (ref 8.9–12.7)
POTASSIUM SERPL-SCNC: 4.1 MMOL/L (ref 3.5–5.3)
RBC # BLD AUTO: 3.84 MILLION/UL (ref 3.88–5.62)
SODIUM SERPL-SCNC: 140 MMOL/L (ref 136–145)
WBC # BLD AUTO: 5.55 THOUSAND/UL (ref 4.31–10.16)

## 2020-03-18 PROCEDURE — 80048 BASIC METABOLIC PNL TOTAL CA: CPT | Performed by: STUDENT IN AN ORGANIZED HEALTH CARE EDUCATION/TRAINING PROGRAM

## 2020-03-18 PROCEDURE — 85027 COMPLETE CBC AUTOMATED: CPT | Performed by: STUDENT IN AN ORGANIZED HEALTH CARE EDUCATION/TRAINING PROGRAM

## 2020-03-18 PROCEDURE — 82948 REAGENT STRIP/BLOOD GLUCOSE: CPT

## 2020-03-18 PROCEDURE — 99232 SBSQ HOSP IP/OBS MODERATE 35: CPT | Performed by: STUDENT IN AN ORGANIZED HEALTH CARE EDUCATION/TRAINING PROGRAM

## 2020-03-18 PROCEDURE — 99232 SBSQ HOSP IP/OBS MODERATE 35: CPT | Performed by: INTERNAL MEDICINE

## 2020-03-18 RX ORDER — ACETAMINOPHEN 325 MG/1
650 TABLET ORAL EVERY 6 HOURS PRN
Status: DISCONTINUED | OUTPATIENT
Start: 2020-03-18 | End: 2020-03-20 | Stop reason: HOSPADM

## 2020-03-18 RX ADMIN — LEVOTHYROXINE SODIUM 25 MCG: 25 TABLET ORAL at 06:06

## 2020-03-18 RX ADMIN — HEPARIN SODIUM 5000 UNITS: 5000 INJECTION INTRAVENOUS; SUBCUTANEOUS at 22:02

## 2020-03-18 RX ADMIN — FAMOTIDINE 20 MG: 20 TABLET ORAL at 08:14

## 2020-03-18 RX ADMIN — ATORVASTATIN CALCIUM 20 MG: 20 TABLET, FILM COATED ORAL at 08:14

## 2020-03-18 RX ADMIN — HEPARIN SODIUM 5000 UNITS: 5000 INJECTION INTRAVENOUS; SUBCUTANEOUS at 13:45

## 2020-03-18 RX ADMIN — DIVALPROEX SODIUM 1000 MG: 125 CAPSULE, COATED PELLETS ORAL at 22:05

## 2020-03-18 RX ADMIN — HEPARIN SODIUM 5000 UNITS: 5000 INJECTION INTRAVENOUS; SUBCUTANEOUS at 06:06

## 2020-03-18 RX ADMIN — DIVALPROEX SODIUM 500 MG: 125 CAPSULE, COATED PELLETS ORAL at 08:14

## 2020-03-18 RX ADMIN — HYDRALAZINE HYDROCHLORIDE 25 MG: 25 TABLET ORAL at 13:40

## 2020-03-18 RX ADMIN — METOPROLOL TARTRATE 12.5 MG: 25 TABLET ORAL at 22:02

## 2020-03-18 RX ADMIN — ISOSORBIDE DINITRATE 20 MG: 10 TABLET ORAL at 13:45

## 2020-03-18 RX ADMIN — FAMOTIDINE 20 MG: 20 TABLET ORAL at 17:18

## 2020-03-18 RX ADMIN — HYDRALAZINE HYDROCHLORIDE 25 MG: 25 TABLET ORAL at 22:06

## 2020-03-18 RX ADMIN — DEXTROSE AND SODIUM CHLORIDE 75 ML/HR: 5; .45 INJECTION, SOLUTION INTRAVENOUS at 06:12

## 2020-03-18 RX ADMIN — HYDRALAZINE HYDROCHLORIDE 25 MG: 25 TABLET ORAL at 05:01

## 2020-03-18 RX ADMIN — ISOSORBIDE DINITRATE 20 MG: 10 TABLET ORAL at 08:14

## 2020-03-18 RX ADMIN — METOPROLOL TARTRATE 12.5 MG: 25 TABLET ORAL at 08:14

## 2020-03-18 RX ADMIN — ISOSORBIDE DINITRATE 20 MG: 10 TABLET ORAL at 17:18

## 2020-03-18 NOTE — ASSESSMENT & PLAN NOTE
History of CVA  Patient without significant deficit  Continue aspirin, Plavix  Wife is requesting for patient to come home with VNA services with [facility] and if patient does not improve with therapy and if needed then transition to home hospice with [facility]

## 2020-03-18 NOTE — SPEECH THERAPY NOTE
Attempted f/u  Patient lethargic and unable to rouse for safe po intake  PEG successfully placed yesterday and TF initiated       HAMILTON Treviño , [address] [facility]  Speech Language Pathologist   Available via [address] [place] [address] #[id number]  [place] #[id number]

## 2020-03-18 NOTE — ASSESSMENT & PLAN NOTE
Malnutrition Findings:           BMI Findings: Body mass index is 37 6 kg/m²  Status post PEG tube placement on 03/17/2020  Currently on [facility]  Follow-up with nutritionist consult for PEG tube feeding recommendation

## 2020-03-18 NOTE — PLAN OF CARE
Problem: Prexisting or High Potential for Compromised Skin Integrity  Goal: Skin integrity is maintained or improved  Description  INTERVENTIONS:  - Identify patients at risk for skin breakdown  - Assess and monitor skin integrity  - Assess and monitor nutrition and hydration status  - Monitor labs   - Assess for incontinence   - Turn and reposition patient  - Assist with mobility/ambulation  - Relieve pressure over bony prominences  - Avoid friction and shearing  - Provide appropriate hygiene as needed including keeping skin clean and dry  - Evaluate need for skin moisturizer/barrier cream  - Collaborate with interdisciplinary team   - Patient/family teaching  - Consider wound care consult   Outcome: Progressing     Problem: PAIN - ADULT  Goal: Verbalizes/displays adequate comfort level or baseline comfort level  Description  Interventions:  - Encourage patient to monitor pain and request assistance  - Assess pain using appropriate pain scale  - Administer analgesics based on type and severity of pain and evaluate response  - Implement non-pharmacological measures as appropriate and evaluate response  - Consider cultural and social influences on pain and pain management  - Notify physician/advanced practitioner if interventions unsuccessful or patient reports new pain  Outcome: Progressing     Problem: INFECTION - ADULT  Goal: Absence or prevention of progression during hospitalization  Description  INTERVENTIONS:  - Assess and monitor for signs and symptoms of infection  - Monitor lab/diagnostic results  - Monitor all insertion sites, i e  indwelling lines, tubes, and drains  - Monitor endotracheal if appropriate and nasal secretions for changes in amount and color  - [place] appropriate cooling/warming therapies per order  - Administer medications as ordered  - Instruct and encourage patient and family to use good hand hygiene technique  - Identify and instruct in appropriate isolation precautions for identified infection/condition  Outcome: Progressing  Goal: Absence of fever/infection during neutropenic period  Description  INTERVENTIONS:  - Monitor WBC    Outcome: Progressing     Problem: SAFETY ADULT  Goal: Patient will remain free of falls  Description  INTERVENTIONS:  - Assess patient frequently for physical needs  -  Identify cognitive and physical deficits and behaviors that affect risk of falls    -  [place] fall precautions as indicated by assessment   - Educate patient/family on patient safety including physical limitations  - Instruct patient to call for assistance with activity based on assessment  - Modify environment to reduce risk of injury  - Consider OT/PT consult to assist with strengthening/mobility  Outcome: Progressing  Goal: Maintain or return to baseline ADL function  Description  INTERVENTIONS:  -  Assess patient's ability to carry out ADLs; assess patient's baseline for ADL function and identify physical deficits which impact ability to perform ADLs (bathing, care of mouth/teeth, toileting, grooming, dressing, etc )  - Assess/evaluate cause of self-care deficits   - Assess range of motion  - Assess patient's mobility; develop plan if impaired  - Assess patient's need for assistive devices and provide as appropriate  - Encourage maximum independence but intervene and supervise when necessary  - Involve family in performance of ADLs  - Assess for home care needs following discharge   - Consider OT consult to assist with ADL evaluation and planning for discharge  - Provide patient education as appropriate  Outcome: Progressing  Goal: Maintain or return mobility status to optimal level  Description  INTERVENTIONS:  - Assess patient's baseline mobility status (ambulation, transfers, stairs, etc )    - Identify cognitive and physical deficits and behaviors that affect mobility  - Identify mobility aids required to assist with transfers and/or ambulation (gait belt, sit-to-stand, lift, walker, cane, etc )  - [place] fall precautions as indicated by assessment  - Record patient progress and toleration of activity level on Mobility SBAR; progress patient to next Phase/Stage  - Instruct patient to call for assistance with activity based on assessment  - Consider rehabilitation consult to assist with strengthening/weightbearing, etc   Outcome: Progressing     Problem: DISCHARGE PLANNING  Goal: Discharge to home or other facility with appropriate resources  Description  INTERVENTIONS:  - Identify barriers to discharge w/patient and caregiver  - Arrange for needed discharge resources and transportation as appropriate  - Identify discharge learning needs (meds, wound care, etc )  - Arrange for interpretive services to assist at discharge as needed  - Refer to Case Management Department for coordinating discharge planning if the patient needs post-hospital services based on physician/advanced practitioner order or complex needs related to functional status, cognitive ability, or social support system  Outcome: Progressing     Problem: Knowledge Deficit  Goal: Patient/family/caregiver demonstrates understanding of disease process, treatment plan, medications, and discharge instructions  Description  Complete learning assessment and assess knowledge base  Interventions:  - Provide teaching at level of understanding  - Provide teaching via preferred learning methods  Outcome: Progressing     Problem: Nutrition/Hydration-ADULT  Goal: Nutrient/Hydration intake appropriate for improving, restoring or maintaining nutritional needs  Description  Monitor and assess patient's nutrition/hydration status for malnutrition  Collaborate with interdisciplinary team and initiate plan and interventions as ordered  Monitor patient's weight and dietary intake as ordered or per policy  Utilize nutrition screening tool and intervene as necessary   Determine patient's food preferences and provide high-protein, high-caloric foods as appropriate  INTERVENTIONS:  - Monitor oral intake, urinary output, labs, and treatment plans  - Assess nutrition and hydration status and recommend course of action  - Evaluate amount of meals eaten  - Assist patient with eating if necessary   - Allow adequate time for meals  - Recommend/ encourage appropriate diets, oral nutritional supplements, and vitamin/mineral supplements  - Order, calculate, and assess calorie counts as needed  - Recommend, monitor, and adjust tube feedings and TPN/PPN based on assessed needs  - Assess need for intravenous fluids  - Provide nutrition/hydration education as appropriate  - Include patient/family/caregiver in decisions related to nutrition   Outcome: Progressing     Problem: Potential for Falls  Goal: Patient will remain free of falls  Description  INTERVENTIONS:  - Assess patient frequently for physical needs  -  Identify cognitive and physical deficits and behaviors that affect risk of falls    -  [place] fall precautions as indicated by assessment   - Educate patient/family on patient safety including physical limitations  - Instruct patient to call for assistance with activity based on assessment  - Modify environment to reduce risk of injury  - Consider OT/PT consult to assist with strengthening/mobility  Outcome: Progressing     Problem: SAFETY,RESTRAINT: NV/NON-SELF DESTRUCTIVE BEHAVIOR  Goal: Remains free of harm/injury (restraint for non violent/non self-detsructive behavior)  Description  INTERVENTIONS:  - Instruct patient/family regarding restraint use   - Assess and monitor physiologic and psychological status   - Provide interventions and comfort measures to meet assessed patient needs   - Identify and implement measures to help patient regain control  - Assess readiness for release of restraint   Outcome: Progressing  Goal: Returns to optimal restraint-free functioning  Description  INTERVENTIONS:  - Assess the patient's behavior and symptoms that indicate continued need for restraint  - Identify and implement measures to help patient regain control  - Assess readiness for release of restraint   Outcome: Progressing

## 2020-03-18 NOTE — PROGRESS NOTES
Progress Note - Alden Felty 1940, 70 y o  male MRN: [id number]    Unit/Bed#: -01 Encounter: [id number]    Primary Care Provider: Jt Rueda MD   Date and time admitted to hospital: 3/9/2020  6:29 AM        * NSTEMI (non-ST elevated myocardial infarction) [facility])  Assessment & Plan  Troponin peaked 4 80 trended down 0 39  Unclear if non STEMI type 1 versus type 2  Echo noted with EF 40-50%, mildly reduced dysfunction, mild diffuse hypokinesis, grade 1 diastolic dysfunction  Currently patient denies chest pain, dyspnea, abdominal pain, any new complaints  Patient was treated with IV heparin drip  now discontinued per Cardiology  Currently asymptomatic, hemodynamically stable  At this time will hold off on further invasive workup considering patient's risk per Cardiology  Patient and wife both agreeable with above plan  Continue aspirin, Plavix, statin, beta-blocker  Cardiology recommendation appreciated  Dysphagia  Assessment & Plan  Per wife patient has decreased p o  Intake for past couple days without clear etiology  Currently NPO  Speech and swallow recommendation appreciated  After lengthy discussion regarding risk and benefits of feeding tube patient and family both requesting discussed with gastroenterologist about possibility of PEG tube placement  Now status post PEG placement on 03/17/2020  Plan is to start on PEG tube feeding today  Follow-up nutritionist consult for tube feed recommendations  CM working on getting feeding tube supplies delivered at home  Protein calorie malnutrition ([facility] [address] )  Assessment & Plan  Malnutrition Findings:           BMI Findings: Body mass index is 37 6 kg/m²  Status post PEG tube placement on 03/17/2020  Plan is to start on PEG tube feeding  Follow-up with nutritionist consult for PEG tube feeding recommendation  Mass of left inguinal region  Assessment & Plan  Noted on CT    Groin ultrasound report noted for possible seroma or hematoma and discussed with patient and wife at bedside  Asymptomatic  Cystitis  Assessment & Plan  Completed antibiotic course  Currently asymptomatic  Acquired hypothyroidism  Assessment & Plan  TSH noted elevated  Wife reports that patient takes all medication as prescribed  Resume home medication of Synthroid  Recommended repeat TSH outpatient and 3-4 weeks  Outpatient follow-up    Acute metabolic encephalopathy  Assessment & Plan  Patient presented with worsening encephalopathy over last 2 days prior to admission  Folate, B12 level normal range  Suspected secondary to acute illness as stated above as well as component of progressive dementia  MRI brain shows no acute infarct, no acute hemorrhage, moderate, chronic microangiopathic changes, and scattered chronic lacunar infarction  Now improved  Currently patient does appear slightly confused  Cooperative during exam exam   Wife does not appear to be concerned  Patient appears to be at baseline per wife  I do suspect underlying cognitive decline may be worsening  Currently patient is at baseline  Wife is requesting for patient to come home with [facility]NA services with [facility] and therapy at home and if patient does not improve with therapy and if needed then transition to home hospice with [facility]  Lymphedema of right lower extremity  Assessment & Plan  Chronic  Noted  Seizure disorder [facility])  Assessment & Plan  Home medication includes Depakote 500 mg p o  In the morning and 1000 mg at night  Currently on IV while inpatient NPO  Status post PEG tube placement on 03/17/2020  Resume home medications through PEG tube  Pending speech and swallow recommendations  Patient has not had seizure in approximately 8 years per wife      Chronic diastolic heart failure [facility])  Assessment & Plan  Wt Readings from Last 3 Encounters:   03/18/20 122 kg (269 lb 10 oz)   03/15/20 113 kg (250 lb)   02/11/20 114 kg (252 lb)     Echo report noted with EF of 45-50%  With grade 1 diastolic dysfunction  Continue aspirin, beta-blocker, Isordil, hydralazine per Cardiology  Low-salt, fluid restriction diet  Not on Ace or Arb secondary to allergy to enalapril with angioedema        History of bilateral subcortical CVA   Assessment & Plan  History of CVA  Patient without significant deficit  Continue aspirin, Plavix  Wife is requesting for patient to come home with [facility] services with [facility] and if patient does not improve with therapy and if needed then transition to home hospice with [facility]  Paroxysmal atrial flutter (HCC)  Assessment & Plan  History of paroxysmal AFib  Continue metoprolol 12 5 mg b i d  Currently rate controlled  Current  Not on anticoagulation due to dementia, risk of fall, increased risk of bleeding  Cardiology recommendations appreciated  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[facility])  Assessment & Plan  Lab Results   Component Value Date    HGBA1C 6 9 (H) 02/07/2020       Recent Labs     03/17/20  1806 03/17/20  2048 03/17/20  2355 03/18/20  0555   POCGLU 120 132 114 114       Blood Sugar Average: Last 72 hrs:  (P) 120 [id number]     Hemoglobin A1c 6 9  Patient was getting Lantus 30 units q h s     Due to episodes of hypoglycemia and currently being NPO will decrease it to Lantus 5 units q h s   Status post PEG tube  Plan is to start on PEG tube feeding  (Glucerna not available at this time)  Accu-Cheks monitoring  Sliding scale coverage    Benign essential hypertension  Assessment & Plan  Continue Lopressor with holding parameters,  Started on hydralazine, Isordil per Cardiology  Currently patient is NPO per speech and swallow recommendation  S/p PEG tube placement on 03/17/2020  Plan is to start on PEG tube feeding today  Follow-up with nutritionist consult for feeding recommendation            Chronic renal insufficiency, stage III (moderate) (HCC)  Assessment & Plan  Baseline creatinine is around 1 6 range  Currently creatinine 1 12  Outpatient follow-up  VTE Pharmacologic Prophylaxis:   Pharmacologic: Heparin  Mechanical VTE Prophylaxis in Place: Yes     Patient Centered Rounds: I have performed bedside rounds with nursing staff today  Discussions with Specialists or Other Care Team Provider: GI    Education and Discussions with Family / Patient: pt  Spoke with wife at [phone] at 10:38am today  Time Spent for Care: 30 minutes  More than 50% of total time spent on counseling and coordination of care as described above  Current Length of Stay: 9 day(s)    Current Patient Status: Inpatient   Certification Statement: The patient will continue to require additional inpatient hospital stay due to Starting on PEG tube feeding, awaiting feeding tube supplies at home    Discharge Plan:  Likely tomorrow  Code Status: Level 1 - Full Code      Subjective:   Afebrile, hemodynamically stable  Patient is comfortable at his baseline  Does not offer any complaints  Plan is to start on PEG tube feeding today  Awaiting PEG tube feeding supplies at home  CM working of supplies for home  No other events reported  Spoke with wife over the phone and plan is for him to come home tomorrow with [facility] and therapy at home if he is not doing well they will consider transitioning home hospice thereafter  Objective:     Vitals:   Temp (24hrs), Av 7 °F (36 5 °C), Min:97 5 °F (36 4 °C), Max:98 1 °F (36 7 °C)    Temp:  [97 5 °F (36 4 °C)-98 1 °F (36 7 °C)] 97 5 °F (36 4 °C)  HR:  [73-86] 80  Resp:  [18] 18  BP: (135-186)/(75-88) 180/82  SpO2:  [96 %-100 %] 100 %  Body mass index is 37 6 kg/m²  Input and Output Summary (last 24 hours): Intake/Output Summary (Last 24 hours) at 3/18/2020 1537  Last data filed at 3/18/2020 1430  Gross per 24 hour   Intake 1630 ml   Output 432 ml   Net 1198 ml       Physical Exam:     Physical Exam   Constitutional: No distress  Elderly, frail, deconditioned male patient, chronically ill, acutely nontoxic appearing not in acute distress  HENT:   Head: Normocephalic and atraumatic  Eyes: Pupils are equal, round, and reactive to light  EOM are normal    Neck: Normal range of motion  Neck supple  No JVD present  Cardiovascular: Normal rate and regular rhythm  Pulmonary/Chest: Effort normal and breath sounds normal  No stridor  No respiratory distress  Abdominal: Soft  Bowel sounds are normal  There is no tenderness  PEG tube noted  Musculoskeletal: Normal range of motion  He exhibits edema  Neurological:   Patient appears slightly confused  Cooperative during exam    Skin: He is not diaphoretic  Additional Data:     Labs:    Results from last 7 days   Lab Units 03/18/20  0459  03/16/20  0546   WBC Thousand/uL 5 55   < > 4 51   HEMOGLOBIN g/dL 12 3   < > 11 8*   HEMATOCRIT % 38 5   < > 36 3*   PLATELETS Thousands/uL 111*   < > 112*   NEUTROS PCT %  --   --  33*   LYMPHS PCT %  --   --  44   MONOS PCT %  --   --  17*   EOS PCT %  --   --  5    < > = values in this interval not displayed  Results from last 7 days   Lab Units 03/18/20  0459 03/17/20  0533   SODIUM mmol/L 140 143   POTASSIUM mmol/L 4 1 3 6   CHLORIDE mmol/L 107 108   CO2 mmol/L 24 27   BUN mg/dL 7 7   CREATININE mg/dL 1 12 1 08   ANION GAP mmol/L 9 8   CALCIUM mg/dL 8 3 7 8*   ALBUMIN g/dL  --  2 1*   TOTAL BILIRUBIN mg/dL  --  0 60   ALK PHOS U/L  --  60   ALT U/L  --  34   AST U/L  --  58*   GLUCOSE RANDOM mg/dL 114 114     Results from last 7 days   Lab Units 03/17/20  0533   INR  1 15     Results from last 7 days   Lab Units 03/18/20  1127 03/18/20  0555 03/17/20  2355 03/17/20  2048 03/17/20  1806 03/17/20  1047 03/17/20  0618 03/17/20  0027 03/16/20  2329 03/16/20  1753 03/16/20  1232 03/16/20  0553   POC GLUCOSE mg/dl 121 114 114 132 120 125 109 123 143* 148* 138 122                   * I Have Reviewed All Lab Data Listed Above    * Additional Pertinent Lab Tests Reviewed: All Labs Within Last 24 Hours Reviewed      Recent Cultures (last 7 days):           Last 24 Hours Medication List:     Current Facility-Administered Medications:  ALPRAZolam 0 25 mg Per PEG Tube BID PRN Hugo SKINNER MD   atorvastatin 20 mg Per PEG Tube Daily Hugo SKINNER MD   divalproex sodium 1,000 mg Per PEG Tube HS Hugo SKINNER MD   divalproex sodium 500 mg Per PEG Tube Daily Hugo SKINNER MD   famotidine 20 mg Per PEG Tube BID Hugo SKINNER MD   heparin (porcine) 5,000 Units Subcutaneous [facility] Gopi Iverson,    hydrALAZINE 5 mg Intravenous Q6H PRN Zev Sa, DO   hydrALAZINE 25 mg Per PEG Tube Q8H [address] Hugo SKINNER MD   insulin lispro 1-6 Units Subcutaneous Q6H [address] Gopi Iverson,    isosorbide dinitrate 20 mg Per PEG Tube TID after meals Hugo SKINNER MD   levothyroxine 25 mcg Per PEG Tube Early Morning Hugo SKINNER MD   metoprolol tartrate 12 5 mg Per PEG Tube Q12H [address] Octavia Dewey MD        Today, Patient Was Seen By: Alba Shine MD    ** Please Note: Dictation voice to text software may have been used in the creation of this document   **

## 2020-03-18 NOTE — SOCIAL WORK
Referral placed to [employer] medical supplies for tube feeds  CM spoke with Cici, complete tube feed form, and awaiting MD signature  Plan at this time will be for discharge home tomorrow with [facility] through residential [facility] supplies for tube feeds, and supportive care  Per slim, spouse will review options of hospice in the home with residential if they feel it is appropriate  CM department will follow through discharge  Patient will most likely require BLS transport home

## 2020-03-18 NOTE — ASSESSMENT & PLAN NOTE
Wt Readings from Last 3 Encounters:   03/18/20 122 kg (269 lb 10 oz)   03/15/20 113 kg (250 lb)   02/11/20 114 kg (252 lb)     Echo report noted with EF of 45-50%    With grade 1 diastolic dysfunction  Continue aspirin, beta-blocker, Isordil, hydralazine per Cardiology  Low-salt, fluid restriction diet  Not on Ace or Arb secondary to allergy to enalapril with angioedema

## 2020-03-18 NOTE — QUICK NOTE
Palliative had been notified that patient's wife was interested in transitioning to home hospice on discharge; however, on further discussions w/ her and with the primary team, there was some confusion about care/disposition  Patient's wife wishes him to go home w/ [facility] and then transition to hospice with them if he does not improve or continues to decline      Jose Francisco MD  [address] [facility]  [phone]

## 2020-03-18 NOTE — SOCIAL WORK
Tube feed form and clinical faxed to Tereza Carr at [phone]  CM department will follow through discharge

## 2020-03-18 NOTE — OCCUPATIONAL THERAPY NOTE
OCCUPATIONAL THERAPY CANCELLATION  NOTE    Therapist made 3 attempts to see pt today  Pt very fatigued , unable to remain awake  RN made aware  OT will continue to follow case                                                      SMOOTH Ivory/MELLISSA

## 2020-03-18 NOTE — ASSESSMENT & PLAN NOTE
Lab Results   Component Value Date    HGBA1C 6 9 (H) 02/07/2020       Recent Labs     03/17/20  1806 03/17/20  2048 03/17/20  2355 03/18/20  0555   POCGLU 120 132 114 114       Blood Sugar Average: Last 72 hrs:  (P) 120 [id number]     Hemoglobin A1c 6 9  Patient was getting Lantus 30 units q h s     Due to episodes of hypoglycemia and currently being NPO will decrease it to Lantus 5 units q h s   Status post PEG tube  Started on Jevity   (Glucerna not available at this time)  Accu-Cheks monitoring  Sliding scale coverage

## 2020-03-18 NOTE — ASSESSMENT & PLAN NOTE
Patient presented with worsening encephalopathy over last 2 days prior to admission  Folate, B12 level normal range  Suspected secondary to acute illness as stated above as well as component of progressive dementia  MRI brain shows no acute infarct, no acute hemorrhage, moderate, chronic microangiopathic changes, and scattered chronic lacunar infarction  Now improved  Currently patient does appear slightly confused  Cooperative during exam exam   Wife does not appear to be concerned  Patient appears to be at baseline per wife  I do suspect underlying cognitive decline may be worsening  Currently patient is at baseline  Wife is requesting for patient to come home with [facility] services with [facility] and if patient does not improve with therapy and if needed then transition to home hospice with [facility]

## 2020-03-18 NOTE — ASSESSMENT & PLAN NOTE
Home medication includes Depakote 500 mg p o  In the morning and 1000 mg at night  Currently on IV while inpatient NPO  Status post PEG tube placement on 03/17/2020  Resume home medications through PEG tube  Pending speech and swallow recommendations  Patient has not had seizure in approximately 8 years per wife

## 2020-03-18 NOTE — ASSESSMENT & PLAN NOTE
Per wife patient has decreased p o  Intake for past couple days without clear etiology  Currently NPO  Speech and swallow recommendation appreciated  After lengthy discussion regarding risk and benefits of feeding tube patient and family both requesting discussed with gastroenterologist about possibility of PEG tube placement  Now status post back to placement on 03/17/2020  Currently on Jevity    Follow-up nutritionist consult for tube feed recommendations

## 2020-03-18 NOTE — ASSESSMENT & PLAN NOTE
TSH noted elevated  Wife reports that patient takes all medication as prescribed    Resume home medication of Synthroid  Recommended repeat TSH outpatient and 3-4 weeks  Outpatient follow-up

## 2020-03-18 NOTE — SOCIAL WORK
Per Palliative pt's wife would like [facility]  Per SLIM pt's wife would like [facility]  CM s/w pt's wife to clarify dcp  Per pt's wife she would like [facility] and if pt does not improve then she will transition to [address] [place] [address]  Pt's wife is in agreement to having [facility]  CM updated [facility] via [facility]

## 2020-03-18 NOTE — PROGRESS NOTES
GI Progress Note - Lynn Murray 70 y o  male MRN: [id number]    Unit/Bed#: -01 Encounter: [id number]    Subjective: He is lethargic today and difficult to wake up  RN reports difficulty flushing PEG tube this morning but this resolved so she is going to start tube feedings soon  Objective:     Vitals: Blood pressure (!) 186/80, pulse 79, temperature 97 5 °F (36 4 °C), temperature source Axillary, resp  rate 18, height 5' 11" (1 803 m), weight 122 kg (269 lb 10 oz), SpO2 100 %  ,Body mass index is 37 6 kg/m²  Intake/Output Summary (Last 24 hours) at 3/18/2020 1209  Last data filed at 3/18/2020 0612  Gross per 24 hour   Intake 1080 ml   Output 432 ml   Net 648 ml       Physical Exam:     General Appearance: Lethargic, no acute distress  Lungs: Clear to auscultation bilaterally, no rales or rhonchi, no labored breathing/accessory muscle use  Heart: RRR, no murmur  Abdomen: Non-distended, soft, BS active, NTTP, (+) PEG tube site appears clean, no induration or surrounding tenderness  Extremities: No cyanosis or LE edema    Invasive Devices     Peripheral Intravenous Line            Peripheral IV 03/17/20 Right Hand less than 1 day          Drain            Gastrostomy/Enterostomy Percutaneous endoscopic gastrostomy (PEG) LUQ less than 1 day                Lab Results:  Results from last 7 days   Lab Units 03/18/20 0459 03/16/20  0546   WBC Thousand/uL 5 55   < > 4 51   HEMOGLOBIN g/dL 12 3   < > 11 8*   HEMATOCRIT % 38 5   < > 36 3*   PLATELETS Thousands/uL 111*   < > 112*   NEUTROS PCT %  --   --  33*   LYMPHS PCT %  --   --  44   MONOS PCT %  --   --  17*   EOS PCT %  --   --  5    < > = values in this interval not displayed       Results from last 7 days   Lab Units 03/18/20 0459 03/17/20  0533   POTASSIUM mmol/L 4 1 3 6   CHLORIDE mmol/L 107 108   CO2 mmol/L 24 27   BUN mg/dL 7 7   CREATININE mg/dL 1 12 1 08   CALCIUM mg/dL 8 3 7 8*   ALK PHOS U/L  --  60   ALT U/L  --  34   AST U/L  --  58*     Results from last 7 days   Lab Units 03/17/20  0533   INR  1 15           Imaging Studies: I have personally reviewed pertinent imaging studies  Ct Chest Abdomen Pelvis Wo Contrast  Result Date: 3/10/2020  Impression: 1  Scattered atelectasis with small right pleural effusion  2   Small pericardial effusion  3   Catheterized, thick-walled bladder with perivesical inflammation  Cystitis not excluded  4   Bilobed low density mass in the left inguinal region measuring 6 0 x 3 5 cm, possible postoperative seroma status post herniorrhaphy  Pathologic lymph node not completely excluded on this nonenhanced study  Ultrasound would be of benefit for further characterization  The study was marked in EPIC for significant notification  Workstation performed: [id number]     Xr Chest Portable  Result Date: 3/13/2020  Impression: Enteric tube courses to the stomach  Workstation performed: [id number]     Xr Chest Portable  Result Date: 3/9/2020  Impression: Mild vascular congestion  Patchy bibasilar atelectasis  Workstation performed: [id number]     Xr Abdomen 1 View Kub  Result Date: 3/15/2020  Impression: Interval placement of nasogastric tube which projects in the expected location of the esophagogastric junction  It could be advanced to achieve more distal gastric location if clinically appropriate Workstation performed: [id number]     Ct Head Without Contrast  Result Date: 3/9/2020  Impression: No acute intracranial abnormality  Microangiopathic changes  Workstation performed: [id number]     Us Groin/inguinal Area  Result Date: 3/11/2020  Impression: 7 1 x 6 3 x 3 8 cm complex mass in the left groin corresponding to the prior CT finding  Findings most suggestive of a complex seroma or organizing hematoma    Lymphadenopathy felt less likely, however recommend clinical follow-up to ensure ability or resolution with repeated imaging for any suspicious interval change Workstation performed: [id number]       Assessment and Plan: Oropharyngeal dysphagia  - Pt with high risk of aspiration making him strict NPO  - S/P EGD with PEG tube placement on 3/17  - PEG check today WNL, okay to start tube feedings per nutrition recommendations and advance as tolerated to goal of 65ml/hr  - Speech therapy as outpatient per family wishes to see if he improves enough for PO intake in the future, PEG tube can be removed if he is able to tolerate PO consistently for several months and maintain nutritional requirements  - The patient is stable for discharge from GI standpoint once he is close or at goal tube feeding rate     Thrombocytopenia  - Chronic, unclear etiology  - Reviewed imaging and there is no evidence of liver disease/cirrhosis or splenomegaly     Elevated AST  - Resolved with resolution of mild rhabdomyolysis  - No further workup needed      The patient will be seen by Dr Vonda Tineo   GI will sign off, call with questions

## 2020-03-18 NOTE — ASSESSMENT & PLAN NOTE
Continue Lopressor with holding parameters,  Started on hydralazine, Isordil per Cardiology  Currently patient is NPO per speech and swallow recommendation  S/p PEG tube placement on 03/17/2020  Started on Jevity  Follow-up with nutritionist consult for feeding recommendation

## 2020-03-19 LAB
GLUCOSE SERPL-MCNC: 144 MG/DL (ref 65–140)
GLUCOSE SERPL-MCNC: 148 MG/DL (ref 65–140)
GLUCOSE SERPL-MCNC: 157 MG/DL (ref 65–140)
GLUCOSE SERPL-MCNC: 167 MG/DL (ref 65–140)
GLUCOSE SERPL-MCNC: 192 MG/DL (ref 65–140)

## 2020-03-19 PROCEDURE — 82948 REAGENT STRIP/BLOOD GLUCOSE: CPT

## 2020-03-19 PROCEDURE — 99232 SBSQ HOSP IP/OBS MODERATE 35: CPT | Performed by: FAMILY MEDICINE

## 2020-03-19 PROCEDURE — 92526 ORAL FUNCTION THERAPY: CPT

## 2020-03-19 RX ORDER — HYDRALAZINE HYDROCHLORIDE 50 MG/1
50 TABLET, FILM COATED ORAL 3 TIMES DAILY
Qty: 90 TABLET | Refills: 0 | Status: SHIPPED | OUTPATIENT
Start: 2020-03-19

## 2020-03-19 RX ORDER — ALPRAZOLAM 0.25 MG/1
0.25 TABLET ORAL 2 TIMES DAILY PRN
Qty: 10 TABLET | Refills: 0 | Status: SHIPPED | OUTPATIENT
Start: 2020-03-19 | End: 2020-03-29

## 2020-03-19 RX ORDER — HYDRALAZINE HYDROCHLORIDE 25 MG/1
50 TABLET, FILM COATED ORAL EVERY 8 HOURS SCHEDULED
Status: DISCONTINUED | OUTPATIENT
Start: 2020-03-19 | End: 2020-03-20 | Stop reason: HOSPADM

## 2020-03-19 RX ORDER — ISOSORBIDE DINITRATE 20 MG/1
20 TABLET ORAL 3 TIMES DAILY
Qty: 90 TABLET | Refills: 0 | Status: SHIPPED | OUTPATIENT
Start: 2020-03-19 | End: 2020-03-20

## 2020-03-19 RX ADMIN — HYDRALAZINE HYDROCHLORIDE 50 MG: 25 TABLET ORAL at 14:07

## 2020-03-19 RX ADMIN — ISOSORBIDE DINITRATE 20 MG: 10 TABLET ORAL at 12:41

## 2020-03-19 RX ADMIN — DIVALPROEX SODIUM 1000 MG: 125 CAPSULE, COATED PELLETS ORAL at 23:34

## 2020-03-19 RX ADMIN — HEPARIN SODIUM 5000 UNITS: 5000 INJECTION INTRAVENOUS; SUBCUTANEOUS at 14:07

## 2020-03-19 RX ADMIN — INSULIN LISPRO 1 UNITS: 100 INJECTION, SOLUTION INTRAVENOUS; SUBCUTANEOUS at 17:42

## 2020-03-19 RX ADMIN — METOPROLOL TARTRATE 12.5 MG: 25 TABLET ORAL at 22:18

## 2020-03-19 RX ADMIN — ISOSORBIDE DINITRATE 20 MG: 10 TABLET ORAL at 17:28

## 2020-03-19 RX ADMIN — HEPARIN SODIUM 5000 UNITS: 5000 INJECTION INTRAVENOUS; SUBCUTANEOUS at 22:18

## 2020-03-19 RX ADMIN — DIVALPROEX SODIUM 500 MG: 125 CAPSULE, COATED PELLETS ORAL at 08:42

## 2020-03-19 RX ADMIN — FAMOTIDINE 20 MG: 20 TABLET ORAL at 17:28

## 2020-03-19 RX ADMIN — METOPROLOL TARTRATE 12.5 MG: 25 TABLET ORAL at 08:42

## 2020-03-19 RX ADMIN — ATORVASTATIN CALCIUM 20 MG: 20 TABLET, FILM COATED ORAL at 08:41

## 2020-03-19 RX ADMIN — LEVOTHYROXINE SODIUM 25 MCG: 25 TABLET ORAL at 05:55

## 2020-03-19 RX ADMIN — HYDRALAZINE HYDROCHLORIDE 50 MG: 25 TABLET ORAL at 22:18

## 2020-03-19 RX ADMIN — HYDRALAZINE HYDROCHLORIDE 25 MG: 25 TABLET ORAL at 05:55

## 2020-03-19 RX ADMIN — FAMOTIDINE 20 MG: 20 TABLET ORAL at 08:41

## 2020-03-19 RX ADMIN — ISOSORBIDE DINITRATE 20 MG: 10 TABLET ORAL at 08:41

## 2020-03-19 RX ADMIN — HEPARIN SODIUM 5000 UNITS: 5000 INJECTION INTRAVENOUS; SUBCUTANEOUS at 05:55

## 2020-03-19 NOTE — PROGRESS NOTES
Progress Note - Araceli Span 1940, 70 y o  male MRN: [id number]    Unit/Bed#: -01 Encounter: [id number]    Primary Care Provider: Jarek Berry MD   Date and time admitted to hospital: 3/9/2020  6:29 AM        Acquired hypothyroidism  Assessment & Plan  TSH noted elevated  Wife reports that patient takes all medication as prescribed  Resume home medication of Synthroid  Recommended repeat TSH outpatient and 3-4 weeks  Outpatient follow-up    Dysphagia  Assessment & Plan  Per wife patient has decreased p o  Intake for past couple days without clear etiology  Currently NPO  Speech and swallow recommendation appreciated  After lengthy discussion regarding risk and benefits of feeding tube patient and family both requesting discussed with gastroenterologist about possibility of PEG tube placement  Now status post back to placement on 03/17/2020  Currently on Jevity  Follow-up nutritionist consult for tube feed recommendations    Acute metabolic encephalopathy  Assessment & Plan  Patient presented with worsening encephalopathy over last 2 days prior to admission  Folate, B12 level normal range  Suspected secondary to acute illness as stated above as well as component of progressive dementia  MRI brain shows no acute infarct, no acute hemorrhage, moderate, chronic microangiopathic changes, and scattered chronic lacunar infarction  Now improved  Currently patient does appear slightly confused, but that appears to be his baseline per nursing and his wife  Cooperative during exam exam   Wife does not appear to be concerned  Currently patient is at baseline  Wife is requesting for patient to come home with VNA services with [facility] and if patient does not improve with therapy and if needed then transition to home hospice with [facility]           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[facility])  Assessment & Plan  Lab Results   Component Value Date    HGBA1C 6 9 (H) 02/07/2020       Recent Labs     03/19/20  0028 03/19/20  0600 03/19/20  1121 03/19/20  1544   POCGLU 148* 144* 157* 167*       Blood Sugar Average: Last 72 hrs:  (P) 684 [id number]     Hemoglobin A1c 6 9  Patient was getting Lantus 30 units q h s     Due to episodes of hypoglycemia and currently being NPO will decrease it to Lantus 5 units q h s   Status post PEG tube  Started on Jevity  (Glucerna not available at this time)  Accu-Cheks monitoring  Sliding scale coverage    Benign essential hypertension  Assessment & Plan  Noted elevated blood pressures today which may be due to agitation versus poorly controlled blood pressure  Continue Lopressor with holding parameters,  Increased hydralazine, continue Isordil per Cardiology  Currently patient is NPO per speech and swallow recommendation  S/p PEG tube placement on 03/17/2020  Started on Jevity  Follow-up with nutritionist consult for feeding recommendation  Chronic renal insufficiency, stage III (moderate) (HCC)  Assessment & Plan  Baseline creatinine is around 1 6 range  Currently creatinine 1 12  Outpatient follow-up  * NSTEMI (non-ST elevated myocardial infarction) [facility])  Assessment & Plan  Troponin peaked 4 80 trended down 0 39  Unclear if non STEMI type 1 versus type 2  Echo noted with EF 40-50%, mildly reduced dysfunction, mild diffuse hypokinesis, grade 1 diastolic dysfunction  Currently patient denies chest pain, dyspnea, abdominal pain, any new complaints  Patient was treated with IV heparin drip  now discontinued per Cardiology  Currently asymptomatic, hemodynamically stable  At this time will hold off on further invasive workup considering patient's risk per Cardiology  Patient and wife both agreeable with above plan  Continue aspirin, Plavix, statin, beta-blocker  Cardiology recommendation appreciated            VTE Pharmacologic Prophylaxis:   Pharmacologic: Heparin  Mechanical VTE Prophylaxis in Place: Yes    Patient Centered Rounds: I have performed bedside rounds with nursing staff today  Discussions with Specialists or Other Care Team Provider:  Nursing and     Education and Discussions with Family / Patient:  Patient's wife    Time Spent for Care: 20 minutes  More than 50% of total time spent on counseling and coordination of care as described above  Current Length of Stay: 10 day(s)    Current Patient Status: Inpatient   Certification Statement: The patient will continue to require additional inpatient hospital stay due to Elevated blood pressure    Discharge Plan:  24 hours    Code Status: Level 1 - Full Code      Subjective:   Patient was seen and examined  He denies any complaints or discomfort at this time  Objective:     Vitals:   Temp (24hrs), Av 6 °F (37 °C), Min:98 1 °F (36 7 °C), Max:99 2 °F (37 3 °C)    Temp:  [98 1 °F (36 7 °C)-99 2 °F (37 3 °C)] 98 4 °F (36 9 °C)  HR:  [65-73] 68  Resp:  [20-24] 24  BP: (143-190)/(59-82) 151/59  SpO2:  [96 %-99 %] 96 %  Body mass index is 37 57 kg/m²  Input and Output Summary (last 24 hours): Intake/Output Summary (Last 24 hours) at 3/19/2020 1626  Last data filed at 3/19/2020 0222  Gross per 24 hour   Intake 0 ml   Output 430 ml   Net -430 ml       Physical Exam:     Physical Exam   HENT:   Head: Normocephalic and atraumatic  Cardiovascular: Normal rate, regular rhythm and normal heart sounds  Pulmonary/Chest: Effort normal and breath sounds normal  No respiratory distress  Abdominal: Soft  There is no tenderness  Musculoskeletal: He exhibits no edema or deformity  Neurological: He is alert  Skin: Skin is warm  No rash noted  No erythema  Psychiatric: He has a normal mood and affect  Cognition and memory are impaired       Additional Data:     Labs:    Results from last 7 days   Lab Units 20  0459  20  0546   WBC Thousand/uL 5 55   < > 4 51   HEMOGLOBIN g/dL 12 3   < > 11 8*   HEMATOCRIT % 38 5   < > 36 3*   PLATELETS Thousands/uL 111*   < > 112*   NEUTROS PCT %  --   --  33*   LYMPHS PCT %  --   --  44   MONOS PCT %  --   --  17*   EOS PCT %  --   --  5    < > = values in this interval not displayed  Results from last 7 days   Lab Units 03/18/20  0459 03/17/20  0533   SODIUM mmol/L 140 143   POTASSIUM mmol/L 4 1 3 6   CHLORIDE mmol/L 107 108   CO2 mmol/L 24 27   BUN mg/dL 7 7   CREATININE mg/dL 1 12 1 08   ANION GAP mmol/L 9 8   CALCIUM mg/dL 8 3 7 8*   ALBUMIN g/dL  --  2 1*   TOTAL BILIRUBIN mg/dL  --  0 60   ALK PHOS U/L  --  60   ALT U/L  --  34   AST U/L  --  58*   GLUCOSE RANDOM mg/dL 114 114     Results from last 7 days   Lab Units 03/17/20  0533   INR  1 15     Results from last 7 days   Lab Units 03/19/20  1544 03/19/20  1121 03/19/20  0600 03/19/20  0028 03/18/20  2048 03/18/20  1604 03/18/20  1127 03/18/20  0555 03/17/20  2355 03/17/20  2048 03/17/20  1806 03/17/20  1047   POC GLUCOSE mg/dl 167* 157* 144* 148* 134 115 121 114 114 132 120 125                   * I Have Reviewed All Lab Data Listed Above  * Additional Pertinent Lab Tests Reviewed:  All Labs Within Last 24 Hours Reviewed    Imaging:      Recent Cultures (last 7 days):           Last 24 Hours Medication List:     Current Facility-Administered Medications:  acetaminophen 650 mg Oral Q6H PRN Ildefonso Jenkins MD   ALPRAZolam 0 25 mg Per PEG Tube BID PRN Hugo SKINNER MD   atorvastatin 20 mg Per PEG Tube Daily Hugo SKINNER MD   divalproex sodium 1,000 mg Per PEG Tube HS Hugo SKINNER MD   divalproex sodium 500 mg Per PEG Tube Daily Hugo SKINNER MD   famotidine 20 mg Per PEG Tube BID Hugo SKINNER MD   heparin (porcine) 5,000 Units Subcutaneous [facility] Gopi Iverson DO   hydrALAZINE 5 mg Intravenous Q6H PRN Henarina Steele, DO   hydrALAZINE 50 mg Per PEG Tube Q8H [address] Quinn Yeager MD   insulin lispro 1-6 Units Subcutaneous Q6H [address] Gopi Iverson DO   isosorbide dinitrate 20 mg Per PEG Tube TID after meals Jonathan Renner MD levothyroxine 25 mcg Per PEG Tube Early Morning Hugo SKINNER MD   metoprolol tartrate 12 5 mg Per PEG Tube Q12H Rani Vasquez MD        Today, Patient Was Seen By: Alphonse Weber MD    ** Please Note: Dictation voice to text software may have been used in the creation of this document   **

## 2020-03-19 NOTE — PLAN OF CARE
Problem: Prexisting or High Potential for Compromised Skin Integrity  Goal: Skin integrity is maintained or improved  Description  INTERVENTIONS:  - Identify patients at risk for skin breakdown  - Assess and monitor skin integrity  - Assess and monitor nutrition and hydration status  - Monitor labs   - Assess for incontinence   - Turn and reposition patient  - Assist with mobility/ambulation  - Relieve pressure over bony prominences  - Avoid friction and shearing  - Provide appropriate hygiene as needed including keeping skin clean and dry  - Evaluate need for skin moisturizer/barrier cream  - Collaborate with interdisciplinary team   - Patient/family teaching  - Consider wound care consult   Outcome: Progressing     Problem: PAIN - ADULT  Goal: Verbalizes/displays adequate comfort level or baseline comfort level  Description  Interventions:  - Encourage patient to monitor pain and request assistance  - Assess pain using appropriate pain scale  - Administer analgesics based on type and severity of pain and evaluate response  - Implement non-pharmacological measures as appropriate and evaluate response  - Consider cultural and social influences on pain and pain management  - Notify physician/advanced practitioner if interventions unsuccessful or patient reports new pain  Outcome: Progressing     Problem: INFECTION - ADULT  Goal: Absence or prevention of progression during hospitalization  Description  INTERVENTIONS:  - Assess and monitor for signs and symptoms of infection  - Monitor lab/diagnostic results  - Monitor all insertion sites, i e  indwelling lines, tubes, and drains  - Monitor endotracheal if appropriate and nasal secretions for changes in amount and color  - [place] appropriate cooling/warming therapies per order  - Administer medications as ordered  - Instruct and encourage patient and family to use good hand hygiene technique  - Identify and instruct in appropriate isolation precautions for identified infection/condition  Outcome: Progressing  Goal: Absence of fever/infection during neutropenic period  Description  INTERVENTIONS:  - Monitor WBC    Outcome: Progressing     Problem: SAFETY ADULT  Goal: Patient will remain free of falls  Description  INTERVENTIONS:  - Assess patient frequently for physical needs  -  Identify cognitive and physical deficits and behaviors that affect risk of falls    -  [place] fall precautions as indicated by assessment   - Educate patient/family on patient safety including physical limitations  - Instruct patient to call for assistance with activity based on assessment  - Modify environment to reduce risk of injury  - Consider OT/PT consult to assist with strengthening/mobility  Outcome: Progressing  Goal: Maintain or return to baseline ADL function  Description  INTERVENTIONS:  -  Assess patient's ability to carry out ADLs; assess patient's baseline for ADL function and identify physical deficits which impact ability to perform ADLs (bathing, care of mouth/teeth, toileting, grooming, dressing, etc )  - Assess/evaluate cause of self-care deficits   - Assess range of motion  - Assess patient's mobility; develop plan if impaired  - Assess patient's need for assistive devices and provide as appropriate  - Encourage maximum independence but intervene and supervise when necessary  - Involve family in performance of ADLs  - Assess for home care needs following discharge   - Consider OT consult to assist with ADL evaluation and planning for discharge  - Provide patient education as appropriate  Outcome: Progressing  Goal: Maintain or return mobility status to optimal level  Description  INTERVENTIONS:  - Assess patient's baseline mobility status (ambulation, transfers, stairs, etc )    - Identify cognitive and physical deficits and behaviors that affect mobility  - Identify mobility aids required to assist with transfers and/or ambulation (gait belt, sit-to-stand, lift, walker, cane, etc )  - [facility] fall precautions as indicated by assessment  - Record patient progress and toleration of activity level on Mobility SBAR; progress patient to next Phase/Stage  - Instruct patient to call for assistance with activity based on assessment  - Consider rehabilitation consult to assist with strengthening/weightbearing, etc   Outcome: Progressing     Problem: DISCHARGE PLANNING  Goal: Discharge to home or other facility with appropriate resources  Description  INTERVENTIONS:  - Identify barriers to discharge w/patient and caregiver  - Arrange for needed discharge resources and transportation as appropriate  - Identify discharge learning needs (meds, wound care, etc )  - Arrange for interpretive services to assist at discharge as needed  - Refer to Case Management Department for coordinating discharge planning if the patient needs post-hospital services based on physician/advanced practitioner order or complex needs related to functional status, cognitive ability, or social support system  Outcome: Progressing     Problem: Potential for Falls  Goal: Patient will remain free of falls  Description  INTERVENTIONS:  - Assess patient frequently for physical needs  -  Identify cognitive and physical deficits and behaviors that affect risk of falls    -  [facility] fall precautions as indicated by assessment   - Educate patient/family on patient safety including physical limitations  - Instruct patient to call for assistance with activity based on assessment  - Modify environment to reduce risk of injury  - Consider OT/PT consult to assist with strengthening/mobility  Outcome: Progressing     Problem: SAFETY,RESTRAINT: NV/NON-SELF DESTRUCTIVE BEHAVIOR  Goal: Remains free of harm/injury (restraint for non violent/non self-detsructive behavior)  Description  INTERVENTIONS:  - Instruct patient/family regarding restraint use   - Assess and monitor physiologic and psychological status   - Provide interventions and comfort measures to meet assessed patient needs   - Identify and implement measures to help patient regain control  - Assess readiness for release of restraint   Outcome: Progressing  Goal: Returns to optimal restraint-free functioning  Description  INTERVENTIONS:  - Assess the patient's behavior and symptoms that indicate continued need for restraint  - Identify and implement measures to help patient regain control  - Assess readiness for release of restraint   Outcome: Progressing

## 2020-03-19 NOTE — ASSESSMENT & PLAN NOTE
Patient presented with worsening encephalopathy over last 2 days prior to admission  Folate, B12 level normal range  Suspected secondary to acute illness as stated above as well as component of progressive dementia  MRI brain shows no acute infarct, no acute hemorrhage, moderate, chronic microangiopathic changes, and scattered chronic lacunar infarction  Now improved  Currently patient does appear slightly confused, but that appears to be his baseline per nursing and his wife  Cooperative during exam exam   Wife does not appear to be concerned  Currently patient is at baseline  Wife is requesting for patient to come home with [facility] services with [facility] and if patient does not improve with therapy and if needed then transition to home hospice with [facility]

## 2020-03-19 NOTE — ASSESSMENT & PLAN NOTE
Noted elevated blood pressures today which may be due to agitation versus poorly controlled blood pressure  Continue Lopressor with holding parameters,  Increased hydralazine, continue Isordil per Cardiology  Currently patient is NPO per speech and swallow recommendation  S/p PEG tube placement on 03/17/2020  Started on Jevity  Follow-up with nutritionist consult for feeding recommendation

## 2020-03-19 NOTE — SOCIAL WORK
CM s/w Carlotta Bolanos  Per [facility] feeds will be delivered  CM s/w Carolina Kramer, David [phone] to request soc this evening  Mary to call office and cb

## 2020-03-19 NOTE — NUTRITION
03/19/20 1057   Recommendations/Interventions   Summary Patient remains NPO secondary to aspiration risk  Patient is s/p PEG placement, EN to provide 100% nutritional needs  Right and left lower extremity trace edema noted  Nursing skin care plan reviewed  Interventions EN continue as ordered   Nutrition Recommendations Continue EN/PN as ordered;Lab - consider order (specify); Other (specify)  (Monitor electrolytes and obtain current weight   If bolus EN desired suggest: Jevity 1 5 kcal  237 ml every 4 hours with 250 ml free water flush every 4 hours  )

## 2020-03-19 NOTE — SOCIAL WORK
CM s/w pt's wife who reports room is not ready as they replaced breanna and she will be ready tomorrow  CM discussed 9am   Pt's wife in agreement  CM notified [facility]  CM notified [facility]  CM Dept to setup transport

## 2020-03-19 NOTE — ASSESSMENT & PLAN NOTE
Lab Results   Component Value Date    HGBA1C 6 9 (H) 02/07/2020       Recent Labs     03/19/20  0028 03/19/20  0600 03/19/20  1121 03/19/20  1544   POCGLU 148* 144* 157* 167*       Blood Sugar Average: Last 72 hrs:  (P) 049 [id number]     Hemoglobin A1c 6 9  Patient was getting Lantus 30 units q h s     Due to episodes of hypoglycemia and currently being NPO will decrease it to Lantus 5 units q h s   Status post PEG tube  Started on Jevity   (Glucerna not available at this time)  Accu-Cheks monitoring  Sliding scale coverage

## 2020-03-19 NOTE — SPEECH THERAPY NOTE
Speech Language/Pathology    Speech/Language Pathology Progress Note    Patient Name: Roma Christine  UWQCA'B Date: 3/19/2020       Subjective:  Pt awake/alert positioned upright in bed w/ RN assistance  Wrist restraints in place  RN reports pt more awake today, but combative/agitated at times  Pt s/p PEG tube placement, reportedly tolerating tube feeds  Audible wet breath sounds and throat clearing at rest      Objective:  Pt seen for dysphagia tx to assess swallow function/safety of oral intake  Assessed with 4 1/2 tsps puree  Fair retrieval from spoon  Minimal manipulation with puree with occasional oral holding requiring cues to transfer/swallow  Suspect premature spillage as pt w/ intermittent immediate throat clearing prior to swallow initiation  Swallow initiation appeared mod-severely delayed with laryngeal rise present to palpation  Delayed wet coughing following first 3 1/2 tsp trials puree  Pt became increasingly lethargic following last trial with oral holding requiring max cues to swallow  Audible, wet breath sounds with suspected pharyngeal secretions throughout session  Pt able to produce strong cough x1, which was ineffective to clear  Provided oral suction, though no secretions or po present in oral cavity  Assessment:  Pt cont with significant oropharyngeal dysphagia rose by oral holding with mod-severely delayed swallow initiation requiring cues to initiate  Pt also w/ audible, wet secretions at rest and overt s/s aspiration following min amounts of po  Pt is not safe for oral intake       Plan/Recommendations:  Cont NPO w/ TF  meds via tube   Rec ST post d/c

## 2020-03-19 NOTE — SOCIAL WORK
CM received message from [employer] at [facility] asking what time they should be at pt home tomorrow  Pt leaving at 0900  CM sent message back requesting they arrive by 12 noon on 3/20 due to pt continuous feeds  Awaiting response  CM to follow

## 2020-03-20 VITALS
TEMPERATURE: 99.1 F | RESPIRATION RATE: 18 BRPM | SYSTOLIC BLOOD PRESSURE: 171 MMHG | BODY MASS INDEX: 37.01 KG/M2 | DIASTOLIC BLOOD PRESSURE: 79 MMHG | HEIGHT: 71 IN | HEART RATE: 79 BPM | OXYGEN SATURATION: 93 % | WEIGHT: 264.33 LBS

## 2020-03-20 LAB
GLUCOSE SERPL-MCNC: 164 MG/DL (ref 65–140)
GLUCOSE SERPL-MCNC: 200 MG/DL (ref 65–140)

## 2020-03-20 PROCEDURE — 99239 HOSP IP/OBS DSCHRG MGMT >30: CPT | Performed by: PHYSICIAN ASSISTANT

## 2020-03-20 PROCEDURE — 82948 REAGENT STRIP/BLOOD GLUCOSE: CPT

## 2020-03-20 RX ORDER — DIVALPROEX SODIUM 125 MG/1
1000 CAPSULE, COATED PELLETS ORAL
Qty: 90 CAPSULE | Refills: 0 | Status: SHIPPED | OUTPATIENT
Start: 2020-03-20

## 2020-03-20 RX ORDER — CLOPIDOGREL BISULFATE 75 MG/1
75 TABLET ORAL DAILY
Refills: 0
Start: 2020-03-20

## 2020-03-20 RX ORDER — ATORVASTATIN CALCIUM 20 MG/1
20 TABLET, FILM COATED ORAL DAILY
Refills: 0
Start: 2020-03-20

## 2020-03-20 RX ORDER — INSULIN GLARGINE 100 [IU]/ML
5 INJECTION, SOLUTION SUBCUTANEOUS
Refills: 0
Start: 2020-03-20

## 2020-03-20 RX ORDER — POTASSIUM CHLORIDE 20MEQ/15ML
20 LIQUID (ML) ORAL DAILY
Qty: 1350 ML | Refills: 1 | Status: SHIPPED | OUTPATIENT
Start: 2020-03-20 | End: 2020-06-18

## 2020-03-20 RX ORDER — FOLIC ACID 1 MG/1
1000 TABLET ORAL DAILY
Refills: 0
Start: 2020-03-20

## 2020-03-20 RX ORDER — FUROSEMIDE 20 MG/1
20 TABLET ORAL DAILY
Refills: 0
Start: 2020-03-20

## 2020-03-20 RX ORDER — FERROUS SULFATE 220 (44)/5
220 ELIXIR ORAL DAILY
Qty: 450 ML | Refills: 1 | Status: SHIPPED | OUTPATIENT
Start: 2020-03-20 | End: 2020-06-18

## 2020-03-20 RX ORDER — LEVOTHYROXINE SODIUM 0.03 MG/1
25 TABLET ORAL
Qty: 30 TABLET | Refills: 0
Start: 2020-03-20 | End: 2021-03-26

## 2020-03-20 RX ORDER — AMLODIPINE BESYLATE 5 MG/1
5 TABLET ORAL DAILY
Start: 2020-03-20

## 2020-03-20 RX ORDER — DIVALPROEX SODIUM 125 MG/1
500 CAPSULE, COATED PELLETS ORAL DAILY
Qty: 90 CAPSULE | Refills: 0 | Status: SHIPPED | OUTPATIENT
Start: 2020-03-20

## 2020-03-20 RX ORDER — ISOSORBIDE DINITRATE 20 MG/1
20 TABLET ORAL 3 TIMES DAILY
Qty: 90 TABLET | Refills: 0
Start: 2020-03-20

## 2020-03-20 RX ORDER — MULTIVITAMIN
1 CAPSULE ORAL DAILY
Refills: 0
Start: 2020-03-20 | End: 2020-06-18

## 2020-03-20 RX ORDER — ASPIRIN 81 MG/1
81 TABLET, CHEWABLE ORAL DAILY
Qty: 90 TABLET | Refills: 1 | Status: SHIPPED | OUTPATIENT
Start: 2020-03-20 | End: 2020-06-18

## 2020-03-20 RX ADMIN — INSULIN LISPRO 2 UNITS: 100 INJECTION, SOLUTION INTRAVENOUS; SUBCUTANEOUS at 06:09

## 2020-03-20 RX ADMIN — INSULIN LISPRO 2 UNITS: 100 INJECTION, SOLUTION INTRAVENOUS; SUBCUTANEOUS at 00:50

## 2020-03-20 RX ADMIN — FAMOTIDINE 20 MG: 20 TABLET ORAL at 09:15

## 2020-03-20 RX ADMIN — ISOSORBIDE DINITRATE 20 MG: 10 TABLET ORAL at 09:15

## 2020-03-20 RX ADMIN — DIVALPROEX SODIUM 500 MG: 125 CAPSULE, COATED PELLETS ORAL at 09:22

## 2020-03-20 RX ADMIN — ATORVASTATIN CALCIUM 20 MG: 20 TABLET, FILM COATED ORAL at 09:15

## 2020-03-20 RX ADMIN — METOPROLOL TARTRATE 12.5 MG: 25 TABLET ORAL at 09:16

## 2020-03-20 RX ADMIN — LEVOTHYROXINE SODIUM 25 MCG: 25 TABLET ORAL at 06:09

## 2020-03-20 RX ADMIN — HYDRALAZINE HYDROCHLORIDE 50 MG: 25 TABLET ORAL at 06:09

## 2020-03-20 RX ADMIN — HEPARIN SODIUM 5000 UNITS: 5000 INJECTION INTRAVENOUS; SUBCUTANEOUS at 06:09

## 2020-03-20 NOTE — ASSESSMENT & PLAN NOTE
Patient presented with worsening encephalopathy for 2 days prior to admission  Folate, B12 level normal range  Suspected secondary to acute illness as stated above as well as component of progressive dementia  MRI brain shows no acute infarct, no acute hemorrhage, moderate, chronic microangiopathic changes, and scattered chronic lacunar infarction  Now improved  Currently patient does appear slightly confused, but that appears to be his baseline per nursing and his wife  She does not appear to be concerned - she is willing to accept him back to home per my conversation by phone on discharge  Wife is requesting for patient to come home with [facility] services with [facility] and if patient does not improve with therapy and if needed then transition to home hospice with [facility]

## 2020-03-20 NOTE — ASSESSMENT & PLAN NOTE
Troponin peaked 4 80 trended down 0 39  Unclear if NSTEMI type 1 versus type 2, cardiology consulted and discussed with ICU on admit as well as wife, will continue with medical management and noninvasive workup  Continue aspirin, plavix, beta blocker  Echo noted with EF 40-50%, mildly reduced dysfunction, mild diffuse hypokinesis, grade 1 diastolic dysfunction  Currently patient denies chest pain, dyspnea, abdominal pain, any new complaints  Currently asymptomatic, hemodynamically stable  At this time will hold off on further invasive workup considering patient's risk per Cardiology  Patient and wife both agreeable with above plan

## 2020-03-20 NOTE — ASSESSMENT & PLAN NOTE
History of paroxysmal AFib  Continue metoprolol 12 5 mg b i d  Currently rate controlled  Not on anticoagulation due to dementia, risk of fall, increased risk of bleeding  Cardiology recommendations appreciated

## 2020-03-20 NOTE — PROGRESS NOTES
Progress Note - Ida Galarza 1940, 70 y o  male MRN: [id number]    Unit/Bed#: -01 Encounter: [id number]    Primary Care Provider: Steve Saucedo MD   Date and time admitted to hospital: 3/9/2020  6:29 AM      * NSTEMI (non-ST elevated myocardial infarction) [facility])  Assessment & Plan  Troponin peaked 4 80 trended down 0 39  Unclear if NSTEMI type 1 versus type 2, cardiology consulted and discussed with ICU on admit as well as wife, will continue with medical management and noninvasive workup  Continue aspirin, plavix, beta blocker  Echo noted with EF 40-50%, mildly reduced dysfunction, mild diffuse hypokinesis, grade 1 diastolic dysfunction  Currently patient denies chest pain, dyspnea, abdominal pain, any new complaints  Currently asymptomatic, hemodynamically stable  At this time will hold off on further invasive workup considering patient's risk per Cardiology  Patient and wife both agreeable with above plan  Acute metabolic encephalopathy  Assessment & Plan  Patient presented with worsening encephalopathy for 2 days prior to admission  Folate, B12 level normal range  Suspected secondary to acute illness as stated above as well as component of progressive dementia  MRI brain shows no acute infarct, no acute hemorrhage, moderate, chronic microangiopathic changes, and scattered chronic lacunar infarction  Now improved  Currently patient does appear slightly confused, but that appears to be his baseline per nursing and his wife  She does not appear to be concerned - she is willing to accept him back to home per my conversation by phone on discharge  Wife is requesting for patient to come home with VNA services with [facility] and if patient does not improve with therapy and if needed then transition to home hospice with [facility]  Seizure disorder [facility])  Assessment & Plan  Home medication includes Depakote 500 mg p o  In the morning and 1000 mg at night    Status post PEG tube placement on 03/17/2020  Resume home medications through PEG tube  Speech and swallow recommendations noted, strict NPO  Patient has not had seizure in approximately 8 years per wife  History of bilateral subcortical CVA   Assessment & Plan  History of CVA  Patient without significant deficit  Continue aspirin, Plavix  Paroxysmal atrial flutter (HCC)  Assessment & Plan  History of paroxysmal AFib  Continue metoprolol 12 5 mg b i d  Currently rate controlled  Not on anticoagulation due to dementia, risk of fall, increased risk of bleeding  Cardiology recommendations appreciated  Dysphagia  Assessment & Plan  Per wife patient has decreased p o  Intake for several days without clear etiology prior to admission  Gi consulted this admission for PEG tube placement, s/p placement on 03/17/2020  Currently on [facility]    Chronic diastolic heart failure [facility])  Assessment & Plan  Wt Readings from Last 3 Encounters:   03/21/20 111 kg (244 lb 4 3 oz)   03/20/20 120 kg (264 lb 5 3 oz)   03/15/20 113 kg (250 lb)     Echo report noted with EF of 45-50%  With grade 1 diastolic dysfunction  Continue aspirin, beta-blocker, Isordil, hydralazine per Cardiology  Low-salt, fluid restriction diet  Not on Ace or Arb secondary to allergy to enalapril with angioedema    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[facility])  Assessment & Plan  Lab Results   Component Value Date    HGBA1C 6 9 (H) 02/07/2020       Recent Labs     03/19/20  1544 03/19/20  2037 03/20/20  0038 03/20/20  0556   POCGLU 167* 192* 200* 164*       Blood Sugar Average: Last 72 hrs:  (P) 492 [id number]     Hemoglobin A1c 6 9    Patient was getting Lantus 30 units q h s  - decreased to 5 u on discharge  Status post PEG tube, on [facility] inpatient  Discussed with wife regarding "titrtating" insulin if he is hyper or hypoglycemic    Protein calorie malnutrition ([facility] [address] )  Assessment & Plan  Malnutrition Findings:           BMI Findings: Body mass index is 36 87 kg/m²  Benign essential hypertension  Assessment & Plan  Several changes made to the medication regimen - metoprolol, hydralazine, lasix, amlodipine    Acquired hypothyroidism  Assessment & Plan  TSH noted elevated  Recommended repeat TSH outpatient and 3-4 weeks  Continue prior dosing at this time     Cystitis  Assessment & Plan  Completed antibiotic course  Currently asymptomatic  Mass of left inguinal region  Assessment & Plan  Noted on CT  Groin ultrasound report noted for possible seroma or hematoma and discussed with patient and wife at bedside by prior practitioner  Chronic renal insufficiency, stage III (moderate) (HCC)  Assessment & Plan  Baseline creatinine is around 1 6 range  Outpatient follow-up  Discharging Physician / Practitioner: Ankita Plunkett PA-C  PCP: Shanae Houston MD  Admission Date:   Admission Orders (From admission, onward)     Ordered        03/09/20 0843  Inpatient Admission  Once                   Discharge Date: 03/20/20    Resolved Problems  Date Reviewed: 3/19/2020          Resolved    Lactic acidosis 3/11/2020     Resolved by  Linda Cooney PA-C          Consultations During Hospital Stay:  · Cardiology  · Palliative care  · Gastroenterology    Procedures Performed:   · PEG tube placement 3/16  · CXR on admission showed mild vascular congestion and atelectasis  · CT head on admission showed no acute changes  · Venous duplex lower extremity showed no acute DVT  · CT chest/abdomen/pelvis atelectasis, small right pleural effusion, small pericardial effusion, bilobed low-density mass left inguinal region  · Groin ultrasound showing complex mass in left groin complex seroma or hematoma, less likely lymphadenopathy  · MRI brain no acute CVA or hemorrhage, chronic the and scattered lacunar infarcts are stable    Significant Findings / Test Results:      Ref   Range 3/18/2020 04:59   Sodium Latest Ref Range: 136 - 145 mmol/L 140 Potassium Latest Ref Range: 3 5 - 5 3 mmol/L 4 1   Chloride Latest Ref Range: 100 - 108 mmol/L 107   CO2 Latest Ref Range: 21 - 32 mmol/L 24   Anion Gap Latest Ref Range: 4 - 13 mmol/L 9   BUN Latest Ref Range: 5 - 25 mg/dL 7   Creatinine Latest Ref Range: 0 60 - 1 30 mg/dL 1 12   Glucose, Random Latest Ref Range: 65 - 140 mg/dL 114   Calcium Latest Ref Range: 8 3 - 10 1 mg/dL 8 3   eGFR Latest Units: ml/min/1 73sq m 73   WBC Latest Ref Range: 4 31 - 10 16 Thousand/uL 5 55   Red Blood Cell Count Latest Ref Range: 3 88 - 5 62 Million/uL 3 84 (L)   Hemoglobin Latest Ref Range: 12 0 - 17 0 g/dL 12 3   HCT Latest Ref Range: 36 5 - 49 3 % 38 5   MCV Latest Ref Range: 82 - 98 fL 100 (H)   MCH Latest Ref Range: 26 8 - 34 3 pg 32 0   MCHC Latest Ref Range: 31 4 - 37 4 g/dL 31 9   RDW Latest Ref Range: 11 6 - 15 1 % 15 6 (H)   Platelet Count Latest Ref Range: 149 - 390 Thousands/uL 111 (L)   MPV Latest Ref Range: 8 9 - 12 7 fL 11 5     Incidental Findings:   ·      Test Results Pending at Discharge (will require follow up):   ·      Outpatient Tests Requested:  · Follow-up with PCP  · Follow-up with GI if needed    Complications:  None    Reason for Admission: poor PO intake, malnutrition, need for PEG tube    Hospital Course:     Rose Burch is a 70 y o  male patient who originally presented to the hospital on 3/9/2020 due to patient admitted secondary to altered mental status/lethargy, initially to the ICU service  His a 2 day history of worsening symptoms and decreased oral intake at home  Prior history of CVA, seizure disorder, type 2 diabetes on insulin, paroxysmal AFib not anticoagulated secondary to high fall risk, chronic diastolic heart failure, CKD stage 3, chronic lymphedema of the right lower extremity, hypertension, hypothyroidism  Patient lives home with his wife, who is his primary care taker      On presentation, patient found to have NSTEMI with troponin of > 4, lactic acidosis > for, inability to tolerate p o , concern for possible community-acquired pneumonia and cystitis causing acute metabolic encephalopathy  Wife has decided that if patient does not improve, she will moved towards hospice approach, however the time of discharge patient is not hospice status  Please see above list of diagnoses and related plan for additional information  Condition at Discharge: stable     Discharge Day Visit / Exam:     Subjective:  Patient seen, he has bilateral wrist restraints because he is confused and trying to climb out of bed  His legs are hanging over the side  When asked where he is going, he states I don't know"  Denies any pain in the chest, abdomen or groin  Vitals: Blood Pressure: (!) 171/79 (03/20/20 0719)  Pulse: 79 (03/20/20 0719)  Temperature: 99 1 °F (37 3 °C) (03/20/20 0719)  Temp Source: Oral (03/19/20 1527)  Respirations: 18 (03/20/20 0719)  Height: 5' 11" (180 3 cm) (03/10/20 1339)  Weight - Scale: 120 kg (264 lb 5 3 oz) (03/20/20 0559)  SpO2: 93 % (03/20/20 0719)  Exam:   Physical Exam   Constitutional: Vital signs are normal  He appears well-developed  No distress  Cardiovascular: Normal rate, regular rhythm, S1 normal, S2 normal and normal heart sounds  No murmur heard  Pulmonary/Chest: Effort normal and breath sounds normal  No respiratory distress  He has no wheezes  He has no rhonchi  He has no rales  Abdominal: Soft  Bowel sounds are normal  He exhibits no distension  PEG tube noted, site clean   Musculoskeletal: He exhibits edema (Right lower extremity)  Neurological: He is alert  He is disoriented  Nursing note and vitals reviewed  Discussion with Family:  Wife by phone    Discharge instructions/Information to patient and family:   See after visit summary for information provided to patient and family  Provisions for Follow-Up Care:  See after visit summary for information related to follow-up care and any pertinent home health orders        Disposition: Home with VNA Services (Reminder: Complete face to face encounter)    Planned Readmission:  None     Discharge Statement:  I spent approximately 35 minutes discharging the patient  This time was spent on the day of discharge  I had direct contact with the patient on the day of discharge  Greater than 50% of the total time was spent examining patient, answering all patient questions, arranging and discussing plan of care with patient as well as directly providing post-discharge instructions  Additional time then spent on discharge activities  Discharge Medications:  See after visit summary for reconciled discharge medications provided to patient and family        ** Please Note: This note has been constructed using a voice recognition system **

## 2020-03-20 NOTE — ASSESSMENT & PLAN NOTE
Home medication includes Depakote 500 mg p o  In the morning and 1000 mg at night  Status post PEG tube placement on 03/17/2020  Resume home medications through PEG tube  Speech and swallow recommendations noted, strict NPO  Patient has not had seizure in approximately 8 years per wife

## 2020-03-21 ENCOUNTER — HOSPITAL ENCOUNTER (INPATIENT)
Facility: HOSPITAL | Age: 80
LOS: 2 days | Discharge: HOME HEALTH | DRG: 393 | End: 2020-03-24
Attending: EMERGENCY MEDICINE | Admitting: INTERNAL MEDICINE
Payer: MEDICARE

## 2020-03-21 ENCOUNTER — APPOINTMENT (OUTPATIENT)
Dept: CT IMAGING | Facility: HOSPITAL | Age: 80
DRG: 393 | End: 2020-03-21
Payer: MEDICARE

## 2020-03-21 DIAGNOSIS — A41.9 SEPSIS (HCC): ICD-10-CM

## 2020-03-21 DIAGNOSIS — K94.23 PEG TUBE MALFUNCTION (HCC): Primary | ICD-10-CM

## 2020-03-21 DIAGNOSIS — I50.32 CHRONIC DIASTOLIC HEART FAILURE (HCC): ICD-10-CM

## 2020-03-21 PROBLEM — R50.9 ACUTE FEBRILE ILLNESS: Status: ACTIVE | Noted: 2020-03-21

## 2020-03-21 PROBLEM — N18.30 CKD (CHRONIC KIDNEY DISEASE) STAGE 3, GFR 30-59 ML/MIN (HCC): Status: ACTIVE | Noted: 2020-03-21

## 2020-03-21 LAB
ALBUMIN SERPL BCP-MCNC: 2.3 G/DL (ref 3.5–5)
ALP SERPL-CCNC: 84 U/L (ref 46–116)
ALT SERPL W P-5'-P-CCNC: 23 U/L (ref 12–78)
ANION GAP SERPL CALCULATED.3IONS-SCNC: 5 MMOL/L (ref 4–13)
AST SERPL W P-5'-P-CCNC: 33 U/L (ref 5–45)
BACTERIA UR QL AUTO: ABNORMAL /HPF
BASOPHILS # BLD AUTO: 0.03 THOUSANDS/ΜL (ref 0–0.1)
BASOPHILS NFR BLD AUTO: 0 % (ref 0–1)
BILIRUB DIRECT SERPL-MCNC: 0.19 MG/DL (ref 0–0.2)
BILIRUB SERPL-MCNC: 0.6 MG/DL (ref 0.2–1)
BILIRUB UR QL STRIP: NEGATIVE
BUN SERPL-MCNC: 10 MG/DL (ref 5–25)
CALCIUM SERPL-MCNC: 8.5 MG/DL (ref 8.3–10.1)
CHLORIDE SERPL-SCNC: 104 MMOL/L (ref 100–108)
CLARITY UR: CLEAR
CO2 SERPL-SCNC: 28 MMOL/L (ref 21–32)
COLOR UR: YELLOW
CREAT SERPL-MCNC: 1.26 MG/DL (ref 0.6–1.3)
EOSINOPHIL # BLD AUTO: 0.03 THOUSAND/ΜL (ref 0–0.61)
EOSINOPHIL NFR BLD AUTO: 0 % (ref 0–6)
ERYTHROCYTE [DISTWIDTH] IN BLOOD BY AUTOMATED COUNT: 15.9 % (ref 11.6–15.1)
GFR SERPL CREATININE-BSD FRML MDRD: 63 ML/MIN/1.73SQ M
GLUCOSE SERPL-MCNC: 190 MG/DL (ref 65–140)
GLUCOSE UR STRIP-MCNC: NEGATIVE MG/DL
HCT VFR BLD AUTO: 37.8 % (ref 36.5–49.3)
HGB BLD-MCNC: 12.5 G/DL (ref 12–17)
HGB UR QL STRIP.AUTO: NEGATIVE
IMM GRANULOCYTES # BLD AUTO: 0.06 THOUSAND/UL (ref 0–0.2)
IMM GRANULOCYTES NFR BLD AUTO: 0 % (ref 0–2)
KETONES UR STRIP-MCNC: NEGATIVE MG/DL
LACTATE SERPL-SCNC: 1 MMOL/L (ref 0.5–2)
LACTATE SERPL-SCNC: 2.1 MMOL/L (ref 0.5–2)
LEUKOCYTE ESTERASE UR QL STRIP: ABNORMAL
LYMPHOCYTES # BLD AUTO: 1.66 THOUSANDS/ΜL (ref 0.6–4.47)
LYMPHOCYTES NFR BLD AUTO: 11 % (ref 14–44)
MCH RBC QN AUTO: 32.8 PG (ref 26.8–34.3)
MCHC RBC AUTO-ENTMCNC: 33.1 G/DL (ref 31.4–37.4)
MCV RBC AUTO: 99 FL (ref 82–98)
MONOCYTES # BLD AUTO: 2.48 THOUSAND/ΜL (ref 0.17–1.22)
MONOCYTES NFR BLD AUTO: 16 % (ref 4–12)
NEUTROPHILS # BLD AUTO: 11.12 THOUSANDS/ΜL (ref 1.85–7.62)
NEUTS SEG NFR BLD AUTO: 73 % (ref 43–75)
NITRITE UR QL STRIP: NEGATIVE
NON-SQ EPI CELLS URNS QL MICRO: ABNORMAL /HPF
NRBC BLD AUTO-RTO: 0 /100 WBCS
PH UR STRIP.AUTO: 8 [PH]
PLATELET # BLD AUTO: 161 THOUSANDS/UL (ref 149–390)
PMV BLD AUTO: 11.3 FL (ref 8.9–12.7)
POTASSIUM SERPL-SCNC: 5.1 MMOL/L (ref 3.5–5.3)
PROT SERPL-MCNC: 7 G/DL (ref 6.4–8.2)
PROT UR STRIP-MCNC: NEGATIVE MG/DL
RBC # BLD AUTO: 3.81 MILLION/UL (ref 3.88–5.62)
RBC #/AREA URNS AUTO: ABNORMAL /HPF
SODIUM SERPL-SCNC: 137 MMOL/L (ref 136–145)
SP GR UR STRIP.AUTO: 1.01 (ref 1–1.03)
UROBILINOGEN UR QL STRIP.AUTO: 1 E.U./DL
WBC # BLD AUTO: 15.38 THOUSAND/UL (ref 4.31–10.16)
WBC #/AREA URNS AUTO: ABNORMAL /HPF

## 2020-03-21 PROCEDURE — 96361 HYDRATE IV INFUSION ADD-ON: CPT

## 2020-03-21 PROCEDURE — 83605 ASSAY OF LACTIC ACID: CPT | Performed by: PHYSICIAN ASSISTANT

## 2020-03-21 PROCEDURE — 82948 REAGENT STRIP/BLOOD GLUCOSE: CPT

## 2020-03-21 PROCEDURE — 71260 CT THORAX DX C+: CPT

## 2020-03-21 PROCEDURE — 74177 CT ABD & PELVIS W/CONTRAST: CPT

## 2020-03-21 PROCEDURE — 87635 SARS-COV-2 COVID-19 AMP PRB: CPT | Performed by: PHYSICIAN ASSISTANT

## 2020-03-21 PROCEDURE — 84145 PROCALCITONIN (PCT): CPT | Performed by: PHYSICIAN ASSISTANT

## 2020-03-21 PROCEDURE — 99285 EMERGENCY DEPT VISIT HI MDM: CPT | Performed by: PHYSICIAN ASSISTANT

## 2020-03-21 PROCEDURE — 96360 HYDRATION IV INFUSION INIT: CPT

## 2020-03-21 PROCEDURE — 80076 HEPATIC FUNCTION PANEL: CPT | Performed by: PHYSICIAN ASSISTANT

## 2020-03-21 PROCEDURE — 99220 PR INITIAL OBSERVATION CARE/DAY 70 MINUTES: CPT | Performed by: INTERNAL MEDICINE

## 2020-03-21 PROCEDURE — 85025 COMPLETE CBC W/AUTO DIFF WBC: CPT | Performed by: PHYSICIAN ASSISTANT

## 2020-03-21 PROCEDURE — 87040 BLOOD CULTURE FOR BACTERIA: CPT | Performed by: PHYSICIAN ASSISTANT

## 2020-03-21 PROCEDURE — 36415 COLL VENOUS BLD VENIPUNCTURE: CPT | Performed by: PHYSICIAN ASSISTANT

## 2020-03-21 PROCEDURE — 81001 URINALYSIS AUTO W/SCOPE: CPT | Performed by: PHYSICIAN ASSISTANT

## 2020-03-21 PROCEDURE — 80048 BASIC METABOLIC PNL TOTAL CA: CPT | Performed by: PHYSICIAN ASSISTANT

## 2020-03-21 PROCEDURE — 99285 EMERGENCY DEPT VISIT HI MDM: CPT

## 2020-03-21 RX ORDER — FUROSEMIDE 20 MG/1
20 TABLET ORAL DAILY
Status: DISCONTINUED | OUTPATIENT
Start: 2020-03-22 | End: 2020-03-24 | Stop reason: HOSPADM

## 2020-03-21 RX ORDER — FOLIC ACID 1 MG/1
1000 TABLET ORAL DAILY
Status: DISCONTINUED | OUTPATIENT
Start: 2020-03-22 | End: 2020-03-24 | Stop reason: HOSPADM

## 2020-03-21 RX ORDER — VANCOMYCIN HYDROCHLORIDE 1 G/200ML
12.5 INJECTION, SOLUTION INTRAVENOUS EVERY 12 HOURS
Status: DISCONTINUED | OUTPATIENT
Start: 2020-03-21 | End: 2020-03-21

## 2020-03-21 RX ORDER — ASPIRIN 81 MG/1
81 TABLET, CHEWABLE ORAL DAILY
Status: DISCONTINUED | OUTPATIENT
Start: 2020-03-22 | End: 2020-03-24 | Stop reason: HOSPADM

## 2020-03-21 RX ORDER — INSULIN GLARGINE 100 [IU]/ML
5 INJECTION, SOLUTION SUBCUTANEOUS
Status: DISCONTINUED | OUTPATIENT
Start: 2020-03-21 | End: 2020-03-24 | Stop reason: HOSPADM

## 2020-03-21 RX ORDER — TRAVOPROST OPHTHALMIC SOLUTION 0.04 MG/ML
1 SOLUTION OPHTHALMIC
Status: DISCONTINUED | OUTPATIENT
Start: 2020-03-21 | End: 2020-03-24 | Stop reason: HOSPADM

## 2020-03-21 RX ORDER — VALPROIC ACID 250 MG/5ML
500 SOLUTION ORAL DAILY
Status: DISCONTINUED | OUTPATIENT
Start: 2020-03-22 | End: 2020-03-24 | Stop reason: HOSPADM

## 2020-03-21 RX ORDER — VALPROIC ACID 250 MG/5ML
1000 SOLUTION ORAL
Status: DISCONTINUED | OUTPATIENT
Start: 2020-03-21 | End: 2020-03-24 | Stop reason: HOSPADM

## 2020-03-21 RX ORDER — ATORVASTATIN CALCIUM 20 MG/1
20 TABLET, FILM COATED ORAL DAILY
Status: DISCONTINUED | OUTPATIENT
Start: 2020-03-22 | End: 2020-03-24 | Stop reason: HOSPADM

## 2020-03-21 RX ORDER — ISOSORBIDE DINITRATE 10 MG/1
20 TABLET ORAL 3 TIMES DAILY
Status: DISCONTINUED | OUTPATIENT
Start: 2020-03-21 | End: 2020-03-24 | Stop reason: HOSPADM

## 2020-03-21 RX ORDER — POTASSIUM CHLORIDE 20MEQ/15ML
20 LIQUID (ML) ORAL DAILY
Status: DISCONTINUED | OUTPATIENT
Start: 2020-03-22 | End: 2020-03-22

## 2020-03-21 RX ORDER — AMLODIPINE BESYLATE 5 MG/1
5 TABLET ORAL DAILY
Status: DISCONTINUED | OUTPATIENT
Start: 2020-03-22 | End: 2020-03-24 | Stop reason: HOSPADM

## 2020-03-21 RX ORDER — HYDRALAZINE HYDROCHLORIDE 25 MG/1
50 TABLET, FILM COATED ORAL 3 TIMES DAILY
Status: DISCONTINUED | OUTPATIENT
Start: 2020-03-21 | End: 2020-03-24 | Stop reason: HOSPADM

## 2020-03-21 RX ORDER — ALPRAZOLAM 0.25 MG/1
0.25 TABLET ORAL 2 TIMES DAILY PRN
Status: DISCONTINUED | OUTPATIENT
Start: 2020-03-21 | End: 2020-03-24 | Stop reason: HOSPADM

## 2020-03-21 RX ORDER — FERROUS SULFATE 300 MG/5ML
300 LIQUID (ML) ORAL
Status: DISCONTINUED | OUTPATIENT
Start: 2020-03-22 | End: 2020-03-24 | Stop reason: HOSPADM

## 2020-03-21 RX ORDER — LEVOTHYROXINE SODIUM 0.03 MG/1
25 TABLET ORAL
Status: DISCONTINUED | OUTPATIENT
Start: 2020-03-22 | End: 2020-03-24 | Stop reason: HOSPADM

## 2020-03-21 RX ORDER — ACETAMINOPHEN 160 MG/5ML
650 SUSPENSION, ORAL (FINAL DOSE FORM) ORAL EVERY 4 HOURS PRN
Status: DISCONTINUED | OUTPATIENT
Start: 2020-03-21 | End: 2020-03-24 | Stop reason: HOSPADM

## 2020-03-21 RX ORDER — ONDANSETRON 2 MG/ML
4 INJECTION INTRAMUSCULAR; INTRAVENOUS EVERY 4 HOURS PRN
Status: DISCONTINUED | OUTPATIENT
Start: 2020-03-21 | End: 2020-03-24 | Stop reason: HOSPADM

## 2020-03-21 RX ORDER — CLOPIDOGREL BISULFATE 75 MG/1
75 TABLET ORAL DAILY
Status: DISCONTINUED | OUTPATIENT
Start: 2020-03-22 | End: 2020-03-24 | Stop reason: HOSPADM

## 2020-03-21 RX ORDER — MULTIVIT WITH IRON,MINERALS
15 LIQUID (ML) ORAL DAILY
Status: DISCONTINUED | OUTPATIENT
Start: 2020-03-22 | End: 2020-03-24 | Stop reason: HOSPADM

## 2020-03-21 RX ADMIN — HYDRALAZINE HYDROCHLORIDE 50 MG: 25 TABLET ORAL at 23:00

## 2020-03-21 RX ADMIN — ACETAMINOPHEN 650 MG: 650 SUSPENSION ORAL at 23:00

## 2020-03-21 RX ADMIN — IOHEXOL 100 ML: 350 INJECTION, SOLUTION INTRAVENOUS at 20:12

## 2020-03-21 RX ADMIN — ALPRAZOLAM 0.25 MG: 0.25 TABLET ORAL at 23:00

## 2020-03-21 RX ADMIN — SODIUM CHLORIDE 500 ML: 0.9 INJECTION, SOLUTION INTRAVENOUS at 17:09

## 2020-03-21 RX ADMIN — TRAVOPROST 1 DROP: 0.04 SOLUTION/ DROPS OPHTHALMIC at 23:02

## 2020-03-21 RX ADMIN — ISOSORBIDE DINITRATE 20 MG: 10 TABLET ORAL at 23:00

## 2020-03-21 RX ADMIN — INSULIN LISPRO 1 UNITS: 100 INJECTION, SOLUTION INTRAVENOUS; SUBCUTANEOUS at 23:19

## 2020-03-21 RX ADMIN — VALPROIC ACID 1000 MG: 500 SOLUTION ORAL at 23:02

## 2020-03-21 RX ADMIN — INSULIN GLARGINE 5 UNITS: 100 INJECTION, SOLUTION SUBCUTANEOUS at 23:03

## 2020-03-21 RX ADMIN — METOPROLOL TARTRATE 25 MG: 25 TABLET, FILM COATED ORAL at 23:04

## 2020-03-21 RX ADMIN — CEFEPIME HYDROCHLORIDE 2000 MG: 2 INJECTION, POWDER, FOR SOLUTION INTRAVENOUS at 20:26

## 2020-03-21 NOTE — ASSESSMENT & PLAN NOTE
Wt Readings from Last 3 Encounters:   03/21/20 111 kg (244 lb 4 3 oz)   03/20/20 120 kg (264 lb 5 3 oz)   03/15/20 113 kg (250 lb)     Echo report noted with EF of 45-50%    With grade 1 diastolic dysfunction  Continue aspirin, beta-blocker, Isordil, hydralazine per Cardiology  Low-salt, fluid restriction diet  Not on Ace or Arb secondary to allergy to enalapril with angioedema

## 2020-03-21 NOTE — DISCHARGE SUMMARY
Discharge- Corey Bray 1940, 70 y o  male MRN: [id number]    Unit/Bed#: -01 Encounter: [id number]    Primary Care Provider: Preston Mata MD   Date and time admitted to hospital: 3/9/2020  6:29 AM    * NSTEMI (non-ST elevated myocardial infarction) [facility])  Assessment & Plan  Troponin peaked 4 80 trended down 0 39  Unclear if NSTEMI type 1 versus type 2, cardiology consulted and discussed with ICU on admit as well as wife, will continue with medical management and noninvasive workup  Continue aspirin, plavix, beta blocker  Echo noted with EF 40-50%, mildly reduced dysfunction, mild diffuse hypokinesis, grade 1 diastolic dysfunction  Currently patient denies chest pain, dyspnea, abdominal pain, any new complaints  Currently asymptomatic, hemodynamically stable  At this time will hold off on further invasive workup considering patient's risk per Cardiology  Patient and wife both agreeable with above plan      Acute metabolic encephalopathy  Assessment & Plan  Patient presented with worsening encephalopathy for 2 days prior to admission  Folate, B12 level normal range  Suspected secondary to acute illness as stated above as well as component of progressive dementia  MRI brain shows no acute infarct, no acute hemorrhage, moderate, chronic microangiopathic changes, and scattered chronic lacunar infarction  Now improved      Currently patient does appear slightly confused, but that appears to be his baseline per nursing and his wife  She does not appear to be concerned - she is willing to accept him back to home per my conversation by phone on discharge  Wife is requesting for patient to come home with [facility]NA services with [facility] and if patient does not improve with therapy and if needed then transition to home hospice with [facility]            Seizure disorder [facility])  Assessment & Plan  Home medication includes Depakote 500 mg p o  In the morning and 1000 mg at night    Status post PEG tube placement on 03/17/2020  Resume home medications through PEG tube  Speech and swallow recommendations noted, strict NPO  Patient has not had seizure in approximately 8 years per wife      History of bilateral subcortical CVA   Assessment & Plan  History of CVA  Patient without significant deficit  Continue aspirin, Plavix            Paroxysmal atrial flutter (HCC)  Assessment & Plan  History of paroxysmal AFib  Continue metoprolol 12 5 mg b i d  Currently rate controlled  Not on anticoagulation due to dementia, risk of fall, increased risk of bleeding  Cardiology recommendations appreciated      Dysphagia  Assessment & Plan  Per wife patient has decreased p o  Intake for several days without clear etiology prior to admission  Gi consulted this admission for PEG tube placement, s/p placement on 03/17/2020  Currently on [facility]     Chronic diastolic heart failure (HCC)  Assessment & Plan      Wt Readings from Last 3 Encounters:   03/21/20 111 kg (244 lb 4 3 oz)   03/20/20 120 kg (264 lb 5 3 oz)   03/15/20 113 kg (250 lb)      Echo report noted with EF of 45-50%  With grade 1 diastolic dysfunction  Continue aspirin, beta-blocker, Isordil, hydralazine per Cardiology  Low-salt, fluid restriction diet  Not on Ace or Arb secondary to allergy to enalapril with angioedema     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[facility])  Assessment & Plan        Lab Results   Component Value Date     HGBA1C 6 9 (H) 02/07/2020                Recent Labs     03/19/20  1544 03/19/20  2037 03/20/20  0038 03/20/20  0556   POCGLU 167* 192* 200* 164*         Blood Sugar Average: Last 72 hrs:  (P) 322 [id number]      Hemoglobin A1c 6 9    Patient was getting Lantus 30 units q h s  - decreased to 5 u on discharge  Status post PEG tube, on [facility] inpatient  Discussed with wife regarding "titrtating" insulin if he is hyper or hypoglycemic     Protein calorie malnutrition ([facility] [address] )  Assessment & Plan  Malnutrition Findings:    BMI Findings: Body mass index is 36 87 kg/m²             Benign essential hypertension  Assessment & Plan  Several changes made to the medication regimen - metoprolol, hydralazine, lasix, amlodipine     Acquired hypothyroidism  Assessment & Plan  TSH noted elevated  Recommended repeat TSH outpatient and 3-4 weeks  Continue prior dosing at this time      Cystitis  Assessment & Plan  Completed antibiotic course  Currently asymptomatic         Mass of left inguinal region  Assessment & Plan  Noted on CT  Groin ultrasound report noted for possible seroma or hematoma and discussed with patient and wife at bedside by prior practitioner       Chronic renal insufficiency, stage III (moderate) ([facility] [address] )  Assessment & Plan  Baseline creatinine is around 1 6 range  Outpatient follow-up      Discharging Physician / Practitioner: Sharath Kirby PA-C  PCP: Paramjit Stanford MD  Admission Date:       Admission Orders (From admission, onward)       Ordered          03/09/20 0843   Inpatient Admission  Once                       Discharge Date: 03/20/20              Resolved Problems  Date Reviewed: 3/19/2020           Resolved     Lactic acidosis 3/11/2020       Resolved by  Ricco Noriega PA-C             Consultations During Hospital Stay:  · Cardiology  · Palliative care  · Gastroenterology     Procedures Performed:   · PEG tube placement 3/16  · CXR on admission showed mild vascular congestion and atelectasis  · CT head on admission showed no acute changes  · Venous duplex lower extremity showed no acute DVT  · CT chest/abdomen/pelvis atelectasis, small right pleural effusion, small pericardial effusion, bilobed low-density mass left inguinal region  · Groin ultrasound showing complex mass in left groin complex seroma or hematoma, less likely lymphadenopathy  · MRI brain no acute CVA or hemorrhage, chronic the and scattered lacunar infarcts are stable     Significant Findings / Test Results:        Ref  Range 3/18/2020 04:59   Sodium Latest Ref Range: 136 - 145 mmol/L 140   Potassium Latest Ref Range: 3 5 - 5 3 mmol/L 4 1   Chloride Latest Ref Range: 100 - 108 mmol/L 107   CO2 Latest Ref Range: 21 - 32 mmol/L 24   Anion Gap Latest Ref Range: 4 - 13 mmol/L 9   BUN Latest Ref Range: 5 - 25 mg/dL 7   Creatinine Latest Ref Range: 0 60 - 1 30 mg/dL 1 12   Glucose, Random Latest Ref Range: 65 - 140 mg/dL 114   Calcium Latest Ref Range: 8 3 - 10 1 mg/dL 8 3   eGFR Latest Units: ml/min/1 73sq m 73   WBC Latest Ref Range: 4 31 - 10 16 Thousand/uL 5 55   Red Blood Cell Count Latest Ref Range: 3 88 - 5 62 Million/uL 3 84 (L)   Hemoglobin Latest Ref Range: 12 0 - 17 0 g/dL 12 3   HCT Latest Ref Range: 36 5 - 49 3 % 38 5   MCV Latest Ref Range: 82 - 98 fL 100 (H)   MCH Latest Ref Range: 26 8 - 34 3 pg 32 0   MCHC Latest Ref Range: 31 4 - 37 4 g/dL 31 9   RDW Latest Ref Range: 11 6 - 15 1 % 15 6 (H)   Platelet Count Latest Ref Range: 149 - 390 Thousands/uL 111 (L)   MPV Latest Ref Range: 8 9 - 12 7 fL 11 5      Incidental Findings:   ·       Test Results Pending at Discharge (will require follow up):   ·       Outpatient Tests Requested:  · Follow-up with PCP  · Follow-up with GI if needed     Complications:  None     Reason for Admission: poor PO intake, malnutrition, need for PEG tube     Hospital Course:      Efra Jensen is a 70 y o  male patient who originally presented to the hospital on 3/9/2020 due to patient admitted secondary to altered mental status/lethargy, initially to the ICU service  His a 2 day history of worsening symptoms and decreased oral intake at home  Prior history of CVA, seizure disorder, type 2 diabetes on insulin, paroxysmal AFib not anticoagulated secondary to high fall risk, chronic diastolic heart failure, CKD stage 3, chronic lymphedema of the right lower extremity, hypertension, hypothyroidism    Patient lives home with his wife, who is his primary care taker      On presentation, patient found to have NSTEMI with troponin of > 4, lactic acidosis > for, inability to tolerate p o , concern for possible community-acquired pneumonia and cystitis causing acute metabolic encephalopathy      Wife has decided that if patient does not improve, she will moved towards hospice approach, however the time of discharge patient is not hospice status      Please see above list of diagnoses and related plan for additional information       Condition at Discharge: stable      Discharge Day Visit / Exam:      Subjective:  Patient seen, he has bilateral wrist restraints because he is confused and trying to climb out of bed  His legs are hanging over the side  When asked where he is going, he states I don't know"  Denies any pain in the chest, abdomen or groin  Vitals: Blood Pressure: (!) 171/79 (03/20/20 0719)  Pulse: 79 (03/20/20 0719)  Temperature: 99 1 °F (37 3 °C) (03/20/20 0719)  Temp Source: Oral (03/19/20 1527)  Respirations: 18 (03/20/20 0719)  Height: 5' 11" (180 3 cm) (03/10/20 1339)  Weight - Scale: 120 kg (264 lb 5 3 oz) (03/20/20 0559)  SpO2: 93 % (03/20/20 0719)  Exam:   Physical Exam   Constitutional: Vital signs are normal  He appears well-developed  No distress  Cardiovascular: Normal rate, regular rhythm, S1 normal, S2 normal and normal heart sounds  No murmur heard  Pulmonary/Chest: Effort normal and breath sounds normal  No respiratory distress  He has no wheezes  He has no rhonchi  He has no rales  Abdominal: Soft  Bowel sounds are normal  He exhibits no distension  PEG tube noted, site clean   Musculoskeletal: He exhibits edema (Right lower extremity)  Neurological: He is alert  He is disoriented     Nursing note and vitals reviewed      Discussion with Family:  Wife by phone     Discharge instructions/Information to patient and family:   See after visit summary for information provided to patient and family        Provisions for Follow-Up Care:  See after visit summary for information related to follow-up care and any pertinent home health orders        Disposition:      Home with VNA Services (Reminder: Complete face to face encounter)     Planned Readmission:  None     Discharge Statement:  I spent approximately 35 minutes discharging the patient  This time was spent on the day of discharge  I had direct contact with the patient on the day of discharge  Greater than 50% of the total time was spent examining patient, answering all patient questions, arranging and discussing plan of care with patient as well as directly providing post-discharge instructions    Additional time then spent on discharge activities      Discharge Medications:  See after visit summary for reconciled discharge medications provided to patient and family        ** Please Note: This note has been constructed using a voice recognition system **

## 2020-03-21 NOTE — ED NOTES
1  CC: blocked PEG tube  2  Orientation status: pt is at baseline  3  Abnormal labs/ focused assessment/ vitals: monitoring PEG tube     4  Medication/ drips: 500mL NS given in the ED     5  Last time narcotics given/ pain medications: none     6  IV lines/drains/ etc: 20g R AC     7  Isolation status: none as of now     8  Skin: clean dry intact     9  Ambulation status: non ambulatory     10   ED phone number: [phone]       Rossy Jimenes RN  03/21/20 0049

## 2020-03-21 NOTE — ASSESSMENT & PLAN NOTE
Noted on CT  Groin ultrasound report noted for possible seroma or hematoma and discussed with patient and wife at bedside by prior practitioner

## 2020-03-21 NOTE — ED NOTES
Feeding tube unclogged using pepsi as per Rita Machuca PA-C  Flushed with 10mL saline and flushed appropriately        Wen Molina RN  03/21/20 1049

## 2020-03-21 NOTE — ASSESSMENT & PLAN NOTE
Per wife patient has decreased p o   Intake for several days without clear etiology prior to admission  Gi consulted this admission for PEG tube placement, s/p placement on 03/17/2020  Currently on [facility]

## 2020-03-21 NOTE — PROGRESS NOTES
Vancomycin Assessment    Marty Encarnacion is a 70 y o  male who is currently ordered vanco 1750 mg IV once for       Relevant clinical data and objective history reviewed:  Creatinine   Date Value Ref Range Status   03/21/2020 1 26 0 60 - 1 30 mg/dL Final     Comment:     Standardized to IDMS reference method   03/18/2020 1 12 0 60 - 1 30 mg/dL Final     Comment:     Standardized to IDMS reference method   03/17/2020 1 08 0 60 - 1 30 mg/dL Final     Comment:     Standardized to IDMS reference method     BP (!) 185/81 (BP Location: Right arm)   Pulse 94   Temp 99 4 °F (37 4 °C) (Temporal)   Resp 18   Wt 111 kg (244 lb 4 3 oz)   SpO2 100%   BMI 34 07 kg/m²   No intake/output data recorded  Lab Results   Component Value Date/Time    BUN 10 03/21/2020 04:02 PM    WBC 15 38 (H) 03/21/2020 04:02 PM    HGB 12 5 03/21/2020 04:02 PM    HCT 37 8 03/21/2020 04:02 PM    MCV 99 (H) 03/21/2020 04:02 PM     03/21/2020 04:02 PM     Temp Readings from Last 3 Encounters:   03/21/20 99 4 °F (37 4 °C) (Temporal)   03/20/20 99 1 °F (37 3 °C)   12/29/19 97 5 °F (36 4 °C) (Oral)     Vancomycin Days of Therapy: 1    Assessment/Plan  The patient is currently on vancomycin utilizing scheduled dosing based on adjusted body weight (due to obesity)  Baseline risks associated with therapy include: pre-existing renal impairment, concomitant nephrotoxic medications and advanced age  The patient is currently ordered vanco 1750 mg IV once and after clinical evaluation will be changed to 1000 mg IV q12h  Pharmacy will also follow closely for s/sx of nephrotoxicity, infusion reactions and appropriateness of therapy  BMP and CBC will be ordered per protocol  Plan for trough as patient approaches steady state, 30 min before vanco dose due at 0730 on 3-  Due to infection severity, will target a trough of 15-20 (appropriate for most indications)     Pharmacy will continue to follow the patients culture results and clinical progress daily      Gil Sultana, Pharmacist

## 2020-03-21 NOTE — ASSESSMENT & PLAN NOTE
TSH noted elevated    Recommended repeat TSH outpatient and 3-4 weeks  Continue prior dosing at this time

## 2020-03-21 NOTE — ASSESSMENT & PLAN NOTE
Lab Results   Component Value Date    HGBA1C 6 9 (H) 02/07/2020       Recent Labs     03/19/20  1544 03/19/20  2037 03/20/20  0038 03/20/20  0556   POCGLU 167* 192* 200* 164*       Blood Sugar Average: Last 72 hrs:  (P) 773 [id number]     Hemoglobin A1c 6 9    Patient was getting Lantus 30 units q h s  - decreased to 5 u on discharge  Status post PEG tube, on [facility] inpatient  Discussed with wife regarding "titrtating" insulin if he is hyper or hypoglycemic

## 2020-03-21 NOTE — ED PROVIDER NOTES
History  Chief Complaint   Patient presents with    Feeding Tube Problem     PLACED ON 3/17, TUBE IS BLOCKED    Fever - 76 years or older     Roma Christine is a 70 y o  male w PMH HTN, HLD, ETOH abuse, seizure disorder, CVA, CKD who presents for evaluation of clogged feeding tube  Patient presents with his wife  She reports nurse at facility noted a fever of 100 4 today  Also noted the PEG tube was clogged  On chart review this was placed on 3/17 on recent admission  Patient just discharged yesterday  No respiratory symptoms noted per wife  Wife noted a lump on his abdomen after he got his tube feeds  However patient has no complains of abdominal pain, no nausea, vomiting, diarrhea  Prior to Admission Medications   Prescriptions Last Dose Informant Patient Reported? Taking?    ALPRAZolam (XANAX) 0 25 mg tablet   No No   Si tablet (0 25 mg total) by Per PEG Tube route 2 (two) times a day as needed for anxiety for up to 10 days   B-D UF III MINI PEN NEEDLES 31G X 5 MM MISC  Spouse/Significant Other Yes No   Multiple Vitamin (MULTIVITAMIN) capsule   No No   Si capsule by Per G Tube route daily Please make sure multivitamin is either liquid or a capsule that can be opened to put down PEG tube   amLODIPine (NORVASC) 5 mg tablet   No No   Si tablet (5 mg total) by Per G Tube route daily   aspirin 81 mg chewable tablet   No No   Sig: Chew 1 tablet (81 mg total) daily   atorvastatin (LIPITOR) 20 mg tablet   No No   Si tablet (20 mg total) by Per G Tube route daily   clopidogrel (PLAVIX) 75 mg tablet   No No   Si tablet (75 mg total) by Per PEG Tube route daily   divalproex sodium (DEPAKOTE SPRINKLE) 125 MG capsule   No No   Si capsules (1,000 mg total) by Per PEG Tube route daily at bedtime   divalproex sodium (DEPAKOTE SPRINKLE) 125 MG capsule   No No   Si capsules (500 mg total) by Per PEG Tube route daily   ferrous sulfate 220 (44 Fe) mg/5 mL solution   No No   Sig: Take 5 mL (220 mg total) by mouth daily   folic acid (FOLVITE) 1 mg tablet   No No   Si tablet (1,000 mcg total) by Per PEG Tube route daily   furosemide (LASIX) 20 mg tablet   No No   Si tablet (20 mg total) by Per G Tube route daily   hydrALAZINE (APRESOLINE) 50 mg tablet   No No   Si tablet (50 mg total) by Per G Tube route 3 (three) times a day   insulin glargine (LANTUS) 100 units/mL subcutaneous injection   No No   Sig: Inject 5 Units under the skin daily at bedtime Please start with 5u and increase as needed if blood glucose is persistently elevated with the help your home nurse and primary doctor   isosorbide dinitrate (ISORDIL) 20 mg tablet   No No   Si tablet (20 mg total) by Per G Tube route 3 (three) times a day   levothyroxine 25 mcg tablet  Spouse/Significant Other Yes No   Sig: Take 25 mcg by mouth daily in the early morning   levothyroxine 25 mcg tablet   No No   Si tablet (25 mcg total) by Per G Tube route daily in the early morning   metoprolol tartrate (LOPRESSOR) 25 mg tablet   No No   Si tablet (25 mg total) by Per PEG Tube route every 12 (twelve) hours   potassium chloride 10 % oral solution   No No   Sig: Take 15 mL (20 mEq total) by mouth daily   travoprost (TRAVATAN Z) 0 004 % ophthalmic solution  Spouse/Significant Other Yes No   Sig: Apply 1 drop to eye daily at bedtime       Facility-Administered Medications: None       Past Medical History:   Diagnosis Date    Acute renal failure syndrome (HCC)     Cardiac arrhythmia     Carotid stenosis, bilateral     Cerebrovascular disease     Chronic kidney disease     [facility]     Diabetes mellitus ([facility] )     ETOH abuse     Hyperlipidemia     Hyperosmolality     Hypertension     Lymphedema of both lower extremities     Memory loss     NPH (normal pressure hydrocephalus) [facility]     Seizure disorder ([facility])        Past Surgical History:   Procedure Laterality Date    CATARACT EXTRACTION      COLONOSCOPY 2017    HERNIA REPAIR      MULTIPLE TOOTH EXTRACTIONS         Family History   Problem Relation Age of Onset    Hypertension Father     Hypertension Brother     Diabetes Brother     Stroke Brother     Cancer Mother      I have reviewed and agree with the history as documented  E-Cigarette/Vaping    E-Cigarette Use Never User      E-Cigarette/Vaping Substances    Nicotine No     THC No     CBD No     Flavoring No     Other No     Unknown No      Social History     Tobacco Use    Smoking status: Former Smoker     Types: Cigarettes     Last attempt to quit: 2012     Years since quittin 2    Smokeless tobacco: Never Used   Substance Use Topics    Alcohol use: Never     Frequency: Never    Drug use: Never       Review of Systems   Constitutional: Positive for fever  Negative for activity change, chills, diaphoresis and fatigue  HENT: Negative for congestion and rhinorrhea  Eyes: Negative for pain  Respiratory: Negative for cough, chest tightness, shortness of breath and wheezing  Cardiovascular: Negative for chest pain and palpitations  Gastrointestinal: Negative for abdominal distention, constipation, diarrhea, nausea and vomiting  Genitourinary: Negative for difficulty urinating and dysuria  Musculoskeletal: Negative for arthralgias and myalgias  Neurological: Negative for dizziness, weakness, light-headedness and headaches  Psychiatric/Behavioral: The patient is not nervous/anxious  Physical Exam  Physical Exam   Constitutional: He appears well-developed and well-nourished  No distress  HENT:   Head: Normocephalic and atraumatic  Eyes: Pupils are equal, round, and reactive to light  Neck: Normal range of motion  Neck supple  No tracheal deviation present  Cardiovascular: Normal rate, regular rhythm, normal heart sounds and intact distal pulses  Exam reveals no gallop and no friction rub  No murmur heard    Pulmonary/Chest: Effort normal and breath sounds normal  No respiratory distress  He has no wheezes  He has no rales  Abdominal: Soft  Bowel sounds are normal  He exhibits no distension and no mass  There is no tenderness  There is no guarding  Patient bats my hand away when I palpate abdomen, unclear if from pain or dislike for exam  No guarding   PEG tube blocked but site c/d/i   Musculoskeletal: He exhibits no edema or deformity  Neurological:   Patient is encephalopathic, per wife this is baseline  He answers some questions for me, but not all  Not able to provide history   Does not answer questions regarding orientation  Follows simple commands and eyes open to voice but then close    Skin: Skin is warm and dry  He is not diaphoretic  Psychiatric: He has a normal mood and affect  His behavior is normal    Nursing note and vitals reviewed  Vital Signs  ED Triage Vitals   Temperature Pulse Respirations Blood Pressure SpO2   03/21/20 1512 03/21/20 1514 03/21/20 1514 03/21/20 1514 03/21/20 1514   99 4 °F (37 4 °C) 97 18 (!) 187/91 97 %      Temp Source Heart Rate Source Patient Position - Orthostatic VS BP Location FiO2 (%)   03/21/20 1512 -- -- 03/21/20 1710 --   Temporal   Right arm       Pain Score       --                  Vitals:    03/21/20 1514 03/21/20 1710 03/21/20 1900   BP: (!) 187/91 (!) 185/81 147/89   Pulse: 97 94 97         Visual Acuity      ED Medications  Medications   cefepime (MAXIPIME) 2,000 mg in dextrose 5 % 50 mL IVPB (has no administration in time range)   vancomycin (VANCOCIN) IVPB (premix) 1,000 mg (has no administration in time range)   sodium chloride 0 9 % bolus 500 mL (0 mL Intravenous Stopped 3/21/20 1915)       Diagnostic Studies  Results Reviewed     Procedure Component Value Units Date/Time    2019 Novel Coronavirus (COVID-19), LASHON [[id number]] Collected:  03/21/20 1917    Lab Status:   In process Specimen:  Nasopharyngeal Swab Updated:  03/21/20 1917    Lactic acid, plasma [[id number]]     Lab Status:  No result Specimen:  Blood     Urinalysis with microscopic [[id number]]  (Abnormal) Collected:  03/21/20 1809    Lab Status:  Final result Specimen:  Urine, Straight Cath Updated:  03/21/20 1825     Clarity, UA Clear     Color, UA Yellow     Specific Gravity, UA 1 015     pH, UA 8 0     Glucose, UA Negative mg/dl      Ketones, UA Negative mg/dl      Blood, UA Negative     Protein, UA Negative mg/dl      Nitrite, UA Negative     Bilirubin, UA Negative     Urobilinogen, UA 1 0 E U /dl      Leukocytes, UA Trace     WBC, UA 0-1 /hpf      RBC, UA 0-1 /hpf      Bacteria, UA Occasional /hpf      Epithelial Cells Occasional /hpf     Procalcitonin [[id number]] Collected:  03/21/20 1759    Lab Status: In process Specimen:  Blood from Arm, Right Updated:  03/21/20 1811    Blood culture #1 [[id number]] Collected:  03/21/20 1759    Lab Status: In process Specimen:  Blood from Arm, Right Updated:  03/21/20 1811    Blood culture #2 [[id number]] Collected:  03/21/20 1759    Lab Status: In process Specimen:  Blood from Arm, Left Updated:  03/21/20 1811    Lactic acid, plasma [[id number]]  (Abnormal) Collected:  03/21/20 1602    Lab Status:  Final result Specimen:  Blood from Arm, Right Updated:  03/21/20 1641     LACTIC ACID 2 1 mmol/L     Narrative:       Result may be elevated if tourniquet was used during collection      Hepatic function panel [[id number]]  (Abnormal) Collected:  03/21/20 1602    Lab Status:  Final result Specimen:  Blood from Arm, Right Updated:  03/21/20 1626     Total Bilirubin 0 60 mg/dL      Bilirubin, Direct 0 19 mg/dL      Alkaline Phosphatase 84 U/L      AST 33 U/L      ALT 23 U/L      Total Protein 7 0 g/dL      Albumin 2 3 g/dL     Basic metabolic panel [[id number]]  (Abnormal) Collected:  03/21/20 1602    Lab Status:  Final result Specimen:  Blood from Arm, Right Updated:  03/21/20 1626     Sodium 137 mmol/L      Potassium 5 1 mmol/L      Chloride 104 mmol/L      CO2 28 mmol/L      ANION GAP 5 mmol/L      BUN 10 mg/dL      Creatinine 1 26 mg/dL      Glucose 190 mg/dL      Calcium 8 5 mg/dL      eGFR 63 ml/min/1 73sq m     Narrative:       [place] guidelines for Chronic Kidney Disease (CKD):     Stage 1 with normal or high GFR (GFR > 90 mL/min/1 73 square meters)    Stage 2 Mild CKD (GFR = 60-89 mL/min/1 73 square meters)    Stage 3A Moderate CKD (GFR = 45-59 mL/min/1 73 square meters)    Stage 3B Moderate CKD (GFR = 30-44 mL/min/1 73 square meters)    Stage 4 Severe CKD (GFR = 15-29 mL/min/1 73 square meters)    Stage 5 End Stage CKD (GFR <15 mL/min/1 73 square meters)  Note: GFR calculation is accurate only with a steady state creatinine    CBC and differential [[id number]]  (Abnormal) Collected:  03/21/20 1602    Lab Status:  Final result Specimen:  Blood from Arm, Right Updated:  03/21/20 1611     WBC 15 38 Thousand/uL      RBC 3 81 Million/uL      Hemoglobin 12 5 g/dL      Hematocrit 37 8 %      MCV 99 fL      MCH 32 8 pg      MCHC 33 1 g/dL      RDW 15 9 %      MPV 11 3 fL      Platelets 783 Thousands/uL      nRBC 0 /100 WBCs      Neutrophils Relative 73 %      Immat GRANS % 0 %      Lymphocytes Relative 11 %      Monocytes Relative 16 %      Eosinophils Relative 0 %      Basophils Relative 0 %      Neutrophils Absolute 11 12 Thousands/µL      Immature Grans Absolute 0 06 Thousand/uL      Lymphocytes Absolute 1 66 Thousands/µL      Monocytes Absolute 2 48 Thousand/µL      Eosinophils Absolute 0 03 Thousand/µL      Basophils Absolute 0 03 Thousands/µL                  CT chest abdomen pelvis w contrast    (Results Pending)              Procedures  Procedures         ED Course                     Initial Sepsis Screening     Row Name 03/21/20 1920                Is the patient's history suggestive of a new or worsening infection?   (!) Yes (Proceed)  -SB        Suspected source of infection  suspect infection, source unknown  -SB        Are two or more of the following signs & symptoms of infection both present and new to the patient? (!) Yes (Proceed)  -SB        Indicate SIRS criteria  Leukocytosis (WBC > 96257 IJL); Tachycardia > 90 bpm  -SB        If the answer is yes to both questions, suspicion of sepsis is present          If severe sepsis is present AND tissue hypoperfusion perists in the hour after fluid resuscitation or lactate > 4, the patient meets criteria for SEPTIC SHOCK          Are any of the following organ dysfunction criteria present within 6 hours of suspected infection and SIRS criteria that are NOT considered to be chronic conditions? (!) Yes  -SB        Organ dysfunction  Lactate > 2 0 mmol/L  -SB        Date of presentation of severe sepsis  03/21/20  -SB        Time of presentation of severe sepsis  1751  -SB        Tissue hypoperfusion persists in the hour after crystalloid fluid administration, evidenced, by either:          Was hypotension present within one hour of the conclusion of crystalloid fluid administration?         Date of presentation of septic shock          Time of presentation of septic shock            User Key  (r) = Recorded By, (t) = Taken By, (c) = Cosigned By    Initials Name Provider Type    SB Felix Choi PA-C Physician Assistant           Default Flowsheet Data (last 720 hours)      Sepsis Reassess     Row Name 03/21/20 1921                   Repeat Volume Status and Tissue Perfusion Assessment Performed    Repeat Volume Status and Tissue Perfusion Assessment Performed  Yes  -SB           Volume Status and Tissue Perfusion Post Fluid Resuscitation * Must Document All *    Vital Signs Reviewed (HR, RR, BP, T)  Yes  -SB        Shock Index Reviewed          Arterial Oxygen Saturation Reviewed (POx, SaO2 or SpO2)  Yes (comment %) 97  -SB        Cardio  Normal S1/S2; Regular rate and rhythm; No murmor; No rub or gallop  -SB        Pulmonary  Normal effort;Clear to auscultation  -SB        Capillary Refill  Brisk  -SB        Peripheral Pulses Radial;Dorsalis Pedis  -SB        Peripheral Pulse  +2  -SB        Dorsalis Pedis  +2  -SB        Skin  Warm;Dry;Normal  -SB        Urine output assessed  Decreased  -SB           *OR*   Intensive Monitoring- Must Document One of the Following Four *:    Vital Signs Reviewed          * Central Venous Pressure (CVP or RAP)          * Central Venous Oxygen (SVO2, ScvO2 or Oxygen saturation via central catheter)          * Bedside Cardiovascular US in IVC diameter and % collapse          * Passive Leg Raise OR Crystalloid Challenge            User Key  (r) = Recorded By, (t) = Taken By, (c) = Cosigned By    Initials Name Provider Type    MINNA Gan PA-C Physician Assistant                MDM  Number of Diagnoses or Management Options  PEG tube malfunction [facility]):   Sepsis [facility]):   Diagnosis management comments: DDX includes but not ltd to:   Patient presents w febrile illness, reported fever by nurse after discharge from hospital yesterday  Difficult to obtain history  Per wife is acting normally and no respiratory symptoms but consider covid given fever and his recent exposure to the hospital and ICU while covid patients are admitted  Also consider hospital acquired infections as recently had a shanks catheter  No s/sx of pna  Consider flu  Feeding tube also appears clogged; will attempt to flush here  Plan is to obtain:  CBC to check for anemia, leukocytosis, hydration status  Chemistry panel to check for lyte abnormalities, organ function   Lactic acid as a marker of end organ dysfunction  Based on results:  Patient w lactic acidosis, leukocytosis and report of fever at home  Given no obvious source for fever and recent hospital admission to campus w covid positive patients will test for covid and send for CT c/a/p for source of infection  Will cover w BS abx as part of septic workup until additional testing, procal, etc returns  ID on call provider notified of sending test via tiger text       Not placed in mask when brought to the ED thus list of potential exposed staff was filed  Disposition  Final diagnoses:   PEG tube malfunction ([facility] )   Sepsis ([facility] )     Time reflects when diagnosis was documented in both MDM as applicable and the Disposition within this note     Time User Action Codes Description Comment    3/21/2020  6:58 PM Sarah Almodovar [K94 [id number]] PEG tube malfunction ([facility] )     3/21/2020  6:58 PM Radha Butterfield [[id number]] Sepsis [facility])       ED Disposition     ED Disposition Condition Date/Time Comment    Admit Stable Sat Mar 21, 2020  6:58 PM Case was discussed with Compa Woods and the patient's admission status was agreed to be Admission Status: observation status to the service of Dr Compa Woods   Follow-up Information    None         Patient's Medications   Discharge Prescriptions    No medications on file     No discharge procedures on file      PDMP Review     None          ED Provider  Electronically Signed by           Javi Guadalupe PA-C  03/21/20 1279

## 2020-03-22 LAB
ALBUMIN SERPL BCP-MCNC: 2.2 G/DL (ref 3.5–5)
ALP SERPL-CCNC: 80 U/L (ref 46–116)
ALT SERPL W P-5'-P-CCNC: 19 U/L (ref 12–78)
ANION GAP SERPL CALCULATED.3IONS-SCNC: 7 MMOL/L (ref 4–13)
AST SERPL W P-5'-P-CCNC: 25 U/L (ref 5–45)
BILIRUB SERPL-MCNC: 0.7 MG/DL (ref 0.2–1)
BUN SERPL-MCNC: 11 MG/DL (ref 5–25)
CALCIUM SERPL-MCNC: 8.7 MG/DL (ref 8.3–10.1)
CHLORIDE SERPL-SCNC: 103 MMOL/L (ref 100–108)
CO2 SERPL-SCNC: 26 MMOL/L (ref 21–32)
CREAT SERPL-MCNC: 1.27 MG/DL (ref 0.6–1.3)
ERYTHROCYTE [DISTWIDTH] IN BLOOD BY AUTOMATED COUNT: 16.1 % (ref 11.6–15.1)
GFR SERPL CREATININE-BSD FRML MDRD: 63 ML/MIN/1.73SQ M
GLUCOSE SERPL-MCNC: 153 MG/DL (ref 65–140)
GLUCOSE SERPL-MCNC: 169 MG/DL (ref 65–140)
GLUCOSE SERPL-MCNC: 171 MG/DL (ref 65–140)
GLUCOSE SERPL-MCNC: 195 MG/DL (ref 65–140)
GLUCOSE SERPL-MCNC: 242 MG/DL (ref 65–140)
HCT VFR BLD AUTO: 38 % (ref 36.5–49.3)
HGB BLD-MCNC: 12.2 G/DL (ref 12–17)
MCH RBC QN AUTO: 32.2 PG (ref 26.8–34.3)
MCHC RBC AUTO-ENTMCNC: 32.1 G/DL (ref 31.4–37.4)
MCV RBC AUTO: 100 FL (ref 82–98)
PLATELET # BLD AUTO: 156 THOUSANDS/UL (ref 149–390)
PMV BLD AUTO: 11 FL (ref 8.9–12.7)
POTASSIUM SERPL-SCNC: 5 MMOL/L (ref 3.5–5.3)
PROCALCITONIN SERPL-MCNC: 0.09 NG/ML
PROT SERPL-MCNC: 6.9 G/DL (ref 6.4–8.2)
RBC # BLD AUTO: 3.79 MILLION/UL (ref 3.88–5.62)
SODIUM SERPL-SCNC: 136 MMOL/L (ref 136–145)
WBC # BLD AUTO: 20.33 THOUSAND/UL (ref 4.31–10.16)

## 2020-03-22 PROCEDURE — 80053 COMPREHEN METABOLIC PANEL: CPT | Performed by: INTERNAL MEDICINE

## 2020-03-22 PROCEDURE — 82948 REAGENT STRIP/BLOOD GLUCOSE: CPT

## 2020-03-22 PROCEDURE — 85027 COMPLETE CBC AUTOMATED: CPT | Performed by: INTERNAL MEDICINE

## 2020-03-22 PROCEDURE — 99233 SBSQ HOSP IP/OBS HIGH 50: CPT | Performed by: INTERNAL MEDICINE

## 2020-03-22 RX ADMIN — ISOSORBIDE DINITRATE 20 MG: 10 TABLET ORAL at 20:50

## 2020-03-22 RX ADMIN — HYDRALAZINE HYDROCHLORIDE 50 MG: 25 TABLET ORAL at 20:50

## 2020-03-22 RX ADMIN — ENOXAPARIN SODIUM 40 MG: 40 INJECTION SUBCUTANEOUS at 08:21

## 2020-03-22 RX ADMIN — HYDRALAZINE HYDROCHLORIDE 50 MG: 25 TABLET ORAL at 17:15

## 2020-03-22 RX ADMIN — ASPIRIN 81 MG 81 MG: 81 TABLET ORAL at 08:23

## 2020-03-22 RX ADMIN — CLOPIDOGREL BISULFATE 75 MG: 75 TABLET ORAL at 08:23

## 2020-03-22 RX ADMIN — ISOSORBIDE DINITRATE 20 MG: 10 TABLET ORAL at 17:15

## 2020-03-22 RX ADMIN — VALPROIC ACID 1000 MG: 500 SOLUTION ORAL at 22:19

## 2020-03-22 RX ADMIN — LEVOTHYROXINE SODIUM 25 MCG: 25 TABLET ORAL at 06:04

## 2020-03-22 RX ADMIN — ATORVASTATIN CALCIUM 20 MG: 20 TABLET, FILM COATED ORAL at 17:15

## 2020-03-22 RX ADMIN — INSULIN LISPRO 2 UNITS: 100 INJECTION, SOLUTION INTRAVENOUS; SUBCUTANEOUS at 11:39

## 2020-03-22 RX ADMIN — INSULIN LISPRO 3 UNITS: 100 INJECTION, SOLUTION INTRAVENOUS; SUBCUTANEOUS at 23:42

## 2020-03-22 RX ADMIN — FUROSEMIDE 20 MG: 20 TABLET ORAL at 08:23

## 2020-03-22 RX ADMIN — TRAVOPROST 1 DROP: 0.04 SOLUTION/ DROPS OPHTHALMIC at 22:19

## 2020-03-22 RX ADMIN — METOPROLOL TARTRATE 25 MG: 25 TABLET, FILM COATED ORAL at 08:23

## 2020-03-22 RX ADMIN — MINERAL SUPPLEMENT IRON 300 MG / 5 ML STRENGTH LIQUID 100 PER BOX UNFLAVORED 300 MG: at 06:04

## 2020-03-22 RX ADMIN — INSULIN LISPRO 3 UNITS: 100 INJECTION, SOLUTION INTRAVENOUS; SUBCUTANEOUS at 17:18

## 2020-03-22 RX ADMIN — AMLODIPINE BESYLATE 5 MG: 5 TABLET ORAL at 08:22

## 2020-03-22 RX ADMIN — HYDRALAZINE HYDROCHLORIDE 50 MG: 25 TABLET ORAL at 08:23

## 2020-03-22 RX ADMIN — MULTIVITAMIN 15 ML: LIQUID ORAL at 08:23

## 2020-03-22 RX ADMIN — VALPROIC ACID 500 MG: 500 SOLUTION ORAL at 08:23

## 2020-03-22 RX ADMIN — FOLIC ACID 1000 MCG: 1 TABLET ORAL at 08:23

## 2020-03-22 RX ADMIN — POTASSIUM CHLORIDE 20 MEQ: 20 SOLUTION ORAL at 08:21

## 2020-03-22 RX ADMIN — ISOSORBIDE DINITRATE 20 MG: 10 TABLET ORAL at 08:23

## 2020-03-22 RX ADMIN — INSULIN LISPRO 1 UNITS: 100 INJECTION, SOLUTION INTRAVENOUS; SUBCUTANEOUS at 06:19

## 2020-03-22 RX ADMIN — METOPROLOL TARTRATE 25 MG: 25 TABLET, FILM COATED ORAL at 20:50

## 2020-03-22 RX ADMIN — INSULIN GLARGINE 5 UNITS: 100 INJECTION, SOLUTION SUBCUTANEOUS at 22:08

## 2020-03-22 NOTE — ASSESSMENT & PLAN NOTE
· Recent hospitalization for NSTEMI however further investigation was deferred given comorbidities    · Continue metoprolol clopidogrel and isosorbide

## 2020-03-22 NOTE — ASSESSMENT & PLAN NOTE
Lab Results   Component Value Date    HGBA1C 6 9 (H) 02/07/2020     Recent Labs     03/20/20  0038 03/20/20  0556 03/21/20  2318 03/22/20  0605   POCGLU 200* 164* 153* 169*     · Accu-Cheks are getting better  Continue with current treatment including sliding scale    It is fairly controlled

## 2020-03-22 NOTE — H&P
H&P- Lidya Reilly 1940, 70 y o  male MRN: [id number]  Unit/Bed#: ED 12 Encounter: [id number]  Primary Care Provider: Duong Lubin MD   Date and time admitted to hospital: 3/21/2020  3:11 PM        Assessment and Plan  * PEG tube malfunction [facility])  Assessment & Plan  · Peg tube malfunction  · In the emergency department was able to be unclogged with carbonated beverage  · Continue Jevity 1 5 65 mL/h with free water flushes    Acute febrile illness  Assessment & Plan  · Acute febrile illness with recent prolonged hospitalization  · No clear source at this time  Urinalysis with only trace leukocytes  · CT chest abdomen and pelvis has been ordered at time of this note  · COVID-19 rule out testing ordered in the emergency department given recent prolonged hospitalization and risk of exposure  · Defer on further antibiotics until source available    CKD (chronic kidney disease) stage 3, GFR 30-59 ml/min ([facility])  Assessment & Plan  · CKD 3 at baseline    Results from last 7 days   Lab Units 03/21/20  1602 03/18/20  0459 03/17/20  0533 03/16/20  0546   BUN mg/dL 10 7 7 8   CREATININE mg/dL 1 26 1 12 1 08 1 13       History of non-ST elevation myocardial infarction (NSTEMI)  Assessment & Plan  · Recent hospitalization for NSTEMI however further investigation was deferred given comorbidities  · Continue metoprolol clopidogrel and isosorbide    History of stroke  Assessment & Plan  · History of stroke on clopidogrel and atorvastatin    Seizure disorder (HCC)  Assessment & Plan  · Seizure disorder continue depakote    Chronic diastolic heart failure (HCC)  Assessment & Plan  Wt Readings from Last 3 Encounters:   03/21/20 111 kg (244 lb 4 3 oz)   03/20/20 120 kg (264 lb 5 3 oz)   03/15/20 113 kg (250 lb)     · Chronic diastolic CHF currently compensated on furosemide      Paroxysmal atrial flutter (HCC)  Assessment & Plan  · Paroxysmal atrial flutter on metoprolol tartrate    · Anticoagulation deferred given neurologic deficits and fall risk    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[facility])  Assessment & Plan  Lab Results   Component Value Date    HGBA1C 6 9 (H) 02/07/2020     Recent Labs     03/19/20  1544 03/19/20  2037 03/20/20  0038 03/20/20  0556   POCGLU 167* 192* 200* 164*     · Diabetes mellitus  Continue glargine 5 units at bedtime with sliding scale insulin    Benign essential hypertension  Assessment & Plan  · Essential hypertension continue metoprolol amlodipine and hydralazine    VTE Prophylaxis: Enoxaparin (Lovenox)  Code Status:  Discussed with wife-level one full code  Anticipated Length of Stay:  Patient will be admitted on an Observation basis with an anticipated length of stay of  less than 2 midnights  Justification for Hospital Stay: PEG tube malfunction [facility])  Total Time for Visit, including Counseling / Coordination of Care: xx mins  Greater than 50% of this total time spent on direct patient counseling and coordination of care  Chief Complaint:     Chief Complaint   Patient presents with    Feeding Tube Problem     PLACED ON 3/17, TUBE IS BLOCKED    Fever - 76 years or older     History of Present Illness:    Tiffanie Guerrier is a 70 y o  male who presents with peg tube dysfunction  The patient had prolonged hospitalization 3/9/2020 into 3/20/2020 for encephalopathy with NSTEMI and dysphagia  Medical management was agreed upon given comorbidities and PEG tube was placed  It was clogged this morning and when visiting nurses arrived noted that the patient had a 100 4 fever  Patient cannot provide any reliable history as he remains encephalopathic    There are no respiratory symptoms reported by wife but patient does have left-sided abdominal pain which prompted CT chest/abdomen/pelvis in the emergency department    Review of Systems:  History obtained from chart review and spouse  General ROS: positive for  - fever  Psychological ROS: positive for - irritability  Ophthalmic ROS: negative for - loss of vision  Respiratory ROS: negative for - cough or shortness of breath  Cardiovascular ROS: negative for - chest pain  Gastrointestinal ROS: positive for - abdominal pain/feeding tube dysfunction  Genito-Urinary ROS: negative for - hematuria  Musculoskeletal ROS: positive for - muscle pain  Neurological ROS: positive for - confusion  Otherwise, all other 12 point review of systems normal     Past Medical and Surgical History:   Past Medical History:   Diagnosis Date    Acute renal failure syndrome ([facility])     Carotid stenosis, bilateral     Cerebrovascular disease     Chronic diastolic CHF (congestive heart failure) ([facility])     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[facility])     CKD (chronic kidney disease) stage 3, GFR 30-59 ml/min ([facility])     Diabetes mellitus ([facility])     ETOH abuse     History of non-ST elevation myocardial infarction (NSTEMI)     History of stroke     Hyperlipidemia     Hypertension     Lymphedema of both lower extremities     Memory loss     NPH (normal pressure hydrocephalus) ([facility])     Paroxysmal atrial flutter ([facility])     Seizure disorder ([facility])      Past Surgical History:   Procedure Laterality Date    CATARACT EXTRACTION      COLONOSCOPY  2017    HERNIA REPAIR      MULTIPLE TOOTH EXTRACTIONS      PEG TUBE PLACEMENT       Meds/Allergies: Allergies: Allergies   Allergen Reactions    Enalapril Angioedema, Anaphylaxis and Shortness Of Breath     Other reaction(s): Unknown  Other reaction(s): Respiratory distress, Unknown Reaction     Prior to Admission Medications   Prescriptions Last Dose Informant Patient Reported? Taking?    ALPRAZolam (XANAX) 0 25 mg tablet   No No   Si tablet (0 25 mg total) by Per PEG Tube route 2 (two) times a day as needed for anxiety for up to 10 days   B-D UF III MINI PEN NEEDLES 31G X 5 MM MISC  Spouse/Significant Other Yes No   Multiple Vitamin (MULTIVITAMIN) capsule   No No   Si capsule by Per G Tube route daily Please make sure multivitamin is either liquid or a capsule that can be opened to put down PEG tube   amLODIPine (NORVASC) 5 mg tablet   No No   Si tablet (5 mg total) by Per G Tube route daily   aspirin 81 mg chewable tablet   No No   Sig: Chew 1 tablet (81 mg total) daily   Patient taking differently: 81 mg by Per PEG Tube route daily    atorvastatin (LIPITOR) 20 mg tablet   No No   Si tablet (20 mg total) by Per G Tube route daily   clopidogrel (PLAVIX) 75 mg tablet   No No   Si tablet (75 mg total) by Per PEG Tube route daily   divalproex sodium (DEPAKOTE SPRINKLE) 125 MG capsule   No No   Si capsules (1,000 mg total) by Per PEG Tube route daily at bedtime   divalproex sodium (DEPAKOTE SPRINKLE) 125 MG capsule   No No   Si capsules (500 mg total) by Per PEG Tube route daily   ferrous sulfate 220 (44 Fe) mg/5 mL solution   No No   Sig: Take 5 mL (220 mg total) by mouth daily   Patient taking differently: 220 mg by Per G Tube route daily    folic acid (FOLVITE) 1 mg tablet   No No   Si tablet (1,000 mcg total) by Per PEG Tube route daily   furosemide (LASIX) 20 mg tablet   No No   Si tablet (20 mg total) by Per G Tube route daily   hydrALAZINE (APRESOLINE) 50 mg tablet   No No   Si tablet (50 mg total) by Per G Tube route 3 (three) times a day   insulin glargine (LANTUS) 100 units/mL subcutaneous injection   No No   Sig: Inject 5 Units under the skin daily at bedtime Please start with 5u and increase as needed if blood glucose is persistently elevated with the help your home nurse and primary doctor   isosorbide dinitrate (ISORDIL) 20 mg tablet   No No   Si tablet (20 mg total) by Per G Tube route 3 (three) times a day   levothyroxine 25 mcg tablet   No No   Si tablet (25 mcg total) by Per G Tube route daily in the early morning   metoprolol tartrate (LOPRESSOR) 25 mg tablet   No No   Si tablet (25 mg total) by Per PEG Tube route every 12 (twelve) hours   potassium chloride 10 % oral solution   No No   Sig: Take 15 mL (20 mEq total) by mouth daily   travoprost (TRAVATAN Z) 0 004 % ophthalmic solution  Spouse/Significant Other Yes No   Sig: Apply 1 drop to eye daily at bedtime       Facility-Administered Medications: None     Social History:     Social History     Socioeconomic History    Marital status: /Civil Union     Spouse name: Not on file    Number of children: Not on file    Years of education: Not on file    Highest education level: Not on file   Occupational History    Not on file   Social Needs    Financial resource strain: Not on file    Food insecurity:     Worry: Not on file     Inability: Not on file    Transportation needs:     Medical: Not on file     Non-medical: Not on file   Tobacco Use    Smoking status: Former Smoker     Types: Cigarettes     Last attempt to quit:      Years since quittin 2    Smokeless tobacco: Never Used   Substance and Sexual Activity    Alcohol use: Never     Frequency: Never    Drug use: Never    Sexual activity: Never   Lifestyle    Physical activity:     Days per week: Not on file     Minutes per session: Not on file    Stress: Not on file   Relationships    Social connections:     Talks on phone: Not on file     Gets together: Not on file     Attends [employer] service: Not on file     Active member of club or organization: Not on file     Attends meetings of clubs or organizations: Not on file     Relationship status: Not on file    Intimate partner violence:     Fear of current or ex partner: Not on file     Emotionally abused: Not on file     Physically abused: Not on file     Forced sexual activity: Not on file   Other Topics Concern    Not on file   Social History Narrative    Not on file     Patient Pre-hospital Living Situation:   Patient Pre-hospital Level of Mobility:  Patient Pre-hospital Diet Restrictions:  Jevity 1 5    Family History:  Family History   Problem Relation Age of Onset    Hypertension Father     Hypertension Brother     Diabetes Brother     Stroke Brother     Cancer Mother      Physical Exam:   Vitals:   Blood Pressure: 147/89 (03/21/20 1900)  Pulse: 97 (03/21/20 1900)  Temperature: 99 4 °F (37 4 °C) (03/21/20 1512)  Temp Source: Temporal (03/21/20 1512)  Respirations: 18 (03/21/20 1900)  Weight - Scale: 111 kg (244 lb 4 3 oz) (03/21/20 1514)  SpO2: 98 % (03/21/20 1900)    General appearance: alert, delirious and appears stated age  Skin: Skin color, texture, turgor normal  No rashes or lesions  Head: Normocephalic, without obvious abnormality, atraumatic  Eyes: conjunctivae/corneas clear  PERRL, EOM's intact  Lungs: diminished breath sounds  Heart: regular rate and rhythm  Abdomen: Feeding tube in place  Back: range of motion normal  Extremities: extremities normal, atraumatic, no cyanosis or edema  Neurologic:  Not oriented  Strength symmetric  Psychiatric:  Delirious    Lab Results: I have personally reviewed pertinent reports      Results from last 7 days   Lab Units 03/21/20  1602   WBC Thousand/uL 15 38*   HEMOGLOBIN g/dL 12 5   HEMATOCRIT % 37 8   PLATELETS Thousands/uL 161   NEUTROS PCT % 73   LYMPHS PCT % 11*   MONOS PCT % 16*   EOS PCT % 0     Results from last 7 days   Lab Units 03/21/20  1602 03/18/20  0459 03/17/20  0533 03/16/20  0546   SODIUM mmol/L 137 140 143 142   POTASSIUM mmol/L 5 1 4 1 3 6 3 7   CHLORIDE mmol/L 104 107 108 108   CO2 mmol/L 28 24 27 26   ANION GAP mmol/L 5 9 8 8   BUN mg/dL 10 7 7 8   CREATININE mg/dL 1 26 1 12 1 08 1 13   CALCIUM mg/dL 8 5 8 3 7 8* 7 8*   ALBUMIN g/dL 2 3*  --  2 1*  --    TOTAL BILIRUBIN mg/dL 0 60  --  0 60  --    ALK PHOS U/L 84  --  60  --    ALT U/L 23  --  34  --    AST U/L 33  --  58*  --    EGFR ml/min/1 73sq m 63 73 76 72   GLUCOSE RANDOM mg/dL 190* 114 114 131     Results from last 7 days   Lab Units 03/17/20  0533   INR  1 15     Results from last 7 days   Lab Units 03/17/20  0533   CK TOTAL U/L 261   CK MB INDEX % 2 3     Results from last 7 days   Lab Units 03/21/20  1602   LACTIC ACID mmol/L 2 1*              Results from last 7 days   Lab Units 03/21/20  1809   COLOR UA  Yellow   CLARITY UA  Clear   SPEC GRAV UA  1 015   PH UA  8 0   LEUKOCYTES UA  Trace*   NITRITE UA  Negative   GLUCOSE UA mg/dl Negative   KETONES UA mg/dl Negative   BILIRUBIN UA  Negative   BLOOD UA  Negative      Results from last 7 days   Lab Units 03/21/20  1809   RBC UA /hpf 0-1*   WBC UA /hpf 0-1*   EPITHELIAL CELLS WET PREP /hpf Occasional   BACTERIA UA /hpf Occasional          Imaging:   CT chest abdomen pelvis pending    EKG, Pathology, and Other Studies Reviewed on Admission:   Reviewed previous records    [facility]/ [facility] Records Reviewed: Yes    ** Please Note: This note has been constructed using a voice recognition system   **

## 2020-03-22 NOTE — ASSESSMENT & PLAN NOTE
· CKD 3 at baseline    Results from last 7 days   Lab Units 03/21/20  1602 03/18/20  0459 03/17/20  0533 03/16/20  0546   BUN mg/dL 10 7 7 8   CREATININE mg/dL 1 26 1 12 1 08 1 13

## 2020-03-22 NOTE — ASSESSMENT & PLAN NOTE
· Paroxysmal atrial flutter on metoprolol tartrate    · Anticoagulation deferred given neurologic deficits and fall risk

## 2020-03-22 NOTE — ED NOTES
Patient changed from a mask to a surgical mask with the springs    Patient seems more comfortable with the different mask     Mai Francis  03/21/20 2019

## 2020-03-22 NOTE — ASSESSMENT & PLAN NOTE
Wt Readings from Last 3 Encounters:   03/21/20 107 kg (236 lb 8 9 oz)   03/20/20 120 kg (264 lb 5 3 oz)   03/15/20 113 kg (250 lb)     · Chronic diastolic CHF and does not appear to be in any exacerbation  Weight is also stable    Continue to monitor and also continue with diuretics

## 2020-03-22 NOTE — ASSESSMENT & PLAN NOTE
· Peg tube malfunction    · In the emergency department was able to be unclogged with carbonated beverage  · Continue Jevity 1 5 65 mL/h with free water flushes

## 2020-03-22 NOTE — ASSESSMENT & PLAN NOTE
Chronic kidney disease stage 3  Creatinine slightly up    Continue to monitor  Results from last 7 days   Lab Units 03/22/20  0613 03/21/20  1602 03/18/20  0459 03/17/20  0533 03/16/20  0546   BUN mg/dL 11 10 7 7 8   CREATININE mg/dL 1 27 1 26 1 12 1 08 1 13

## 2020-03-22 NOTE — ASSESSMENT & PLAN NOTE
Wt Readings from Last 3 Encounters:   03/21/20 111 kg (244 lb 4 3 oz)   03/20/20 120 kg (264 lb 5 3 oz)   03/15/20 113 kg (250 lb)     · Chronic diastolic CHF currently compensated on furosemide

## 2020-03-22 NOTE — PLAN OF CARE
Problem: Potential for Falls  Goal: Patient will remain free of falls  Description  INTERVENTIONS:  - Assess patient frequently for physical needs  -  Identify cognitive and physical deficits and behaviors that affect risk of falls    -  [place] fall precautions as indicated by assessment   - Educate patient/family on patient safety including physical limitations  - Instruct patient to call for assistance with activity based on assessment  - Modify environment to reduce risk of injury  - Consider OT/PT consult to assist with strengthening/mobility  Outcome: Progressing     Problem: Prexisting or High Potential for Compromised Skin Integrity  Goal: Skin integrity is maintained or improved  Description  INTERVENTIONS:  - Identify patients at risk for skin breakdown  - Assess and monitor skin integrity  - Assess and monitor nutrition and hydration status  - Monitor labs   - Assess for incontinence   - Turn and reposition patient  - Assist with mobility/ambulation  - Relieve pressure over bony prominences  - Avoid friction and shearing  - Provide appropriate hygiene as needed including keeping skin clean and dry  - Evaluate need for skin moisturizer/barrier cream  - Collaborate with interdisciplinary team   - Patient/family teaching  - Consider wound care consult   Outcome: Progressing     Problem: PAIN - ADULT  Goal: Verbalizes/displays adequate comfort level or baseline comfort level  Description  Interventions:  - Encourage patient to monitor pain and request assistance  - Assess pain using appropriate pain scale  - Administer analgesics based on type and severity of pain and evaluate response  - Implement non-pharmacological measures as appropriate and evaluate response  - Consider cultural and social influences on pain and pain management  - Notify physician/advanced practitioner if interventions unsuccessful or patient reports new pain  Outcome: Progressing     Problem: INFECTION - ADULT  Goal: Absence or prevention of progression during hospitalization  Description  INTERVENTIONS:  - Assess and monitor for signs and symptoms of infection  - Monitor lab/diagnostic results  - Monitor all insertion sites, i e  indwelling lines, tubes, and drains  - Monitor endotracheal if appropriate and nasal secretions for changes in amount and color  - [place] appropriate cooling/warming therapies per order  - Administer medications as ordered  - Instruct and encourage patient and family to use good hand hygiene technique  - Identify and instruct in appropriate isolation precautions for identified infection/condition  Outcome: Progressing     Problem: SAFETY ADULT  Goal: Patient will remain free of falls  Description  INTERVENTIONS:  - Assess patient frequently for physical needs  -  Identify cognitive and physical deficits and behaviors that affect risk of falls    -  [place] fall precautions as indicated by assessment   - Educate patient/family on patient safety including physical limitations  - Instruct patient to call for assistance with activity based on assessment  - Modify environment to reduce risk of injury  - Consider OT/PT consult to assist with strengthening/mobility  Outcome: Progressing  Goal: Maintain or return to baseline ADL function  Description  INTERVENTIONS:  -  Assess patient's ability to carry out ADLs; assess patient's baseline for ADL function and identify physical deficits which impact ability to perform ADLs (bathing, care of mouth/teeth, toileting, grooming, dressing, etc )  - Assess/evaluate cause of self-care deficits   - Assess range of motion  - Assess patient's mobility; develop plan if impaired  - Assess patient's need for assistive devices and provide as appropriate  - Encourage maximum independence but intervene and supervise when necessary  - Involve family in performance of ADLs  - Assess for home care needs following discharge   - Consider OT consult to assist with ADL evaluation and planning for discharge  - Provide patient education as appropriate  Outcome: Progressing  Goal: Maintain or return mobility status to optimal level  Description  INTERVENTIONS:  - Assess patient's baseline mobility status (ambulation, transfers, stairs, etc )    - Identify cognitive and physical deficits and behaviors that affect mobility  - Identify mobility aids required to assist with transfers and/or ambulation (gait belt, sit-to-stand, lift, walker, cane, etc )  - [place] fall precautions as indicated by assessment  - Record patient progress and toleration of activity level on Mobility SBAR; progress patient to next Phase/Stage  - Instruct patient to call for assistance with activity based on assessment  - Consider rehabilitation consult to assist with strengthening/weightbearing, etc   Outcome: Progressing     Problem: DISCHARGE PLANNING  Goal: Discharge to home or other facility with appropriate resources  Description  INTERVENTIONS:  - Identify barriers to discharge w/patient and caregiver  - Arrange for needed discharge resources and transportation as appropriate  - Identify discharge learning needs (meds, wound care, etc )  - Arrange for interpretive services to assist at discharge as needed  - Refer to Case Management Department for coordinating discharge planning if the patient needs post-hospital services based on physician/advanced practitioner order or complex needs related to functional status, cognitive ability, or social support system  Outcome: Progressing     Problem: Knowledge Deficit  Goal: Patient/family/caregiver demonstrates understanding of disease process, treatment plan, medications, and discharge instructions  Description  Complete learning assessment and assess knowledge base    Interventions:  - Provide teaching at level of understanding  - Provide teaching via preferred learning methods  Outcome: Progressing     Problem: NEUROSENSORY - ADULT  Goal: Achieves stable or improved neurological status  Description  INTERVENTIONS  - Monitor and report changes in neurological status  - Monitor vital signs such as temperature, blood pressure, glucose, and any other labs ordered   - Initiate measures to prevent increased intracranial pressure  - Monitor for seizure activity and implement precautions if appropriate      Outcome: Progressing  Goal: Achieves maximal functionality and self care  Description  INTERVENTIONS  - Monitor swallowing and airway patency with patient fatigue and changes in neurological status  - Encourage and assist patient to increase activity and self care     - Encourage visually impaired, hearing impaired and aphasic patients to use assistive/communication devices  Outcome: Progressing     Problem: RESPIRATORY - ADULT  Goal: Achieves optimal ventilation and oxygenation  Description  INTERVENTIONS:  - Assess for changes in respiratory status  - Assess for changes in mentation and behavior  - Position to facilitate oxygenation and minimize respiratory effort  - Oxygen administered by appropriate delivery if ordered  - Initiate smoking cessation education as indicated  - Encourage broncho-pulmonary hygiene including cough, deep breathe, Incentive Spirometry  - Assess the need for suctioning and aspirate as needed  - Assess and instruct to report SOB or any respiratory difficulty  - Respiratory Therapy support as indicated  Outcome: Progressing     Problem: METABOLIC, FLUID AND ELECTROLYTES - ADULT  Goal: Glucose maintained within target range  Description  INTERVENTIONS:  - Monitor Blood Glucose as ordered  - Assess for signs and symptoms of hyperglycemia and hypoglycemia  - Administer ordered medications to maintain glucose within target range  - Assess nutritional intake and initiate nutrition service referral as needed  Outcome: Progressing     Problem: SKIN/TISSUE INTEGRITY - ADULT  Goal: Skin integrity remains intact  Description  INTERVENTIONS  - Identify patients at risk for skin breakdown  - Assess and monitor skin integrity  - Assess and monitor nutrition and hydration status  - Monitor labs (i e  albumin)  - Assess for incontinence   - Turn and reposition patient  - Assist with mobility/ambulation  - Relieve pressure over bony prominences  - Avoid friction and shearing  - Provide appropriate hygiene as needed including keeping skin clean and dry  - Evaluate need for skin moisturizer/barrier cream  - Collaborate with interdisciplinary team (i e  Nutrition, Rehabilitation, etc )   - Patient/family teaching  Outcome: Progressing

## 2020-03-22 NOTE — ASSESSMENT & PLAN NOTE
No obvious source  Continue to monitor  COVID-19 pending  He has a seroma left inguinal area with history of I believe hernia surgery  Reviewed some of his previous scan/ultrasound and it appears that it has not changed  His white cell count is [id number] scan did not show any significant other abnormality other than seroma left inguinal area

## 2020-03-22 NOTE — PROGRESS NOTES
[facility] Internal Medicine Progress Note  Patient: Tiffanie Guerrier 70 y o  male   MRN: [id number]  PCP: [place] Hopper MD  Unit/Bed#: -01 Encounter: [id number]  Date Of Visit: 03/22/20          * PEG tube malfunction [facility])  Assessment & Plan  PEG has been working fine now  He is tolerating current tube feeds    CKD (chronic kidney disease) stage 3, GFR 30-59 ml/min ([facility])  Assessment & Plan  Chronic kidney disease stage 3  Creatinine slightly up  Continue to monitor  Results from last 7 days   Lab Units 03/22/20  0613 03/21/20  1602 03/18/20  0459 03/17/20  0533 03/16/20  0546   BUN mg/dL 11 10 7 7 8   CREATININE mg/dL 1 27 1 26 1 12 1 08 1 13       Acute febrile illness  Assessment & Plan  No obvious source  Continue to monitor  COVID-19 pending  He has a seroma left inguinal area with history of I believe hernia surgery  Reviewed some of his previous scan/ultrasound and it appears that it has not changed  His white cell count is [id number] scan did not show any significant other abnormality other than seroma left inguinal area  History of non-ST elevation myocardial infarction (NSTEMI)  Assessment & Plan  Medical management    History of stroke  Assessment & Plan  · History of stroke on clopidogrel and atorvastatin  PT/OT when stable    Seizure disorder [facility])  Assessment & Plan  · To continue depakote    Chronic diastolic heart failure (HCC)  Assessment & Plan  Wt Readings from Last 3 Encounters:   03/21/20 107 kg (236 lb 8 9 oz)   03/20/20 120 kg (264 lb 5 3 oz)   03/15/20 113 kg (250 lb)     · Chronic diastolic CHF and does not appear to be in any exacerbation  Weight is also stable    Continue to monitor and also continue with diuretics      Paroxysmal atrial flutter (HCC)  Assessment & Plan  Heart rate and rhythm currently better    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[facility])  Assessment & Plan  Lab Results   Component Value Date    HGBA1C 6 9 (H) 02/07/2020 Recent Labs     20  0038 20  0556 20  2318 20  0605   POCGLU 200* 164* 153* 169*     · Accu-Cheks are getting better  Continue with current treatment including sliding scale  It is fairly controlled    Benign essential hypertension  Assessment & Plan  Continue with home medications through PEG      Present on Admission:   Benign essential hypertension   Chronic diastolic heart failure (HCC)   Paroxysmal atrial flutter (HCC)   Seizure disorder (HCC)   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(HCC)   PEG tube malfunction (HCC)   Acute febrile illness   CKD (chronic kidney disease) stage 3, GFR 30-59 ml/min (HCC)            VTE Pharmacologic Prophylaxis:   Pharmacologic: Enoxaparin (Lovenox)  Mechanical VTE Prophylaxis in Place: Yes    Patient Centered Rounds: I have performed bedside rounds with nursing staff today  Discussions with Specialists or Other Care Team Provider:  Yes    Education and Discussions with Family / Patient:  Yes although patient does not speak much    Time Spent for Care: 35+ minutes  More than 50% of total time spent on counseling and coordination of care as described above  Current Length of Stay: 0 day(s)-    Note: Patient would require more than 2 midnight stay because of acute febrile illness and rule out COVID-19    Current Patient Status: Observation   Certification Statement: The patient will continue to require additional inpatient hospital stay due to Febrile illness/leukocytosis/PEG malfunction    Discharge Plan: To be determined    Code Status: Level 1 - Full Code      Subjective:    Patient did not really talk much  He has mitts on  Has history of stroke  Somewhat agitated when try to speak to him and look at his PEG tube      Objective:     Vitals:   Temp (24hrs), Av 3 °F (37 4 °C), Min:98 1 °F (36 7 °C), Max:101 3 °F (38 5 °C)    Temp:  [98 1 °F (36 7 °C)-101 3 °F (38 5 °C)] 98 1 °F (36 7 °C)  HR:  [89-97] 95  Resp: [18-24] 24  BP: (147-187)/(76-91) 157/78  SpO2:  [97 %-100 %] 99 %  Body mass index is 33 01 kg/m²  Input and Output Summary (last 24 hours): Intake/Output Summary (Last 24 hours) at 3/22/2020 0934  Last data filed at 3/22/2020 0100  Gross per 24 hour   Intake 500 ml   Output    Net 500 ml           Physical Exam:     Vital signs reviewed as above  Oropharynx are dry  He did not really talk to me  Has mitts on  Tube feeds are on and it looks like that his PEG is working fine  Otherwise abdomen is soft  Bowel sounds are audible  Poor inspiratory effort as he did not follow much commands  S1 and S2 audible  Skin is warm and dry  Other examination is limited because of patient not really very cooperative    Additional Data:     Labs:    Results from last 7 days   Lab Units 03/22/20  0613 03/21/20  1602   WBC Thousand/uL 20 33* 15 38*   HEMOGLOBIN g/dL 12 2 12 5   HEMATOCRIT % 38 0 37 8   PLATELETS Thousands/uL 156 161   NEUTROS PCT %  --  73   LYMPHS PCT %  --  11*   MONOS PCT %  --  16*   EOS PCT %  --  0     Results from last 7 days   Lab Units 03/22/20  0613   POTASSIUM mmol/L 5 0   CHLORIDE mmol/L 103   CO2 mmol/L 26   BUN mg/dL 11   CREATININE mg/dL 1 27   CALCIUM mg/dL 8 7   ALK PHOS U/L 80   ALT U/L 19   AST U/L 25     Results from last 7 days   Lab Units 03/17/20  0533   INR  1 15       * I Have Reviewed All Lab Data Listed Above  * Additional Pertinent Lab Tests Reviewed: All Labs Within Last 24 Hours Reviewed    Imaging:    Imaging Reports Reviewed Today Include:  CT scan results      Recent Cultures (last 7 days):     Results from last 7 days   Lab Units 03/21/20  1759   BLOOD CULTURE  Received in Microbiology Lab  Culture in Progress  Received in Microbiology Lab  Culture in Progress         Last 24 Hours Medication List:     Current Facility-Administered Medications:  acetaminophen 650 mg Per PEG Tube Q4H PRN Medhat Boothe DO   ALPRAZolam 0 25 mg Per PEG Tube BID PRN Akin Cortez DO amLODIPine 5 mg Per G Tube Daily Andria Santana, DO   aspirin 81 mg Per PEG Tube Daily Medhat Boothe, DO   atorvastatin 20 mg Per G Tube Daily Medhat Boothe, DO   clopidogrel 75 mg Per PEG Tube Daily Medhat Boothe, DO   enoxaparin 40 mg Subcutaneous Daily Medhat Boothe, DO   ferrous sulfate 300 mg Per PEG Tube Daily Before Breakfast Medhat Boothe, DO   folic acid 4,459 mcg Per PEG Tube Daily Medhat Boothe, DO   furosemide 20 mg Per G Tube Daily Medhat Boothe, DO   hydrALAZINE 50 mg Per G Tube TID Andria Esther, DO   insulin glargine 5 Units Subcutaneous HS Medhat Boothe, DO   insulin lispro 1-6 Units Subcutaneous Q6H [address] Medhat Boothe, DO   isosorbide dinitrate 20 mg Per G Tube TID Andria Esther, DO   levothyroxine 25 mcg Per G Tube Early Morning Medhat Boothe, DO   metoprolol tartrate 25 mg Per PEG Tube Q12H [address] Medhat Boothe, DO   multivitamin with iron-minerals 15 mL Per PEG Tube Daily Medhat Boothe, DO   ondansetron 4 mg Intravenous Q4H PRN Medhat Boothe, DO   potassium chloride 20 mEq Oral Daily Medhat Boothe, DO   travoprost 1 drop Both Eyes HS Medhat Boothe, DO   valproic acid 500 mg Per PEG Tube Daily Medhat Boothe, DO   And       valproic acid 1,000 mg Per PEG Tube HS Andria Santana, DO        Today, Patient Was Seen By: Kiana Sargent MD    ** Please Note: Dragon 360 Dictation voice to text software may have been used in the creation of this document   **

## 2020-03-22 NOTE — ASSESSMENT & PLAN NOTE
· Acute febrile illness with recent prolonged hospitalization  · No clear source at this time  Urinalysis with only trace leukocytes  · CT chest abdomen and pelvis has been ordered at time of this note    · COVID-19 rule out testing ordered in the emergency department given recent prolonged hospitalization and risk of exposure  · Defer on further antibiotics until source available

## 2020-03-22 NOTE — UTILIZATION REVIEW
Initial Clinical Review    OBSERVATION 3/21/20 @ 1859 to INPATIENT 3/22/20 @ 1512    Admission: Date/Time/Statement: Admission Orders (From admission, onward)     Ordered       Inpatient Admission Orders (From admission, onward)     Ordered        03/22/20 1512  Inpatient Admission  Once                            Orders Placed This Encounter   Procedures    03/22/20 1512  Inpatient Admission Once     Transfer Service: General Medicine       Question Answer Comment   Admitting Physician Vero Gonzalez    Level of Care Med Surg    Estimated length of stay More than 2 Midnights    Certification I certify that inpatient services are medically necessary for this patient for a duration of greater than two midnights  See H&P and MD Progress Notes for additional information about the patient's course of treatment  03/22/20 1512          ED Arrival Information     Expected Arrival Acuity Means of Arrival Escorted By Service Admission Type    - 3/21/2020 15:10 Urgent Ambulance [facility] Hospitalist Urgent    Arrival Complaint    feeding tube problem         Chief Complaint   Patient presents with    Feeding Tube Problem     PLACED ON 3/17, TUBE IS BLOCKED    Fever - 75 years or older     Assessment/Plan:   Mr Amrita Chacko is a 71 yo male who presents to the ED via EMS from home at the direction of [facility]  PEG tube was blocked and pt was febrile at 100 7 and L side abd pain  Pt is encephalopathic since recent admission in March for NSTEMI and dysphagia  PEG tube was inserted at that time  In ED PEG tube was unblocked with carbonated beverage  He continues on his regular tube feed  He is admitted to OBSERVATION with PEG tube malfunction - unblocked and Jevity infusing with free water flushes  Acute febrile illness  - no clear source, COVID r/o testing, defer antibiotics at this time  Type 2 DM - POC glucose testing and SSI cover  PMH:  CKD stage 3, NSTEMI, CVA, Seizure disorder, Chronic diastolic HF, PAF, HTN  3/22 Medicine Progress Note - PEG tube working well   + leukocytosis, COVID pending  Seroma from previous surgery in L inguinal area  Glucose improving  Mitts on for some control of agitation  ED Triage Vitals   Temperature Pulse Respirations Blood Pressure SpO2   03/21/20 1512 03/21/20 1514 03/21/20 1514 03/21/20 1514 03/21/20 1514   99 4 °F (37 4 °C) 97 18 (!) 187/91 97 %      Temp Source Heart Rate Source Patient Position - Orthostatic VS BP Location FiO2 (%)   03/21/20 1512 03/21/20 2236 03/21/20 2236 03/21/20 1710 --   Temporal Monitor Lying Right arm       Pain Score       03/21/20 2236       7        Wt Readings from Last 1 Encounters:   03/21/20 107 kg (236 lb 8 9 oz)     Additional Vital Signs:   03/22/20 0700  98 1 °F (36 7 °C)  95  24Abnormal   157/78    99 %  None (Room air)   03/22/20 0057  98 4 °F (36 9 °C)               03/21/20 2236  101 3 °F (38 5 °C)Abnormal   89  18  169/76    98 %  None (Room air)   03/21/20 1900    97  18  147/89  111  98 %     03/21/20 1710    94  18  185/81Abnormal     100 %  None (Room air)     Pertinent Labs/Diagnostic Test Results:    3/21 - CT chest, abd, pelvis - Small bilateral pleural effusions and minimal dependent hypoventilatory changes  No acute intra-abdominal abnormality  Stable left inguinal fluid collection likely postoperative      Results from last 7 days   Lab Units 03/22/20  0613 03/21/20  1602 03/18/20  0459 03/17/20  0533 03/16/20  0546   WBC Thousand/uL 20 33* 15 38* 5 55 5 15 4 51   HEMOGLOBIN g/dL 12 2 12 5 12 3 11 8* 11 8*   HEMATOCRIT % 38 0 37 8 38 5 35 8* 36 3*   PLATELETS Thousands/uL 156 161 111* 115* 112*   NEUTROS ABS Thousands/µL  --  11 12*  --   --  1 49*     Results from last 7 days   Lab Units 03/22/20  0613 03/21/20  1602 03/18/20  0459 03/17/20  0533 03/16/20  0546   SODIUM mmol/L 136 137 140 143 142   POTASSIUM mmol/L 5 0 5 1 4 1 3 6 3 7   CHLORIDE mmol/L 103 104 107 108 108   CO2 mmol/L 26 28 24 27 26   ANION GAP mmol/L 7 5 9 8 8   BUN mg/dL 11 10 7 7 8   CREATININE mg/dL 1 27 1 26 1 12 1 08 1 13   EGFR ml/min/1 73sq m 63 63 73 76 72   CALCIUM mg/dL 8 7 8 5 8 3 7 8* 7 8*     Results from last 7 days   Lab Units 03/22/20  0613 03/21/20  1602 03/17/20  0533   AST U/L 25 33 58*   ALT U/L 19 23 34   ALK PHOS U/L 80 84 60   TOTAL PROTEIN g/dL 6 9 7 0 6 1*   ALBUMIN g/dL 2 2* 2 3* 2 1*   TOTAL BILIRUBIN mg/dL 0 70 0 60 0 60   BILIRUBIN DIRECT mg/dL  --  0 19  --      Results from last 7 days   Lab Units 03/22/20  0605 03/21/20  2318 03/20/20  0556 03/20/20  0038 03/19/20  2037 03/19/20  1544 03/19/20  1121 03/19/20  0600 03/19/20  0028 03/18/20  2048 03/18/20  1604 03/18/20  1127   POC GLUCOSE mg/dl 169* 153* 164* 200* 192* 167* 157* 144* 148* 134 115 121     Results from last 7 days   Lab Units 03/22/20  0613 03/21/20  1602 03/18/20  0459 03/17/20  0533 03/16/20  0546   GLUCOSE RANDOM mg/dL 171* 190* 114 114 131     Results from last 7 days   Lab Units 03/17/20  0533   CK TOTAL U/L 261   CK MB INDEX % 2 3   CK MB ng/mL 5 9*     Results from last 7 days   Lab Units 03/17/20  0533   PROTIME seconds 14 8*   INR  1 15     Results from last 7 days   Lab Units 03/21/20  1759   PROCALCITONIN ng/ml 0 09     Results from last 7 days   Lab Units 03/21/20  2033 03/21/20  1602   LACTIC ACID mmol/L 1 0 2 1*     Results from last 7 days   Lab Units 03/21/20  1809   CLARITY UA  Clear   COLOR UA  Yellow   SPEC GRAV UA  1 015   PH UA  8 0   GLUCOSE UA mg/dl Negative   KETONES UA mg/dl Negative   BLOOD UA  Negative   PROTEIN UA mg/dl Negative   NITRITE UA  Negative   BILIRUBIN UA  Negative   UROBILINOGEN UA E U /dl 1 0   LEUKOCYTES UA  Trace*   WBC UA /hpf 0-1*   RBC UA /hpf 0-1*   BACTERIA UA /hpf Occasional   EPITHELIAL CELLS WET PREP /hpf Occasional       Results from last 7 days   Lab Units 03/21/20  3362   BLOOD CULTURE  Received in Microbiology Lab  Culture in Progress  Received in Microbiology Lab  Culture in Progress       ED Treatment:   Medication Administration from 03/21/2020 1510 to 03/21/2020 2233    Date/Time Order Dose Route Action   03/21/2020 1709 sodium chloride 0 9 % bolus 500 mL 500 mL Intravenous New Bag   03/21/2020 2026 cefepime (MAXIPIME) 2,000 mg in dextrose 5 % 50 mL IVPB 2,000 mg Intravenous New Bag   03/21/2020 2012 iohexol (OMNIPAQUE) 350 MG/ML injection (MULTI-DOSE) 100 mL 100 mL Intravenous Given        Past Medical History:   Diagnosis Date    Acute renal failure syndrome (HCC)     Carotid stenosis, bilateral     Cerebrovascular disease     Chronic diastolic CH[facility] (congestive heart failure) [facility])     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[facility])     CKD (chronic kidney disease) stage 3, GFR 30-59 ml/min ([facility])     Diabetes mellitus ([facility] )     ETOH abuse     History of non-ST elevation myocardial infarction (NSTEMI)     History of stroke     Hyperlipidemia     Hypertension     Lymphedema of both lower extremities     Memory loss     NPH (normal pressure hydrocephalus) ([facility])     Paroxysmal atrial flutter ([facility])     Seizure disorder ([facility] )      Present on Admission:   Benign essential hypertension   Chronic diastolic heart failure (HCC)   Paroxysmal atrial flutter (HCC)   Seizure disorder ([facility])   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(HCC)   PEG tube malfunction ([facility])   Acute febrile illness   CKD (chronic kidney disease) stage 3, GFR 30-59 ml/min ([facility])    Admitting Diagnosis: Feeding tube blocked [T85 598A]  PEG tube malfunction ([place] [address] ) [K94 23]  Sepsis ([place] [address] ) [A41 9]     Age/Sex: 70 y o  male     Admission Orders:  Scheduled Medications:    Medications:  amLODIPine 5 mg Per G Tube Daily   aspirin 81 mg Per PEG Tube Daily   atorvastatin 20 mg Per G Tube Daily   clopidogrel 75 mg Per PEG Tube Daily   enoxaparin 40 mg Subcutaneous Daily   ferrous sulfate 300 mg Per PEG Tube Daily Before Breakfast   folic acid 2,405 mcg Per PEG Tube Daily   furosemide 20 mg Per G Tube Daily   hydrALAZINE 50 mg Per G Tube TID   insulin glargine 5 Units Subcutaneous HS   insulin lispro 1-6 Units Subcutaneous Q6H EUGENIO   isosorbide dinitrate 20 mg Per G Tube TID   levothyroxine 25 mcg Per G Tube Early Morning   metoprolol tartrate 25 mg Per PEG Tube Q12H [facility] HOME   multivitamin with iron-minerals 15 mL Per PEG Tube Daily   potassium chloride 20 mEq Oral Daily   travoprost 1 drop Both Eyes HS   valproic acid 500 mg Per PEG Tube Daily   And      valproic acid 1,000 mg Per PEG Tube HS     Continuous IV Infusions:     PRN Meds:    acetaminophen 650 mg Per PEG Tube Q4H PRN x1 3/21   ALPRAZolam 0 25 mg Per PEG Tube BID PRN x1 3/21   ondansetron 4 mg Intravenous Q4H PRN      SCDs  COVID  Restraints non-violent  Up w/ assist   POC GLUCOSE AC/HS WITH SSI COVERAGE   Enteral tube feeds    Network Utilization Review Department  [email or web address]  ATTENTION: Please call with any questions or concerns to [phone] and carefully listen to the prompts so that you are directed to the right person  All voicemails are confidential   Alla Holguin all requests for admission clinical reviews, approved or denied determinations and any other requests to dedicated fax number below belonging to the campus where the patient is receiving treatment   List of dedicated fax numbers for the Facilities:  [address] DENIALS (Administrative/Medical Necessity) [phone]   [address] (Maternity/NICU/Pediatrics) [phone]   [facility] [phone]   [facility] [phone]   [address] [place] [address] [phone]   [address] [place] [address]  [phone]   [address] [phone]   [facility]  [phone]   [address] [facility] [address] [facility]  [address] [facility] [address] [facility] [phone]

## 2020-03-22 NOTE — PLAN OF CARE
Problem: Potential for Falls  Goal: Patient will remain free of falls  Description  INTERVENTIONS:  - Assess patient frequently for physical needs  -  Identify cognitive and physical deficits and behaviors that affect risk of falls    -  [place] fall precautions as indicated by assessment   - Educate patient/family on patient safety including physical limitations  - Instruct patient to call for assistance with activity based on assessment  - Modify environment to reduce risk of injury  - Consider OT/PT consult to assist with strengthening/mobility  Outcome: Progressing     Problem: Prexisting or High Potential for Compromised Skin Integrity  Goal: Skin integrity is maintained or improved  Description  INTERVENTIONS:  - Identify patients at risk for skin breakdown  - Assess and monitor skin integrity  - Assess and monitor nutrition and hydration status  - Monitor labs   - Assess for incontinence   - Turn and reposition patient  - Assist with mobility/ambulation  - Relieve pressure over bony prominences  - Avoid friction and shearing  - Provide appropriate hygiene as needed including keeping skin clean and dry  - Evaluate need for skin moisturizer/barrier cream  - Collaborate with interdisciplinary team   - Patient/family teaching  - Consider wound care consult   Outcome: Progressing     Problem: PAIN - ADULT  Goal: Verbalizes/displays adequate comfort level or baseline comfort level  Description  Interventions:  - Encourage patient to monitor pain and request assistance  - Assess pain using appropriate pain scale  - Administer analgesics based on type and severity of pain and evaluate response  - Implement non-pharmacological measures as appropriate and evaluate response  - Consider cultural and social influences on pain and pain management  - Notify physician/advanced practitioner if interventions unsuccessful or patient reports new pain  Outcome: Progressing     Problem: INFECTION - ADULT  Goal: Absence or prevention of progression during hospitalization  Description  INTERVENTIONS:  - Assess and monitor for signs and symptoms of infection  - Monitor lab/diagnostic results  - Monitor all insertion sites, i e  indwelling lines, tubes, and drains  - Monitor endotracheal if appropriate and nasal secretions for changes in amount and color  - [place] appropriate cooling/warming therapies per order  - Administer medications as ordered  - Instruct and encourage patient and family to use good hand hygiene technique  - Identify and instruct in appropriate isolation precautions for identified infection/condition  Outcome: Progressing     Problem: SAFETY ADULT  Goal: Patient will remain free of falls  Description  INTERVENTIONS:  - Assess patient frequently for physical needs  -  Identify cognitive and physical deficits and behaviors that affect risk of falls    -  [place] fall precautions as indicated by assessment   - Educate patient/family on patient safety including physical limitations  - Instruct patient to call for assistance with activity based on assessment  - Modify environment to reduce risk of injury  - Consider OT/PT consult to assist with strengthening/mobility  Outcome: Progressing  Goal: Maintain or return to baseline ADL function  Description  INTERVENTIONS:  -  Assess patient's ability to carry out ADLs; assess patient's baseline for ADL function and identify physical deficits which impact ability to perform ADLs (bathing, care of mouth/teeth, toileting, grooming, dressing, etc )  - Assess/evaluate cause of self-care deficits   - Assess range of motion  - Assess patient's mobility; develop plan if impaired  - Assess patient's need for assistive devices and provide as appropriate  - Encourage maximum independence but intervene and supervise when necessary  - Involve family in performance of ADLs  - Assess for home care needs following discharge   - Consider OT consult to assist with ADL evaluation and planning for discharge  - Provide patient education as appropriate  Outcome: Progressing  Goal: Maintain or return mobility status to optimal level  Description  INTERVENTIONS:  - Assess patient's baseline mobility status (ambulation, transfers, stairs, etc )    - Identify cognitive and physical deficits and behaviors that affect mobility  - Identify mobility aids required to assist with transfers and/or ambulation (gait belt, sit-to-stand, lift, walker, cane, etc )  - [place] fall precautions as indicated by assessment  - Record patient progress and toleration of activity level on Mobility SBAR; progress patient to next Phase/Stage  - Instruct patient to call for assistance with activity based on assessment  - Consider rehabilitation consult to assist with strengthening/weightbearing, etc   Outcome: Progressing     Problem: DISCHARGE PLANNING  Goal: Discharge to home or other facility with appropriate resources  Description  INTERVENTIONS:  - Identify barriers to discharge w/patient and caregiver  - Arrange for needed discharge resources and transportation as appropriate  - Identify discharge learning needs (meds, wound care, etc )  - Arrange for interpretive services to assist at discharge as needed  - Refer to Case Management Department for coordinating discharge planning if the patient needs post-hospital services based on physician/advanced practitioner order or complex needs related to functional status, cognitive ability, or social support system  Outcome: Progressing     Problem: Knowledge Deficit  Goal: Patient/family/caregiver demonstrates understanding of disease process, treatment plan, medications, and discharge instructions  Description  Complete learning assessment and assess knowledge base    Interventions:  - Provide teaching at level of understanding  - Provide teaching via preferred learning methods  Outcome: Progressing

## 2020-03-22 NOTE — ASSESSMENT & PLAN NOTE
Lab Results   Component Value Date    HGBA1C 6 9 (H) 02/07/2020     Recent Labs     03/19/20  1544 03/19/20  2037 03/20/20  0038 03/20/20  0556   POCGLU 167* 192* 200* 164*     · Diabetes mellitus    Continue glargine 5 units at bedtime with sliding scale insulin

## 2020-03-23 LAB
ALBUMIN SERPL BCP-MCNC: 1.8 G/DL (ref 3.5–5)
ALP SERPL-CCNC: 71 U/L (ref 46–116)
ALT SERPL W P-5'-P-CCNC: 14 U/L (ref 12–78)
ANION GAP SERPL CALCULATED.3IONS-SCNC: 4 MMOL/L (ref 4–13)
AST SERPL W P-5'-P-CCNC: 18 U/L (ref 5–45)
BILIRUB SERPL-MCNC: 0.4 MG/DL (ref 0.2–1)
BUN SERPL-MCNC: 14 MG/DL (ref 5–25)
CALCIUM SERPL-MCNC: 7.9 MG/DL (ref 8.3–10.1)
CHLORIDE SERPL-SCNC: 103 MMOL/L (ref 100–108)
CO2 SERPL-SCNC: 28 MMOL/L (ref 21–32)
CREAT SERPL-MCNC: 1.25 MG/DL (ref 0.6–1.3)
ERYTHROCYTE [DISTWIDTH] IN BLOOD BY AUTOMATED COUNT: 16.3 % (ref 11.6–15.1)
GFR SERPL CREATININE-BSD FRML MDRD: 64 ML/MIN/1.73SQ M
GLUCOSE SERPL-MCNC: 242 MG/DL (ref 65–140)
GLUCOSE SERPL-MCNC: 255 MG/DL (ref 65–140)
GLUCOSE SERPL-MCNC: 259 MG/DL (ref 65–140)
GLUCOSE SERPL-MCNC: 268 MG/DL (ref 65–140)
GLUCOSE SERPL-MCNC: 286 MG/DL (ref 65–140)
GLUCOSE SERPL-MCNC: 291 MG/DL (ref 65–140)
HCT VFR BLD AUTO: 33.3 % (ref 36.5–49.3)
HGB BLD-MCNC: 10.6 G/DL (ref 12–17)
MCH RBC QN AUTO: 31.9 PG (ref 26.8–34.3)
MCHC RBC AUTO-ENTMCNC: 31.8 G/DL (ref 31.4–37.4)
MCV RBC AUTO: 100 FL (ref 82–98)
PLATELET # BLD AUTO: 144 THOUSANDS/UL (ref 149–390)
PMV BLD AUTO: 10.5 FL (ref 8.9–12.7)
POTASSIUM SERPL-SCNC: 4.2 MMOL/L (ref 3.5–5.3)
PROT SERPL-MCNC: 6.2 G/DL (ref 6.4–8.2)
RBC # BLD AUTO: 3.32 MILLION/UL (ref 3.88–5.62)
SODIUM SERPL-SCNC: 135 MMOL/L (ref 136–145)
VANCOMYCIN TROUGH SERPL-MCNC: <0.8 UG/ML (ref 10–20)
WBC # BLD AUTO: 14.31 THOUSAND/UL (ref 4.31–10.16)

## 2020-03-23 PROCEDURE — 99232 SBSQ HOSP IP/OBS MODERATE 35: CPT | Performed by: GENERAL PRACTICE

## 2020-03-23 PROCEDURE — 85027 COMPLETE CBC AUTOMATED: CPT | Performed by: INTERNAL MEDICINE

## 2020-03-23 PROCEDURE — 80053 COMPREHEN METABOLIC PANEL: CPT | Performed by: INTERNAL MEDICINE

## 2020-03-23 PROCEDURE — 82948 REAGENT STRIP/BLOOD GLUCOSE: CPT

## 2020-03-23 PROCEDURE — 80202 ASSAY OF VANCOMYCIN: CPT | Performed by: INTERNAL MEDICINE

## 2020-03-23 RX ADMIN — ISOSORBIDE DINITRATE 20 MG: 10 TABLET ORAL at 09:28

## 2020-03-23 RX ADMIN — HYDRALAZINE HYDROCHLORIDE 50 MG: 25 TABLET ORAL at 09:28

## 2020-03-23 RX ADMIN — ASPIRIN 81 MG 81 MG: 81 TABLET ORAL at 09:28

## 2020-03-23 RX ADMIN — ISOSORBIDE DINITRATE 20 MG: 10 TABLET ORAL at 16:36

## 2020-03-23 RX ADMIN — HYDRALAZINE HYDROCHLORIDE 50 MG: 25 TABLET ORAL at 20:57

## 2020-03-23 RX ADMIN — ISOSORBIDE DINITRATE 20 MG: 10 TABLET ORAL at 20:57

## 2020-03-23 RX ADMIN — VALPROIC ACID 1000 MG: 500 SOLUTION ORAL at 21:05

## 2020-03-23 RX ADMIN — FUROSEMIDE 20 MG: 20 TABLET ORAL at 09:28

## 2020-03-23 RX ADMIN — FOLIC ACID 1000 MCG: 1 TABLET ORAL at 09:28

## 2020-03-23 RX ADMIN — AMLODIPINE BESYLATE 5 MG: 5 TABLET ORAL at 09:28

## 2020-03-23 RX ADMIN — MINERAL SUPPLEMENT IRON 300 MG / 5 ML STRENGTH LIQUID 100 PER BOX UNFLAVORED 300 MG: at 06:16

## 2020-03-23 RX ADMIN — LEVOTHYROXINE SODIUM 25 MCG: 25 TABLET ORAL at 06:16

## 2020-03-23 RX ADMIN — INSULIN LISPRO 3 UNITS: 100 INJECTION, SOLUTION INTRAVENOUS; SUBCUTANEOUS at 11:43

## 2020-03-23 RX ADMIN — HYDRALAZINE HYDROCHLORIDE 50 MG: 25 TABLET ORAL at 16:35

## 2020-03-23 RX ADMIN — INSULIN LISPRO 3 UNITS: 100 INJECTION, SOLUTION INTRAVENOUS; SUBCUTANEOUS at 17:26

## 2020-03-23 RX ADMIN — METOPROLOL TARTRATE 25 MG: 25 TABLET, FILM COATED ORAL at 20:57

## 2020-03-23 RX ADMIN — INSULIN GLARGINE 5 UNITS: 100 INJECTION, SOLUTION SUBCUTANEOUS at 21:05

## 2020-03-23 RX ADMIN — ATORVASTATIN CALCIUM 20 MG: 20 TABLET, FILM COATED ORAL at 16:36

## 2020-03-23 RX ADMIN — VALPROIC ACID 500 MG: 500 SOLUTION ORAL at 09:29

## 2020-03-23 RX ADMIN — METOPROLOL TARTRATE 25 MG: 25 TABLET, FILM COATED ORAL at 09:28

## 2020-03-23 RX ADMIN — MULTIVITAMIN 15 ML: LIQUID ORAL at 09:29

## 2020-03-23 RX ADMIN — INSULIN LISPRO 4 UNITS: 100 INJECTION, SOLUTION INTRAVENOUS; SUBCUTANEOUS at 06:15

## 2020-03-23 RX ADMIN — CLOPIDOGREL BISULFATE 75 MG: 75 TABLET ORAL at 09:28

## 2020-03-23 NOTE — NURSING NOTE
Pt AAOx2, no c/o pain or discomfort  Peg tube feedings infusing with out difficulty  Residual 10cc  Mitts removed at 10 am this AM  Pt agreeable and has been pleasant through out the day  Vital signs stable  Will continue to monitor

## 2020-03-23 NOTE — PLAN OF CARE
Problem: Potential for Falls  Goal: Patient will remain free of falls  Description  INTERVENTIONS:  - Assess patient frequently for physical needs  -  Identify cognitive and physical deficits and behaviors that affect risk of falls    -  [place] fall precautions as indicated by assessment   - Educate patient/family on patient safety including physical limitations  - Instruct patient to call for assistance with activity based on assessment  - Modify environment to reduce risk of injury  - Consider OT/PT consult to assist with strengthening/mobility  Outcome: Progressing     Problem: Prexisting or High Potential for Compromised Skin Integrity  Goal: Skin integrity is maintained or improved  Description  INTERVENTIONS:  - Identify patients at risk for skin breakdown  - Assess and monitor skin integrity  - Assess and monitor nutrition and hydration status  - Monitor labs   - Assess for incontinence   - Turn and reposition patient  - Assist with mobility/ambulation  - Relieve pressure over bony prominences  - Avoid friction and shearing  - Provide appropriate hygiene as needed including keeping skin clean and dry  - Evaluate need for skin moisturizer/barrier cream  - Collaborate with interdisciplinary team   - Patient/family teaching  - Consider wound care consult   Outcome: Progressing     Problem: PAIN - ADULT  Goal: Verbalizes/displays adequate comfort level or baseline comfort level  Description  Interventions:  - Encourage patient to monitor pain and request assistance  - Assess pain using appropriate pain scale  - Administer analgesics based on type and severity of pain and evaluate response  - Implement non-pharmacological measures as appropriate and evaluate response  - Consider cultural and social influences on pain and pain management  - Notify physician/advanced practitioner if interventions unsuccessful or patient reports new pain  Outcome: Progressing     Problem: INFECTION - ADULT  Goal: Absence or prevention of progression during hospitalization  Description  INTERVENTIONS:  - Assess and monitor for signs and symptoms of infection  - Monitor lab/diagnostic results  - Monitor all insertion sites, i e  indwelling lines, tubes, and drains  - Monitor endotracheal if appropriate and nasal secretions for changes in amount and color  - [place] appropriate cooling/warming therapies per order  - Administer medications as ordered  - Instruct and encourage patient and family to use good hand hygiene technique  - Identify and instruct in appropriate isolation precautions for identified infection/condition  Outcome: Progressing     Problem: SAFETY ADULT  Goal: Patient will remain free of falls  Description  INTERVENTIONS:  - Assess patient frequently for physical needs  -  Identify cognitive and physical deficits and behaviors that affect risk of falls    -  [place] fall precautions as indicated by assessment   - Educate patient/family on patient safety including physical limitations  - Instruct patient to call for assistance with activity based on assessment  - Modify environment to reduce risk of injury  - Consider OT/PT consult to assist with strengthening/mobility  Outcome: Progressing  Goal: Maintain or return to baseline ADL function  Description  INTERVENTIONS:  -  Assess patient's ability to carry out ADLs; assess patient's baseline for ADL function and identify physical deficits which impact ability to perform ADLs (bathing, care of mouth/teeth, toileting, grooming, dressing, etc )  - Assess/evaluate cause of self-care deficits   - Assess range of motion  - Assess patient's mobility; develop plan if impaired  - Assess patient's need for assistive devices and provide as appropriate  - Encourage maximum independence but intervene and supervise when necessary  - Involve family in performance of ADLs  - Assess for home care needs following discharge   - Consider OT consult to assist with ADL evaluation and planning for discharge  - Provide patient education as appropriate  Outcome: Progressing  Goal: Maintain or return mobility status to optimal level  Description  INTERVENTIONS:  - Assess patient's baseline mobility status (ambulation, transfers, stairs, etc )    - Identify cognitive and physical deficits and behaviors that affect mobility  - Identify mobility aids required to assist with transfers and/or ambulation (gait belt, sit-to-stand, lift, walker, cane, etc )  - [place] fall precautions as indicated by assessment  - Record patient progress and toleration of activity level on Mobility SBAR; progress patient to next Phase/Stage  - Instruct patient to call for assistance with activity based on assessment  - Consider rehabilitation consult to assist with strengthening/weightbearing, etc   Outcome: Progressing     Problem: DISCHARGE PLANNING  Goal: Discharge to home or other facility with appropriate resources  Description  INTERVENTIONS:  - Identify barriers to discharge w/patient and caregiver  - Arrange for needed discharge resources and transportation as appropriate  - Identify discharge learning needs (meds, wound care, etc )  - Arrange for interpretive services to assist at discharge as needed  - Refer to Case Management Department for coordinating discharge planning if the patient needs post-hospital services based on physician/advanced practitioner order or complex needs related to functional status, cognitive ability, or social support system  Outcome: Progressing     Problem: Knowledge Deficit  Goal: Patient/family/caregiver demonstrates understanding of disease process, treatment plan, medications, and discharge instructions  Description  Complete learning assessment and assess knowledge base    Interventions:  - Provide teaching at level of understanding  - Provide teaching via preferred learning methods  Outcome: Progressing     Problem: NEUROSENSORY - ADULT  Goal: Achieves stable or improved neurological status  Description  INTERVENTIONS  - Monitor and report changes in neurological status  - Monitor vital signs such as temperature, blood pressure, glucose, and any other labs ordered   - Initiate measures to prevent increased intracranial pressure  - Monitor for seizure activity and implement precautions if appropriate      Outcome: Progressing  Goal: Achieves maximal functionality and self care  Description  INTERVENTIONS  - Monitor swallowing and airway patency with patient fatigue and changes in neurological status  - Encourage and assist patient to increase activity and self care     - Encourage visually impaired, hearing impaired and aphasic patients to use assistive/communication devices  Outcome: Progressing     Problem: RESPIRATORY - ADULT  Goal: Achieves optimal ventilation and oxygenation  Description  INTERVENTIONS:  - Assess for changes in respiratory status  - Assess for changes in mentation and behavior  - Position to facilitate oxygenation and minimize respiratory effort  - Oxygen administered by appropriate delivery if ordered  - Initiate smoking cessation education as indicated  - Encourage broncho-pulmonary hygiene including cough, deep breathe, Incentive Spirometry  - Assess the need for suctioning and aspirate as needed  - Assess and instruct to report SOB or any respiratory difficulty  - Respiratory Therapy support as indicated  Outcome: Progressing     Problem: METABOLIC, FLUID AND ELECTROLYTES - ADULT  Goal: Glucose maintained within target range  Description  INTERVENTIONS:  - Monitor Blood Glucose as ordered  - Assess for signs and symptoms of hyperglycemia and hypoglycemia  - Administer ordered medications to maintain glucose within target range  - Assess nutritional intake and initiate nutrition service referral as needed  Outcome: Progressing     Problem: SKIN/TISSUE INTEGRITY - ADULT  Goal: Skin integrity remains intact  Description  INTERVENTIONS  - Identify patients at risk for skin breakdown  - Assess and monitor skin integrity  - Assess and monitor nutrition and hydration status  - Monitor labs (i e  albumin)  - Assess for incontinence   - Turn and reposition patient  - Assist with mobility/ambulation  - Relieve pressure over bony prominences  - Avoid friction and shearing  - Provide appropriate hygiene as needed including keeping skin clean and dry  - Evaluate need for skin moisturizer/barrier cream  - Collaborate with interdisciplinary team (i e  Nutrition, Rehabilitation, etc )   - Patient/family teaching  Outcome: Progressing

## 2020-03-23 NOTE — SOCIAL WORK
Per [facility]IM pt anticipated to dc tomorrow  CM s/w pt's wife to complete assessment  Per pt's wife she was instructed to have pt sent to ed for eval as his peg tube was clogged  Pt was dc'ed on 3/20 with [facility] and [facility] for tube feeds  Pt is 30 day ra  CM discussed dcp  Pt's wife reports she would like pt to return home with residential [facility]  Pt's wife has supplies from [employer]  Pt's wife requested transportation  Pt's wife requested update from rn- CM notified  CM sent referral to [facility]  CM requested soc tomorrow, 3/24 as dc is anticipated  CM Dept to setup transport when medically clear

## 2020-03-24 ENCOUNTER — EPISODE CHANGES (OUTPATIENT)
Dept: CASE MANAGEMENT | Facility: HOSPITAL | Age: 80
End: 2020-03-24

## 2020-03-24 VITALS
HEIGHT: 71 IN | SYSTOLIC BLOOD PRESSURE: 152 MMHG | BODY MASS INDEX: 33.12 KG/M2 | OXYGEN SATURATION: 98 % | TEMPERATURE: 97.9 F | HEART RATE: 80 BPM | RESPIRATION RATE: 20 BRPM | WEIGHT: 236.55 LBS | DIASTOLIC BLOOD PRESSURE: 73 MMHG

## 2020-03-24 PROBLEM — R50.9 ACUTE FEBRILE ILLNESS: Status: RESOLVED | Noted: 2020-03-21 | Resolved: 2020-03-24

## 2020-03-24 PROBLEM — K94.23 PEG TUBE MALFUNCTION (HCC): Status: RESOLVED | Noted: 2020-03-21 | Resolved: 2020-03-24

## 2020-03-24 LAB
ANION GAP SERPL CALCULATED.3IONS-SCNC: 6 MMOL/L (ref 4–13)
BASOPHILS # BLD AUTO: 0.03 THOUSANDS/ΜL (ref 0–0.1)
BASOPHILS NFR BLD AUTO: 0 % (ref 0–1)
BUN SERPL-MCNC: 13 MG/DL (ref 5–25)
CALCIUM SERPL-MCNC: 8.3 MG/DL (ref 8.3–10.1)
CHLORIDE SERPL-SCNC: 103 MMOL/L (ref 100–108)
CO2 SERPL-SCNC: 30 MMOL/L (ref 21–32)
CREAT SERPL-MCNC: 1.03 MG/DL (ref 0.6–1.3)
EOSINOPHIL # BLD AUTO: 0.13 THOUSAND/ΜL (ref 0–0.61)
EOSINOPHIL NFR BLD AUTO: 2 % (ref 0–6)
ERYTHROCYTE [DISTWIDTH] IN BLOOD BY AUTOMATED COUNT: 15.9 % (ref 11.6–15.1)
GFR SERPL CREATININE-BSD FRML MDRD: 81 ML/MIN/1.73SQ M
GLUCOSE SERPL-MCNC: 195 MG/DL (ref 65–140)
GLUCOSE SERPL-MCNC: 197 MG/DL (ref 65–140)
GLUCOSE SERPL-MCNC: 249 MG/DL (ref 65–140)
GLUCOSE SERPL-MCNC: 385 MG/DL (ref 65–140)
HCT VFR BLD AUTO: 33.9 % (ref 36.5–49.3)
HGB BLD-MCNC: 10.8 G/DL (ref 12–17)
IMM GRANULOCYTES # BLD AUTO: 0.04 THOUSAND/UL (ref 0–0.2)
IMM GRANULOCYTES NFR BLD AUTO: 1 % (ref 0–2)
LYMPHOCYTES # BLD AUTO: 1.55 THOUSANDS/ΜL (ref 0.6–4.47)
LYMPHOCYTES NFR BLD AUTO: 23 % (ref 14–44)
MCH RBC QN AUTO: 31.9 PG (ref 26.8–34.3)
MCHC RBC AUTO-ENTMCNC: 31.9 G/DL (ref 31.4–37.4)
MCV RBC AUTO: 100 FL (ref 82–98)
MONOCYTES # BLD AUTO: 0.99 THOUSAND/ΜL (ref 0.17–1.22)
MONOCYTES NFR BLD AUTO: 14 % (ref 4–12)
NEUTROPHILS # BLD AUTO: 4.15 THOUSANDS/ΜL (ref 1.85–7.62)
NEUTS SEG NFR BLD AUTO: 60 % (ref 43–75)
NRBC BLD AUTO-RTO: 0 /100 WBCS
PLATELET # BLD AUTO: 151 THOUSANDS/UL (ref 149–390)
PMV BLD AUTO: 10.7 FL (ref 8.9–12.7)
POTASSIUM SERPL-SCNC: 4.3 MMOL/L (ref 3.5–5.3)
RBC # BLD AUTO: 3.39 MILLION/UL (ref 3.88–5.62)
SODIUM SERPL-SCNC: 139 MMOL/L (ref 136–145)
WBC # BLD AUTO: 6.89 THOUSAND/UL (ref 4.31–10.16)

## 2020-03-24 PROCEDURE — 85025 COMPLETE CBC W/AUTO DIFF WBC: CPT | Performed by: GENERAL PRACTICE

## 2020-03-24 PROCEDURE — 82948 REAGENT STRIP/BLOOD GLUCOSE: CPT

## 2020-03-24 PROCEDURE — 99239 HOSP IP/OBS DSCHRG MGMT >30: CPT | Performed by: GENERAL PRACTICE

## 2020-03-24 PROCEDURE — 80048 BASIC METABOLIC PNL TOTAL CA: CPT | Performed by: GENERAL PRACTICE

## 2020-03-24 RX ADMIN — AMLODIPINE BESYLATE 5 MG: 5 TABLET ORAL at 08:56

## 2020-03-24 RX ADMIN — INSULIN LISPRO 3 UNITS: 100 INJECTION, SOLUTION INTRAVENOUS; SUBCUTANEOUS at 12:10

## 2020-03-24 RX ADMIN — METOPROLOL TARTRATE 25 MG: 25 TABLET, FILM COATED ORAL at 08:56

## 2020-03-24 RX ADMIN — MINERAL SUPPLEMENT IRON 300 MG / 5 ML STRENGTH LIQUID 100 PER BOX UNFLAVORED 300 MG: at 06:54

## 2020-03-24 RX ADMIN — INSULIN LISPRO 6 UNITS: 100 INJECTION, SOLUTION INTRAVENOUS; SUBCUTANEOUS at 00:16

## 2020-03-24 RX ADMIN — FUROSEMIDE 20 MG: 20 TABLET ORAL at 08:56

## 2020-03-24 RX ADMIN — HYDRALAZINE HYDROCHLORIDE 50 MG: 25 TABLET ORAL at 08:56

## 2020-03-24 RX ADMIN — LEVOTHYROXINE SODIUM 25 MCG: 25 TABLET ORAL at 06:54

## 2020-03-24 RX ADMIN — ISOSORBIDE DINITRATE 20 MG: 10 TABLET ORAL at 08:56

## 2020-03-24 RX ADMIN — ASPIRIN 81 MG 81 MG: 81 TABLET ORAL at 08:56

## 2020-03-24 RX ADMIN — INSULIN LISPRO 2 UNITS: 100 INJECTION, SOLUTION INTRAVENOUS; SUBCUTANEOUS at 07:03

## 2020-03-24 RX ADMIN — FOLIC ACID 1000 MCG: 1 TABLET ORAL at 08:56

## 2020-03-24 RX ADMIN — MULTIVITAMIN 15 ML: LIQUID ORAL at 08:56

## 2020-03-24 RX ADMIN — VALPROIC ACID 500 MG: 500 SOLUTION ORAL at 08:55

## 2020-03-24 RX ADMIN — CLOPIDOGREL BISULFATE 75 MG: 75 TABLET ORAL at 08:56

## 2020-03-24 NOTE — ASSESSMENT & PLAN NOTE
Continue to monitor  COVID-19 pending  He has a seroma left inguinal area with history of I believe hernia surgery  Reviewed some of his previous scan/ultrasound and it appears that it has not changed  His white cell count is up-leukocytosis has resolved-CT scan did not show any significant other abnormality other than seroma left inguinal area

## 2020-03-24 NOTE — ASSESSMENT & PLAN NOTE
Lab Results   Component Value Date    HGBA1C 6 9 (H) 02/07/2020     Recent Labs     03/23/20 2053 03/24/20  0015 03/24/20  0702 03/24/20  1125   POCGLU 286* 385* 197* 249*     · Accu-Cheks are getting better  Continue with current treatment including sliding scale    It is fairly controlled

## 2020-03-24 NOTE — PLAN OF CARE
Problem: Potential for Falls  Goal: Patient will remain free of falls  Description  INTERVENTIONS:  - Assess patient frequently for physical needs  -  Identify cognitive and physical deficits and behaviors that affect risk of falls    -  [place] fall precautions as indicated by assessment   - Educate patient/family on patient safety including physical limitations  - Instruct patient to call for assistance with activity based on assessment  - Modify environment to reduce risk of injury  - Consider OT/PT consult to assist with strengthening/mobility  Outcome: Progressing     Problem: Prexisting or High Potential for Compromised Skin Integrity  Goal: Skin integrity is maintained or improved  Description  INTERVENTIONS:  - Identify patients at risk for skin breakdown  - Assess and monitor skin integrity  - Assess and monitor nutrition and hydration status  - Monitor labs   - Assess for incontinence   - Turn and reposition patient  - Assist with mobility/ambulation  - Relieve pressure over bony prominences  - Avoid friction and shearing  - Provide appropriate hygiene as needed including keeping skin clean and dry  - Evaluate need for skin moisturizer/barrier cream  - Collaborate with interdisciplinary team   - Patient/family teaching  - Consider wound care consult   Outcome: Progressing     Problem: PAIN - ADULT  Goal: Verbalizes/displays adequate comfort level or baseline comfort level  Description  Interventions:  - Encourage patient to monitor pain and request assistance  - Assess pain using appropriate pain scale  - Administer analgesics based on type and severity of pain and evaluate response  - Implement non-pharmacological measures as appropriate and evaluate response  - Consider cultural and social influences on pain and pain management  - Notify physician/advanced practitioner if interventions unsuccessful or patient reports new pain  Outcome: Progressing     Problem: INFECTION - ADULT  Goal: Absence or prevention of progression during hospitalization  Description  INTERVENTIONS:  - Assess and monitor for signs and symptoms of infection  - Monitor lab/diagnostic results  - Monitor all insertion sites, i e  indwelling lines, tubes, and drains  - Monitor endotracheal if appropriate and nasal secretions for changes in amount and color  - [place] appropriate cooling/warming therapies per order  - Administer medications as ordered  - Instruct and encourage patient and family to use good hand hygiene technique  - Identify and instruct in appropriate isolation precautions for identified infection/condition  Outcome: Progressing     Problem: SAFETY ADULT  Goal: Patient will remain free of falls  Description  INTERVENTIONS:  - Assess patient frequently for physical needs  -  Identify cognitive and physical deficits and behaviors that affect risk of falls    -  [place] fall precautions as indicated by assessment   - Educate patient/family on patient safety including physical limitations  - Instruct patient to call for assistance with activity based on assessment  - Modify environment to reduce risk of injury  - Consider OT/PT consult to assist with strengthening/mobility  Outcome: Progressing  Goal: Maintain or return to baseline ADL function  Description  INTERVENTIONS:  -  Assess patient's ability to carry out ADLs; assess patient's baseline for ADL function and identify physical deficits which impact ability to perform ADLs (bathing, care of mouth/teeth, toileting, grooming, dressing, etc )  - Assess/evaluate cause of self-care deficits   - Assess range of motion  - Assess patient's mobility; develop plan if impaired  - Assess patient's need for assistive devices and provide as appropriate  - Encourage maximum independence but intervene and supervise when necessary  - Involve family in performance of ADLs  - Assess for home care needs following discharge   - Consider OT consult to assist with ADL evaluation and planning for discharge  - Provide patient education as appropriate  Outcome: Progressing  Goal: Maintain or return mobility status to optimal level  Description  INTERVENTIONS:  - Assess patient's baseline mobility status (ambulation, transfers, stairs, etc )    - Identify cognitive and physical deficits and behaviors that affect mobility  - Identify mobility aids required to assist with transfers and/or ambulation (gait belt, sit-to-stand, lift, walker, cane, etc )  - [place] fall precautions as indicated by assessment  - Record patient progress and toleration of activity level on Mobility SBAR; progress patient to next Phase/Stage  - Instruct patient to call for assistance with activity based on assessment  - Consider rehabilitation consult to assist with strengthening/weightbearing, etc   Outcome: Progressing     Problem: DISCHARGE PLANNING  Goal: Discharge to home or other facility with appropriate resources  Description  INTERVENTIONS:  - Identify barriers to discharge w/patient and caregiver  - Arrange for needed discharge resources and transportation as appropriate  - Identify discharge learning needs (meds, wound care, etc )  - Arrange for interpretive services to assist at discharge as needed  - Refer to Case Management Department for coordinating discharge planning if the patient needs post-hospital services based on physician/advanced practitioner order or complex needs related to functional status, cognitive ability, or social support system  Outcome: Progressing     Problem: Knowledge Deficit  Goal: Patient/family/caregiver demonstrates understanding of disease process, treatment plan, medications, and discharge instructions  Description  Complete learning assessment and assess knowledge base    Interventions:  - Provide teaching at level of understanding  - Provide teaching via preferred learning methods  Outcome: Progressing     Problem: NEUROSENSORY - ADULT  Goal: Achieves stable or improved neurological status  Description  INTERVENTIONS  - Monitor and report changes in neurological status  - Monitor vital signs such as temperature, blood pressure, glucose, and any other labs ordered   - Initiate measures to prevent increased intracranial pressure  - Monitor for seizure activity and implement precautions if appropriate      Outcome: Progressing  Goal: Achieves maximal functionality and self care  Description  INTERVENTIONS  - Monitor swallowing and airway patency with patient fatigue and changes in neurological status  - Encourage and assist patient to increase activity and self care     - Encourage visually impaired, hearing impaired and aphasic patients to use assistive/communication devices  Outcome: Progressing     Problem: RESPIRATORY - ADULT  Goal: Achieves optimal ventilation and oxygenation  Description  INTERVENTIONS:  - Assess for changes in respiratory status  - Assess for changes in mentation and behavior  - Position to facilitate oxygenation and minimize respiratory effort  - Oxygen administered by appropriate delivery if ordered  - Initiate smoking cessation education as indicated  - Encourage broncho-pulmonary hygiene including cough, deep breathe, Incentive Spirometry  - Assess the need for suctioning and aspirate as needed  - Assess and instruct to report SOB or any respiratory difficulty  - Respiratory Therapy support as indicated  Outcome: Progressing     Problem: METABOLIC, FLUID AND ELECTROLYTES - ADULT  Goal: Glucose maintained within target range  Description  INTERVENTIONS:  - Monitor Blood Glucose as ordered  - Assess for signs and symptoms of hyperglycemia and hypoglycemia  - Administer ordered medications to maintain glucose within target range  - Assess nutritional intake and initiate nutrition service referral as needed  Outcome: Progressing     Problem: SKIN/TISSUE INTEGRITY - ADULT  Goal: Skin integrity remains intact  Description  INTERVENTIONS  - Identify patients at risk for skin breakdown  - Assess and monitor skin integrity  - Assess and monitor nutrition and hydration status  - Monitor labs (i e  albumin)  - Assess for incontinence   - Turn and reposition patient  - Assist with mobility/ambulation  - Relieve pressure over bony prominences  - Avoid friction and shearing  - Provide appropriate hygiene as needed including keeping skin clean and dry  - Evaluate need for skin moisturizer/barrier cream  - Collaborate with interdisciplinary team (i e  Nutrition, Rehabilitation, etc )   - Patient/family teaching  Outcome: Progressing     Problem: Nutrition/Hydration-ADULT  Goal: Nutrient/Hydration intake appropriate for improving, restoring or maintaining nutritional needs  Description  Monitor and assess patient's nutrition/hydration status for malnutrition  Collaborate with interdisciplinary team and initiate plan and interventions as ordered  Monitor patient's weight and dietary intake as ordered or per policy  Utilize nutrition screening tool and intervene as necessary  Determine patient's food preferences and provide high-protein, high-caloric foods as appropriate       INTERVENTIONS:  - Monitor oral intake, urinary output, labs, and treatment plans  - Assess nutrition and hydration status and recommend course of action  - Evaluate amount of meals eaten  - Assist patient with eating if necessary   - Allow adequate time for meals  - Recommend/ encourage appropriate diets, oral nutritional supplements, and vitamin/mineral supplements  - Order, calculate, and assess calorie counts as needed  - Recommend, monitor, and adjust tube feedings and TPN/PPN based on assessed needs  - Assess need for intravenous fluids  - Provide specific nutrition/hydration education as appropriate  - Include patient/family/caregiver in decisions related to nutrition  Outcome: Progressing

## 2020-03-24 NOTE — SOCIAL WORK
CM s/w Magdiw Dany who reported PMR will  at 1430  CM notified [facility] and RN  CM notified [facility] via [address]  CM also s/w [place], David and confirmed  CM s/w pt's wife and she is in agreement to plan

## 2020-03-24 NOTE — TRANSPORTATION MEDICAL NECESSITY
Section I - General Information    Name of Patient: Alden Felty                 : 1940    Medicare #: [id number]  Transport Date: 20 (PCS is valid for round trips on this date and for all repetitive trips in the 60-day range as noted below )  Origin: [facility] [address]: [address]  [place] [address]  Is the pt's stay covered under Medicare Part A (PPS/DRG)   [x]     Closest appropriate facility? If no, why is transport to more distant facility required? Yes  If hospice pt, is this transport related to pt's terminal illness? NA       Section II - Medical Necessity Questionnaire  Ambulance transportation is medically necessary only if other means of transport are contraindicated or would be potentially harmful to the patient  To meet this requirement, the patient must either be "bed confined" or suffer from a condition such that transport by means other than ambulance is contraindicated by the patient's condition  The following questions must be answered by the medical professional signing below for this form to be valid:    1)  Describe the MEDICAL CONDITION (physical and/or mental) of this patient AT [address] [place] [address] that requires the patient to be transported in an ambulance and why transport by other means is contraindicated by the patient's condition: Pt has hx of dementia and is confused  Pt requires medical attendant  Pt is a max assist  Pt has peg tube  Pt is covid r/o and requires isolation precautions  2) Is the patient "bed confined" as defined below? Yes  To be "be confined" the patient must satisfy all three of the following conditions: (1) unable to get up from bed without Assistance; AND (2) unable to ambulate; AND (3) unable to sit in a chair or wheelchair      3) Can this patient safely be transported by car or wheelchair van (i e , seated during transport without a medical attendant or monitoring)? No    4) In addition to completing questions 1-3 above, please check any of the following conditions that apply*:   *Note: supporting documentation for any boxes checked must be maintained in the patient's medical records  If hosp-hosp transfer, describe services needed at 2nd facility not available at 1st facility? Patient is confused  Moderate/severe pain on movement   Medical attendant required   Special handling/isolation/infection control precautions required   Unable to tolerate seated position for time needed to transport       Section III - Signature of Physician or Healthcare Professional  I certify that the above information is true and correct based on my evaluation of this patient, and represent that the patient requires transport by ambulance and that other forms of transport are contraindicated  I understand that this information will be used by the Centers for Medicare and Medicaid Services (CMS) to support the determination of medical necessity for ambulance services, and I represent that I have personal knowledge of the patient's condition at time of transport  []  If this box is checked, I also certify that the patient is physically or mentally incapable of signing the ambulance service's claim and that the institution with which I am affiliated has furnished care, services, or assistance to the patient  My signature below is made on behalf of the patient pursuant to 42 CFR §424 36(b)(4)  In accordance with 42 CFR §424 37, the specific reason(s) that the patient is physically or mentally incapable of signing the claim form is as follows: n/a        Signature of Physician* or Healthcare Professional______________________________________________________________  Signature Date 03/24/20 (For scheduled repetitive transports, this form is not valid for transports performed more than 60 days after this date)    Printed Name & Credentials of Physician or Healthcare Professional (MD, DO, RN, etc )____NOLVIA Bates___________  *Form must be signed by patient's attending physician for scheduled, repetitive transports   For non-repetitive, unscheduled ambulance transports, if unable to obtain the signature of the attending physician, any of the following may sign (choose appropriate option below)  [] Physician Assistant []  Clinical Nurse Specialist []  Registered Nurse  []  Nurse Practitioner  [x] Discharge Planner

## 2020-03-24 NOTE — DISCHARGE SUMMARY
Discharge- Rose Burch 1940, 70 y o  male MRN: [id number]    Unit/Bed#: -01 Encounter: [id number]    Primary Care Provider: Luigi Hernandez MD   Date and time admitted to hospital: 3/21/2020  3:11 PM        CKD (chronic kidney disease) stage 3, GFR 30-59 ml/min ([facility])  Assessment & Plan  Chronic kidney disease stage 3  Creatinine slightly up  Continue to monitor  Results from last 7 days   Lab Units 03/24/20  0830 03/23/20  0659 03/22/20  0613 03/21/20  1602 03/18/20  0459   BUN mg/dL 13 14 11 10 7   CREATININE mg/dL 1 03 1 25 1 27 1 26 1 12       Acute febrile illness  Assessment & Plan  Continue to monitor  COVID-19 pending  He has a seroma left inguinal area with history of I believe hernia surgery  Reviewed some of his previous scan/ultrasound and it appears that it has not changed  His white cell count is up-leukocytosis has resolved-CT scan did not show any significant other abnormality other than seroma left inguinal area  History of non-ST elevation myocardial infarction (NSTEMI)  Assessment & Plan  Medical management    History of stroke  Assessment & Plan  · History of stroke on clopidogrel and atorvastatin  PT/OT when stable    Chronic diastolic heart failure (HCC)  Assessment & Plan  Wt Readings from Last 3 Encounters:   03/21/20 107 kg (236 lb 8 9 oz)   03/20/20 120 kg (264 lb 5 3 oz)   03/15/20 113 kg (250 lb)     · Chronic diastolic CHF and does not appear to be in any exacerbation  Weight is also stable  Continue to monitor and also continue with diuretics      Paroxysmal atrial flutter (HCC)  Assessment & Plan  Heart rate and rhythm currently better    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[facility])  Assessment & Plan  Lab Results   Component Value Date    HGBA1C 6 9 (H) 02/07/2020     Recent Labs     03/23/20 2053 03/24/20  0015 03/24/20  0702 03/24/20  1125   POCGLU 286* 385* 197* 249*     · Accu-Cheks are getting better    Continue with current treatment including sliding scale  It is fairly controlled    Benign essential hypertension  Assessment & Plan  Continue with home medications through PEG    * PEG tube malfunction [facility])  Assessment & Plan  PEG has been working fine now  He is tolerating current tube feeds          Discharging Physician / Practitioner: Bailey Cardoza MD  PCP: Ace Lewis MD  Admission Date:   Admission Orders (From admission, onward)     Ordered        03/22/20 1512  Inpatient Admission  Once         03/21/20 1859  Place in Observation  Once                   Discharge Date: 03/24/20    Resolved Problems  Date Reviewed: 3/24/2020    None          Consultations During Hospital Stay:  ·     Procedures Performed:   ·     Significant Findings / Test Results:   · Leucocytosis  · Peg tube malfunction    Incidental Findings:   ·      Test Results Pending at Discharge (will require follow up):   · COVID 19 test     Outpatient Tests Requested:  ·     Complications:      Reason for Admission:  Feeding tube problem    Hospital Course:     Ace Lee is a 70 y o  male who presents with peg tube dysfunction  The patient had prolonged hospitalization 3/9/2020 into 3/20/2020 for encephalopathy with NSTEMI and dysphagia  Medical management was agreed upon given comorbidities and PEG tube was placed  It was clogged this morning and when visiting nurses arrived noted that the patient had a 100 4 fever  Patient cannot provide any reliable history as he remains encephalopathic  There are no respiratory symptoms reported by wife but patient does have left-sided abdominal pain which prompted CT chest/abdomen/pelvis in the emergency department  CT of the chest abdomen and pelvis showed small bilateral pleural effusions no acute intra-abdominal abnormality and stable left inguinal fluid collection  [place] virus swab was obtained    He has not been febrile during his hospital course since March 21st   His white blood cell count is normal/ his feeding tube has been working properly  [place] with screen is pending at discharge          Please see above list of diagnoses and related plan for additional information  Condition at Discharge: stable     Discharge Day Visit / Exam:     Subjective:  No c/o  Vitals: Blood Pressure: 152/73 (03/24/20 0820)  Pulse: 80 (03/24/20 0820)  Temperature: 97 9 °F (36 6 °C) (03/24/20 0012)  Temp Source: Axillary (03/23/20 1540)  Respirations: 20 (03/24/20 0820)  Height: 5' 10 98" (180 3 cm) (03/21/20 2236)  Weight - Scale: 107 kg (236 lb 8 9 oz) (03/21/20 2236)  SpO2: 98 % (03/24/20 0820)  Exam:   Physical Exam   Constitutional: He appears well-developed and well-nourished  HENT:   Head: Normocephalic and atraumatic  Eyes: Pupils are equal, round, and reactive to light  Neck: Neck supple  Cardiovascular: Normal rate and regular rhythm  Pulmonary/Chest: Effort normal and breath sounds normal    Abdominal: Soft  Bowel sounds are normal    Musculoskeletal: He exhibits no edema  Neurological: He is alert  Skin: Skin is warm  Psychiatric: His behavior is normal        Discussion with Family:     Discharge instructions/Information to patient and family:   See after visit summary for information provided to patient and family  Provisions for Follow-Up Care:  See after visit summary for information related to follow-up care and any pertinent home health orders  Disposition:     Home with VNA Services (Reminder: Complete face to face encounter)    For Discharges to [facility] SNF:   · Not Applicable to this Patient - Not Applicable to this Patient    Planned Readmission:     Discharge Statement:  I spent 40 minutes discharging the patient  This time was spent on the day of discharge  I had direct contact with the patient on the day of discharge   Greater than 50% of the total time was spent examining patient, answering all patient questions, arranging and discussing plan of care with patient as well as directly providing post-discharge instructions  Additional time then spent on discharge activities  Discharge Medications:  See after visit summary for reconciled discharge medications provided to patient and family        ** Please Note: This note has been constructed using a voice recognition system **

## 2020-03-24 NOTE — NURSING NOTE
IV removed  Peg tube flushed with 60 cc of sterile water  Peg tube clamped for travel  Spoke with patients wife over the phone  Aware of discharge  All questions answered at this time  Vital signs stable   Transport team at bedside

## 2020-03-24 NOTE — PLAN OF CARE
Problem: Potential for Falls  Goal: Patient will remain free of falls  Description  INTERVENTIONS:  - Assess patient frequently for physical needs  -  Identify cognitive and physical deficits and behaviors that affect risk of falls    -  [place] fall precautions as indicated by assessment   - Educate patient/family on patient safety including physical limitations  - Instruct patient to call for assistance with activity based on assessment  - Modify environment to reduce risk of injury  - Consider OT/PT consult to assist with strengthening/mobility  Outcome: Progressing     Problem: Prexisting or High Potential for Compromised Skin Integrity  Goal: Skin integrity is maintained or improved  Description  INTERVENTIONS:  - Identify patients at risk for skin breakdown  - Assess and monitor skin integrity  - Assess and monitor nutrition and hydration status  - Monitor labs   - Assess for incontinence   - Turn and reposition patient  - Assist with mobility/ambulation  - Relieve pressure over bony prominences  - Avoid friction and shearing  - Provide appropriate hygiene as needed including keeping skin clean and dry  - Evaluate need for skin moisturizer/barrier cream  - Collaborate with interdisciplinary team   - Patient/family teaching  - Consider wound care consult   Outcome: Progressing     Problem: PAIN - ADULT  Goal: Verbalizes/displays adequate comfort level or baseline comfort level  Description  Interventions:  - Encourage patient to monitor pain and request assistance  - Assess pain using appropriate pain scale  - Administer analgesics based on type and severity of pain and evaluate response  - Implement non-pharmacological measures as appropriate and evaluate response  - Consider cultural and social influences on pain and pain management  - Notify physician/advanced practitioner if interventions unsuccessful or patient reports new pain  Outcome: Progressing     Problem: INFECTION - ADULT  Goal: Absence or prevention of progression during hospitalization  Description  INTERVENTIONS:  - Assess and monitor for signs and symptoms of infection  - Monitor lab/diagnostic results  - Monitor all insertion sites, i e  indwelling lines, tubes, and drains  - Monitor endotracheal if appropriate and nasal secretions for changes in amount and color  - [place] appropriate cooling/warming therapies per order  - Administer medications as ordered  - Instruct and encourage patient and family to use good hand hygiene technique  - Identify and instruct in appropriate isolation precautions for identified infection/condition  Outcome: Progressing     Problem: SAFETY ADULT  Goal: Patient will remain free of falls  Description  INTERVENTIONS:  - Assess patient frequently for physical needs  -  Identify cognitive and physical deficits and behaviors that affect risk of falls    -  [place] fall precautions as indicated by assessment   - Educate patient/family on patient safety including physical limitations  - Instruct patient to call for assistance with activity based on assessment  - Modify environment to reduce risk of injury  - Consider OT/PT consult to assist with strengthening/mobility  Outcome: Progressing  Goal: Maintain or return to baseline ADL function  Description  INTERVENTIONS:  -  Assess patient's ability to carry out ADLs; assess patient's baseline for ADL function and identify physical deficits which impact ability to perform ADLs (bathing, care of mouth/teeth, toileting, grooming, dressing, etc )  - Assess/evaluate cause of self-care deficits   - Assess range of motion  - Assess patient's mobility; develop plan if impaired  - Assess patient's need for assistive devices and provide as appropriate  - Encourage maximum independence but intervene and supervise when necessary  - Involve family in performance of ADLs  - Assess for home care needs following discharge   - Consider OT consult to assist with ADL evaluation and planning for discharge  - Provide patient education as appropriate  Outcome: Progressing  Goal: Maintain or return mobility status to optimal level  Description  INTERVENTIONS:  - Assess patient's baseline mobility status (ambulation, transfers, stairs, etc )    - Identify cognitive and physical deficits and behaviors that affect mobility  - Identify mobility aids required to assist with transfers and/or ambulation (gait belt, sit-to-stand, lift, walker, cane, etc )  - [place] fall precautions as indicated by assessment  - Record patient progress and toleration of activity level on Mobility SBAR; progress patient to next Phase/Stage  - Instruct patient to call for assistance with activity based on assessment  - Consider rehabilitation consult to assist with strengthening/weightbearing, etc   Outcome: Progressing     Problem: DISCHARGE PLANNING  Goal: Discharge to home or other facility with appropriate resources  Description  INTERVENTIONS:  - Identify barriers to discharge w/patient and caregiver  - Arrange for needed discharge resources and transportation as appropriate  - Identify discharge learning needs (meds, wound care, etc )  - Arrange for interpretive services to assist at discharge as needed  - Refer to Case Management Department for coordinating discharge planning if the patient needs post-hospital services based on physician/advanced practitioner order or complex needs related to functional status, cognitive ability, or social support system  Outcome: Progressing     Problem: Knowledge Deficit  Goal: Patient/family/caregiver demonstrates understanding of disease process, treatment plan, medications, and discharge instructions  Description  Complete learning assessment and assess knowledge base    Interventions:  - Provide teaching at level of understanding  - Provide teaching via preferred learning methods  Outcome: Progressing     Problem: NEUROSENSORY - ADULT  Goal: Achieves stable or improved neurological status  Description  INTERVENTIONS  - Monitor and report changes in neurological status  - Monitor vital signs such as temperature, blood pressure, glucose, and any other labs ordered   - Initiate measures to prevent increased intracranial pressure  - Monitor for seizure activity and implement precautions if appropriate      Outcome: Progressing  Goal: Achieves maximal functionality and self care  Description  INTERVENTIONS  - Monitor swallowing and airway patency with patient fatigue and changes in neurological status  - Encourage and assist patient to increase activity and self care     - Encourage visually impaired, hearing impaired and aphasic patients to use assistive/communication devices  Outcome: Progressing     Problem: RESPIRATORY - ADULT  Goal: Achieves optimal ventilation and oxygenation  Description  INTERVENTIONS:  - Assess for changes in respiratory status  - Assess for changes in mentation and behavior  - Position to facilitate oxygenation and minimize respiratory effort  - Oxygen administered by appropriate delivery if ordered  - Initiate smoking cessation education as indicated  - Encourage broncho-pulmonary hygiene including cough, deep breathe, Incentive Spirometry  - Assess the need for suctioning and aspirate as needed  - Assess and instruct to report SOB or any respiratory difficulty  - Respiratory Therapy support as indicated  Outcome: Progressing     Problem: METABOLIC, FLUID AND ELECTROLYTES - ADULT  Goal: Glucose maintained within target range  Description  INTERVENTIONS:  - Monitor Blood Glucose as ordered  - Assess for signs and symptoms of hyperglycemia and hypoglycemia  - Administer ordered medications to maintain glucose within target range  - Assess nutritional intake and initiate nutrition service referral as needed  Outcome: Progressing     Problem: SKIN/TISSUE INTEGRITY - ADULT  Goal: Skin integrity remains intact  Description  INTERVENTIONS  - Identify patients at risk for skin breakdown  - Assess and monitor skin integrity  - Assess and monitor nutrition and hydration status  - Monitor labs (i e  albumin)  - Assess for incontinence   - Turn and reposition patient  - Assist with mobility/ambulation  - Relieve pressure over bony prominences  - Avoid friction and shearing  - Provide appropriate hygiene as needed including keeping skin clean and dry  - Evaluate need for skin moisturizer/barrier cream  - Collaborate with interdisciplinary team (i e  Nutrition, Rehabilitation, etc )   - Patient/family teaching  Outcome: Progressing     Problem: Nutrition/Hydration-ADULT  Goal: Nutrient/Hydration intake appropriate for improving, restoring or maintaining nutritional needs  Description  Monitor and assess patient's nutrition/hydration status for malnutrition  Collaborate with interdisciplinary team and initiate plan and interventions as ordered  Monitor patient's weight and dietary intake as ordered or per policy  Utilize nutrition screening tool and intervene as necessary  Determine patient's food preferences and provide high-protein, high-caloric foods as appropriate       INTERVENTIONS:  - Monitor oral intake, urinary output, labs, and treatment plans  - Assess nutrition and hydration status and recommend course of action  - Evaluate amount of meals eaten  - Assist patient with eating if necessary   - Allow adequate time for meals  - Recommend/ encourage appropriate diets, oral nutritional supplements, and vitamin/mineral supplements  - Order, calculate, and assess calorie counts as needed  - Recommend, monitor, and adjust tube feedings and TPN/PPN based on assessed needs  - Assess need for intravenous fluids  - Provide specific nutrition/hydration education as appropriate  - Include patient/family/caregiver in decisions related to nutrition  Outcome: Progressing

## 2020-03-24 NOTE — ASSESSMENT & PLAN NOTE
Chronic kidney disease stage 3  Creatinine slightly up    Continue to monitor  Results from last 7 days   Lab Units 03/24/20  0830 03/23/20  0659 03/22/20  0613 03/21/20  1602 03/18/20  0459   BUN mg/dL 13 14 11 10 7   CREATININE mg/dL 1 03 1 25 1 27 1 26 1 12

## 2020-03-24 NOTE — SOCIAL WORK
Per [facility] tp clear for dc  CM s/w Alicja Whitaker to request transport  CM alerted David Sparks via ecin  CM awaiting transport time

## 2020-03-25 ENCOUNTER — PATIENT OUTREACH (OUTPATIENT)
Dept: CASE MANAGEMENT | Facility: OTHER | Age: 80
End: 2020-03-25

## 2020-03-25 NOTE — PROGRESS NOTES
First outreach attempt since discharge  I spoke with patient's wife who states patient is doing well  He is alert and cooperative with her  He has dementia and did have encephalopathy which did resolve per discharge note  She did call the PCP office because she is concerned that the Depakote sprinkles is not dissolving well enough to give via his peg tube  She is awaiting a return call  I advised her to ask if they could do a virtual visit for his transition of care appointment due to the covid 19 recommendations to self isolate  Visiting nurses are in place thru [facility]  Physical Therapy is also scheduled  She has no other issues or concerns at this time  She did consent to a call next week  I gave her my contact information should she have questions before the next outreach

## 2020-03-26 ENCOUNTER — PATIENT OUTREACH (OUTPATIENT)
Dept: CASE MANAGEMENT | Facility: OTHER | Age: 80
End: 2020-03-26

## 2020-03-26 NOTE — PROGRESS NOTES
Call to patient's spouse to follow up peg tube issue from yesterday  Visiting nurse yesterday called to ask if I had any suggestions for opening the peg tube  She had tried both flushing with warm water and carbonated beverage  The Depakote sprinkle was "clumping"  She also tried to milk the tubing  Spouse had placed a call to PCP office for a substitution for the Depakote in a liquid form  It eventually flushed yesterday and she had no issues giving it via the peg tube today  Spouse stated Annika Jassoo was doing well today  She has my contact information and will call if any further issues or concerns

## 2020-03-27 LAB
BACTERIA BLD CULT: NORMAL
BACTERIA BLD CULT: NORMAL

## 2020-03-28 LAB — SARS-COV-2 RNA SPEC QL NAA+PROBE: NOT DETECTED

## 2020-03-30 ENCOUNTER — TELEPHONE (OUTPATIENT)
Dept: OTHER | Facility: OTHER | Age: 80
End: 2020-03-30

## 2020-03-30 RX ORDER — FERROUS SULFATE 325(65) MG
TABLET ORAL DAILY
COMMUNITY

## 2020-03-30 RX ORDER — BRIMONIDINE TARTRATE 2 MG/ML
SOLUTION/ DROPS OPHTHALMIC
COMMUNITY

## 2020-03-30 RX ORDER — SIMVASTATIN 20 MG
TABLET ORAL
COMMUNITY

## 2020-03-30 RX ORDER — HYDROCODONE BITARTRATE AND ACETAMINOPHEN 5; 300 MG/1; MG/1
TABLET ORAL
COMMUNITY

## 2020-03-30 RX ORDER — AMOXICILLIN 500 MG/1
CAPSULE ORAL
COMMUNITY

## 2020-03-31 NOTE — TELEPHONE ENCOUNTER
Kezia Griffin no we do not! If he needs we can get him set up for a tube change? Let me know how I can help Thanks

## 2020-03-31 NOTE — TELEPHONE ENCOUNTER
Valentine from [facility],calling to notify that pt's peg tube is blocked and has been blocking since it was put in  Please call and follow up with Valentine   Thanks

## 2020-03-31 NOTE — TELEPHONE ENCOUNTER
Dr Soraida Olsen - Patient's wife Himanshu Pardo called  Patient's Tube is blocked  Patient has not had his medication, nor any food since 8pm on 03/30/20  Himanshu Pardo is in a state of panic  Please call Valentine at [phone]  ty  Patient has OV 04/01/20

## 2020-03-31 NOTE — TELEPHONE ENCOUNTER
Sarah Saleh     Already spoke with Rachel Silverio and has a virtual with Dr Blessing Momin tomorrow

## 2020-03-31 NOTE — TELEPHONE ENCOUNTER
Son Shruti Nam state visiting nurse unable to unblock tube last night  He stated another nurse (Therese) on her way at this time for second attempt  Made son aware if unsuccessful to please call office for arrangement to have a new tube placed  Message relayed to Mathieu Waldron PA-C to make aware of update  Son states they would prefer not to go to ED during this pandemic

## 2020-03-31 NOTE — TELEPHONE ENCOUNTER
Ritu Akins [facility] - Residential Visiting Nurse called regarding patient   Please call Therese at [phone] ty

## 2020-04-01 ENCOUNTER — TELEMEDICINE (OUTPATIENT)
Dept: GASTROENTEROLOGY | Facility: CLINIC | Age: 80
End: 2020-04-01
Payer: MEDICARE

## 2020-04-01 ENCOUNTER — TELEPHONE (OUTPATIENT)
Dept: GASTROENTEROLOGY | Facility: CLINIC | Age: 80
End: 2020-04-01

## 2020-04-01 DIAGNOSIS — R13.11 ORAL PHASE DYSPHAGIA: Primary | ICD-10-CM

## 2020-04-01 PROCEDURE — 99214 OFFICE O/P EST MOD 30 MIN: CPT | Performed by: INTERNAL MEDICINE

## 2020-04-06 ENCOUNTER — TELEPHONE (OUTPATIENT)
Dept: GASTROENTEROLOGY | Facility: CLINIC | Age: 80
End: 2020-04-06

## 2020-05-14 ENCOUNTER — PATIENT OUTREACH (OUTPATIENT)
Dept: CASE MANAGEMENT | Facility: OTHER | Age: 80
End: 2020-05-14

## 2020-05-15 ENCOUNTER — PATIENT OUTREACH (OUTPATIENT)
Dept: CASE MANAGEMENT | Facility: OTHER | Age: 80
End: 2020-05-15

## 2020-05-18 DIAGNOSIS — E83.9 CHRONIC KIDNEY DISEASE-MINERAL AND BONE DISORDER: ICD-10-CM

## 2020-05-18 DIAGNOSIS — N18.30 CHRONIC RENAL INSUFFICIENCY, STAGE III (MODERATE) (HCC): Primary | ICD-10-CM

## 2020-05-18 DIAGNOSIS — N18.9 CHRONIC KIDNEY DISEASE-MINERAL AND BONE DISORDER: ICD-10-CM

## 2020-05-18 DIAGNOSIS — M89.9 CHRONIC KIDNEY DISEASE-MINERAL AND BONE DISORDER: ICD-10-CM

## 2020-05-26 ENCOUNTER — TELEPHONE (OUTPATIENT)
Dept: GASTROENTEROLOGY | Facility: CLINIC | Age: 80
End: 2020-05-26

## 2020-06-02 RX ORDER — POTASSIUM CHLORIDE 20 MEQ/1
20 TABLET, EXTENDED RELEASE ORAL DAILY
COMMUNITY
Start: 2020-04-02

## 2020-06-02 RX ORDER — VALPROIC ACID 250 MG/5ML
SOLUTION ORAL
COMMUNITY
Start: 2020-03-25

## 2020-06-05 ENCOUNTER — OFFICE VISIT (OUTPATIENT)
Dept: GASTROENTEROLOGY | Facility: CLINIC | Age: 80
End: 2020-06-05
Payer: MEDICARE

## 2020-06-05 VITALS — DIASTOLIC BLOOD PRESSURE: 78 MMHG | SYSTOLIC BLOOD PRESSURE: 142 MMHG | HEART RATE: 78 BPM

## 2020-06-05 DIAGNOSIS — R13.19 ESOPHAGEAL DYSPHAGIA: Primary | ICD-10-CM

## 2020-06-05 PROCEDURE — 99214 OFFICE O/P EST MOD 30 MIN: CPT | Performed by: INTERNAL MEDICINE

## 2020-06-05 RX ORDER — INSULIN GLARGINE 100 [IU]/ML
INJECTION, SOLUTION SUBCUTANEOUS
COMMUNITY
Start: 2020-04-19

## 2020-06-17 ENCOUNTER — EPISODE CHANGES (OUTPATIENT)
Dept: CASE MANAGEMENT | Facility: OTHER | Age: 80
End: 2020-06-17

## 2020-06-19 ENCOUNTER — TELEPHONE (OUTPATIENT)
Dept: NEPHROLOGY | Facility: CLINIC | Age: 80
End: 2020-06-19

## 2022-01-28 NOTE — ASSESSMENT & PLAN NOTE
History of CVA  Patient without significant deficit  Continue aspirin, Plavix  PT OT eval noted for rehab  Patient requesting him to come home on hospice with residual home hospice after PEG tube placement per CM  warm and dry/color normal detailed exam